# Patient Record
Sex: FEMALE | Race: WHITE | Employment: OTHER | URBAN - METROPOLITAN AREA
[De-identification: names, ages, dates, MRNs, and addresses within clinical notes are randomized per-mention and may not be internally consistent; named-entity substitution may affect disease eponyms.]

---

## 2017-10-31 ENCOUNTER — PAT TELEPHONE (OUTPATIENT)
Dept: PREADMISSION TESTING | Age: 72
End: 2017-10-31

## 2017-10-31 VITALS — BODY MASS INDEX: 30.84 KG/M2 | HEIGHT: 59 IN | WEIGHT: 153 LBS

## 2017-10-31 RX ORDER — PANTOPRAZOLE SODIUM 40 MG/1
20 GRANULE, DELAYED RELEASE ORAL
COMMUNITY
End: 2018-02-19 | Stop reason: CLARIF

## 2017-10-31 RX ORDER — LISINOPRIL AND HYDROCHLOROTHIAZIDE 20; 12.5 MG/1; MG/1
1 TABLET ORAL DAILY
COMMUNITY
End: 2018-02-19 | Stop reason: SDUPTHER

## 2017-10-31 RX ORDER — ATORVASTATIN CALCIUM 20 MG/1
20 TABLET, FILM COATED ORAL DAILY
COMMUNITY
End: 2019-10-29 | Stop reason: SDUPTHER

## 2017-10-31 NOTE — PROGRESS NOTES
them.     If you have a living will and a durable power of  for healthcare, please bring in a copy. As part of our patient safety program to minimize surgical site infections, we ask you to do the following:    · Please notify your surgeon if you develop any illness between         now and the  day of your surgery. · This includes a cough, cold, fever, sore throat, nausea,         or vomiting, and diarrhea, etc.  ·  Please notify your surgeon if you experience dizziness, shortness         of breath or blurred vision between now and the time of your surgery. You may shower the night before surgery or the morning of   your surgery with an antibacterial soap. You will need to bring a photo ID and insurance card    [facility] has an onsite pharmacy, would you like to utilize our pharmacy     If you will be staying overnight and use a C-pap machine, please bring   your C-pap to hospital     Our goal is to provide you with excellent care, therefore, visitors will be limited to two(2) in the room at a time so that we may focus on providing this care for you. Please contact pre-admission testing if you have any further questions. [facility] phone number:  [phone]  Please note these are generalized instructions for all surgical cases, you may be provided with more specific instructions according to your surgery.

## 2017-11-07 RX ORDER — SODIUM CHLORIDE 9 MG/ML
INJECTION, SOLUTION INTRAVENOUS CONTINUOUS
Status: CANCELLED | OUTPATIENT
Start: 2017-11-07

## 2017-11-07 RX ORDER — SODIUM CHLORIDE 0.9 % (FLUSH) 0.9 %
10 SYRINGE (ML) INJECTION PRN
Status: CANCELLED | OUTPATIENT
Start: 2017-11-07

## 2017-11-07 RX ORDER — SODIUM CHLORIDE 0.9 % (FLUSH) 0.9 %
10 SYRINGE (ML) INJECTION EVERY 12 HOURS SCHEDULED
Status: CANCELLED | OUTPATIENT
Start: 2017-11-07

## 2017-11-07 ASSESSMENT — LIFESTYLE VARIABLES: SMOKING_STATUS: 0

## 2017-11-07 NOTE — ANESTHESIA PRE-OP
20-12.5 MG per tablet Take 1 tablet by mouth daily      levothyroxine (SYNTHROID) 25 MCG tablet Take 25 mcg by mouth daily. No current facility-administered medications for this encounter. Vital Signs (Current) There were no vitals filed for this visit. Vital Signs Statistics (for past 48 hrs)     No Data Recorded    BP Readings from Last 3 Encounters:   09/02/10 115/66     BMI  There is no height or weight on file to calculate BMI. Estimated body mass index is 30.9 kg/m² as calculated from the following:    Height as of 10/31/17: 4' 11\" (1.499 m). Weight as of 10/31/17: 153 lb (69.4 kg). CBC   Lab Results   Component Value Date    WBC 5.9 09/02/2010    RBC 4.69 09/02/2010    HGB 14.5 09/02/2010    HCT 43.6 09/02/2010    MCV 93.0 09/02/2010    RDW 13.2 09/02/2010     09/02/2010     CMP    Lab Results   Component Value Date     09/02/2010    K 3.6 09/02/2010     09/02/2010    CO2 31 09/02/2010    BUN 19 09/02/2010    CREATININE 0.8 09/02/2010    GFRAA >60 09/02/2010    GLUCOSE 99 09/02/2010    CALCIUM 9.6 09/02/2010     BMP    Lab Results   Component Value Date     09/02/2010    K 3.6 09/02/2010     09/02/2010    CO2 31 09/02/2010    BUN 19 09/02/2010    CREATININE 0.8 09/02/2010    CALCIUM 9.6 09/02/2010    GFRAA >60 09/02/2010    GLUCOSE 99 09/02/2010     POCGlucose  No results for input(s): GLUCOSE in the last 72 hours. Coags  No results found for: PROTIME, INR, APTT  HCG (If Applicable) No results found for: PREGTESTUR, PREGSERUM, HCG, HCGQUANT   ABGs No results found for: PHART, PO2ART, STB3VIV, TXZ8OQO, BEART, A2DGTUAD   Type & Screen (If Applicable)  No results found for: [facility]  Surgeon:    Procedure:    Medications prior to admission:   Prior to Admission medications    Medication Sig Start Date End Date Taking?  Authorizing Provider   atorvastatin (LIPITOR) 20 MG tablet Take 20 mg by mouth daily    Historical Provider, MD   metFORMIN (GLUCOPHAGE) 500 MG tablet Take 500 mg by mouth 2 times daily (with meals)    Historical Provider, MD   pantoprazole sodium (PROTONIX) 40 MG PACK packet Take 20 mg by mouth every morning (before breakfast)    Historical Provider, MD   lisinopril-hydrochlorothiazide (PRINZIDE;ZESTORETIC) 20-12.5 MG per tablet Take 1 tablet by mouth daily    Historical Provider, MD   levothyroxine (SYNTHROID) 25 MCG tablet Take 25 mcg by mouth daily. Historical Provider, MD       Current medications:    Current Outpatient Prescriptions   Medication Sig Dispense Refill    atorvastatin (LIPITOR) 20 MG tablet Take 20 mg by mouth daily      metFORMIN (GLUCOPHAGE) 500 MG tablet Take 500 mg by mouth 2 times daily (with meals)      pantoprazole sodium (PROTONIX) 40 MG PACK packet Take 20 mg by mouth every morning (before breakfast)      lisinopril-hydrochlorothiazide (PRINZIDE;ZESTORETIC) 20-12.5 MG per tablet Take 1 tablet by mouth daily      levothyroxine (SYNTHROID) 25 MCG tablet Take 25 mcg by mouth daily. No current facility-administered medications for this encounter. Allergies:  No Known Allergies    Problem List:  There is no problem list on file for this patient. Past Medical History:        Diagnosis Date    Diabetes mellitus ([address]     GERD (gastroesophageal reflux disease)     Hyperlipidemia     Hypertension     Thyroid disease        Past Surgical History:        Procedure Laterality Date    EYE SURGERY      left eye       Social History:    Social History   Substance Use Topics    Smoking status: Never Smoker    Smokeless tobacco: Never Used    Alcohol use No                                Counseling given: Not Answered      Vital Signs (Current): There were no vitals filed for this visit.                                            BP Readings from Last 3 Encounters:   09/02/10 115/66       NPO Status:                                                                                 BMI:   Wt Readings from

## 2017-11-08 ENCOUNTER — HOSPITAL ENCOUNTER (OUTPATIENT)
Dept: ENDOSCOPY | Age: 72
Discharge: HOME | End: 2017-11-08
Attending: INTERNAL MEDICINE | Admitting: INTERNAL MEDICINE

## 2018-02-19 ENCOUNTER — OFFICE VISIT (OUTPATIENT)
Dept: FAMILY MEDICINE CLINIC | Age: 73
End: 2018-02-19

## 2018-02-19 VITALS
RESPIRATION RATE: 16 BRPM | HEIGHT: 59 IN | DIASTOLIC BLOOD PRESSURE: 80 MMHG | BODY MASS INDEX: 32.34 KG/M2 | TEMPERATURE: 98.8 F | WEIGHT: 160.4 LBS | OXYGEN SATURATION: 96 % | HEART RATE: 78 BPM | SYSTOLIC BLOOD PRESSURE: 158 MMHG

## 2018-02-19 DIAGNOSIS — Z23 NEED FOR SHINGLES VACCINE: ICD-10-CM

## 2018-02-19 DIAGNOSIS — E03.9 HYPOTHYROIDISM, UNSPECIFIED TYPE: ICD-10-CM

## 2018-02-19 DIAGNOSIS — E11.9 TYPE 2 DIABETES MELLITUS WITHOUT COMPLICATION, WITHOUT LONG-TERM CURRENT USE OF INSULIN (HCC): Primary | ICD-10-CM

## 2018-02-19 DIAGNOSIS — I10 HYPERTENSION, UNSPECIFIED TYPE: ICD-10-CM

## 2018-02-19 DIAGNOSIS — Z12.11 SCREENING FOR COLON CANCER: ICD-10-CM

## 2018-02-19 DIAGNOSIS — Z23 NEED FOR PNEUMOCOCCAL VACCINATION: ICD-10-CM

## 2018-02-19 DIAGNOSIS — E78.5 HYPERLIPIDEMIA, UNSPECIFIED HYPERLIPIDEMIA TYPE: ICD-10-CM

## 2018-02-19 DIAGNOSIS — K21.9 GASTRO-ESOPHAGEAL REFLUX DISEASE WITHOUT ESOPHAGITIS: ICD-10-CM

## 2018-02-19 DIAGNOSIS — R39.89 POSSIBLE URINARY TRACT INFECTION: ICD-10-CM

## 2018-02-19 LAB
BILIRUBIN, POC: ABNORMAL
BLOOD URINE, POC: ABNORMAL
CLARITY, POC: ABNORMAL
COLOR, POC: ABNORMAL
CREATININE URINE: 115.8 MG/DL (ref 28–259)
GLUCOSE URINE, POC: ABNORMAL
HBA1C MFR BLD: 7.3 %
KETONES, POC: ABNORMAL
LEUKOCYTE EST, POC: ABNORMAL
MICROALBUMIN UR-MCNC: 2.1 MG/DL
MICROALBUMIN/CREAT UR-RTO: 18.1 MG/G (ref 0–30)
NITRITE, POC: ABNORMAL
PH, POC: 6
PROTEIN, POC: ABNORMAL
SPECIFIC GRAVITY, POC: 1.02
UROBILINOGEN, POC: ABNORMAL

## 2018-02-19 PROCEDURE — 99203 OFFICE O/P NEW LOW 30 MIN: CPT | Performed by: FAMILY MEDICINE

## 2018-02-19 PROCEDURE — G0009 ADMIN PNEUMOCOCCAL VACCINE: HCPCS | Performed by: FAMILY MEDICINE

## 2018-02-19 PROCEDURE — 83036 HEMOGLOBIN GLYCOSYLATED A1C: CPT | Performed by: FAMILY MEDICINE

## 2018-02-19 PROCEDURE — 90732 PPSV23 VACC 2 YRS+ SUBQ/IM: CPT | Performed by: FAMILY MEDICINE

## 2018-02-19 PROCEDURE — 81002 URINALYSIS NONAUTO W/O SCOPE: CPT | Performed by: FAMILY MEDICINE

## 2018-02-19 RX ORDER — LISINOPRIL AND HYDROCHLOROTHIAZIDE 20; 12.5 MG/1; MG/1
1 TABLET ORAL DAILY
Qty: 90 TABLET | Refills: 1 | Status: SHIPPED | OUTPATIENT
Start: 2018-02-19 | End: 2018-07-19 | Stop reason: SDUPTHER

## 2018-02-19 RX ORDER — PANTOPRAZOLE SODIUM 40 MG/1
40 TABLET, DELAYED RELEASE ORAL DAILY
Qty: 90 TABLET | Refills: 1 | Status: SHIPPED | OUTPATIENT
Start: 2018-02-19 | End: 2018-11-06 | Stop reason: SDUPTHER

## 2018-02-19 RX ORDER — LEVOTHYROXINE SODIUM 0.03 MG/1
25 TABLET ORAL DAILY
Qty: 90 TABLET | Refills: 1 | Status: SHIPPED | OUTPATIENT
Start: 2018-02-19 | End: 2018-07-19 | Stop reason: SDUPTHER

## 2018-02-19 RX ORDER — PANTOPRAZOLE SODIUM 40 MG/1
20 GRANULE, DELAYED RELEASE ORAL
Qty: 30 EACH | Status: CANCELLED | OUTPATIENT
Start: 2018-02-19

## 2018-02-19 ASSESSMENT — ENCOUNTER SYMPTOMS
WHEEZING: 0
NAUSEA: 0
VOMITING: 0
SHORTNESS OF BREATH: 0
CHEST TIGHTNESS: 0
COUGH: 0
EYE PAIN: 0
EYE REDNESS: 0
TROUBLE SWALLOWING: 0
DIARRHEA: 0
SORE THROAT: 0
ANAL BLEEDING: 0
BLOOD IN STOOL: 0
ABDOMINAL PAIN: 0
CONSTIPATION: 0

## 2018-02-19 ASSESSMENT — PATIENT HEALTH QUESTIONNAIRE - PHQ9
2. FEELING DOWN, DEPRESSED OR HOPELESS: 0
SUM OF ALL RESPONSES TO PHQ9 QUESTIONS 1 & 2: 0
SUM OF ALL RESPONSES TO PHQ QUESTIONS 1-9: 0
1. LITTLE INTEREST OR PLEASURE IN DOING THINGS: 0

## 2018-02-19 NOTE — PROGRESS NOTES
[facility] Medicine  Clinic Note    Date: 2/19/2018                                               Subjective:     Chief Complaint   Patient presents with    New Patient     NEW PT  MEDS FOR DM , HTN     HPI  Ran out of Zestoretic, Synthroid, Pantroparzole about 1 month ago. HTN x since age 48. Diabetes x 2-3 years, , old chart looks like 6133, no known complications. Numbness in feet on and off. Normal eye exam Aug 2017. Last labs done couple months ago, does not know results. Unable to tolerate colon prep, agreeable to getting FIT cards. Got flu shot few months ago, does not recall pneumonia vaccine. Last mammogram and DEXA 2 years ago, will be scanned into chart.    Had exercise stress echo  test in Aug 2017, normal.          Patient Active Problem List    Diagnosis Date Noted    Type 2 diabetes mellitus ([facility] [address] 02/19/2018    Hyperlipemia 02/19/2018    Hypertension 02/19/2018    Hypothyroidism 02/19/2018    Gastro-esophageal reflux disease without esophagitis 09/07/2016     Past Medical History:   Diagnosis Date    Diabetes mellitus ([facility] [address]     GERD (gastroesophageal reflux disease)     Hyperlipidemia     Hypertension     Hypothyroidism     Thyroid disease      Past Surgical History:   Procedure Laterality Date    EYE SURGERY Left     \"pin under eye\"     Admission on 09/02/2010, Discharged on 09/02/2010   Component Date Value Ref Range Status    WBC 09/02/2010 5.9  4.0 - 11.0 X 1000 Final    RBC 09/02/2010 4.69  4.0 - 5.2 X(10)6 Final    Hemoglobin 09/02/2010 14.5  12.0 - 16.0 gm/dl Final    Hematocrit 09/02/2010 43.6  36.0 - 48.0 % Final    MCV 09/02/2010 93.0  80 - 100 fl Final    MCH 09/02/2010 30.9  26 - 34 pg Final    MCHC 09/02/2010 33.2  31 - 36 gm/dl Final    RDW 09/02/2010 13.2  11.5 - 14.5 % Final    Platelets 73/84/6600 157  135 - 450 X(10)3 Final    MPV 09/02/2010 8.4  5.0 - 10.5 fl Final    Segs Relative 09/02/2010 62.8  42.0 - 63.0 % Final    Lymphocytes % 09/02/2010 28.0  25.0 - 40.0 % Final    Monocytes % 09/02/2010 6.6  SEE BELOW % Final    Comment: 0.0-12.0                           Effective 8/11/10                           New ref range.  Eosinophils % 09/02/2010 2.2  0.0 - 5.0 % Final    Basophils % 09/02/2010 0.4  0.0 - 2.0 % Final    GRANULOCYTE ABSOLUTE COUNT 09/02/2010 3.7  1.7 - 7.7 X(10)3 Final    Lymphocytes # 09/02/2010 1.6  1.0 - 5.1 X(10)3 Final    Monocytes # 09/02/2010 0.4  SEE BELOW X(10)3 Final    Comment: 0.0-1.3                           Effective 8/11/10                           New ref range.  Eosinophils # 09/02/2010 0.1  0.0 - 0.6 X(10)3 Final    Basophils # 09/02/2010 0.0  0.0 - 0.2 X(10)3 Final    Differential Type 09/02/2010 Auto   Final    Sodium 09/02/2010 141  136 - 145 mEq/L Final    Potassium 09/02/2010 3.6  3.5 - 5.1 mEq/L Final    Chloride 09/02/2010 107  99 - 110 mEq/L Final    CO2 09/02/2010 31  21 - 32 mEq/L Final    Glucose 09/02/2010 99  70 - 99 mg/dl Final    BUN 09/02/2010 19* 7 - 18 mg/dl Final    CREATININE 09/02/2010 0.8  0.6 - 1.2 mg/dl Final    Calcium 09/02/2010 9.6  8.3 - 10.6 mg/dl Final    GFR, Estimated 09/02/2010 >60  >60 mL/min/1.73m2 Final    GFR Est, /Amer 09/02/2010 >60  SEE BELOW Final    Comment: >60 mL/min/1.73m2                           EGFR, calc. for ages 25                           & older using the MDRD                           formula (not corrected                           for weight),is valid for                           stable renal function.  Color, UA 09/02/2010 YELLOW  Yellow Final    Clarity, UA 09/02/2010 CLEAR  Clear Final    Glucose, UA 09/02/2010 NEGATIVE  Neg Final    Bilirubin, Urine 09/02/2010 NEGATIVE  Neg Final    Ketones, Urine 09/02/2010 NEGATIVE  Neg Final    Specific Gravity, UA 09/02/2010 1.010  SEE BELOW Final    Comment: 1.005-1.030                           Effective 8/11/10                           New ref range.     Blood, Urine 09/02/2010 NEGATIVE  Neg Final    pH, UA 09/02/2010 7.5  4.5 - 8.0 Final    Protein, UA 09/02/2010 NEGATIVE  Neg Final    Urobilinogen, Urine 09/02/2010 0.2  <2.0 EU/dl Final    Nitrite, Urine 09/02/2010 NEGATIVE  Neg Final    Leukocyte Esterase, Urine 09/02/2010 NEGATIVE  Neg Final     Family History   Problem Relation Age of Onset    High Cholesterol Mother     Stroke Mother     Heart Disease Father     Heart Attack Father      Current Outpatient Prescriptions   Medication Sig Dispense Refill    lisinopril-hydrochlorothiazide (PRINZIDE;ZESTORETIC) 20-12.5 MG per tablet Take 1 tablet by mouth daily 90 tablet 1    pantoprazole (PROTONIX) 40 MG tablet Take 1 tablet by mouth daily 90 tablet 1    levothyroxine (SYNTHROID) 25 MCG tablet Take 1 tablet by mouth daily 90 tablet 1    zoster recombinant adjuvanted vaccine (SHINGRIX) 50 MCG SUSR injection Inject 0.5 mLs into the muscle once for 1 dose 0.5 mL 0    atorvastatin (LIPITOR) 20 MG tablet Take 20 mg by mouth daily      metFORMIN (GLUCOPHAGE) 500 MG tablet Take 500 mg by mouth 2 times daily (with meals)       No current facility-administered medications for this visit. No Known Allergies    Review of Systems   Constitutional: Negative for chills, diaphoresis and fever. HENT: Negative for ear pain, mouth sores, sore throat and trouble swallowing. Eyes: Negative for pain, redness and visual disturbance. Respiratory: Negative for cough, chest tightness, shortness of breath and wheezing. Cardiovascular: Negative for chest pain, palpitations and leg swelling. Gastrointestinal: Negative for abdominal pain, anal bleeding, blood in stool, constipation, diarrhea, nausea and vomiting. Endocrine: Negative for polydipsia and polyuria. Genitourinary: Negative for decreased urine volume, difficulty urinating, dysuria and hematuria. Musculoskeletal: Negative for arthralgias, joint swelling and myalgias. Skin: Negative for rash and wound. Allergic/Immunologic: Negative for environmental allergies. Neurological: Negative for seizures, syncope and facial asymmetry. Hematological: Does not bruise/bleed easily. Psychiatric/Behavioral: Negative for agitation and dysphoric mood. The patient is not nervous/anxious. Objective:  BP (!) 158/80 (Site: Right Arm, Position: Sitting, Cuff Size: Medium Adult)   Pulse 78   Temp 98.8 °F (37.1 °C) (Oral)   Resp 16   Ht 4' 11\" (1.499 m) Comment: with shoes  Wt 160 lb 6.4 oz (72.8 kg) Comment: WITH SHOES  SpO2 96%   Breastfeeding? No   BMI 32.40 kg/m²     Physical Exam   Constitutional: She is oriented to person, place, and time. She appears well-developed and well-nourished. She is cooperative. HENT:   Head: Normocephalic and atraumatic. Right Ear: Hearing, tympanic membrane, external ear and ear canal normal.   Left Ear: Hearing, tympanic membrane, external ear and ear canal normal.   Mouth/Throat: Uvula is midline and mucous membranes are normal.   Eyes: Conjunctivae, EOM and lids are normal.   Neck: Neck supple. Cardiovascular: Normal rate and regular rhythm. Pulmonary/Chest: Effort normal and breath sounds normal. No respiratory distress. She has no wheezes. She has no rales. Abdominal: Soft. Normal appearance. She exhibits no distension. Musculoskeletal:        Right ankle: She exhibits no swelling. Left ankle: She exhibits no swelling. Neurological: She is alert and oriented to person, place, and time. Skin: Skin is warm and dry. Psychiatric: She has a normal mood and affect. Her speech is normal and behavior is normal. Cognition and memory are impaired.      Visual inspection:  Deformity/amputation: absent  Skin lesions/pre-ulcerative calluses: absent  Edema: right- negative, left- negative    Sensory exam:  Monofilament sensation: normal  (minimum of 5 random plantar locations tested, avoiding callused areas - > 1 area with absence of sensation is + for

## 2018-02-21 LAB — URINE CULTURE, ROUTINE: NORMAL

## 2018-07-19 ENCOUNTER — OFFICE VISIT (OUTPATIENT)
Dept: FAMILY MEDICINE CLINIC | Age: 73
End: 2018-07-19

## 2018-07-19 VITALS
RESPIRATION RATE: 16 BRPM | HEART RATE: 84 BPM | TEMPERATURE: 98.4 F | OXYGEN SATURATION: 95 % | BODY MASS INDEX: 30.88 KG/M2 | SYSTOLIC BLOOD PRESSURE: 134 MMHG | WEIGHT: 153.2 LBS | HEIGHT: 59 IN | DIASTOLIC BLOOD PRESSURE: 70 MMHG

## 2018-07-19 DIAGNOSIS — E78.5 HYPERLIPIDEMIA, UNSPECIFIED HYPERLIPIDEMIA TYPE: ICD-10-CM

## 2018-07-19 DIAGNOSIS — I10 HYPERTENSION, UNSPECIFIED TYPE: ICD-10-CM

## 2018-07-19 DIAGNOSIS — E03.9 HYPOTHYROIDISM, UNSPECIFIED TYPE: ICD-10-CM

## 2018-07-19 DIAGNOSIS — E11.9 TYPE 2 DIABETES MELLITUS WITHOUT COMPLICATION, WITHOUT LONG-TERM CURRENT USE OF INSULIN (HCC): ICD-10-CM

## 2018-07-19 DIAGNOSIS — I10 ESSENTIAL HYPERTENSION: Primary | ICD-10-CM

## 2018-07-19 DIAGNOSIS — R20.2 PARESTHESIAS: ICD-10-CM

## 2018-07-19 LAB
A/G RATIO: 1.5 (ref 1.1–2.2)
ALBUMIN SERPL-MCNC: 4 G/DL (ref 3.4–5)
ALP BLD-CCNC: 109 U/L (ref 40–129)
ALT SERPL-CCNC: 16 U/L (ref 10–40)
ANION GAP SERPL CALCULATED.3IONS-SCNC: 10 MMOL/L (ref 3–16)
AST SERPL-CCNC: 17 U/L (ref 15–37)
BASOPHILS ABSOLUTE: 0.1 K/UL (ref 0–0.2)
BASOPHILS RELATIVE PERCENT: 0.8 %
BILIRUB SERPL-MCNC: 0.4 MG/DL (ref 0–1)
BUN BLDV-MCNC: 16 MG/DL (ref 7–20)
CALCIUM SERPL-MCNC: 9.4 MG/DL (ref 8.3–10.6)
CHLORIDE BLD-SCNC: 102 MMOL/L (ref 99–110)
CHOLESTEROL, TOTAL: 152 MG/DL (ref 0–199)
CO2: 26 MMOL/L (ref 21–32)
CREAT SERPL-MCNC: 0.8 MG/DL (ref 0.6–1.2)
EOSINOPHILS ABSOLUTE: 0.2 K/UL (ref 0–0.6)
EOSINOPHILS RELATIVE PERCENT: 2.8 %
GFR AFRICAN AMERICAN: >60
GFR NON-AFRICAN AMERICAN: >60
GLOBULIN: 2.7 G/DL
GLUCOSE BLD-MCNC: 219 MG/DL (ref 70–99)
HCT VFR BLD CALC: 38 % (ref 36–48)
HDLC SERPL-MCNC: 32 MG/DL (ref 40–60)
HEMOGLOBIN: 12.5 G/DL (ref 12–16)
LDL CHOLESTEROL CALCULATED: ABNORMAL MG/DL
LDL CHOLESTEROL DIRECT: 93 MG/DL
LYMPHOCYTES ABSOLUTE: 2.6 K/UL (ref 1–5.1)
LYMPHOCYTES RELATIVE PERCENT: 36.8 %
MCH RBC QN AUTO: 28 PG (ref 26–34)
MCHC RBC AUTO-ENTMCNC: 33.1 G/DL (ref 31–36)
MCV RBC AUTO: 84.7 FL (ref 80–100)
MONOCYTES ABSOLUTE: 0.5 K/UL (ref 0–1.3)
MONOCYTES RELATIVE PERCENT: 6.9 %
NEUTROPHILS ABSOLUTE: 3.7 K/UL (ref 1.7–7.7)
NEUTROPHILS RELATIVE PERCENT: 52.7 %
PDW BLD-RTO: 15.4 % (ref 12.4–15.4)
PLATELET # BLD: 190 K/UL (ref 135–450)
PMV BLD AUTO: 9.6 FL (ref 5–10.5)
POTASSIUM SERPL-SCNC: 3.7 MMOL/L (ref 3.5–5.1)
RBC # BLD: 4.48 M/UL (ref 4–5.2)
SODIUM BLD-SCNC: 138 MMOL/L (ref 136–145)
TOTAL PROTEIN: 6.7 G/DL (ref 6.4–8.2)
TRIGL SERPL-MCNC: 379 MG/DL (ref 0–150)
TSH REFLEX: 1.72 UIU/ML (ref 0.27–4.2)
VLDLC SERPL CALC-MCNC: ABNORMAL MG/DL
WBC # BLD: 7 K/UL (ref 4–11)

## 2018-07-19 PROCEDURE — 36415 COLL VENOUS BLD VENIPUNCTURE: CPT | Performed by: FAMILY MEDICINE

## 2018-07-19 PROCEDURE — 99214 OFFICE O/P EST MOD 30 MIN: CPT | Performed by: FAMILY MEDICINE

## 2018-07-19 RX ORDER — LISINOPRIL AND HYDROCHLOROTHIAZIDE 20; 12.5 MG/1; MG/1
1 TABLET ORAL DAILY
Qty: 90 TABLET | Refills: 1 | Status: SHIPPED | OUTPATIENT
Start: 2018-07-19 | End: 2019-01-12 | Stop reason: SDUPTHER

## 2018-07-19 RX ORDER — LEVOTHYROXINE SODIUM 0.03 MG/1
25 TABLET ORAL DAILY
Qty: 90 TABLET | Refills: 1 | Status: SHIPPED | OUTPATIENT
Start: 2018-07-19 | End: 2019-01-12 | Stop reason: SDUPTHER

## 2018-07-19 ASSESSMENT — ENCOUNTER SYMPTOMS
SORE THROAT: 0
EYE REDNESS: 0
NAUSEA: 0
WHEEZING: 0
VOMITING: 0
COUGH: 0
SHORTNESS OF BREATH: 0
EYE PAIN: 0
ABDOMINAL PAIN: 0
TROUBLE SWALLOWING: 0

## 2018-07-19 NOTE — PROGRESS NOTES
times daily (with meals) 180 tablet 1    levothyroxine (SYNTHROID) 25 MCG tablet Take 1 tablet by mouth daily 90 tablet 1    lisinopril-hydrochlorothiazide (PRINZIDE;ZESTORETIC) 20-12.5 MG per tablet Take 1 tablet by mouth daily 90 tablet 1    atorvastatin (LIPITOR) 20 MG tablet Take 20 mg by mouth daily      pantoprazole (PROTONIX) 40 MG tablet Take 1 tablet by mouth daily 90 tablet 1     No current facility-administered medications for this visit. No Known Allergies    Review of Systems   Constitutional: Negative for chills, fever and unexpected weight change. HENT: Negative for ear pain, sore throat and trouble swallowing. Eyes: Negative for pain, redness and visual disturbance. Respiratory: Negative for cough, shortness of breath and wheezing. Cardiovascular: Negative for chest pain, palpitations and leg swelling. Gastrointestinal: Negative for abdominal pain, nausea and vomiting. Endocrine: Negative for polydipsia, polyphagia and polyuria. Musculoskeletal: Negative for gait problem, joint swelling and myalgias. Neurological: Positive for numbness. Negative for syncope and facial asymmetry. Psychiatric/Behavioral: Positive for decreased concentration. Negative for dysphoric mood. The patient is not nervous/anxious. Objective:  /70 (Site: Right Arm, Position: Sitting, Cuff Size: Medium Adult)   Pulse 84   Temp 98.4 °F (36.9 °C) (Oral)   Resp 16   Ht 4' 11\" (1.499 m) Comment: WITH SHOES  Wt 153 lb 3.2 oz (69.5 kg) Comment: WITH SHOES  SpO2 95%   Breastfeeding? No   BMI 30.94 kg/m²     Physical Exam   Constitutional: She is oriented to person, place, and time. She appears well-developed and well-nourished. She is cooperative. HENT:   Head: Normocephalic and atraumatic.    Right Ear: Hearing, tympanic membrane, external ear and ear canal normal.   Left Ear: Hearing, tympanic membrane, external ear and ear canal normal.   Mouth/Throat: Uvula is midline, oropharynx is clear and moist and mucous membranes are normal.   Eyes: Conjunctivae, EOM and lids are normal.   Neck: Neck supple. Cardiovascular: Normal rate and regular rhythm. Pulmonary/Chest: Effort normal and breath sounds normal.   Abdominal: Soft. Normal appearance. She exhibits no distension. Neurological: She is alert and oriented to person, place, and time. No cranial nerve deficit or sensory deficit. Skin: Skin is warm and dry. Psychiatric: She has a normal mood and affect. Her speech is normal and behavior is normal.   Nursing note and vitals reviewed. Assessment/Plan:   Cody Casiano was seen today for blood pressure check. Diagnoses and all orders for this visit:    Essential hypertension  -     CBC Auto Differential; Future  -     Comprehensive Metabolic Panel; Future    Type 2 diabetes mellitus without complication, without long-term current use of insulin (HCC)  -     Hemoglobin A1C; Future  -     metFORMIN (GLUCOPHAGE) 500 MG tablet; Take 1 tablet by mouth 2 times daily (with meals)    Hyperlipidemia, unspecified hyperlipidemia type  -     Lipid Panel; Future  -     levothyroxine (SYNTHROID) 25 MCG tablet; Take 1 tablet by mouth daily    Hypothyroidism, unspecified type  -     TSH with Reflex; Future    Paresthesias  -     Vitamin B12 & Folate; Future    Hypertension, unspecified type  -     lisinopril-hydrochlorothiazide (PRINZIDE;ZESTORETIC) 20-12.5 MG per tablet; Take 1 tablet by mouth daily      Return in about 3 months (around 10/19/2018), or follow up BP, diabetes, chol, thyroid.     [address] [address]  7/19/2018  2:20 PM

## 2018-07-20 LAB
ESTIMATED AVERAGE GLUCOSE: 165.7 MG/DL
FOLATE: 10.24 NG/ML (ref 4.78–24.2)
HBA1C MFR BLD: 7.4 %
VITAMIN B-12: 339 PG/ML (ref 211–911)

## 2018-07-26 ENCOUNTER — OFFICE VISIT (OUTPATIENT)
Dept: FAMILY MEDICINE CLINIC | Age: 73
End: 2018-07-26

## 2018-07-26 VITALS
HEART RATE: 88 BPM | DIASTOLIC BLOOD PRESSURE: 64 MMHG | RESPIRATION RATE: 16 BRPM | OXYGEN SATURATION: 96 % | SYSTOLIC BLOOD PRESSURE: 102 MMHG | WEIGHT: 150.8 LBS | TEMPERATURE: 98.4 F | BODY MASS INDEX: 30.4 KG/M2 | HEIGHT: 59 IN

## 2018-07-26 DIAGNOSIS — R10.30 LOWER ABDOMINAL PAIN: ICD-10-CM

## 2018-07-26 DIAGNOSIS — R82.90 ABNORMAL URINALYSIS: ICD-10-CM

## 2018-07-26 DIAGNOSIS — R31.9 HEMATURIA, UNSPECIFIED TYPE: Primary | ICD-10-CM

## 2018-07-26 LAB
BILIRUBIN, POC: ABNORMAL
BLOOD URINE, POC: ABNORMAL
CLARITY, POC: CLEAR
COLOR, POC: YELLOW
GLUCOSE URINE, POC: ABNORMAL
KETONES, POC: ABNORMAL
LEUKOCYTE EST, POC: ABNORMAL
NITRITE, POC: ABNORMAL
PH, POC: 6
PROTEIN, POC: ABNORMAL
SPECIFIC GRAVITY, POC: 1.01
UROBILINOGEN, POC: ABNORMAL

## 2018-07-26 PROCEDURE — 81002 URINALYSIS NONAUTO W/O SCOPE: CPT | Performed by: FAMILY MEDICINE

## 2018-07-26 PROCEDURE — 99213 OFFICE O/P EST LOW 20 MIN: CPT | Performed by: FAMILY MEDICINE

## 2018-07-26 RX ORDER — SULFAMETHOXAZOLE AND TRIMETHOPRIM 800; 160 MG/1; MG/1
1 TABLET ORAL 2 TIMES DAILY
Qty: 14 TABLET | Refills: 0 | Status: SHIPPED | OUTPATIENT
Start: 2018-07-26 | End: 2018-08-02

## 2018-07-26 ASSESSMENT — PATIENT HEALTH QUESTIONNAIRE - PHQ9
1. LITTLE INTEREST OR PLEASURE IN DOING THINGS: 0
2. FEELING DOWN, DEPRESSED OR HOPELESS: 0
SUM OF ALL RESPONSES TO PHQ9 QUESTIONS 1 & 2: 0
SUM OF ALL RESPONSES TO PHQ QUESTIONS 1-9: 0

## 2018-07-26 NOTE — PATIENT INSTRUCTIONS
Patient Education        Blood in the Urine: Care Instructions  Your Care Instructions    Blood in the urine, or hematuria, may make the urine look red, brown, or pink. There may be blood every time you urinate or just from time to time. You cannot always see blood in the urine, but it will show up in a urine test.  Blood in the urine may be serious. It should always be checked by a doctor. Your doctor may recommend more tests, including an X-ray, a CT scan, or a cystoscopy (which lets a doctor look inside the urethra and bladder). Blood in the urine can be a sign of another problem. Common causes are bladder infections and kidney stones. An injury to your groin or your genital area can also cause bleeding in the urinary tract. Very hard exercise-such as running a marathon-can cause blood in the urine. Blood in the urine can also be a sign of kidney disease or cancer in the bladder or kidney. Many cases of blood in the urine are caused by a harmless condition that runs in families. This is called benign familial hematuria. It does not need any treatment. Sometimes your urine may look red or brown even though it does not contain blood. For example, not getting enough fluids (dehydration), taking certain medicines, or having a liver problem can change the color of your urine. Eating foods such as beets, rhubarb, or blackberries or foods with red food coloring can make your urine look red or pink. Follow-up care is a key part of your treatment and safety. Be sure to make and go to all appointments, and call your doctor if you are having problems. It's also a good idea to know your test results and keep a list of the medicines you take. When should you call for help? Call your doctor now or seek immediate medical care if:    · You have symptoms of a urinary infection. For example:  ¨ You have pus in your urine. ¨ You have pain in your back just below your rib cage. This is called flank pain.   ¨ You have a fever,

## 2018-07-26 NOTE — PROGRESS NOTES
[facility] Medicine  Clinic Note    Date: 7/26/2018                                               Subjective:     Chief Complaint   Patient presents with    Emesis     1 EPISODE A COUPLE DAYS AGO    Diarrhea     1 EPISODE OF DIARRHEA , BLOOD IN URINE     HPI  Hematuria: Patient complains of gross hematuria. Onset of hematuria was 2 days ago and was unknown in onset. There is not a history of nephrolithiasis. There is not a history of urologic trauma. Other urologic symptoms include none. Patient admits to history of no urologic problems. Patient denies history of urolithiasis but had bladder suspension years ago. Prior workup has been none. Urine feels \"warm\" but no burning per se. Had one episode of vomiting and loose BM.          Patient Active Problem List    Diagnosis Date Noted    Type 2 diabetes mellitus ([facility] [address] 02/19/2018    Hyperlipemia 02/19/2018    Hypertension 02/19/2018    Hypothyroidism 02/19/2018    Gastro-esophageal reflux disease without esophagitis 09/07/2016     Past Medical History:   Diagnosis Date    Diabetes mellitus ([facility] [address]     GERD (gastroesophageal reflux disease)     Hyperlipidemia     Hypertension     Hypothyroidism     Thyroid disease      Past Surgical History:   Procedure Laterality Date    BLADDER SUSPENSION N/A 1999    EYE SURGERY Left     \"pin under eye\"       Current Outpatient Prescriptions   Medication Sig Dispense Refill    sulfamethoxazole-trimethoprim (BACTRIM DS) 800-160 MG per tablet Take 1 tablet by mouth 2 times daily for 7 days 14 tablet 0    metFORMIN (GLUCOPHAGE) 500 MG tablet Take 1 tablet by mouth 2 times daily (with meals) 180 tablet 1    levothyroxine (SYNTHROID) 25 MCG tablet Take 1 tablet by mouth daily 90 tablet 1    lisinopril-hydrochlorothiazide (PRINZIDE;ZESTORETIC) 20-12.5 MG per tablet Take 1 tablet by mouth daily 90 tablet 1    pantoprazole (PROTONIX) 40 MG tablet Take 1 tablet by mouth daily 90 tablet 1    atorvastatin (LIPITOR) 20 visit:    Hematuria, unspecified type  -     POCT Urinalysis no Micro  -     Urine Culture; Future  -     sulfamethoxazole-trimethoprim (BACTRIM DS) 800-160 MG per tablet; Take 1 tablet by mouth 2 times daily for 7 days    Abnormal urinalysis  -     Urine Culture; Future  -     sulfamethoxazole-trimethoprim (BACTRIM DS) 800-160 MG per tablet; Take 1 tablet by mouth 2 times daily for 7 days    Lower abdominal pain  -     XR ABDOMEN (2 VIEWS); Future    Cover for possible UTI, pt left before able to leave more urine for culture to be sent  KUB today, follow up depending on symptoms and results    Return if symptoms worsen or fail to improve.     [address] [address],   7/26/2018  4:25 PM

## 2018-07-27 ASSESSMENT — ENCOUNTER SYMPTOMS
COUGH: 0
DIARRHEA: 1
VOMITING: 1
ANAL BLEEDING: 0
ABDOMINAL PAIN: 1
BLOOD IN STOOL: 0
WHEEZING: 0
SHORTNESS OF BREATH: 0

## 2018-08-01 ENCOUNTER — HOSPITAL ENCOUNTER (OUTPATIENT)
Dept: OTHER | Age: 73
Discharge: HOME | End: 2018-08-01
Attending: FAMILY MEDICINE | Admitting: FAMILY MEDICINE

## 2018-08-01 DIAGNOSIS — R10.30 LOWER ABDOMINAL PAIN: ICD-10-CM

## 2018-10-17 ENCOUNTER — NURSE ONLY (OUTPATIENT)
Dept: FAMILY MEDICINE CLINIC | Age: 73
End: 2018-10-17
Payer: COMMERCIAL

## 2018-10-17 DIAGNOSIS — Z23 NEED FOR INFLUENZA VACCINATION: Primary | ICD-10-CM

## 2018-10-17 PROCEDURE — G0008 ADMIN INFLUENZA VIRUS VAC: HCPCS | Performed by: FAMILY MEDICINE

## 2018-10-17 PROCEDURE — 90662 IIV NO PRSV INCREASED AG IM: CPT | Performed by: FAMILY MEDICINE

## 2018-11-06 ENCOUNTER — OFFICE VISIT (OUTPATIENT)
Dept: FAMILY MEDICINE CLINIC | Age: 73
End: 2018-11-06
Payer: COMMERCIAL

## 2018-11-06 VITALS
HEART RATE: 72 BPM | HEIGHT: 59 IN | BODY MASS INDEX: 29.43 KG/M2 | SYSTOLIC BLOOD PRESSURE: 114 MMHG | DIASTOLIC BLOOD PRESSURE: 78 MMHG | WEIGHT: 146 LBS | TEMPERATURE: 97.2 F | RESPIRATION RATE: 16 BRPM | OXYGEN SATURATION: 98 %

## 2018-11-06 DIAGNOSIS — F03.A0 MILD DEMENTIA: Primary | ICD-10-CM

## 2018-11-06 DIAGNOSIS — K21.9 GASTRO-ESOPHAGEAL REFLUX DISEASE WITHOUT ESOPHAGITIS: ICD-10-CM

## 2018-11-06 DIAGNOSIS — R39.89 ABNORMAL URINE COLOR: ICD-10-CM

## 2018-11-06 DIAGNOSIS — E11.9 TYPE 2 DIABETES MELLITUS WITHOUT COMPLICATION, WITHOUT LONG-TERM CURRENT USE OF INSULIN (HCC): ICD-10-CM

## 2018-11-06 LAB
BILIRUBIN, POC: ABNORMAL
BLOOD URINE, POC: ABNORMAL
CLARITY, POC: CLEAR
COLOR, POC: YELLOW
GLUCOSE URINE, POC: ABNORMAL
HBA1C MFR BLD: 6.5 %
KETONES, POC: ABNORMAL
LEUKOCYTE EST, POC: ABNORMAL
NITRITE, POC: ABNORMAL
PH, POC: 6.5
PROTEIN, POC: ABNORMAL
SPECIFIC GRAVITY, POC: 1.01
UROBILINOGEN, POC: ABNORMAL

## 2018-11-06 PROCEDURE — 81002 URINALYSIS NONAUTO W/O SCOPE: CPT | Performed by: FAMILY MEDICINE

## 2018-11-06 PROCEDURE — 83036 HEMOGLOBIN GLYCOSYLATED A1C: CPT | Performed by: FAMILY MEDICINE

## 2018-11-06 PROCEDURE — 99214 OFFICE O/P EST MOD 30 MIN: CPT | Performed by: FAMILY MEDICINE

## 2018-11-06 RX ORDER — DONEPEZIL HYDROCHLORIDE 10 MG/1
10 TABLET, FILM COATED ORAL NIGHTLY
Qty: 30 TABLET | Refills: 2 | Status: SHIPPED | OUTPATIENT
Start: 2018-11-06 | End: 2019-01-14 | Stop reason: SDUPTHER

## 2018-11-06 RX ORDER — PANTOPRAZOLE SODIUM 40 MG/1
40 TABLET, DELAYED RELEASE ORAL DAILY
Qty: 90 TABLET | Refills: 1 | Status: SHIPPED | OUTPATIENT
Start: 2018-11-06 | End: 2019-06-08 | Stop reason: SDUPTHER

## 2018-11-08 LAB — URINE CULTURE, ROUTINE: NORMAL

## 2019-01-12 DIAGNOSIS — E78.5 HYPERLIPIDEMIA, UNSPECIFIED HYPERLIPIDEMIA TYPE: ICD-10-CM

## 2019-01-12 DIAGNOSIS — I10 HYPERTENSION, UNSPECIFIED TYPE: ICD-10-CM

## 2019-01-12 DIAGNOSIS — E11.9 TYPE 2 DIABETES MELLITUS WITHOUT COMPLICATION, WITHOUT LONG-TERM CURRENT USE OF INSULIN (HCC): ICD-10-CM

## 2019-01-14 ENCOUNTER — OFFICE VISIT (OUTPATIENT)
Dept: FAMILY MEDICINE CLINIC | Age: 74
End: 2019-01-14
Payer: COMMERCIAL

## 2019-01-14 VITALS
TEMPERATURE: 98.7 F | WEIGHT: 144 LBS | SYSTOLIC BLOOD PRESSURE: 118 MMHG | DIASTOLIC BLOOD PRESSURE: 60 MMHG | HEIGHT: 59 IN | HEART RATE: 72 BPM | RESPIRATION RATE: 18 BRPM | BODY MASS INDEX: 29.03 KG/M2 | OXYGEN SATURATION: 97 %

## 2019-01-14 DIAGNOSIS — Z12.39 BREAST CANCER SCREENING: ICD-10-CM

## 2019-01-14 DIAGNOSIS — F03.A0 MILD DEMENTIA: Primary | ICD-10-CM

## 2019-01-14 PROCEDURE — 99213 OFFICE O/P EST LOW 20 MIN: CPT | Performed by: FAMILY MEDICINE

## 2019-01-14 RX ORDER — LEVOTHYROXINE SODIUM 0.03 MG/1
TABLET ORAL
Qty: 90 TABLET | Refills: 0 | Status: SHIPPED | OUTPATIENT
Start: 2019-01-14 | End: 2019-02-08 | Stop reason: SDUPTHER

## 2019-01-14 RX ORDER — DONEPEZIL HYDROCHLORIDE 10 MG/1
10 TABLET, FILM COATED ORAL NIGHTLY
Qty: 30 TABLET | Refills: 5 | Status: SHIPPED | OUTPATIENT
Start: 2019-01-14 | End: 2019-07-07 | Stop reason: SDUPTHER

## 2019-01-14 RX ORDER — LISINOPRIL AND HYDROCHLOROTHIAZIDE 20; 12.5 MG/1; MG/1
TABLET ORAL
Qty: 90 TABLET | Refills: 0 | Status: SHIPPED | OUTPATIENT
Start: 2019-01-14 | End: 2019-04-11 | Stop reason: SDUPTHER

## 2019-01-14 ASSESSMENT — ENCOUNTER SYMPTOMS
COUGH: 0
SHORTNESS OF BREATH: 0
WHEEZING: 0

## 2019-02-06 ENCOUNTER — OFFICE VISIT (OUTPATIENT)
Dept: FAMILY MEDICINE CLINIC | Age: 74
End: 2019-02-06
Payer: COMMERCIAL

## 2019-02-06 VITALS
RESPIRATION RATE: 18 BRPM | OXYGEN SATURATION: 98 % | WEIGHT: 177.6 LBS | BODY MASS INDEX: 35.8 KG/M2 | HEIGHT: 59 IN | TEMPERATURE: 98.3 F | HEART RATE: 70 BPM | SYSTOLIC BLOOD PRESSURE: 126 MMHG | DIASTOLIC BLOOD PRESSURE: 78 MMHG

## 2019-02-06 DIAGNOSIS — E03.9 HYPOTHYROIDISM, UNSPECIFIED TYPE: ICD-10-CM

## 2019-02-06 DIAGNOSIS — E11.9 TYPE 2 DIABETES MELLITUS WITHOUT COMPLICATION, WITHOUT LONG-TERM CURRENT USE OF INSULIN (HCC): ICD-10-CM

## 2019-02-06 DIAGNOSIS — R26.81 GAIT INSTABILITY: Primary | ICD-10-CM

## 2019-02-06 DIAGNOSIS — R29.2 ABNORMAL REFLEX: ICD-10-CM

## 2019-02-06 DIAGNOSIS — F03.90 DEMENTIA WITHOUT BEHAVIORAL DISTURBANCE, UNSPECIFIED DEMENTIA TYPE: ICD-10-CM

## 2019-02-06 PROCEDURE — 99214 OFFICE O/P EST MOD 30 MIN: CPT | Performed by: FAMILY MEDICINE

## 2019-02-06 ASSESSMENT — ENCOUNTER SYMPTOMS
VOMITING: 0
WHEEZING: 0
SHORTNESS OF BREATH: 0
DIARRHEA: 1
COUGH: 0
ABDOMINAL PAIN: 0
BACK PAIN: 1
CONSTIPATION: 0

## 2019-02-06 ASSESSMENT — PATIENT HEALTH QUESTIONNAIRE - PHQ9
SUM OF ALL RESPONSES TO PHQ QUESTIONS 1-9: 0
2. FEELING DOWN, DEPRESSED OR HOPELESS: 0
SUM OF ALL RESPONSES TO PHQ9 QUESTIONS 1 & 2: 0
1. LITTLE INTEREST OR PLEASURE IN DOING THINGS: 0
SUM OF ALL RESPONSES TO PHQ QUESTIONS 1-9: 0

## 2019-02-07 DIAGNOSIS — E78.5 HYPERLIPIDEMIA, UNSPECIFIED HYPERLIPIDEMIA TYPE: ICD-10-CM

## 2019-02-08 ENCOUNTER — TELEPHONE (OUTPATIENT)
Dept: FAMILY MEDICINE CLINIC | Age: 74
End: 2019-02-08

## 2019-02-08 DIAGNOSIS — R29.2 ABNORMAL REFLEX: ICD-10-CM

## 2019-02-08 DIAGNOSIS — F03.90 DEMENTIA WITHOUT BEHAVIORAL DISTURBANCE, UNSPECIFIED DEMENTIA TYPE: ICD-10-CM

## 2019-02-08 DIAGNOSIS — R26.81 GAIT INSTABILITY: Primary | ICD-10-CM

## 2019-02-08 RX ORDER — LEVOTHYROXINE SODIUM 0.03 MG/1
TABLET ORAL
Qty: 90 TABLET | Refills: 0 | Status: SHIPPED | OUTPATIENT
Start: 2019-02-08 | End: 2019-07-10 | Stop reason: SDUPTHER

## 2019-02-11 ENCOUNTER — NURSE ONLY (OUTPATIENT)
Dept: FAMILY MEDICINE CLINIC | Age: 74
End: 2019-02-11
Payer: COMMERCIAL

## 2019-02-11 DIAGNOSIS — E11.9 TYPE 2 DIABETES MELLITUS WITHOUT COMPLICATION, WITHOUT LONG-TERM CURRENT USE OF INSULIN (HCC): ICD-10-CM

## 2019-02-11 DIAGNOSIS — F03.90 DEMENTIA WITHOUT BEHAVIORAL DISTURBANCE, UNSPECIFIED DEMENTIA TYPE: ICD-10-CM

## 2019-02-11 LAB
A/G RATIO: 1.6 (ref 1.1–2.2)
ALBUMIN SERPL-MCNC: 4.2 G/DL (ref 3.4–5)
ALP BLD-CCNC: 86 U/L (ref 40–129)
ALT SERPL-CCNC: 18 U/L (ref 10–40)
ANION GAP SERPL CALCULATED.3IONS-SCNC: 14 MMOL/L (ref 3–16)
AST SERPL-CCNC: 17 U/L (ref 15–37)
BASOPHILS ABSOLUTE: 0 K/UL (ref 0–0.2)
BASOPHILS RELATIVE PERCENT: 0.6 %
BILIRUB SERPL-MCNC: <0.2 MG/DL (ref 0–1)
BUN BLDV-MCNC: 14 MG/DL (ref 7–20)
CALCIUM SERPL-MCNC: 9.9 MG/DL (ref 8.3–10.6)
CHLORIDE BLD-SCNC: 102 MMOL/L (ref 99–110)
CHOLESTEROL, TOTAL: 238 MG/DL (ref 0–199)
CO2: 28 MMOL/L (ref 21–32)
CREAT SERPL-MCNC: 0.8 MG/DL (ref 0.6–1.2)
EOSINOPHILS ABSOLUTE: 0.1 K/UL (ref 0–0.6)
EOSINOPHILS RELATIVE PERCENT: 1.8 %
GFR AFRICAN AMERICAN: >60
GFR NON-AFRICAN AMERICAN: >60
GLOBULIN: 2.7 G/DL
GLUCOSE BLD-MCNC: 92 MG/DL (ref 70–99)
HCT VFR BLD CALC: 38.3 % (ref 36–48)
HDLC SERPL-MCNC: 45 MG/DL (ref 40–60)
HEMOGLOBIN: 12.7 G/DL (ref 12–16)
LDL CHOLESTEROL CALCULATED: 138 MG/DL
LYMPHOCYTES ABSOLUTE: 3.1 K/UL (ref 1–5.1)
LYMPHOCYTES RELATIVE PERCENT: 39.5 %
MCH RBC QN AUTO: 29.8 PG (ref 26–34)
MCHC RBC AUTO-ENTMCNC: 33.2 G/DL (ref 31–36)
MCV RBC AUTO: 89.7 FL (ref 80–100)
MONOCYTES ABSOLUTE: 0.5 K/UL (ref 0–1.3)
MONOCYTES RELATIVE PERCENT: 5.9 %
NEUTROPHILS ABSOLUTE: 4.2 K/UL (ref 1.7–7.7)
NEUTROPHILS RELATIVE PERCENT: 52.2 %
PDW BLD-RTO: 13.9 % (ref 12.4–15.4)
PLATELET # BLD: 206 K/UL (ref 135–450)
PMV BLD AUTO: 9.6 FL (ref 5–10.5)
POTASSIUM SERPL-SCNC: 4 MMOL/L (ref 3.5–5.1)
RBC # BLD: 4.27 M/UL (ref 4–5.2)
SODIUM BLD-SCNC: 144 MMOL/L (ref 136–145)
TOTAL PROTEIN: 6.9 G/DL (ref 6.4–8.2)
TRIGL SERPL-MCNC: 274 MG/DL (ref 0–150)
TSH REFLEX: 1.84 UIU/ML (ref 0.27–4.2)
VLDLC SERPL CALC-MCNC: 55 MG/DL
WBC # BLD: 8 K/UL (ref 4–11)

## 2019-02-11 PROCEDURE — 36415 COLL VENOUS BLD VENIPUNCTURE: CPT | Performed by: FAMILY MEDICINE

## 2019-02-12 LAB
ESTIMATED AVERAGE GLUCOSE: 145.6 MG/DL
HBA1C MFR BLD: 6.7 %

## 2019-02-14 ENCOUNTER — HOSPITAL ENCOUNTER (OUTPATIENT)
Dept: CT IMAGING | Age: 74
Discharge: HOME | End: 2019-02-14
Payer: COMMERCIAL

## 2019-02-14 DIAGNOSIS — F03.90 DEMENTIA WITHOUT BEHAVIORAL DISTURBANCE, UNSPECIFIED DEMENTIA TYPE: ICD-10-CM

## 2019-02-14 DIAGNOSIS — R26.81 GAIT INSTABILITY: ICD-10-CM

## 2019-02-14 DIAGNOSIS — R29.2 ABNORMAL REFLEX: ICD-10-CM

## 2019-02-14 PROCEDURE — 72131 CT LUMBAR SPINE W/O DYE: CPT

## 2019-02-14 PROCEDURE — 70470 CT HEAD/BRAIN W/O & W/DYE: CPT

## 2019-02-14 PROCEDURE — 6360000004 HC RX CONTRAST MEDICATION: Performed by: PSYCHIATRY & NEUROLOGY

## 2019-02-14 RX ADMIN — IOPAMIDOL 75 ML: 755 INJECTION, SOLUTION INTRAVENOUS at 08:19

## 2019-02-19 ENCOUNTER — TELEPHONE (OUTPATIENT)
Dept: FAMILY MEDICINE CLINIC | Age: 74
End: 2019-02-19

## 2019-02-19 DIAGNOSIS — R93.7 ABNORMAL BONE RADIOGRAPH: ICD-10-CM

## 2019-02-19 DIAGNOSIS — R90.89 ABNORMAL BRAIN CT: Primary | ICD-10-CM

## 2019-02-25 ENCOUNTER — TELEPHONE (OUTPATIENT)
Dept: FAMILY MEDICINE CLINIC | Age: 74
End: 2019-02-25

## 2019-02-27 ENCOUNTER — TELEPHONE (OUTPATIENT)
Dept: FAMILY MEDICINE CLINIC | Age: 74
End: 2019-02-27

## 2019-02-27 DIAGNOSIS — F03.90 DEMENTIA WITHOUT BEHAVIORAL DISTURBANCE, UNSPECIFIED DEMENTIA TYPE: Primary | ICD-10-CM

## 2019-03-07 ENCOUNTER — HOSPITAL ENCOUNTER (OUTPATIENT)
Dept: NUCLEAR MEDICINE | Age: 74
Discharge: HOME | End: 2019-03-07
Payer: COMMERCIAL

## 2019-03-07 DIAGNOSIS — R93.7 ABNORMAL BONE RADIOGRAPH: ICD-10-CM

## 2019-03-07 DIAGNOSIS — R90.89 ABNORMAL BRAIN CT: ICD-10-CM

## 2019-03-07 PROCEDURE — 78306 BONE IMAGING WHOLE BODY: CPT

## 2019-03-07 PROCEDURE — A9503 TC99M MEDRONATE: HCPCS | Performed by: FAMILY MEDICINE

## 2019-03-07 PROCEDURE — 3430000000 HC RX DIAGNOSTIC RADIOPHARMACEUTICAL: Performed by: FAMILY MEDICINE

## 2019-03-07 RX ORDER — TC 99M MEDRONATE 20 MG/10ML
25.37 INJECTION, POWDER, LYOPHILIZED, FOR SOLUTION INTRAVENOUS
Status: COMPLETED | OUTPATIENT
Start: 2019-03-07 | End: 2019-03-07

## 2019-03-07 RX ADMIN — Medication 25.37 MILLICURIE: at 08:55

## 2019-03-14 ENCOUNTER — OFFICE VISIT (OUTPATIENT)
Dept: FAMILY MEDICINE CLINIC | Age: 74
End: 2019-03-14
Payer: COMMERCIAL

## 2019-03-14 VITALS
SYSTOLIC BLOOD PRESSURE: 112 MMHG | BODY MASS INDEX: 28.87 KG/M2 | HEIGHT: 59 IN | DIASTOLIC BLOOD PRESSURE: 68 MMHG | WEIGHT: 143.2 LBS | OXYGEN SATURATION: 97 % | HEART RATE: 77 BPM

## 2019-03-14 DIAGNOSIS — Z12.39 BREAST CANCER SCREENING: ICD-10-CM

## 2019-03-14 DIAGNOSIS — F03.90 DEMENTIA WITHOUT BEHAVIORAL DISTURBANCE, UNSPECIFIED DEMENTIA TYPE: ICD-10-CM

## 2019-03-14 DIAGNOSIS — I10 ESSENTIAL HYPERTENSION: ICD-10-CM

## 2019-03-14 DIAGNOSIS — E11.8 TYPE 2 DIABETES MELLITUS WITH COMPLICATION, WITHOUT LONG-TERM CURRENT USE OF INSULIN (HCC): Primary | ICD-10-CM

## 2019-03-14 DIAGNOSIS — E03.9 HYPOTHYROIDISM, UNSPECIFIED TYPE: ICD-10-CM

## 2019-03-14 DIAGNOSIS — Z23 NEED FOR PNEUMOCOCCAL VACCINATION: ICD-10-CM

## 2019-03-14 LAB
CREATININE URINE POCT: NORMAL
MICROALBUMIN/CREAT 24H UR: NORMAL MG/G{CREAT}
MICROALBUMIN/CREAT UR-RTO: NORMAL

## 2019-03-14 PROCEDURE — G0009 ADMIN PNEUMOCOCCAL VACCINE: HCPCS | Performed by: FAMILY MEDICINE

## 2019-03-14 PROCEDURE — 82044 UR ALBUMIN SEMIQUANTITATIVE: CPT | Performed by: FAMILY MEDICINE

## 2019-03-14 PROCEDURE — 99214 OFFICE O/P EST MOD 30 MIN: CPT | Performed by: FAMILY MEDICINE

## 2019-03-14 PROCEDURE — 90670 PCV13 VACCINE IM: CPT | Performed by: FAMILY MEDICINE

## 2019-03-14 RX ORDER — ASPIRIN 81 MG/1
81 TABLET ORAL DAILY
Qty: 30 TABLET | Refills: 0 | COMMUNITY
Start: 2019-03-14 | End: 2019-10-29 | Stop reason: ALTCHOICE

## 2019-03-14 ASSESSMENT — ENCOUNTER SYMPTOMS
WHEEZING: 0
COUGH: 0
COLOR CHANGE: 0
EYE PAIN: 0
ABDOMINAL PAIN: 0
SHORTNESS OF BREATH: 0
DIARRHEA: 0
VOMITING: 0
BACK PAIN: 0
EYE REDNESS: 0

## 2019-04-11 DIAGNOSIS — I10 HYPERTENSION, UNSPECIFIED TYPE: ICD-10-CM

## 2019-04-11 DIAGNOSIS — E11.9 TYPE 2 DIABETES MELLITUS WITHOUT COMPLICATION, WITHOUT LONG-TERM CURRENT USE OF INSULIN (HCC): ICD-10-CM

## 2019-04-11 RX ORDER — LISINOPRIL AND HYDROCHLOROTHIAZIDE 20; 12.5 MG/1; MG/1
TABLET ORAL
Qty: 90 TABLET | Refills: 0 | Status: SHIPPED | OUTPATIENT
Start: 2019-04-11 | End: 2019-07-10 | Stop reason: SDUPTHER

## 2019-04-11 NOTE — TELEPHONE ENCOUNTER
for patients 18 years and older. Calcium 9.9  8.3 - 10.6 mg/dL Final 02/11/2019  8:28 AM [facility]   Total Protein 6.9  6.4 - 8.2 g/dL Final 02/11/2019  8:28 AM [facility]   Alb 4.2  3.4 - 5.0 g/dL Final 02/11/2019  8:28 AM [facility]   Albumin/Globulin Ratio 1.6  1.1 - 2.2 Final 02/11/2019  8:28 AM [facility]   Total Bilirubin <0.2  0.0 - 1.0 mg/dL Final 02/11/2019  8:28 AM [facility]   Alkaline Phosphatase 86  40 - 129 U/L Final 02/11/2019  8:28 AM [facility]   ALT 18  10 - 40 U/L Final 02/11/2019  8:28 AM [facility]   AST 17  15 - 37 U/L Final 02/11/2019  8:28 AM [facility]   Globulin 2.7  g/dL Final 02/11/2019  8:28 AM [address] [facility]   Testing Performed By     [id number] Name Director Address Valid Date Range   19- - Margaret Mg M.D., Ph.D. [facility] Mart[facility]nez.   [facility] [id number] 08/30/17 0817-Present   Narrative   Performed by: [address] [facility]   Performed at:

## 2019-05-02 ENCOUNTER — OFFICE VISIT (OUTPATIENT)
Dept: NEUROLOGY | Age: 74
End: 2019-05-02
Payer: COMMERCIAL

## 2019-05-02 ENCOUNTER — HOSPITAL ENCOUNTER (OUTPATIENT)
Age: 74
Discharge: HOME | End: 2019-05-02
Payer: COMMERCIAL

## 2019-05-02 VITALS
WEIGHT: 141 LBS | HEART RATE: 71 BPM | SYSTOLIC BLOOD PRESSURE: 102 MMHG | DIASTOLIC BLOOD PRESSURE: 60 MMHG | BODY MASS INDEX: 28.48 KG/M2

## 2019-05-02 DIAGNOSIS — M89.9 SKULL LESION: ICD-10-CM

## 2019-05-02 DIAGNOSIS — R93.0 ABNORMAL CT OF THE HEAD: ICD-10-CM

## 2019-05-02 DIAGNOSIS — F03.A0 MILD DEMENTIA: Primary | ICD-10-CM

## 2019-05-02 DIAGNOSIS — F03.A0 MILD DEMENTIA: ICD-10-CM

## 2019-05-02 LAB — VITAMIN B-12: 271 PG/ML (ref 211–911)

## 2019-05-02 PROCEDURE — 36415 COLL VENOUS BLD VENIPUNCTURE: CPT

## 2019-05-02 PROCEDURE — 99204 OFFICE O/P NEW MOD 45 MIN: CPT | Performed by: PSYCHIATRY & NEUROLOGY

## 2019-05-02 PROCEDURE — 84165 PROTEIN E-PHORESIS SERUM: CPT

## 2019-05-02 PROCEDURE — 82607 VITAMIN B-12: CPT

## 2019-05-02 PROCEDURE — 86334 IMMUNOFIX E-PHORESIS SERUM: CPT

## 2019-05-02 PROCEDURE — 84155 ASSAY OF PROTEIN SERUM: CPT

## 2019-05-02 NOTE — PATIENT INSTRUCTIONS
Please call with any questions or concerns:   [facility]  @ [phone]. LAB RESULTS:  Please obtain any labs or diagnostic tests as discussed today. You should call the office to check the results. Please allow  3 to 7 days for us to get these results. MEDICATION LIST:  Please bring an accurate list of your medications to every visit. APPOINTMENT CONFIRMATION:  We will call you the day before your scheduled appointment to confirm. If we are unable to reach you, you MUST call back by the end of the day to confirm the appointment or we may be forced to cancel.
Statement Selected

## 2019-05-02 NOTE — PROGRESS NOTES
NEUROLOGY CONSULTATION     Chief Complaint   Patient presents with    New Patient     Dementia        HISTORY OF PRESENT ILLNESS :    Germain Rodriguez is a 76 y.o. female who is referred by Dr. Cohen Pour   History was obtained from patient  Additional history was obtained from the patient's daughter. Patient started having memory impairment about one year ago. Onset was gradual.  Symptoms were initially progressive but now remained stable. No clear aggravating or relieving factors for symptoms. Patient was having difficulty with her financial affairs and her son who is the power of  has taken over the function of writing the checks and paying bills. Patient still has no difficulty with driving directions especially if she is going to familiar environment. Usually complains of forgetting the names of people and where she kept different objects. No focal weakness vertigo or diplopia.     REVIEW OF SYSTEMS    Constitutional:  []   Chills   []  Fatigue   []  Fevers   []  Malaise   [x]  Weight loss     [] Denies all of the above    Eyes:  []  Double vision   []  Blurry vision     [x] Denies all of the above    Ears, nose, mouth, throat, and face:   [] Hearing loss    []   Hoarseness      []  Snoring    []  Tinnitus       [x] Denies all of the above     Respiratory:   [x]  Cough    []  Shortness of breath         [] Denies all of the above     Cardiovascular:   []  Chest pain    [x]  Exertional chest pressure/discomfort           [] Palpitations    []  Syncope     [] Denies all of the above    Gastrointestinal:   []  Abdominal pain   [x]  Constipation    []  Diarrhea    []   Dysphagia                      [] Denies all of the above    Genitourinary:      []  Frequency   []  Hematuria     []  Urinary incontinence           [x] Denies all of the above     Hematologic/lymphatic:  []  Bleeding    []  Easy bruising   []  Anemia  [x] Denies History Narrative    None       PHYSICAL EXAMINATION:  /60   Pulse 71   Wt 141 lb (64 kg)   BMI 28.48 kg/m²   Appearance: Well appearing, well nourished and in no distress  Mental Status Exam: Patient is alert, oriented to person, place and time. Patient could easily recall 2 out of 3 objects in 3 minutes. She had difficulty with serial sevens. She was able to spell the word \"world\" forwards and backwards with minimal difficulty  Fund of Knowledge is normal  Attention/concentration is normal.   Speech : No dysarthria  Language : No aphasia  Funduscopic Exam: sharp disc margins  Cranial Nerves:   II: Visual fields:  Full to confrontation  III: Pupils:  equal, round, reactive to light  III,IV,VI: Extra Ocular Movements are intact. No nystagmus  V: Facial sensation is intact to pin prick and light touch  VII: Facial strength and movements: intact and symmetric smile,cheek puffing and eyebrow elevation  VIII: Hearing:  Intact to finger rub bilaterally  IX: Palate  elevation is symmetric  XI: Shoulder shrug is intact  XII: Tongue movements are normal  Motor:  Muscle tone and bulk are normal.   Strength is symmetrical 5/5 in all four extremities. Sensory: Intact to light touch and  pin prick in all four extremities  Coordination:  Normal  Finger to Nose and Heel to Shin bilaterally    . Reflexes:  DTR +2 and symmetric bilaterally  Plantar response: Flexor bilaterally  Gait: Gait and station is normal.   Romberg: negative  Vascular: No carotid bruit bilaterally        DATA:  LABS:  General Labs:    CBC:   Lab Results   Component Value Date    WBC 8.0 02/11/2019    RBC 4.27 02/11/2019    HGB 12.7 02/11/2019    HCT 38.3 02/11/2019    MCV 89.7 02/11/2019    MCH 29.8 02/11/2019    MCHC 33.2 02/11/2019    RDW 13.9 02/11/2019     02/11/2019    MPV 9.6 02/11/2019     BMP:    Lab Results   Component Value Date     02/11/2019    K 4.0 02/11/2019     02/11/2019    CO2 28 02/11/2019    BUN 14 02/11/2019    LABALBU 4.2 02/11/2019    CREATININE 0.8 02/11/2019    CALCIUM 9.9 02/11/2019    GFRAA >60 02/11/2019    GFRAA >60 09/02/2010    LABGLOM >60 02/11/2019    GLUCOSE 92 02/11/2019     RADIOLOGY REVIEW:  I have reviewed radiology image(s) and reports(s) of:  CT scan of the head    IMPRESSION :  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 The skull lucencies may be suggestive of metastatic lesion but there is no evidence of any primary neoplastic process. She may need further oncology workup but I will leave this up to the primary care physician. I will check B12 level  Continue Aricept 10 mg at bedtime  Return in 6 months  Thank you for this consultation        Please note a portion of this chart was generated using dragon dictation software. Although every effort was made to ensure the accuracy of this automated transcription, some errors in transcription may have occurred.

## 2019-05-03 LAB
ALBUMIN SERPL-MCNC: 3.5 G/DL (ref 3.1–4.9)
ALPHA-1-GLOBULIN: 0.3 G/DL (ref 0.2–0.4)
ALPHA-2-GLOBULIN: 0.8 G/DL (ref 0.4–1.1)
BETA GLOBULIN: 1.3 G/DL (ref 0.9–1.6)
GAMMA GLOBULIN: 1.4 G/DL (ref 0.6–1.8)
M SPIKE 1 SERUM PROTEIN ELEC: 0.2 G/DL
SPE/IFE INTERPRETATION: NORMAL
TOTAL PROTEIN: 7.2 G/DL (ref 6.4–8.2)

## 2019-06-19 ENCOUNTER — HOSPITAL ENCOUNTER (OUTPATIENT)
Dept: WOMENS IMAGING | Age: 74
Discharge: HOME | End: 2019-06-19
Payer: COMMERCIAL

## 2019-06-19 DIAGNOSIS — Z12.39 BREAST CANCER SCREENING: ICD-10-CM

## 2019-06-19 PROCEDURE — 77063 BREAST TOMOSYNTHESIS BI: CPT

## 2019-07-10 DIAGNOSIS — I10 HYPERTENSION, UNSPECIFIED TYPE: ICD-10-CM

## 2019-07-10 DIAGNOSIS — E78.5 HYPERLIPIDEMIA, UNSPECIFIED HYPERLIPIDEMIA TYPE: ICD-10-CM

## 2019-07-10 DIAGNOSIS — E11.9 TYPE 2 DIABETES MELLITUS WITHOUT COMPLICATION, WITHOUT LONG-TERM CURRENT USE OF INSULIN (HCC): ICD-10-CM

## 2019-07-11 RX ORDER — LISINOPRIL AND HYDROCHLOROTHIAZIDE 20; 12.5 MG/1; MG/1
TABLET ORAL
Qty: 90 TABLET | Refills: 0 | Status: SHIPPED | OUTPATIENT
Start: 2019-07-11 | End: 2019-10-08 | Stop reason: SDUPTHER

## 2019-07-11 RX ORDER — LEVOTHYROXINE SODIUM 0.03 MG/1
TABLET ORAL
Qty: 90 TABLET | Refills: 0 | Status: SHIPPED | OUTPATIENT
Start: 2019-07-11 | End: 2019-10-08 | Stop reason: SDUPTHER

## 2019-07-24 ENCOUNTER — TELEPHONE (OUTPATIENT)
Dept: FAMILY MEDICINE CLINIC | Age: 74
End: 2019-07-24

## 2019-07-24 ENCOUNTER — PATIENT MESSAGE (OUTPATIENT)
Dept: FAMILY MEDICINE CLINIC | Age: 74
End: 2019-07-24

## 2019-09-09 DIAGNOSIS — K21.9 GASTRO-ESOPHAGEAL REFLUX DISEASE WITHOUT ESOPHAGITIS: ICD-10-CM

## 2019-09-10 RX ORDER — PANTOPRAZOLE SODIUM 40 MG/1
TABLET, DELAYED RELEASE ORAL
Qty: 90 TABLET | Refills: 0 | Status: SHIPPED | OUTPATIENT
Start: 2019-09-10 | End: 2019-10-29 | Stop reason: SDUPTHER

## 2019-09-18 ENCOUNTER — NURSE ONLY (OUTPATIENT)
Dept: FAMILY MEDICINE CLINIC | Age: 74
End: 2019-09-18
Payer: COMMERCIAL

## 2019-09-18 DIAGNOSIS — Z23 NEED FOR INFLUENZA VACCINATION: Primary | ICD-10-CM

## 2019-09-18 PROCEDURE — G0008 ADMIN INFLUENZA VIRUS VAC: HCPCS | Performed by: FAMILY MEDICINE

## 2019-09-18 PROCEDURE — 90653 IIV ADJUVANT VACCINE IM: CPT | Performed by: FAMILY MEDICINE

## 2019-10-08 DIAGNOSIS — E78.5 HYPERLIPIDEMIA, UNSPECIFIED HYPERLIPIDEMIA TYPE: ICD-10-CM

## 2019-10-08 DIAGNOSIS — E11.9 TYPE 2 DIABETES MELLITUS WITHOUT COMPLICATION, WITHOUT LONG-TERM CURRENT USE OF INSULIN (HCC): ICD-10-CM

## 2019-10-08 DIAGNOSIS — I10 HYPERTENSION, UNSPECIFIED TYPE: ICD-10-CM

## 2019-10-08 RX ORDER — LISINOPRIL AND HYDROCHLOROTHIAZIDE 20; 12.5 MG/1; MG/1
TABLET ORAL
Qty: 90 TABLET | Refills: 0 | Status: SHIPPED | OUTPATIENT
Start: 2019-10-08 | End: 2020-01-07

## 2019-10-08 RX ORDER — LEVOTHYROXINE SODIUM 0.03 MG/1
TABLET ORAL
Qty: 90 TABLET | Refills: 0 | Status: SHIPPED | OUTPATIENT
Start: 2019-10-08 | End: 2020-01-07

## 2019-10-10 DIAGNOSIS — E11.9 TYPE 2 DIABETES MELLITUS WITHOUT COMPLICATION, WITHOUT LONG-TERM CURRENT USE OF INSULIN (HCC): ICD-10-CM

## 2019-10-29 ENCOUNTER — OFFICE VISIT (OUTPATIENT)
Dept: FAMILY MEDICINE CLINIC | Age: 74
End: 2019-10-29
Payer: COMMERCIAL

## 2019-10-29 VITALS
BODY MASS INDEX: 27.78 KG/M2 | SYSTOLIC BLOOD PRESSURE: 98 MMHG | TEMPERATURE: 98.6 F | HEART RATE: 88 BPM | RESPIRATION RATE: 16 BRPM | DIASTOLIC BLOOD PRESSURE: 76 MMHG | HEIGHT: 59 IN | WEIGHT: 137.8 LBS

## 2019-10-29 DIAGNOSIS — Z00.00 ROUTINE GENERAL MEDICAL EXAMINATION AT A HEALTH CARE FACILITY: Primary | ICD-10-CM

## 2019-10-29 DIAGNOSIS — E11.9 TYPE 2 DIABETES MELLITUS WITHOUT COMPLICATION, WITHOUT LONG-TERM CURRENT USE OF INSULIN (HCC): ICD-10-CM

## 2019-10-29 DIAGNOSIS — R07.9 CHEST PAIN, UNSPECIFIED TYPE: ICD-10-CM

## 2019-10-29 DIAGNOSIS — D47.2 MONOCLONAL GAMMOPATHY: ICD-10-CM

## 2019-10-29 LAB — HBA1C MFR BLD: 6.3 %

## 2019-10-29 PROCEDURE — 83036 HEMOGLOBIN GLYCOSYLATED A1C: CPT | Performed by: FAMILY MEDICINE

## 2019-10-29 PROCEDURE — 93000 ELECTROCARDIOGRAM COMPLETE: CPT | Performed by: FAMILY MEDICINE

## 2019-10-29 PROCEDURE — G0402 INITIAL PREVENTIVE EXAM: HCPCS | Performed by: FAMILY MEDICINE

## 2019-10-29 RX ORDER — ATORVASTATIN CALCIUM 20 MG/1
20 TABLET, FILM COATED ORAL DAILY
Qty: 90 TABLET | Refills: 1 | Status: SHIPPED | OUTPATIENT
Start: 2019-10-29 | End: 2020-04-06 | Stop reason: SDUPTHER

## 2019-10-29 ASSESSMENT — PATIENT HEALTH QUESTIONNAIRE - PHQ9
SUM OF ALL RESPONSES TO PHQ QUESTIONS 1-9: 0
1. LITTLE INTEREST OR PLEASURE IN DOING THINGS: 0
2. FEELING DOWN, DEPRESSED OR HOPELESS: 0
SUM OF ALL RESPONSES TO PHQ9 QUESTIONS 1 & 2: 0
SUM OF ALL RESPONSES TO PHQ QUESTIONS 1-9: 0

## 2019-10-29 ASSESSMENT — LIFESTYLE VARIABLES: HOW OFTEN DO YOU HAVE A DRINK CONTAINING ALCOHOL: 0

## 2019-10-30 ENCOUNTER — OFFICE VISIT (OUTPATIENT)
Dept: NEUROLOGY | Age: 74
End: 2019-10-30
Payer: COMMERCIAL

## 2019-10-30 VITALS
HEART RATE: 62 BPM | WEIGHT: 135 LBS | SYSTOLIC BLOOD PRESSURE: 103 MMHG | DIASTOLIC BLOOD PRESSURE: 62 MMHG | BODY MASS INDEX: 27.27 KG/M2

## 2019-10-30 DIAGNOSIS — R93.0 ABNORMAL CT OF THE HEAD: ICD-10-CM

## 2019-10-30 DIAGNOSIS — F03.A0 MILD DEMENTIA: Primary | ICD-10-CM

## 2019-10-30 PROCEDURE — 99213 OFFICE O/P EST LOW 20 MIN: CPT | Performed by: PSYCHIATRY & NEUROLOGY

## 2019-12-10 DIAGNOSIS — K21.9 GASTRO-ESOPHAGEAL REFLUX DISEASE WITHOUT ESOPHAGITIS: ICD-10-CM

## 2019-12-10 RX ORDER — PANTOPRAZOLE SODIUM 40 MG/1
TABLET, DELAYED RELEASE ORAL
Qty: 90 TABLET | Refills: 0 | Status: SHIPPED | OUTPATIENT
Start: 2019-12-10 | End: 2020-03-05

## 2020-01-07 RX ORDER — LEVOTHYROXINE SODIUM 0.03 MG/1
TABLET ORAL
Qty: 90 TABLET | Refills: 0 | Status: SHIPPED | OUTPATIENT
Start: 2020-01-07 | End: 2020-04-06

## 2020-01-07 RX ORDER — LISINOPRIL AND HYDROCHLOROTHIAZIDE 20; 12.5 MG/1; MG/1
TABLET ORAL
Qty: 90 TABLET | Refills: 0 | Status: SHIPPED | OUTPATIENT
Start: 2020-01-07 | End: 2020-04-06

## 2020-01-09 RX ORDER — DONEPEZIL HYDROCHLORIDE 10 MG/1
10 TABLET, FILM COATED ORAL NIGHTLY
Qty: 90 TABLET | Refills: 1 | Status: SHIPPED | OUTPATIENT
Start: 2020-01-09 | End: 2020-07-08 | Stop reason: SDUPTHER

## 2020-04-06 RX ORDER — LEVOTHYROXINE SODIUM 0.03 MG/1
TABLET ORAL
Qty: 90 TABLET | Refills: 0 | Status: SHIPPED | OUTPATIENT
Start: 2020-04-06 | End: 2020-07-06

## 2020-04-06 RX ORDER — LISINOPRIL AND HYDROCHLOROTHIAZIDE 20; 12.5 MG/1; MG/1
TABLET ORAL
Qty: 90 TABLET | Refills: 0 | Status: SHIPPED | OUTPATIENT
Start: 2020-04-06 | End: 2020-07-06

## 2020-04-06 RX ORDER — ATORVASTATIN CALCIUM 20 MG/1
20 TABLET, FILM COATED ORAL DAILY
Qty: 90 TABLET | Refills: 1 | Status: SHIPPED | OUTPATIENT
Start: 2020-04-06 | End: 2020-09-11

## 2020-04-06 RX ORDER — PANTOPRAZOLE SODIUM 40 MG/1
TABLET, DELAYED RELEASE ORAL
Qty: 30 TABLET | Refills: 1 | Status: SHIPPED | OUTPATIENT
Start: 2020-04-06 | End: 2020-06-01

## 2020-06-01 RX ORDER — PANTOPRAZOLE SODIUM 40 MG/1
TABLET, DELAYED RELEASE ORAL
Qty: 30 TABLET | Refills: 1 | Status: SHIPPED | OUTPATIENT
Start: 2020-06-01 | End: 2020-07-30 | Stop reason: SDUPTHER

## 2020-07-06 RX ORDER — LISINOPRIL AND HYDROCHLOROTHIAZIDE 20; 12.5 MG/1; MG/1
TABLET ORAL
Qty: 90 TABLET | Refills: 0 | Status: SHIPPED | OUTPATIENT
Start: 2020-07-06 | End: 2020-09-11

## 2020-07-06 RX ORDER — LEVOTHYROXINE SODIUM 0.03 MG/1
TABLET ORAL
Qty: 90 TABLET | Refills: 0 | Status: SHIPPED | OUTPATIENT
Start: 2020-07-06 | End: 2020-09-11

## 2020-07-08 RX ORDER — DONEPEZIL HYDROCHLORIDE 10 MG/1
10 TABLET, FILM COATED ORAL NIGHTLY
Qty: 30 TABLET | Refills: 1 | Status: SHIPPED | OUTPATIENT
Start: 2020-07-08 | End: 2020-09-11

## 2020-07-09 ENCOUNTER — OFFICE VISIT (OUTPATIENT)
Dept: NEUROLOGY | Age: 75
End: 2020-07-09
Payer: COMMERCIAL

## 2020-07-09 VITALS
SYSTOLIC BLOOD PRESSURE: 117 MMHG | BODY MASS INDEX: 29.03 KG/M2 | HEART RATE: 86 BPM | DIASTOLIC BLOOD PRESSURE: 75 MMHG | HEIGHT: 59 IN | WEIGHT: 144 LBS

## 2020-07-09 PROCEDURE — 99213 OFFICE O/P EST LOW 20 MIN: CPT | Performed by: PSYCHIATRY & NEUROLOGY

## 2020-07-09 NOTE — PROGRESS NOTES
Hector Akhtar   Neurology followup    Subjective:   CC/HP  History was obtained from the patient. Patient lost her  a few months ago. She is dealing with that as well. Additional history was obtained from her daughter. Patient and her family state that her symptoms are about the same as before. She tends to repeat herself more often now. Detailed history:  Short-term memory impairment started in the summer 2018. Onset was gradual and initially slowly progressive but now stable. No clear aggravating or relieving factors for symptoms. Patient does not drive an automobile at this time.       REVIEW OF SYSTEMS    Constitutional:  []   Chills   []  Fatigue   []  Fevers   []  Malaise   []  Weight loss     [x] Denies all of the above    Respiratory:   []  Cough    []  Shortness of breath         [x] Denies all of the above     Cardiovascular:   []  Chest pain    []  Exertional chest pressure/discomfort           [] Palpitations    []  Syncope     [x] Denies all of the above        Past Medical History:   Diagnosis Date    Diabetes mellitus ([facility] [address]     GERD (gastroesophageal reflux disease)     Hyperlipidemia     Hypertension     Hypothyroidism     Mild dementia ([facility] [address]      Family History   Problem Relation Age of Onset    High Cholesterol Mother     Stroke Mother     Heart Disease Father     Heart Attack Father     Colon Cancer Father      Social History     Socioeconomic History    Marital status:      Spouse name: Deuce Johnson Number of children: 3    Years of education: Not on file    Highest education level: Not on file   Occupational History    Occupation: retired   Social Needs    Financial resource strain: Not on file    Food insecurity     Worry: Not on file     Inability: Not on file   [employer] needs     Medical: Not on file     Non-medical: Not on file   Tobacco Use    Smoking status: Never Smoker    Smokeless tobacco: Never Used   Substance and Sexual Activity  Alcohol use: No    Drug use: No    Sexual activity: Not on file   Lifestyle    Physical activity     Days per week: Not on file     Minutes per session: Not on file    Stress: Not on file   Relationships    Social connections     Talks on phone: Not on file     Gets together: Not on file     Attends [employer] service: Not on file     Active member of club or organization: Not on file     Attends meetings of clubs or organizations: Not on file     Relationship status: Not on file    Intimate partner violence     Fear of current or ex partner: Not on file     Emotionally abused: Not on file     Physically abused: Not on file     Forced sexual activity: Not on file   Other Topics Concern    Not on file   Social History Narrative    Not on file        Objective:  Exam:  Ht 4' 11\" (1.499 m)   Wt 144 lb (65.3 kg)   BMI 29.08 kg/m²   This is a well-nourished patient in no acute distress  Patient is awake, alert and oriented x3. Speech is normal.  Poor short-term memory  Pupils are equal round reacting to light. Extraocular movements intact. Face symmetrical. Tongue midline. Motor exam shows normal symmetrical strength. Deep tendon reflexes normal. Plantar reflexes downgoing. Sensory exam normal. Coordination normal. Gait normal. No carotid bruit. No neck stiffness.       Data :  LABS:  General Labs:    CBC:   Lab Results   Component Value Date    WBC 8.0 02/11/2019    RBC 4.27 02/11/2019    HGB 12.7 02/11/2019    HCT 38.3 02/11/2019    MCV 89.7 02/11/2019    MCH 29.8 02/11/2019    MCHC 33.2 02/11/2019    RDW 13.9 02/11/2019     02/11/2019    MPV 9.6 02/11/2019     BMP:    Lab Results   Component Value Date     02/11/2019    K 4.0 02/11/2019     02/11/2019    CO2 28 02/11/2019    BUN 14 02/11/2019    LABALBU 3.5 05/02/2019    CREATININE 0.8 02/11/2019    CALCIUM 9.9 02/11/2019    GFRAA >60 02/11/2019    GFRAA >60 09/02/2010    LABGLOM >60 02/11/2019    GLUCOSE 92 02/11/2019     RADIOLOGY REVIEW:  I have reviewed radiology image(s) and reports(s) of: CT scan of the head    Impression :  Mild dementia, late onset Alzheimer's reasonably stable  CT head showed some lucencies in the skull bone but the brain itself was normal  Serum protein immunofixation showed monoclonal gammopathy. Patient has been evaluated by hematology/oncology and they are monitoring it. Plan :  Discussed with patient and her son  Continue Aricept 10 mg at night  Patient's family will call when she needs refills. Return in 6 months. Please note a portion of  this chart was generated using dragon dictation software. Although every effort was made to ensure the accuracy of this automated transcription, some errors in transcription may have occurred.

## 2020-07-09 NOTE — PATIENT INSTRUCTIONS
Please call with any questions or concerns:   RYAN [facility]  @ [phone]. LAB RESULTS:  Please obtain any labs or diagnostic tests as discussed today. You should call the office to check the results. Please allow  3 to 7 days for us to get these results. MEDICATION LIST:  Please bring an accurate list of your medications to every visit. APPOINTMENT CONFIRMATION:  We will call you the day before your scheduled appointment to confirm. If we are unable to reach you, you MUST call back by the end of the day to confirm the appointment or we may be forced to cancel.

## 2020-07-30 RX ORDER — PANTOPRAZOLE SODIUM 40 MG/1
TABLET, DELAYED RELEASE ORAL
Qty: 30 TABLET | Refills: 1 | Status: SHIPPED | OUTPATIENT
Start: 2020-07-30 | End: 2020-09-28 | Stop reason: SDUPTHER

## 2020-08-03 ENCOUNTER — VIRTUAL VISIT (OUTPATIENT)
Dept: FAMILY MEDICINE CLINIC | Age: 75
End: 2020-08-03
Payer: MEDICARE

## 2020-08-03 PROCEDURE — 99214 OFFICE O/P EST MOD 30 MIN: CPT | Performed by: FAMILY MEDICINE

## 2020-08-03 ASSESSMENT — ENCOUNTER SYMPTOMS
SHORTNESS OF BREATH: 0
WHEEZING: 0
CHEST TIGHTNESS: 0
CHOKING: 1
COUGH: 1

## 2020-08-03 NOTE — PROGRESS NOTES
8/3/2020    TELEHEALTH EVALUATION -- Audio/Visual (During CNK-98 public health emergency)  Chief Complaint   Patient presents with    Cough     PT C/O DRY COUGH BUT A LOT OF MUCUS IN HER THROAT FOR MONTHS NOW   322  HPI:    Deandra Martin (:  1945) has requested an audio/video evaluation for the following concern(s):    Cough: Patient complains of facial pressure, nasal congestion, productive cough, rhinorrhea  and sneezing. chills, dyspnea, bilateral ear congestion, bilateral ear pain, facial pain, fever, headache,  myalgias, nonproductive cough, sore throat, sweats, tooth pain and wheezing. Symptoms began 9 months ago per patient. The cough is without wheezing, dyspnea or hemoptysis and is aggravated by  reflux. Associated symptoms include:sputum production. Patient does not have a history of asthma. Patient does not have a history of environmental allergens. Patient has no recent travel. Patient does not have a history of smoking. Patient  had previous Chest X-ray in . No hx of pneumonia  She thinks it might be her sinus bothering her. Took some cough drops ([place]). No heartburn or reflux while on the medicine. Daughter thought from GERD. Stops eating about 5 pm. No after dinner snacking. No NSAIDS/ASA for pain. Bed at 9:30. She eats tid. She does not drink a glass of water before going to bed. Review of Systems   Constitutional: Positive for fatigue. Negative for chills, diaphoresis and fever. HENT: Positive for sneezing. Negative for congestion. Respiratory: Positive for cough and choking (drank something the other day and down the wrong pipe). Negative for chest tightness, shortness of breath and wheezing. Cardiovascular: Negative for chest pain, palpitations and leg swelling. Prior to Visit Medications    Medication Sig Taking?  Authorizing Provider   pantoprazole (PROTONIX) 40 MG tablet TAKE 1 TABLET BY MOUTH EVERY DAY Yes PRATIMA Starr - CNP Multiple Vitamin (MULTI-DAY VITAMINS PO) Take by mouth Yes Historical Provider, MD   donepezil (ARICEPT) 10 MG tablet Take 1 tablet by mouth nightly *appointment needed for further refills* Yes Nissa Rain MD   metFORMIN (GLUCOPHAGE) 500 MG tablet TAKE 1 TABLET BY MOUTH TWO TIMES A DAY WITH MEALS *APPOINTMENT REQUIRED FOR FURTHER REFILLS* Yes Nissa Rain MD   lisinopril-hydroCHLOROthiazide (PRINZIDE;ZESTORETIC) 20-12.5 MG per tablet TAKE 1 TABLET BY MOUTH ONE TIME A DAY  Yes Clement Mendez DO   levothyroxine (SYNTHROID) 25 MCG tablet TAKE 1 TABLET BY MOUTH ONE TIME A DAY  Yes Catalina Lino DO   atorvastatin (LIPITOR) 20 MG tablet Take 1 tablet by mouth daily Yes Clement Mendez DO       Social History     Tobacco Use    Smoking status: Never Smoker    Smokeless tobacco: Never Used   Substance Use Topics    Alcohol use: No    Drug use: No        No Known Allergies,   Past Medical History:   Diagnosis Date    Diabetes mellitus ([facility] [address]     GERD (gastroesophageal reflux disease)     Hyperlipidemia     Hypertension     Hypothyroidism     Mild dementia ([facility] [address]    ,   Past Surgical History:   Procedure Laterality Date    BLADDER SUSPENSION N/A 1999    COLONOSCOPY  11/07/2017    normal    EYE SURGERY Left 2000    \"pin under eye\"   ,   Family History   Problem Relation Age of Onset    High Cholesterol Mother     Stroke Mother     Heart Disease Father     Heart Attack Father     Colon Cancer Father        PHYSICAL EXAMINATION:  [ INSTRUCTIONS:  \"[x]\" Indicates a positive item  \"[]\" Indicates a negative item  -- DELETE ALL ITEMS NOT EXAMINED]  Vital Signs: (As obtained by patient/caregiver or practitioner observation)    Blood pressure-  Heart rate-    Respiratory rate-    Temperature-  Pulse oximetry-     Constitutional: [] Appears well-developed and well-nourished [x] No apparent distress      [x] Abnormal-overweight  Mental status  [x] Alert and awake  [] Oriented to person/place/time [x]Able to follow commands      Eyes:  EOM    [x]  Normal  [] Abnormal-  Sclera  [x]  Normal  [] Abnormal -         Discharge [x]  None visible  [] Abnormal -    HENT:   [x] Normocephalic, atraumatic. [] Abnormal   [x] Mouth/Throat: Mucous membranes are moist.  Sinus areas are tender. External Ears [] Normal  [] Abnormal-     Neck: [x] No visualized mass. NON-TENDER TO PT PALPATION. Pulmonary/Chest: [x] Respiratory effort normal.  [x] No visualized signs of difficulty breathing or respiratory distress. nO PAIN WITH DEEP BREATH        [] Abnormal-      Musculoskeletal:   [] Normal gait with no signs of ataxia         [] Normal range of motion of neck        [] Abnormal-       Neurological:        [x] No Facial Asymmetry (Cranial nerve 7 motor function) (limited exam to video visit)          [] No gaze palsy        [] Abnormal-         Skin:        [x] No significant exanthematous lesions or discoloration noted on facial skin         [] Abnormal-            Psychiatric:       [] Normal Affect [] No Hallucinations        [x] Abnormal- POOR MEMORY. Other pertinent observable physical exam findings-   Patient-Reported Vitals 8/3/2020   Patient-Reported Height 4'11\"      BP Readings from Last 3 Encounters:   07/09/20 117/75   10/30/19 103/62   10/29/19 98/76     Pulse Readings from Last 3 Encounters:   07/09/20 86   10/30/19 62   10/29/19 88     Wt Readings from Last 3 Encounters:   07/09/20 144 lb (65.3 kg)   10/30/19 135 lb (61.2 kg)   10/29/19 137 lb 12.8 oz (62.5 kg)     VITAMIN D (25 OH)Last Updated: 6/18/2010  [facility]   Component Name Value Ref Range   VITAMIN D, 25-OH, TOTAL 17 (L)   Reference Range:   NR ng/mL     ASSESSMENT/PLAN:  352     Patient Instructions     Paramjit Payne was seen today for cough. Diagnoses and all orders for this visit:    Acute maxillary sinusitis vs Gastro-esophageal reflux disease without esophagitis inducing a cough.     If esophageal reflux is felt to be the cause of the cough would be wise to elevate the head of her bed 4 to 6 inches. Alternatively she can use a wedge shaped pillow that goes from the waist to the top of the bed. Or sleeping in a recliner is sometimes used if you will have reflux until it is resolved. Sometimes it is helpful to minimize the amount of liquids taken with a meal to 8 ounces and then 1 to 2 hours after meal slowly sip water the rest of your daily fluids. This helps aid digestion. If he patient has constipation they also have more problems with reflux. If digested food does not leave the body then the undigested food will be delayed in emptying from the stomach causing more problems with reflux. A note from your previous doctor said that you normally had a bowel movement twice a week. Is this still the case? Below are the instructions for any respiratory infection. IF you develop ANY respiratory infection symptoms (not just allergy symptoms) suggestive of COVID-19 start the recommendations below: Instructions for Respiratory Infections (SAVE THIS SHEET)    For the first 7-14 days of symptoms follow instructions below, even before being seen in the office or even during treatment with antibiotics, until symptom free. 1. Water: Drink 1 ounce of water for every 2 pounds of body weight for adults, you need 64 ounces of water/fluids per day. This will loosen mucus in the head and chest & improve the weak feeling of dehydration, allow the body to get germ fighting resources to the infection. Half of fluids can be juice or sugar free Crystal Light. Don't count drinks with caffeine, alcohol or carbonation. Infants can have Pedialyte liquid or freezer pops. Avoid salt and sports drinks if you have high Blood Pressure, swelling in the feet or ankles or have heart problems. 2. Humidity: Humidify the air to 35-50% ( or until the windows fog over slightly).  Can use a humidifier, vaporizer, boil water on the stove or put a hours ( after meals if stomach is sensitive). Children can use 10-15 mg or less 3-4 times a day or Zinc lollypops. In pregnancy limit to 50-60 mg a day for 7 days as prenatals have Zinc also. With diarrhea use zinc pills 50 mg 1/2 to 1 pill 2x/day. 9. Vitamins: Vitamin C 500 mg with breakfast and dinner (with suspected or diagnosed COVID-19 infection take vitamin C 1000 mg 2-3 times a day). Children and pregnant women should drink citrus juices. This speeds healing and strengthens immune system. 10. Chest Symptoms: Vicks Vapor rub to the chest at bedtime. 11. Decongestants: Avoid all decongestants and antihistamine cold preparations in children. Decongestants should always be avoided in people with high blood pressure, palpitations, heart disease and those on stimulant medications. Antihistamines may impede the body's ability to fight COVID-19.  12. Frequent hand washing and/or hand gel especially after coughing or sneezing into the hands or blowing the nose to help prevent spreading to others. Use kleenex and NOT handkerchiefs. Try all of the above starting with day 1 of symptoms. If Strep throat symptoms appear call to be seen in the office as soon as possible and don't gargle on that day. Newborns, infants, or anyone with earaches or influenza may need to be seen quickly. Adults with fevers over 103 degrees or shortness of breath should call the office immediately. Education about COVID-19 virus infection  The risk is likely to increase for COVID-19 infection as the reopening occurs. This is because while some people are being careful with social distancing masks and social isolation other people are totally ignoring it.      Preventing the Spread of Coronavirus Disease 2019 in Homes and Residential Communities   For the most recent information go to [employer]    Prevention steps for People with confirmed or suspected COVID-19 (including call the healthcare provider and tell them that you have or may have COVID-19. This will help the healthcare providers office take steps to keep other people from getting infected or exposed. Wear a facemask  You should wear a facemask when you are around other people (e.g., sharing a room or vehicle) or pets and before you enter a healthcare providers office. If you are not able to wear a facemask (for example, because it causes trouble breathing), then people who live with you should not stay in the same room with you, or they should wear a facemask if they enter your room. Cover your coughs and sneezes  Cover your mouth and nose with a tissue when you cough or sneeze. Throw used tissues in a lined trash can. Immediately wash your hands with soap and water for at least 20 seconds or, if soap and water are not available, clean your hands with an alcohol-based hand  that contains at least 60% alcohol. Clean your hands often  Wash your hands often with soap and water for at least 20 seconds, especially after blowing your nose, coughing, or sneezing; going to the bathroom; and before eating or preparing food. If soap and water are not readily available, use an alcohol-based hand  with at least 60% alcohol, covering all surfaces of your hands and rubbing them together until they feel dry. Soap and water are the best option if hands are visibly dirty. Avoid touching your eyes, nose, and mouth with unwashed hands. Avoid sharing personal household items  You should not share dishes, drinking glasses, cups, eating utensils, towels, or bedding with other people or pets in your home. After using these items, they should be washed thoroughly with soap and water. Clean all high-touch surfaces everyday  High touch surfaces include counters, tabletops, doorknobs, bathroom fixtures, toilets, phones, keyboards, tablets, and bedside tables.  Also, clean any surfaces that may have blood, stool, or body fluids on them. Use a household cleaning spray or wipe, according to the label instructions. Labels contain instructions for safe and effective use of the cleaning product including precautions you should take when applying the product, such as wearing gloves and making sure you have good ventilation during use of the product. Monitor your symptoms  Seek prompt medical attention if your illness is worsening (e.g., difficulty breathing). Before seeking care, call your healthcare provider and tell them that you have, or are being evaluated for, COVID-19. Put on a facemask before you enter the facility. These steps will help the healthcare providers office to keep other people in the office or waiting room from getting infected or exposed. Ask your healthcare provider to call the local or state health department. Persons who are placed under active monitoring or facilitated self-monitoring should follow instructions provided by their local health department or occupational health professionals, as appropriate. When working with your local health department check their available hours. If you have a medical emergency and need to call 911, notify the dispatch personnel that you have, or are being evaluated for COVID-19. If possible, put on a facemask before emergency medical services arrive. Discontinuing home isolation  Patients with confirmed COVID-19 should remain under home isolation precautions until the risk of secondary transmission to others is thought to be low. The decision to discontinue home isolation precautions should be made on a case-by-case basis, in consultation with healthcare providers and state and local health departments. Use Tylenol NOT Ibuprofen/Aleve/aspirin for pain or fevers. There is a theoretical decrease in the body's ability to fight the virus when you are infected if you are on NSAIDs. The FDA has said that this information is not yet proven.      The newest evidence suggest that wearing a mask in public may be beneficial if you remember that the outside of it is contaminated and treat it accordingly. It also helps prevent spread if someone has an asymptomatic or presymptomatic case and does not know it yet. Even cloth masks can be beneficial.    Advance Care Planning  People with COVID-19 may have no symptoms, mild symptoms, such as fever, cough, and shortness of breath or they may have more severe illness, developing severe and fatal pneumonia. As a result, Advance Care Planning with attention to naming a health care decision maker (someone you trust to make healthcare decisions for you if you could not speak for yourself) and sharing other health care preferences is important BEFORE a possible health crisis. Please contact your Primary Care Provider to discuss Advance Care Planning. Vitamin D by mouth and vitamin C intravenously are being used as part of the treatment regimen for COVID-19 in some hospitalized patients. You have a longstanding vitamin D deficiency and there is no vitamin D listed on your med list:  VITAMIN D (25 OH)Last Updated: 6/18/2010  [facility]   Component Name Value Ref Range   VITAMIN D, 25-OH, TOTAL 17 (L)   Reference Range:   NR ng/mL   Start vitamin D 4000 IU daily. Also start vitamin C 500 mg daily long-term when done at the treatment protocol for this cough. Both of these should be continued for the duration of the moderate risk portion of the COVID-19 pandemic expected to be 1 year. Continue wearing a mask when around people except those living in the same house who are not infected with or heavily exposed to COVID-19, handwashing/hand gel use, social distancing, and social isolation for nonessential activities. Return in about 3 weeks (around 8/24/2020) for Diabetes, Hypertension, Thyroid, GERD dementia, Cholesterol with any available provider.     Espinoza Kay is a 76 y.o. female being evaluated by a Virtual Visit (video visit) encounter to address concerns as mentioned above. A caregiver was present when appropriate. Due to this being a TeleHealth encounter (During CVNPB-30 public health emergency), evaluation of the following organ systems was limited: Vitals/Constitutional/EENT/Resp/CV/GI//MS/Neuro/Skin/Heme-Lymph-Imm. Pursuant to the emergency declaration under the [address] [place] [address] and the [employer] Act, this Virtual Visit was conducted with patient's (and/or legal guardian's) consent, to reduce the patient's risk of exposure to COVID-19 and provide necessary medical care. The patient (and/or legal guardian) has also been advised to contact this office for worsening conditions or problems, and seek emergency medical treatment and/or call 911 if deemed necessary. Patient identification was verified at the start of the visit: Yes    Total time spent on this encounter: 30 minutes of which more than half spent in counseling. Services were provided through a video synchronous discussion virtually to substitute for in-person clinic visit. Patient and provider were located at their individual homes. --Valentina Amezquita DO on 8/3/2020 at 3:22 PM    An electronic signature was used to authenticate this note.

## 2020-08-04 NOTE — PATIENT INSTRUCTIONS
Emmanuel Travis was seen today for cough. Diagnoses and all orders for this visit:    Acute maxillary sinusitis vs Gastro-esophageal reflux disease without esophagitis inducing a cough. If esophageal reflux is felt to be the cause of the cough would be wise to elevate the head of her bed 4 to 6 inches. Alternatively she can use a wedge shaped pillow that goes from the waist to the top of the bed. Or sleeping in a recliner is sometimes used if you will have reflux until it is resolved. Sometimes it is helpful to minimize the amount of liquids taken with a meal to 8 ounces and then 1 to 2 hours after meal slowly sip water the rest of your daily fluids. This helps aid digestion. If he patient has constipation they also have more problems with reflux. If digested food does not leave the body then the undigested food will be delayed in emptying from the stomach causing more problems with reflux. A note from your previous doctor said that you normally had a bowel movement twice a week. Is this still the case? Below are the instructions for any respiratory infection. IF you develop ANY respiratory infection symptoms (not just allergy symptoms) suggestive of COVID-19 start the recommendations below: Instructions for Respiratory Infections (SAVE THIS SHEET)    For the first 7-14 days of symptoms follow instructions below, even before being seen in the office or even during treatment with antibiotics, until symptom free. 1. Water: Drink 1 ounce of water for every 2 pounds of body weight for adults, you need 64 ounces of water/fluids per day. This will loosen mucus in the head and chest & improve the weak feeling of dehydration, allow the body to get germ fighting resources to the infection. Half of fluids can be juice or sugar free Crystal Light. Don't count drinks with caffeine, alcohol or carbonation. Infants can have Pedialyte liquid or freezer pops.  Avoid salt and (DAY ONE OF SYMPTOMS)  Zinc lozenges such as Cold Elie (available most stores), or Basic ([employer] brand) will help shorten the duration and lessen symptoms such as sore throat, cough, nasal congestion, runny nose, and post nasal drip. Use 1 lozenge every 2-4 hours ( after meals if stomach is sensitive). Children can use 10-15 mg or less 3-4 times a day or Zinc lollypops. In pregnancy limit to 50-60 mg a day for 7 days as prenatals have Zinc also. With diarrhea use zinc pills 50 mg 1/2 to 1 pill 2x/day. 9. Vitamins: Vitamin C 500 mg with breakfast and dinner (with suspected or diagnosed COVID-19 infection take vitamin C 1000 mg 2-3 times a day). Children and pregnant women should drink citrus juices. This speeds healing and strengthens immune system. 10. Chest Symptoms: Vicks Vapor rub to the chest at bedtime. 11. Decongestants: Avoid all decongestants and antihistamine cold preparations in children. Decongestants should always be avoided in people with high blood pressure, palpitations, heart disease and those on stimulant medications. Antihistamines may impede the body's ability to fight COVID-19.  12. Frequent hand washing and/or hand gel especially after coughing or sneezing into the hands or blowing the nose to help prevent spreading to others. Use kleenex and NOT handkerchiefs. Try all of the above starting with day 1 of symptoms. If Strep throat symptoms appear call to be seen in the office as soon as possible and don't gargle on that day. Newborns, infants, or anyone with earaches or influenza may need to be seen quickly. Adults with fevers over 103 degrees or shortness of breath should call the office immediately. Education about COVID-19 virus infection  The risk is likely to increase for COVID-19 infection as the reopening occurs. This is because while some people are being careful with social distancing masks and social isolation other people are totally ignoring it.      Preventing the Spread of Coronavirus Disease 2019 in Homes and Residential Communities   For the most recent information go to [employer]    Prevention steps for People with confirmed or suspected COVID-19 (including persons under investigation) who do not need to be hospitalized  and   People with confirmed COVID-19 who were hospitalized and determined to be medically stable to go home    Your healthcare provider and public health staff will evaluate whether you can be cared for at home. If it is determined that you do not need to be hospitalized and can be isolated at home, you will be monitored by staff from your local or state health department. You should follow the prevention steps below until a healthcare provider or local or state health department says you can return to your normal activities. Stay home except to get medical care  People who are mildly ill with COVID-19 are able to isolate at home during their illness. You should restrict activities outside your home, except for getting medical care. Do not go to work, school, or public areas. Avoid using public transportation, ride-sharing, or taxis. Separate yourself from other people and animals in your home  People: As much as possible, you should stay in a specific room and away from other people in your home. Also, you should use a separate bathroom, if available. Animals: You should restrict contact with pets and other animals while you are sick with COVID-19, just like you would around other people. Although there have not been reports of pets or other animals becoming sick with COVID-19, it is still recommended that people sick with COVID-19 limit contact with animals until more information is known about the virus. When possible, have another member of your household care for your animals while you are sick.  If you are sick with COVID-19, avoid contact with your pet, including petting, snuggling, being kissed or licked, and sharing food. If you must care for your pet or be around animals while you are sick, wash your hands before and after you interact with pets and wear a facemask. Call ahead before visiting your doctor  If you have a medical appointment, call the healthcare provider and tell them that you have or may have COVID-19. This will help the healthcare providers office take steps to keep other people from getting infected or exposed. Wear a facemask  You should wear a facemask when you are around other people (e.g., sharing a room or vehicle) or pets and before you enter a healthcare providers office. If you are not able to wear a facemask (for example, because it causes trouble breathing), then people who live with you should not stay in the same room with you, or they should wear a facemask if they enter your room. Cover your coughs and sneezes  Cover your mouth and nose with a tissue when you cough or sneeze. Throw used tissues in a lined trash can. Immediately wash your hands with soap and water for at least 20 seconds or, if soap and water are not available, clean your hands with an alcohol-based hand  that contains at least 60% alcohol. Clean your hands often  Wash your hands often with soap and water for at least 20 seconds, especially after blowing your nose, coughing, or sneezing; going to the bathroom; and before eating or preparing food. If soap and water are not readily available, use an alcohol-based hand  with at least 60% alcohol, covering all surfaces of your hands and rubbing them together until they feel dry. Soap and water are the best option if hands are visibly dirty. Avoid touching your eyes, nose, and mouth with unwashed hands. Avoid sharing personal household items  You should not share dishes, drinking glasses, cups, eating utensils, towels, or bedding with other people or pets in your home.  After using these items, they should be washed thoroughly with soap and water. Clean all high-touch surfaces everyday  High touch surfaces include counters, tabletops, doorknobs, bathroom fixtures, toilets, phones, keyboards, tablets, and bedside tables. Also, clean any surfaces that may have blood, stool, or body fluids on them. Use a household cleaning spray or wipe, according to the label instructions. Labels contain instructions for safe and effective use of the cleaning product including precautions you should take when applying the product, such as wearing gloves and making sure you have good ventilation during use of the product. Monitor your symptoms  Seek prompt medical attention if your illness is worsening (e.g., difficulty breathing). Before seeking care, call your healthcare provider and tell them that you have, or are being evaluated for, COVID-19. Put on a facemask before you enter the facility. These steps will help the healthcare providers office to keep other people in the office or waiting room from getting infected or exposed. Ask your healthcare provider to call the local or state health department. Persons who are placed under active monitoring or facilitated self-monitoring should follow instructions provided by their local health department or occupational health professionals, as appropriate. When working with your local health department check their available hours. If you have a medical emergency and need to call 911, notify the dispatch personnel that you have, or are being evaluated for COVID-19. If possible, put on a facemask before emergency medical services arrive. Discontinuing home isolation  Patients with confirmed COVID-19 should remain under home isolation precautions until the risk of secondary transmission to others is thought to be low. The decision to discontinue home isolation precautions should be made on a case-by-case basis, in consultation with healthcare providers and [facility] and local health departments.     Use Tylenol NOT Ibuprofen/Aleve/aspirin for pain or fevers. There is a theoretical decrease in the body's ability to fight the virus when you are infected if you are on NSAIDs. The FDA has said that this information is not yet proven. The newest evidence suggest that wearing a mask in public may be beneficial if you remember that the outside of it is contaminated and treat it accordingly. It also helps prevent spread if someone has an asymptomatic or presymptomatic case and does not know it yet. Even cloth masks can be beneficial.    Advance Care Planning  People with COVID-19 may have no symptoms, mild symptoms, such as fever, cough, and shortness of breath or they may have more severe illness, developing severe and fatal pneumonia. As a result, Advance Care Planning with attention to naming a health care decision maker (someone you trust to make healthcare decisions for you if you could not speak for yourself) and sharing other health care preferences is important BEFORE a possible health crisis. Please contact your Primary Care Provider to discuss Advance Care Planning. Vitamin D by mouth and vitamin C intravenously are being used as part of the treatment regimen for COVID-19 in some hospitalized patients. You have a longstanding vitamin D deficiency and there is no vitamin D listed on your med list:  VITAMIN D (25 OH)Last Updated: 6/18/2010  [facility]   Component Name Value Ref Range   VITAMIN D, 25-OH, TOTAL 17 (L)   Reference Range:   NR ng/mL   Start vitamin D 4000 IU daily. Also start vitamin C 500 mg daily long-term when done at the treatment protocol for this cough. Both of these should be continued for the duration of the moderate risk portion of the COVID-19 pandemic expected to be 1 year.  Continue wearing a mask when around people except those living in the same house who are not infected with or heavily exposed to COVID-19, handwashing/hand gel use, social distancing, and social isolation for nonessential activities.

## 2020-08-18 ENCOUNTER — TELEPHONE (OUTPATIENT)
Dept: FAMILY MEDICINE CLINIC | Age: 75
End: 2020-08-18

## 2020-08-18 RX ORDER — SULFAMETHOXAZOLE AND TRIMETHOPRIM 800; 160 MG/1; MG/1
1 TABLET ORAL 2 TIMES DAILY
Qty: 20 TABLET | Refills: 0 | Status: SHIPPED | OUTPATIENT
Start: 2020-08-18 | End: 2020-08-28

## 2020-08-18 NOTE — TELEPHONE ENCOUNTER
Patient still has a productive cough  Was told if not better to call and would prescribe different antibiotic and order a chest xray

## 2020-08-18 NOTE — TELEPHONE ENCOUNTER
Tried propping her head up on a pillow. The daughter does not recall our list of things to do for respiratory infection. Encouraged her to look at my chart to find the list and to take the antibiotic called in. Still need to think about the possibility of reflux causing aspiration bronchitis. Acute maxillary sinusitis, recurrence not specified plus/minus aspiration bronchitis  -     sulfamethoxazole-trimethoprim (BACTRIM DS;SEPTRA DS) 800-160 MG per tablet;  Take 1 tablet by mouth 2 times daily for 10 days

## 2020-09-11 RX ORDER — LEVOTHYROXINE SODIUM 0.03 MG/1
TABLET ORAL
Qty: 30 TABLET | Refills: 1 | Status: SHIPPED | OUTPATIENT
Start: 2020-09-11 | End: 2020-12-02

## 2020-09-11 RX ORDER — ATORVASTATIN CALCIUM 20 MG/1
TABLET, FILM COATED ORAL
Qty: 30 TABLET | Refills: 1 | Status: SHIPPED | OUTPATIENT
Start: 2020-09-11 | End: 2020-12-02

## 2020-09-11 RX ORDER — LISINOPRIL AND HYDROCHLOROTHIAZIDE 20; 12.5 MG/1; MG/1
TABLET ORAL
Qty: 30 TABLET | Refills: 1 | Status: SHIPPED | OUTPATIENT
Start: 2020-09-11 | End: 2020-12-02

## 2020-09-29 RX ORDER — PANTOPRAZOLE SODIUM 40 MG/1
TABLET, DELAYED RELEASE ORAL
Qty: 30 TABLET | Refills: 1 | Status: SHIPPED | OUTPATIENT
Start: 2020-09-29 | End: 2020-11-30

## 2020-10-09 ENCOUNTER — OFFICE VISIT (OUTPATIENT)
Dept: FAMILY MEDICINE CLINIC | Age: 75
End: 2020-10-09
Payer: MEDICARE

## 2020-10-09 VITALS
HEIGHT: 59 IN | WEIGHT: 134 LBS | TEMPERATURE: 98.1 F | DIASTOLIC BLOOD PRESSURE: 72 MMHG | BODY MASS INDEX: 27.01 KG/M2 | SYSTOLIC BLOOD PRESSURE: 124 MMHG

## 2020-10-09 LAB
CREATININE URINE: 45 MG/DL (ref 28–259)
HBA1C MFR BLD: 6.7 %
MICROALBUMIN UR-MCNC: <1.2 MG/DL
MICROALBUMIN/CREAT UR-RTO: NORMAL MG/G (ref 0–30)

## 2020-10-09 PROCEDURE — 99214 OFFICE O/P EST MOD 30 MIN: CPT | Performed by: NURSE PRACTITIONER

## 2020-10-09 PROCEDURE — 83036 HEMOGLOBIN GLYCOSYLATED A1C: CPT | Performed by: NURSE PRACTITIONER

## 2020-10-09 PROCEDURE — 90653 IIV ADJUVANT VACCINE IM: CPT | Performed by: NURSE PRACTITIONER

## 2020-10-09 PROCEDURE — G0008 ADMIN INFLUENZA VIRUS VAC: HCPCS | Performed by: NURSE PRACTITIONER

## 2020-10-09 RX ORDER — FLUTICASONE PROPIONATE 50 MCG
1 SPRAY, SUSPENSION (ML) NASAL DAILY
Qty: 1 BOTTLE | Refills: 2 | Status: ON HOLD | OUTPATIENT
Start: 2020-10-09 | End: 2022-08-23 | Stop reason: HOSPADM

## 2020-10-09 ASSESSMENT — ENCOUNTER SYMPTOMS
ABDOMINAL PAIN: 0
SINUS PAIN: 0
NAUSEA: 0
SHORTNESS OF BREATH: 0
SINUS PRESSURE: 0
COUGH: 0
EYE PAIN: 0
EYE DISCHARGE: 0
VOMITING: 0
ABDOMINAL DISTENTION: 0
SORE THROAT: 0
DIARRHEA: 0
EYE REDNESS: 0
WHEEZING: 0

## 2020-10-09 NOTE — PROGRESS NOTES
10/9/2020     Elaine Banegas (:  1945) is a 76 y.o. female, here for evaluation of the following medical concerns:    HPI  Treatment Adherence:   Medication compliance:  compliant all of the time  Diet compliance:  compliant most of the time  Weight trend: stable  Current exercise: walks 7 time(s) per day  Barriers: none    Diabetes Mellitus Type 2: Current symptoms/problems include none. Home blood sugar records: patient does not test  Any episodes of hypoglycemia? no  Eye exam current (within one year): yes  Tobacco history: She  reports that she has never smoked. She has never used smokeless tobacco.   Daily Aspirin? No    Hypertension:  Home blood pressure monitoring: No.  She is adherent to a low sodium diet. Patient denies chest pain, shortness of breath, headache, lightheadedness, blurred vision, peripheral edema, palpitations, dry cough and fatigue. Antihypertensive medication side effects: no medication side effects noted. Use of agents associated with hypertension: none. Hyperlipidemia:  No new myalgias or GI upset on atorvastatin (Lipitor). Lab Results   Component Value Date    LABA1C 6.7 10/09/2020    LABA1C 6.3 10/29/2019    LABA1C 6.7 2019     Lab Results   Component Value Date    LABMICR 2.10 (H) 2018    CREATININE 0.8 2019     Lab Results   Component Value Date    ALT 18 2019    AST 17 2019     Lab Results   Component Value Date    CHOL 238 (H) 2019    TRIG 274 (H) 2019    HDL 45 2019    LDLCALC 138 (H) 2019    LDLDIRECT 93 2018          Review of Systems   Constitutional: Negative for chills and fever. HENT: Negative for ear discharge, ear pain, hearing loss, sinus pressure, sinus pain and sore throat. Eyes: Negative for pain, discharge and redness. Respiratory: Negative for cough, shortness of breath and wheezing. Cardiovascular: Negative for chest pain and palpitations.    Gastrointestinal: Negative for abdominal distention, abdominal pain, diarrhea, nausea and vomiting. Genitourinary: Negative for dysuria and hematuria. Musculoskeletal: Negative for myalgias. Skin: Negative for rash. Neurological: Negative for weakness, numbness and headaches. Prior to Visit Medications    Medication Sig Taking? Authorizing Provider   fluticasone (FLONASE) 50 MCG/ACT nasal spray 1 spray by Each Nostril route daily Yes PRATIMA Alvarado - CNP   pantoprazole (PROTONIX) 40 MG tablet TAKE 1 TABLET BY MOUTH EVERY DAY Yes Reema Lino DO   donepezil (ARICEPT) 10 MG tablet Take 1 tablet by mouth nightly Yes Mis Blankenship MD   metFORMIN (GLUCOPHAGE) 500 MG tablet TAKE 1 TABLET BY MOUTH TWO TIMES A DAY WITH MEALS. Yes Mis Blankenship MD   levothyroxine (SYNTHROID) 25 MCG tablet TAKE 1 TABLET BY MOUTH ONE TIME A DAY  Yes Mickey Lino DO   lisinopril-hydroCHLOROthiazide (PRINZIDE;ZESTORETIC) 20-12.5 MG per tablet TAKE 1 TABLET BY MOUTH ONE TIME A DAY  Yes Reema Lino DO   atorvastatin (LIPITOR) 20 MG tablet TAKE 1 TABLET BY MOUTH ONE TIME A DAY  Yes Brad Crowell,    Multiple Vitamin (MULTI-DAY VITAMINS PO) Take by mouth Yes Historical Provider, MD        Social History     Tobacco Use    Smoking status: Never Smoker    Smokeless tobacco: Never Used   Substance Use Topics    Alcohol use: No        Vitals:    10/09/20 1137   BP: 124/72   Site: Left Upper Arm   Position: Sitting   Cuff Size: Medium Adult   Temp: 98.1 °F (36.7 °C)   TempSrc: Infrared   Weight: 134 lb (60.8 kg)   Height: 4' 11\" (1.499 m)     Estimated body mass index is 27.06 kg/m² as calculated from the following:    Height as of this encounter: 4' 11\" (1.499 m). Weight as of this encounter: 134 lb (60.8 kg). Physical Exam  Constitutional:       Appearance: Normal appearance. She is obese. HENT:      Head: Normocephalic and atraumatic.       Right Ear: Tympanic membrane, ear canal and external ear normal.      Left Ear: Tympanic membrane, ear INFLUENZA, TRIV, INACTIVATED, SUBUNIT, ADJUVANTED, 65 YRS AND OLDER, IM, PREFILL SYR, 0.5ML (FLUAD TRIV)    3. Hypothyroidism, unspecified type  - Due for lab work. No change to synthroid dose today.  - TSH without Reflex; Future  - T4, Free; Future    4. Hyperlipidemia, unspecified hyperlipidemia type  - On lipitor. Due for labs. - Lipid Panel; Future    5. Essential hypertension  - Controlled on lisinopril-hctz  - Comprehensive Metabolic Panel; Future    6. Encounter for hepatitis C screening test for low risk patient  - Hepatitis C Antibody; Future      Return in about 6 months (around 4/9/2021) for Follow-up diabetes. An electronic signature was used to authenticate this note.     --PRATIMA Rodrigues - CNP on 10/9/2020 at 12:09 PM

## 2020-10-09 NOTE — PROGRESS NOTES
Vaccine Information Sheet, \"Influenza - Inactivated\"  given to Yaz Barcenas, or parent/legal guardian of  Yaz Barcenas and verbalized understanding. Patient responses:    Have you ever had a reaction to a flu vaccine? No  Do you have any current illness? No  Have you ever had Guillian Waverly Syndrome? No  Do you have a serious allergy to any of the follow: Neomycin, Polymyxin, Thimerosal, eggs or egg products? No    Flu vaccine given per order. Please see immunization tab. Risks and benefits explained. Current VIS given.       Immunizations Administered     Name Date Dose Route    Influenza, Triv, inactivated, subunit, adjuvanted, IM (Fluad 65 yrs and older) 10/9/2020 0.5 mL Intramuscular    Site: Deltoid- Left    Lot: [id number]    [address]: 32507-045-64

## 2020-10-10 ENCOUNTER — HOSPITAL ENCOUNTER (OUTPATIENT)
Age: 75
Discharge: HOME | End: 2020-10-10
Payer: MEDICARE

## 2020-10-10 LAB
A/G RATIO: 1.3 (ref 1.1–2.2)
ALBUMIN SERPL-MCNC: 3.9 G/DL (ref 3.4–5)
ALP BLD-CCNC: 98 U/L (ref 40–129)
ALT SERPL-CCNC: 33 U/L (ref 10–40)
ANION GAP SERPL CALCULATED.3IONS-SCNC: 12 MMOL/L (ref 3–16)
AST SERPL-CCNC: 24 U/L (ref 15–37)
BILIRUB SERPL-MCNC: 0.3 MG/DL (ref 0–1)
BUN BLDV-MCNC: 13 MG/DL (ref 7–20)
CALCIUM SERPL-MCNC: 9.6 MG/DL (ref 8.3–10.6)
CHLORIDE BLD-SCNC: 103 MMOL/L (ref 99–110)
CHOLESTEROL, TOTAL: 144 MG/DL (ref 0–199)
CO2: 26 MMOL/L (ref 21–32)
CREAT SERPL-MCNC: 0.8 MG/DL (ref 0.6–1.2)
GFR AFRICAN AMERICAN: >60
GFR NON-AFRICAN AMERICAN: >60
GLOBULIN: 3.1 G/DL
GLUCOSE BLD-MCNC: 128 MG/DL (ref 70–99)
HDLC SERPL-MCNC: 45 MG/DL (ref 40–60)
HEPATITIS C ANTIBODY INTERPRETATION: NORMAL
LDL CHOLESTEROL CALCULATED: 61 MG/DL
POTASSIUM SERPL-SCNC: 3.9 MMOL/L (ref 3.5–5.1)
SODIUM BLD-SCNC: 141 MMOL/L (ref 136–145)
T4 FREE: 1.3 NG/DL (ref 0.9–1.8)
TOTAL PROTEIN: 7 G/DL (ref 6.4–8.2)
TRIGL SERPL-MCNC: 190 MG/DL (ref 0–150)
TSH SERPL DL<=0.05 MIU/L-ACNC: 1.54 UIU/ML (ref 0.27–4.2)
VLDLC SERPL CALC-MCNC: 38 MG/DL

## 2020-10-10 PROCEDURE — 86803 HEPATITIS C AB TEST: CPT

## 2020-10-10 PROCEDURE — 84443 ASSAY THYROID STIM HORMONE: CPT

## 2020-10-10 PROCEDURE — 84439 ASSAY OF FREE THYROXINE: CPT

## 2020-10-10 PROCEDURE — 80053 COMPREHEN METABOLIC PANEL: CPT

## 2020-10-10 PROCEDURE — 36415 COLL VENOUS BLD VENIPUNCTURE: CPT

## 2020-10-10 PROCEDURE — 80061 LIPID PANEL: CPT

## 2020-10-28 ENCOUNTER — TELEPHONE (OUTPATIENT)
Dept: NEUROLOGY | Age: 75
End: 2020-10-28

## 2020-10-28 NOTE — TELEPHONE ENCOUNTER
Call from [employer] about a medical records request being sent to [facility].  Told them its been faxed 2 x and they would like it re-faxed to [facility] 1 more time

## 2020-11-09 RX ORDER — DONEPEZIL HYDROCHLORIDE 10 MG/1
TABLET, FILM COATED ORAL
Qty: 30 TABLET | Refills: 5 | Status: SHIPPED | OUTPATIENT
Start: 2020-11-09 | End: 2021-03-02

## 2020-11-30 RX ORDER — PANTOPRAZOLE SODIUM 40 MG/1
TABLET, DELAYED RELEASE ORAL
Qty: 30 TABLET | Refills: 4 | Status: SHIPPED | OUTPATIENT
Start: 2020-11-30 | End: 2021-04-22

## 2020-12-02 RX ORDER — LISINOPRIL AND HYDROCHLOROTHIAZIDE 20; 12.5 MG/1; MG/1
TABLET ORAL
Qty: 30 TABLET | Refills: 4 | Status: SHIPPED | OUTPATIENT
Start: 2020-12-02 | End: 2021-03-02

## 2020-12-02 RX ORDER — LEVOTHYROXINE SODIUM 0.03 MG/1
TABLET ORAL
Qty: 30 TABLET | Refills: 4 | Status: SHIPPED | OUTPATIENT
Start: 2020-12-02 | End: 2021-03-02

## 2020-12-02 RX ORDER — ATORVASTATIN CALCIUM 20 MG/1
TABLET, FILM COATED ORAL
Qty: 30 TABLET | Refills: 4 | Status: SHIPPED | OUTPATIENT
Start: 2020-12-02 | End: 2021-03-02

## 2021-01-13 ENCOUNTER — OFFICE VISIT (OUTPATIENT)
Dept: NEUROLOGY | Age: 76
End: 2021-01-13
Payer: MEDICARE

## 2021-01-13 VITALS
WEIGHT: 134 LBS | DIASTOLIC BLOOD PRESSURE: 79 MMHG | TEMPERATURE: 96.6 F | BODY MASS INDEX: 27.06 KG/M2 | SYSTOLIC BLOOD PRESSURE: 129 MMHG | HEART RATE: 90 BPM

## 2021-01-13 DIAGNOSIS — F03.A0 MILD DEMENTIA: ICD-10-CM

## 2021-01-13 PROCEDURE — 99213 OFFICE O/P EST LOW 20 MIN: CPT | Performed by: PSYCHIATRY & NEUROLOGY

## 2021-01-13 RX ORDER — DONEPEZIL HYDROCHLORIDE 10 MG/1
TABLET, FILM COATED ORAL
Qty: 30 TABLET | Refills: 5 | Status: CANCELLED | OUTPATIENT
Start: 2021-01-13

## 2021-01-13 NOTE — PROGRESS NOTES
Cheng Hart   Neurology followup    Subjective:   CC/HP  History was obtained from the patient. Additional history was obtained from her daughter. Patient's memory seems to have been slightly worse since her last office visit. It is mainly for short-term events at times. No side effects of medication. No other neurological symptoms. Detailed history:  Short-term memory impairment started in the summer 2018. Onset was gradual and initially slowly progressive   No clear aggravating or relieving factors for symptoms. Patient does not drive an automobile at this time.       REVIEW OF SYSTEMS    Constitutional:  []   Chills   []  Fatigue   []  Fevers   []  Malaise   []  Weight loss     [x] Denies all of the above    Respiratory:   []  Cough    []  Shortness of breath         [x] Denies all of the above     Cardiovascular:   []  Chest pain    []  Exertional chest pressure/discomfort           [] Palpitations    []  Syncope     [x] Denies all of the above        Past Medical History:   Diagnosis Date    Diabetes mellitus ([facility] [address]     GERD (gastroesophageal reflux disease)     Hyperlipidemia     Hypertension     Hypothyroidism     Mild dementia ([facility] [address]      Family History   Problem Relation Age of Onset    High Cholesterol Mother     Stroke Mother     Heart Disease Father     Heart Attack Father     Colon Cancer Father      Social History     Socioeconomic History    Marital status:      Spouse name: Xavier Razo Number of children: 3    Years of education: Not on file    Highest education level: Not on file   Occupational History    Occupation: retired manufacturing     Comment: [employer]   Social Needs    Financial resource strain: Not on file    Food insecurity     Worry: Not on file     Inability: Not on file   [employer] needs     Medical: Not on file     Non-medical: Not on file   Tobacco Use    Smoking status: Never Smoker    Smokeless tobacco: Never Used   Substance and Sexual Activity    Alcohol use: No    Drug use: No    Sexual activity: Not on file   Lifestyle    Physical activity     Days per week: Not on file     Minutes per session: Not on file    Stress: Not on file   Relationships    Social connections     Talks on phone: Not on file     Gets together: Not on file     Attends [employer] service: Not on file     Active member of club or organization: Not on file     Attends meetings of clubs or organizations: Not on file     Relationship status: Not on file    Intimate partner violence     Fear of current or ex partner: Not on file     Emotionally abused: Not on file     Physically abused: Not on file     Forced sexual activity: Not on file   Other Topics Concern    Not on file   Social History Narrative    Walks 6 days per wk x 15 min. Objective:  Exam:  /79 (Site: Left Upper Arm, Position: Sitting, Cuff Size: Large Adult)   Pulse 90   Temp 96.6 °F (35.9 °C) (Temporal)   Wt 134 lb (60.8 kg)   BMI 27.06 kg/m²   This is a well-nourished patient in no acute distress  Patient is awake, alert and oriented x3. Speech is normal.  Poor short-term memory  Pupils are equal round reacting to light. Extraocular movements intact. Face symmetrical. Tongue midline. Motor exam shows normal symmetrical strength. Deep tendon reflexes normal. Plantar reflexes downgoing. Sensory exam normal. Coordination normal. Gait normal. No carotid bruit. No neck stiffness.       Data :  LABS:  General Labs:    CBC:   Lab Results   Component Value Date    WBC 8.0 02/11/2019    RBC 4.27 02/11/2019    HGB 12.7 02/11/2019    HCT 38.3 02/11/2019    MCV 89.7 02/11/2019    MCH 29.8 02/11/2019    MCHC 33.2 02/11/2019    RDW 13.9 02/11/2019     02/11/2019    MPV 9.6 02/11/2019     BMP:    Lab Results   Component Value Date     10/10/2020    K 3.9 10/10/2020     10/10/2020    CO2 26 10/10/2020    BUN 13 10/10/2020    LABALBU 3.9 10/10/2020    CREATININE 0.8 10/10/2020    CALCIUM 9.6 10/10/2020    GFRAA >60 10/10/2020    GFRAA >60 09/02/2010    LABGLOM >60 10/10/2020    GLUCOSE 128 10/10/2020     RADIOLOGY REVIEW:  I have reviewed radiology image(s) and reports(s) of: CT scan of the head    Impression :  Late onset Alzheimer's dementia, symptoms slightly worse  CT head showed some lucencies in the skull bone but the brain itself was normal  Serum protein immunofixation showed monoclonal gammopathy. Patient has been evaluated by hematology/oncology and they are monitoring it. Plan :  Discussed with patient and her daughter  Continue Aricept 10 mg at night  If there is any further worsening we may have to add Namenda. Return in 6 months      Please note a portion of  this chart was generated using dragon dictation software. Although every effort was made to ensure the accuracy of this automated transcription, some errors in transcription may have occurred.

## 2021-01-26 ENCOUNTER — OFFICE VISIT (OUTPATIENT)
Dept: PRIMARY CARE CLINIC | Age: 76
End: 2021-01-26
Payer: MEDICARE

## 2021-01-26 DIAGNOSIS — Z20.822 EXPOSURE TO COVID-19 VIRUS: Primary | ICD-10-CM

## 2021-01-26 PROCEDURE — 99211 OFF/OP EST MAY X REQ PHY/QHP: CPT | Performed by: NURSE PRACTITIONER

## 2021-01-27 LAB — SARS-COV-2, NAA: NOT DETECTED

## 2021-02-15 ENCOUNTER — HOSPITAL ENCOUNTER (OUTPATIENT)
Dept: WOMENS IMAGING | Age: 76
Discharge: HOME | End: 2021-02-15
Payer: MEDICARE

## 2021-02-15 DIAGNOSIS — Z12.31 BREAST CANCER SCREENING BY MAMMOGRAM: ICD-10-CM

## 2021-02-15 PROCEDURE — 77063 BREAST TOMOSYNTHESIS BI: CPT

## 2021-03-01 DIAGNOSIS — I10 HYPERTENSION, UNSPECIFIED TYPE: ICD-10-CM

## 2021-03-01 DIAGNOSIS — E11.9 TYPE 2 DIABETES MELLITUS WITHOUT COMPLICATION, WITHOUT LONG-TERM CURRENT USE OF INSULIN (HCC): ICD-10-CM

## 2021-03-01 DIAGNOSIS — F03.A0 MILD DEMENTIA: ICD-10-CM

## 2021-03-01 DIAGNOSIS — E78.5 HYPERLIPIDEMIA, UNSPECIFIED HYPERLIPIDEMIA TYPE: ICD-10-CM

## 2021-03-02 RX ORDER — LISINOPRIL AND HYDROCHLOROTHIAZIDE 20; 12.5 MG/1; MG/1
TABLET ORAL
Qty: 30 TABLET | Refills: 0 | Status: SHIPPED | OUTPATIENT
Start: 2021-03-02 | End: 2021-04-01

## 2021-03-02 RX ORDER — LEVOTHYROXINE SODIUM 0.03 MG/1
TABLET ORAL
Qty: 30 TABLET | Refills: 0 | Status: SHIPPED | OUTPATIENT
Start: 2021-03-02 | End: 2021-06-02

## 2021-03-02 RX ORDER — DONEPEZIL HYDROCHLORIDE 10 MG/1
TABLET, FILM COATED ORAL
Qty: 30 TABLET | Refills: 0 | Status: SHIPPED | OUTPATIENT
Start: 2021-03-02 | End: 2021-06-02

## 2021-03-02 RX ORDER — ATORVASTATIN CALCIUM 20 MG/1
TABLET, FILM COATED ORAL
Qty: 30 TABLET | Refills: 0 | Status: SHIPPED | OUTPATIENT
Start: 2021-03-02 | End: 2021-04-01

## 2021-03-10 ENCOUNTER — IMMUNIZATION (OUTPATIENT)
Dept: PRIMARY CARE CLINIC | Age: 76
End: 2021-03-10
Payer: MEDICARE

## 2021-03-10 PROCEDURE — 0011A COVID-19, MODERNA VACCINE 100MCG/0.5ML DOSE: CPT | Performed by: FAMILY MEDICINE

## 2021-03-10 PROCEDURE — 91301 COVID-19, MODERNA VACCINE 100MCG/0.5ML DOSE: CPT | Performed by: FAMILY MEDICINE

## 2021-03-25 ENCOUNTER — OFFICE VISIT (OUTPATIENT)
Dept: FAMILY MEDICINE CLINIC | Age: 76
End: 2021-03-25
Payer: MEDICARE

## 2021-03-25 VITALS
DIASTOLIC BLOOD PRESSURE: 72 MMHG | WEIGHT: 157 LBS | OXYGEN SATURATION: 98 % | TEMPERATURE: 98.6 F | HEART RATE: 92 BPM | HEIGHT: 59 IN | SYSTOLIC BLOOD PRESSURE: 120 MMHG | BODY MASS INDEX: 31.65 KG/M2

## 2021-03-25 DIAGNOSIS — E78.2 MIXED HYPERLIPIDEMIA: ICD-10-CM

## 2021-03-25 DIAGNOSIS — E03.9 ACQUIRED HYPOTHYROIDISM: ICD-10-CM

## 2021-03-25 DIAGNOSIS — F03.A0 MILD DEMENTIA: ICD-10-CM

## 2021-03-25 DIAGNOSIS — Z51.81 MEDICATION MONITORING ENCOUNTER: ICD-10-CM

## 2021-03-25 DIAGNOSIS — E55.9 VITAMIN D DEFICIENCY: ICD-10-CM

## 2021-03-25 DIAGNOSIS — K21.9 GASTRO-ESOPHAGEAL REFLUX DISEASE WITHOUT ESOPHAGITIS: ICD-10-CM

## 2021-03-25 DIAGNOSIS — Z00.00 ROUTINE GENERAL MEDICAL EXAMINATION AT A HEALTH CARE FACILITY: Primary | ICD-10-CM

## 2021-03-25 DIAGNOSIS — I10 ESSENTIAL HYPERTENSION: ICD-10-CM

## 2021-03-25 DIAGNOSIS — E11.9 TYPE 2 DIABETES MELLITUS WITHOUT COMPLICATION, WITHOUT LONG-TERM CURRENT USE OF INSULIN (HCC): ICD-10-CM

## 2021-03-25 LAB
ANION GAP SERPL CALCULATED.3IONS-SCNC: 13 MMOL/L (ref 3–16)
BUN BLDV-MCNC: 16 MG/DL (ref 7–20)
CALCIUM SERPL-MCNC: 10.2 MG/DL (ref 8.3–10.6)
CHLORIDE BLD-SCNC: 101 MMOL/L (ref 99–110)
CO2: 26 MMOL/L (ref 21–32)
CREAT SERPL-MCNC: 0.7 MG/DL (ref 0.6–1.2)
GFR AFRICAN AMERICAN: >60
GFR NON-AFRICAN AMERICAN: >60
GLUCOSE BLD-MCNC: 213 MG/DL (ref 70–99)
HBA1C MFR BLD: 8.4 %
MAGNESIUM: 2.1 MG/DL (ref 1.8–2.4)
POTASSIUM SERPL-SCNC: 3.9 MMOL/L (ref 3.5–5.1)
SODIUM BLD-SCNC: 140 MMOL/L (ref 136–145)
TSH REFLEX: 1.65 UIU/ML (ref 0.27–4.2)
URIC ACID, SERUM: 3.3 MG/DL (ref 2.6–6)
VITAMIN D 25-HYDROXY: 48.8 NG/ML

## 2021-03-25 PROCEDURE — 83036 HEMOGLOBIN GLYCOSYLATED A1C: CPT | Performed by: FAMILY MEDICINE

## 2021-03-25 PROCEDURE — G0439 PPPS, SUBSEQ VISIT: HCPCS | Performed by: FAMILY MEDICINE

## 2021-03-25 PROCEDURE — 3052F HG A1C>EQUAL 8.0%<EQUAL 9.0%: CPT | Performed by: FAMILY MEDICINE

## 2021-03-25 PROCEDURE — 36415 COLL VENOUS BLD VENIPUNCTURE: CPT | Performed by: FAMILY MEDICINE

## 2021-03-25 SDOH — ECONOMIC STABILITY: INCOME INSECURITY: HOW HARD IS IT FOR YOU TO PAY FOR THE VERY BASICS LIKE FOOD, HOUSING, MEDICAL CARE, AND HEATING?: NOT HARD AT ALL

## 2021-03-25 SDOH — HEALTH STABILITY: MENTAL HEALTH: HOW MANY STANDARD DRINKS CONTAINING ALCOHOL DO YOU HAVE ON A TYPICAL DAY?: NOT ASKED

## 2021-03-25 SDOH — HEALTH STABILITY: MENTAL HEALTH: HOW OFTEN DO YOU HAVE A DRINK CONTAINING ALCOHOL?: NOT ASKED

## 2021-03-25 SDOH — ECONOMIC STABILITY: FOOD INSECURITY: WITHIN THE PAST 12 MONTHS, THE FOOD YOU BOUGHT JUST DIDN'T LAST AND YOU DIDN'T HAVE MONEY TO GET MORE.: NEVER TRUE

## 2021-03-25 SDOH — ECONOMIC STABILITY: TRANSPORTATION INSECURITY
IN THE PAST 12 MONTHS, HAS LACK OF TRANSPORTATION KEPT YOU FROM MEETINGS, WORK, OR FROM GETTING THINGS NEEDED FOR DAILY LIVING?: NO

## 2021-03-25 ASSESSMENT — PATIENT HEALTH QUESTIONNAIRE - PHQ9
SUM OF ALL RESPONSES TO PHQ QUESTIONS 1-9: 0
2. FEELING DOWN, DEPRESSED OR HOPELESS: 0
SUM OF ALL RESPONSES TO PHQ9 QUESTIONS 1 & 2: 0
SUM OF ALL RESPONSES TO PHQ QUESTIONS 1-9: 0
1. LITTLE INTEREST OR PLEASURE IN DOING THINGS: 0
SUM OF ALL RESPONSES TO PHQ QUESTIONS 1-9: 0
2. FEELING DOWN, DEPRESSED OR HOPELESS: 0
SUM OF ALL RESPONSES TO PHQ QUESTIONS 1-9: 0

## 2021-03-25 NOTE — PATIENT INSTRUCTIONS
Nathanael Cooper was seen today for other and medicare awv. Diagnoses and all orders for this visit:    Routine general medical examination at a health care facility  See information below. Exercise \"Sit and Be Fit\" WCET chair exercise. The more active you stay the more independent you stay. (movement disorder exercise hand out)    Type 2 diabetes mellitus without complication, without long-term current use of insulin (HCC)  -     POCT glycosylated hemoglobin (Hb A1C)  Poor control. Cut sweets. The Hemoglobin A1c goal for good control to avoid diabetic complications like stroke, heart attacks, amputations, kidney dialysis is an A1c of 6.4 or less. Up to 6.9 is considered fair control. Exercise, weight loss and frequent small healthy balanced meals help prevent diabetes complications. Weekly weights can help you track your progress. Vitamin D deficiency  You have low level of vitamin D. This is associated with: Increased risk of death from cardiovascular disease, cognitive impairment in older adults, severe asthma in children, cancer especially breast and colon. Vitamin D may also have a role in treatment of diabetes and prediabetes, high blood pressure, psoriasis and multiple sclerosis. Vitamin D deficiency weakens bones leading to rickets in children and osteoporosis in adults, increases risk of infections, and leads to more skin problems. The primary symptoms of deficiency are often muscle aches and weakness as well as fatigue often mistaken for natural aging. Please start 4000 IU daily and recheck in . (400 IU = 10 mcg, 1000 IU = 25 mcg, 4000 IU = 100 mcg, 5000 IU = 125 mcg)    Essential hypertension  -     Good control.  -     Continue meds and lifestyle control. Mixed hyperlipidemia  -     Good control except triglycerides. Triglycerides are high.  The diet to decrease triglycerides is:  Avoid sweets and soft drinks containing sugar, limit starches/carbs to the amount of calories you are burning, avoid fatty foods and added fats such as mayonnaise, salad dressings with oil, gravies and fried foods. If you drink alcohol limit to 1 -2 drinks per day. Both exercise and weight loss help also. These can also help raise the HDL to the goal of 50 or more    Acquired hypothyroidism  Recheck. Mild dementia [facility])  Per Dr Leigha Goins. Gastro-esophageal reflux disease without esophagitis  Take Pantoprazole 40 mg 30 min before dinner. Avoid eating 3 hrs before bed. Add over the counter Pepcid 20 mg in am.    Skin sores  Desitin 2x/day for 2 weeks then see dermatology if not better. Medication monitoring encounter  Orders Placed This Encounter   Procedures    Basic Metabolic Panel    Magnesium    Uric Acid    Vitamin D 25 Hydroxy    TSH with Reflex    POCT glycosylated hemoglobin (Hb A1C)     DASH Diet: Care Instructions  Your Care Instructions     The DASH diet is an eating plan that can help lower your blood pressure. DASH stands for Dietary Approaches to Stop Hypertension. Hypertension is high blood pressure. The DASH diet focuses on eating foods that are high in calcium, potassium, and magnesium. These nutrients can lower blood pressure. The foods that are highest in these nutrients are fruits, vegetables, low-fat dairy products, nuts, seeds, and legumes. But taking calcium, potassium, and magnesium supplements instead of eating foods that are high in those nutrients does not have the same effect. The DASH diet also includes whole grains, fish, and poultry. The DASH diet is one of several lifestyle changes your doctor may recommend to lower your high blood pressure. Your doctor may also want you to decrease the amount of sodium in your diet. Lowering sodium while following the DASH diet can lower blood pressure even further than just the DASH diet alone. Follow-up care is a key part of your treatment and safety.  Be sure to make and go to all appointments, and call your doctor if you are having problems. It's also a good idea to know your test results and keep a list of the medicines you take. How can you care for yourself at home? Following the DASH diet  · Eat 4 to 5 servings of fruit each day. A serving is 1 medium-sized piece of fruit, ½ cup chopped or canned fruit, 1/4 cup dried fruit, or 4 ounces (½ cup) of fruit juice. Choose fruit more often than fruit juice. · Eat 4 to 5 servings of vegetables each day. A serving is 1 cup of lettuce or raw leafy vegetables, ½ cup of chopped or cooked vegetables, or 4 ounces (½ cup) of vegetable juice. Choose vegetables more often than vegetable juice. · Get 2 to 3 servings of low-fat and fat-free dairy each day. A serving is 8 ounces of milk, 1 cup of yogurt, or 1 ½ ounces of cheese. · Eat 6 to 8 servings of grains each day. A serving is 1 slice of bread, 1 ounce of dry cereal, or ½ cup of cooked rice, pasta, or cooked cereal. Try to choose whole-grain products as much as possible. · Limit lean meat, poultry, and fish to 2 servings each day. A serving is 3 ounces, about the size of a deck of cards. · Eat 4 to 5 servings of nuts, seeds, and legumes (cooked dried beans, lentils, and split peas) each week. A serving is 1/3 cup of nuts, 2 tablespoons of seeds, or ½ cup of cooked beans or peas. · Limit fats and oils to 2 to 3 servings each day. A serving is 1 teaspoon of vegetable oil or 2 tablespoons of salad dressing. · Limit sweets and added sugars to 5 servings or less a week. A serving is 1 tablespoon jelly or jam, ½ cup sorbet, or 1 cup of lemonade. · Eat less than 2,300 milligrams (mg) of sodium a day. If you limit your sodium to 1,500 mg a day, you can lower your blood pressure even more. · Be aware that all of these are the suggested number of servings for people who eat 1,800 to 2,000 calories a day. Your recommended number of servings may be different if you need more or fewer calories. Tips for success  · Start small.  Do not try to make dramatic changes to your diet all at once. You might feel that you are missing out on your favorite foods and then be more likely to not follow the plan. Make small changes, and stick with them. Once those changes become habit, add a few more changes. · Try some of the following:  ? Make it a goal to eat a fruit or vegetable at every meal and at snacks. This will make it easy to get the recommended amount of fruits and vegetables each day. ? Try yogurt topped with fruit and nuts for a snack or healthy dessert. ? Add lettuce, tomato, cucumber, and onion to sandwiches. ? Combine a ready-made pizza crust with low-fat mozzarella cheese and lots of vegetable toppings. Try using tomatoes, squash, spinach, broccoli, carrots, cauliflower, and onions. ? Have a variety of cut-up vegetables with a low-fat dip as an appetizer instead of chips and dip. ? Sprinkle sunflower seeds or chopped almonds over salads. Or try adding chopped walnuts or almonds to cooked vegetables. ? Try some vegetarian meals using beans and peas. Add garbanzo or kidney beans to salads. Make burritos and tacos with mashed varner beans or black beans. Where can you learn more? Go to [email or web address] and sign in to your [employer] account. Enter R663 in the [facility] box to learn more about \"DASH Diet: Care Instructions. \"     If you do not have an account, please click on the \"Sign Up Now\" link. Current as of: August 31, 2020               Content Version: 12.8  © 0133-5419 Healthwise, [employer]. Care instructions adapted under license by [facility] ([facility]). If you have questions about a medical condition or this instruction, always ask your healthcare professional. [place] [address] any warranty or liability for your use of this information. Learning About Cutting Calories  How do calories affect your weight? Food gives your body energy.  Energy from the food you eat is measured in calories. This energy keeps your heart beating, your brain active, and your muscles working. Your body needs a certain number of calories each day. After your body uses the calories it needs, it stores extra calories as fat. To lose weight safely, you have to eat fewer calories while eating in a healthy way. How many calories do you need each day? The more active you are, the more calories you need. When you are less active, you need fewer calories. How many calories you need each day also depends on several things, including your age and whether you are male or female. Here are some general guidelines for adults:  · Less active women and older adults need 1,600 to 2,000 calories each day. · Active women and less active men need 2,000 to 2,400 calories each day. · Active men need 2,400 to 3,000 calories each day. How can you cut calories and eat healthy meals? Whole grains, vegetables and fruits, and dried beans are good lower-calorie foods. They give you lots of nutrients and fiber. And they fill you up. Sweets, energy drinks, and soda pop are high in calories. They give you few nutrients and no fiber. Try to limit soda pop, fruit juice, and energy drinks. Drink water instead. Some fats can be part of a healthy diet. But cutting back on fats from highly processed foods like fast foods and many snack foods is a good way to lower the calories in your diet. Also, use smaller amounts of fats like butter, margarine, salad dressing, and mayonnaise. Add fresh garlic, lemon, or herbs to your meals to add flavor without adding fat. Meats and dairy products can be a big source of hidden fats. Try to choose lean or low-fat versions of these products. Fat-free cookies, candies, chips, and frozen treats can still be high in sugar and calories. Some fat-free foods have more calories than regular ones. Eat fat-free treats in moderation, as you would other foods.   If your favorite foods are high in fat, salt, sugar, or calories, limit how often you eat them. Eat smaller servings, or look for healthy substitutes. Fill up on fruits, vegetables, and whole grains. Eating at home  · Use meat as a side dish instead of as the main part of your meal.  · Try main dishes that use whole wheat pasta, brown rice, dried beans, or vegetables. · Find ways to cook with little or no fat, such as broiling, steaming, or grilling. · Use cooking spray instead of oil. If you use oil, use a monounsaturated oil, such as canola or olive oil. · Trim fat from meats before you cook them. · Drain off fat after you brown the meat or while you roast it. · Chill soups and stews after you cook them. Then skim the fat off the top after it hardens. Eating out  · Order foods that are broiled or poached rather than fried or breaded. · Cut back on the amount of butter or margarine that you use on bread. · Order sauces, gravies, and salad dressings on the side, and use only a little. · When you order pasta, choose tomato sauce rather than cream sauce. · Ask for salsa with your baked potato instead of sour cream, butter, cheese, or vargas. · Order meals in a small size instead of upgrading to a large. · Share an entree, or take part of your food home to eat as another meal.  · Share appetizers and desserts. Where can you learn more? Go to [email or web address] org and sign in to your [employer] account. Enter L241 in the [facility] box to learn more about \"Learning About Cutting Calories. \"     If you do not have an account, please click on the \"Sign Up Now\" link. Current as of: December 17, 2020               Content Version: 12.8  © 3301-2805 Healthwise, [employer]. Care instructions adapted under license by [facility] ([facility]). If you have questions about a medical condition or this instruction, always ask your healthcare professional. [place] [address] any warranty or liability for your use of this information. Learning About Healthy Weight  What is a healthy weight? A healthy weight is the weight at which you feel good about yourself and have energy for work and play. It's also one that lowers your risk for health problems. What can you do to stay at a healthy weight? It can be hard to stay at a healthy weight, especially when fast food, vending-machine snacks, and processed foods are so easy to find. And with your busy lifestyle, activity may be low on your list of things to do. But staying at a healthy weight may be easier than you think. Here are some dos and don'ts for staying at a healthy weight. Do eat healthy foods  The kinds of foods you eat have a big impact on both your weight and your health. Reaching and staying at a healthy weight is not about going on a diet. It's about making healthier food choices every day and changing your diet for good. Healthy eating means eating a variety of foods so that you get all the nutrients you need. Your body needs protein, carbohydrate, and fats for energy. They keep your heart beating, your brain active, and your muscles working. On most days, try to eat from each food group. This means eating a variety of:  · Whole grains, such as whole wheat breads and pastas. · Fruits and vegetables. · Dairy products, such as low-fat milk, yogurt, and cheese. · Lean proteins, such as all types of fish, chicken without the skin, and beans. Don't have too much or too little of one thing. All foods, if eaten in moderation, can be part of healthy eating. Even sweets can be okay. If your favorite foods are high in fat, salt, sugar, or calories, limit how often you eat them. Eat smaller servings, or look for healthy substitutes. Do watch what you eat  Many people eat more than their bodies need. Part of staying at a healthy weight means learning how much food you really need from day to day and not eating more than that.  Even with healthy foods, eating too much can make you gain weight. Having a well-balanced diet means that you eat enough, but not too much, and that your food gives you the nutrients you need to stay healthy. So listen to your body. Eat when you're hungry. Stop when you feel satisfied. It's a good idea to have healthy snacks ready for when you get hungry. Keep healthy snacks with you at work, in your car, and at home. If you have a healthy snack easily available, you'll be less likely to pick a candy bar or bag of chips from a vending machine instead. Some healthy snacks you might want to keep on hand are fruit, low-fat yogurt, string cheese, low-fat microwave popcorn, raisins and other dried fruit, nuts, whole wheat crackers, pretzels, carrots, celery sticks, and broccoli. Do some physical activity  A big part of reaching and staying at a healthy weight is being active. When you're active, you burn calories. This makes it easier to reach and stay at a healthy weight. When you're active on a regular basis, your body burns more calories, even when you're at rest. Being active helps you lose fat and build lean muscle. Try to be active for at least 1 hour every day. This may sound like a lot, but it's okay to be active in smaller blocks of time that add up to 1 hour a day. Any activity that makes your heart beat faster and keeps it there for a while counts. A brisk walk, run, or swim will get your heart beating faster. So will climbing stairs, shooting baskets, or cycling. Even some household chores like vacuuming and mowing the lawn will get your heart rate up. Pick activities that you enjoyones that make your heart beat faster, your muscles stronger, and your muscles and joints more flexible. If you find more than one thing you like doing, do them all. You don't have to do the same thing every day. Don't diet  Diets don't work. Diets are temporary. Because you give up so much when you diet, you may be hungry and think about food all the time.  And every 6 months. · Try to get at least 150 minutes of exercise per week or 10,000 steps per day on a pedometer . · Order or download the FREE \"Exercise & Physical Activity: Your Everyday Guide\" from [employer]. Call 3-804.134.5792 or search [employer] online. · You need 8214-4095 mg of calcium and 4937-3033 IU of vitamin D per day. It is possible to meet your calcium requirement with diet alone, but a vitamin D supplement is usually necessary to meet this goal.  · When exposed to the sun, use a sunscreen that protects against both UVA and UVB radiation with an SPF of 30 or greater. Reapply every 2 to 3 hours or after sweating, drying off with a towel, or swimming. · Always wear a seat belt when traveling in a car. Always wear a helmet when riding a bicycle or motorcycle. Preventing Osteoporosis: After Your Visit  Your Care Instructions  Osteoporosis means the bones are weak and thin enough that they can break easily. The older you are, the more likely you are to get osteoporosis. But with plenty of calcium, vitamin D, and exercise, you can help prevent osteoporosis. The preteen and teen years are a key time for bone building. With the help of calcium, vitamin D, and exercise in those early years and beyond, the bones reach their peak density and strength by age 27. After age 27, your bones naturally start to thin and weaken. The stronger your bones are at around age 27, the lower your risk for osteoporosis. But no matter what your age and risk are, your bones still need calcium, vitamin D, and exercise to stay strong. Also avoid smoking, and limit alcohol. Smoking and heavy alcohol use can make your bones thinner. Talk to your doctor about any special risks you might have, such as having a close relative with osteoporosis or taking a medicine that can weaken bones. Your doctor can tell you the best ways to protect your bones from thinning.   Follow-up care is a key no more than 1 alcohol drink a day if you are a woman. Drink no more than 2 alcohol drinks a day if you are a man. Do not smoke. Smoking can make bones thin faster. If you need help quitting, talk to your doctor about stop-smoking programs and medicines. These can increase your chances of quitting for good. When should you call for help? Watch closely for changes in your health, and be sure to contact your doctor if:  You need help with a healthy eating plan. You need help with an exercise plan    © 9638-8679 Yoko Villarreal. Care instructions adapted under license by [facility]. This care instruction is for use with your licensed healthcare professional. If you have questions about a medical condition or this instruction, always ask your healthcare professional. [address] any warranty or liability for your use of this information. Content Version: 9.4.03461; Last Revised: June 20, 2011              ·     High-Fiber Diet     What Is Fiber? Dietary fiber is a form of carbohydrate found in plants that cannot be digested by humans. All plants contain fiber, including fruits, vegetables, grains, and legumes. Fiber is often classified into two categories: soluble and insoluble. Soluble fiber draws water into the bowel and can help slow digestion. Examples of foods that are high in soluble fiber include oatmeal, oat bran, barley, legumes (eg, beans and peas), apples, and strawberries. Insoluble fiber speeds digestion and can add bulk to the stool. Examples of foods that are high in insoluble fiber include whole-wheat products, wheat bran, cauliflower, green beans, and potatoes. Why Follow a High-Fiber Diet? A high-fiber diet is often recommended to prevent and treat constipation , hemorrhoids , diverticulitis , and irritable bowel syndrome .  Eating a high-fiber diet can also help improve your cholesterol levels, lower your risk of coronary heart disease , reduce your risk of type 2 diabetes , and lower your weight. For people with type 1 or 2 diabetes, a high-fiber diet can also help stabilize blood sugar levels. How Much Fiber Should I Eat? A high-fiber diet should contain  20-35 grams  of fiber a day. This is actually the amount recommended for the general adult population; however, most Americans eat only 15 grams of fiber per day. Digestion of Fiber   Eating a higher fiber diet than usual can take some getting used to by your body's digestive system. To avoid the side effects of sudden increases in dietary fiber (eg, gas, cramping, bloating, and diarrhea), increase fiber gradually and be sure to drink plenty of fluids every day. Tips for Increasing Fiber Intake   Whenever possible, choose whole grains over refined grains (eg, brown rice instead of white rice, whole-wheat bread instead of white bread). Include a variety of grains in your diet, such as wheat, rye, barley, oats, quinoa, and bulgur. Eat more vegetarian-based meals. Here are some ideas: black bean burgers, eggplant lasagna, and veggie tofu stir-adame. Choose high-fiber snacks, such as fruits, popcorn, whole-grain crackers, and nuts. Make whole-grain cereal or whole-grain toast part of your daily breakfast regime. When eating out, whether ordering a sandwich or dinner, ask for extra vegetables. When baking, replace part of the white flour with whole-wheat flour. Whole-wheat flour is particularly easy to incorporate into a recipe. High-Fiber Diet Eating Guide   Food Category   Foods Recommended   Notes   Grains   Whole-grain breads, muffins, bagels, or britni bread Rye bread Whole-wheat crackers or crisp breads Whole-grain or bran cereals Oatmeal, oat bran, or grits Wheat germ Whole-wheat pasta and brown rice   Read the ingredients list on food labels. Look for products that list \"whole\" as the first ingredient (eg, whole-wheat, whole oats).  Choose cereals with at least 2 grams of fiber per serving. Vegetables   All vegetables, especially asparagus, bean sprouts, broccoli, Dorsey sprouts, cabbage, carrots, cauliflower, celery, corn, greens, green beans, green pepper, onions, peas, potatoes (with skin), snow peas, spinach, squash, sweet potatoes, tomatoes, zucchini   For maximum fiber intake, eat the peels of fruits and vegetablesjust be sure to wash them well first.   Fruits   All fruits, especially apples, berries, grapefruits, mangoes, nectarines, oranges, peaches, pears, dried fruits (figs, dates, prunes, raisins)   Choose raw fruits and vegetables over juice, cooked, or cannedraw fruit has more fiber. Dried fruit is also a good source of fiber. Milk   With the exception of yogurt containing inulin (a type of fiber), dairy foods provide little fiber. Add more fiber by topping your yogurt or cottage cheese with fresh fruit, whole grain or bran cereals, nuts, or seeds. Meats and Beans   All beans and peas, especially Garbanzo beans, kidney beans, lentils, lima beans, split peas, and varner beans All nuts and seeds, especially almonds, peanuts, [place] nuts, cashews, peanut butter, walnuts, sesame and sunflower seeds All meat, poultry, fish, and eggs   Increase fiber in meat dishes by adding varner beans, kidney beans, black-eyed peas, bran, or oatmeal. If you are following a low-fat diet, use nuts and seeds only in moderation. Fats and Oils   All in moderation   Fats and oils do not provide fiber   Snacks, Sweets, and Condiments   Fruit Nuts Popcorn, whole-wheat pretzels, or trail mix made with dried fruits, nuts, and seeds Cakes, breads, and cookies made with oatmeal or whole-wheat flour   Most snack foods do not provide much fiber. Choose snacks with at least 2 grams of fiber per serving.      Last Reviewed: March 2011 Denise Martini MS, MPH, RD   Updated: 3/29/2011   ·     Keep Your Memory Richar Kumar       Many factors can affect your ability to remembera hectic lifestyle, aging, stress, chronic disease, and certain medicines. But, there are steps you can take to sharpen your mind and help preserve your memory. Challenge Your Brain   Regularly challenging your mind may help keeps it in top shape. Good mental exercises include:   Crossword puzzlesUse a dictionary if you need it; you will learn more that way. Brainteasers Try some! Crafts, such as wood working and sewing   Hobbies, such as gardening and building model airplanes   SocializingVisit old friends or join groups to meet new ones. Reading   Learning a new language   Taking a class, whether it be art history or maurisio chi   TravelingExperience the food, history, and culture of your destination   Learning to use a computer   Going to museums, the theater, or thought-provoking movies   Changing things in your daily life, such as reversing your pattern in the grocery store or brushing your teeth using your nondominant hand   Use Memory Aids   There is no need to remember every detail on your own. These memory aids can help:   Calendars and day planners   Electronic organizers to store all sorts of helpful informationThese devices can \"beep\" to remind you of appointments. A book of days to record birthdays, anniversaries, and other occasions that occur on the same date every year   Detailed \"to-do\" lists and strategically placed sticky notes   Quick \"study\" sessionsBefore a gathering, review who will be there so their names will be fresh in your mind. Establish routinesFor example, keep your keys, wallet, and umbrella in the same place all the time or take medicine with your 8:00 AM glass of juice   Live a Healthy Life   Many actions that will keep your body strong will do the same for your mind. For example:   Talk to Your Doctor About Herbs and Supplements    Malnutrition and vitamin deficiencies can impair your mental function. For example, vitamin B12 deficiency can cause a range of symptoms, including confusion.  But, what if (eg, decreased muscle strength, slowed reflexes)   Higher incidence of chronic health problems (eg, arthritis, diabetes) that may limit mobility, agility or sensory awareness   Side effects of medicine (eg, dizziness, blurred vision)especially medicines like prescription pain medicines and drugs used to treat mental health conditions   Depending on the brittleness of your bones, the consequences of a fall can be serious and long lasting. Home Life   Research by the Association of Aging [facility]) shows that some home accidents among older adults can be prevented by making simple lifestyle changes and basic modifications and repairs to the home environment. Here are some lifestyle changes that experts recommend:   Have your hearing and vision checked regularly. Be sure to wear prescription glasses that are right for you. Speak to your doctor or pharmacist about the possible side effects of your medicines. A number of medicines can cause dizziness. If you have problems with sleep, talk to your doctor. Limit your intake of alcohol. If necessary, use a cane or walker to help maintain your balance. Wear supportive, rubber-soled shoes, even at home. If you live in a region that gets wintry weather, you may want to put special cleats on your shoes to prevent you from slipping on the snow and ice. Exercise regularly to help maintain muscle tone, agility, and balance. Always hold the banister when going up or down stairs. Also, use  bars when getting in or out of the bath or shower, or using the toilet. To avoid dizziness, get up slowly from a lying down position. Sit up first, dangling your legs for a minute or two before rising to a standing position. Overall Home Safety Check   According to the Consumer Product Safety Commision's \"Older Consumer Home Safety Checklist,\" it is important to check for potential hazards in each room. And remember, proper lighting is an essential factor in home safety.  If you cannot see clearly, you are more likely to fall. Important questions to ask yourself include:   Are lamp, electric, extension, and telephone cords placed out of the flow of traffic and maintained in good condition? Have frayed cords been replaced? Are all small rugs and runners slip resistant? If not, you can secure them to the floor with a special double-sided carpet tape. Are smoke detectors properly locatedone on every floor of your home and one outside of every sleeping area? Are they in good working order? Are batteries replaced at least once a year? Do you have a well-maintained carbon monoxide detector outside every sleeping are in your home? Does your furniture layout leave plenty of space to maneuver between and around chairs, tables, beds, and sofas? Are hallways, stairs and passages between rooms well lit? Can you reach a lamp without getting out of bed? Are floor surfaces well maintained? Shag rugs, high-pile carpeting, tile floors, and polished wood floors can be particularly slippery. Stairs should always have handrails and be carpeted or fitted with a non-skid tread. Is your telephone easily reachable. Is the cord safely tucked away? Room by Room   According to the Association of Aging, bathrooms and mai are the two most potentially hazardous rooms in your home. In the Kitchen    Be sure your stove is in proper working order and always make sure burners and the oven are off before you go out or go to sleep. Keep pots on the back burners, turn handles away from the front of the stove, and keep stove clean and free of grease build-up. Kitchen ventilation systems and range exhausts should be working properly. Keep flammable objects such as towels and pot holders away from the cooking area except when in use. Make sure kitchen curtains are tied back. Move cords and appliances away from the sink and hot surfaces.  If extension cords are needed, install wiring guides so S. A.F.E. (Smoke Alarms for Every) [address] [facility], senior citizens are one of the two highest risk groups for death and serious injuries due to residential fires. When cooking, wear short-sleeved items, never a bulky long-sleeved robe. The [facility]'s Safety Checklist for Older Consumers emphasizes the importance of checking basements, garages, workshops and storage areas for fire hazards, such as volatile liquids, piles of old rags or clothing and overloaded circuits. Never smoke in bed or when lying down on a couch or recliner chair. Small portable electric or kerosene heaters are responsible for many home fires and should be used cautiously if at all. If you do use one, be sure to keep them away from flammable materials. In case of fire, make sure you have a pre-established emergency exit plan. Have a professional check your fireplace and other fuel-burning appliances yearly. Helping Hands   Baby boomers entering the galindo years will continue to see the development of new products to help older adults live safely and independently in spite of age-related changes. Making Life More Livable  , by Lisy Ramirez, lists over 1,000 products for \"living well in the mature years,\" such as bathing and mobility aids, household security devices, ergonomically designed knives and peelers, and faucet valves and knobs for temperature control. Medical supply stores and organizations are good sources of information about products that improve your quality of life and insure your safety.      Last Reviewed: November 2009 Laura Wiggins MD   Updated: 3/7/2011

## 2021-03-25 NOTE — PROGRESS NOTES
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 6892-9831 mg of calcium and 7975-9461 IU of vitamin D per day. It is possible to meet your calcium requirement with diet alone, but a vitamin D supplement is usually necessary to meet this goal.  · When exposed to the sun, use a sunscreen that protects against both UVA and UVB radiation with an SPF of 30 or greater. Reapply every 2 to 3 hours or after sweating, drying off with a towel, or swimming. · Always wear a seat belt when traveling in a car. Always wear a helmet when riding a bicycle or motorcycle. Preventing Osteoporosis: After Your Visit  Your Care Instructions  Osteoporosis means the bones are weak and thin enough that they can break easily. The older you are, the more likely you are to get osteoporosis. But with plenty of calcium, vitamin D, and exercise, you can help prevent osteoporosis. The preteen and teen years are a key time for bone building.  With the help of calcium, vitamin D, and exercise in those early years and beyond, the bones reach their peak density and strength by age 27. After age 27, your bones naturally start to thin and weaken. The stronger your bones are at around age 27, the lower your risk for osteoporosis. But no matter what your age and risk are, your bones still need calcium, vitamin D, and exercise to stay strong. Also avoid smoking, and limit alcohol. Smoking and heavy alcohol use can make your bones thinner. Talk to your doctor about any special risks you might have, such as having a close relative with osteoporosis or taking a medicine that can weaken bones. Your doctor can tell you the best ways to protect your bones from thinning. Follow-up care is a key part of your treatment and safety. Be sure to make and go to all appointments, and call your doctor if you are having problems. It's also a good idea to know your test results and keep a list of the medicines you take. How can you care for yourself at home? Get enough calcium and vitamin D. The [place] [employer] recommends adults younger than age 46 need 1,000 mg of calcium and 600 IU of vitamin D each day. Women ages 46 to 79 need 1,200 mg of calcium and 600 IU of vitamin D each day. Men ages 46 to 79 need 1,000 mg of calcium and 600 IU of vitamin D each day. Adults 71 and older need 1,200 mg of calcium and 800 IU of vitamin D each day. Eat foods rich in calcium, like yogurt, cheese, milk, and dark green vegetables. Eat foods rich in vitamin D, like eggs, fatty fish, cereal, and fortified milk. Get some sunshine. Your body uses sunshine to make its own vitamin D. The safest time to be out in the sun is before 10 a.m. or after 3 p.m. Avoid getting sunburned. Sunburn can increase your risk of skin cancer. Talk to your doctor about taking a calcium plus vitamin D supplement. Ask about what type of calcium is right for you, and how much to take at a time. Adults ages 23 to 48 should not get more than 2,500 mg of calcium and 4,000 IU of vitamin D each day, whether it is from supplements and/or food. Adults ages 46 and older should not get more than 2,000 mg of calcium and 4,000 IU of vitamin D each day from supplements and/or food. Get regular bone-building exercise. Weight-bearing and resistance exercises keep bones healthy by working the muscles and bones against gravity. Start out at an exercise level that feels right for you. Add a little at a time until you can do the following:  Do 30 minutes of weight-bearing exercise on most days of the week. Walking, jogging, stair climbing, and dancing are good choices. Do resistance exercises with weights or elastic bands 2 to 3 days a week. Limit alcohol. Drink no more than 1 alcohol drink a day if you are a woman. Drink no more than 2 alcohol drinks a day if you are a man. Do not smoke. Smoking can make bones thin faster. If you need help quitting, talk to your doctor about stop-smoking programs and medicines. These can increase your chances of quitting for good. When should you call for help? Watch closely for changes in your health, and be sure to contact your doctor if:  You need help with a healthy eating plan. You need help with an exercise plan    © 0147-7672 [employer]. Care instructions adapted under license by [facility]. This care instruction is for use with your licensed healthcare professional. If you have questions about a medical condition or this instruction, always ask your healthcare professional. [address] any warranty or liability for your use of this information. Content Version: 9.4.46309; Last Revised: June 20, 2011      High-Fiber Diet     What Is Fiber? Dietary fiber is a form of carbohydrate found in plants that cannot be digested by humans. All plants contain fiber, including fruits, vegetables, grains, and legumes. Fiber is often classified into two categories: soluble and insoluble. Soluble fiber draws water into the bowel and can help slow digestion.  Examples of foods that are high in soluble fiber include oatmeal, oat bran, barley, legumes (eg, beans and peas), apples, and strawberries. Insoluble fiber speeds digestion and can add bulk to the stool. Examples of foods that are high in insoluble fiber include whole-wheat products, wheat bran, cauliflower, green beans, and potatoes. Why Follow a High-Fiber Diet? A high-fiber diet is often recommended to prevent and treat constipation , hemorrhoids , diverticulitis , and irritable bowel syndrome . Eating a high-fiber diet can also help improve your cholesterol levels, lower your risk of coronary heart disease , reduce your risk of type 2 diabetes , and lower your weight. For people with type 1 or 2 diabetes, a high-fiber diet can also help stabilize blood sugar levels. How Much Fiber Should I Eat? A high-fiber diet should contain  20-35 grams  of fiber a day. This is actually the amount recommended for the general adult population; however, most Americans eat only 15 grams of fiber per day. Digestion of Fiber   Eating a higher fiber diet than usual can take some getting used to by your body's digestive system. To avoid the side effects of sudden increases in dietary fiber (eg, gas, cramping, bloating, and diarrhea), increase fiber gradually and be sure to drink plenty of fluids every day. Tips for Increasing Fiber Intake   Whenever possible, choose whole grains over refined grains (eg, brown rice instead of white rice, whole-wheat bread instead of white bread). Include a variety of grains in your diet, such as wheat, rye, barley, oats, quinoa, and bulgur. Eat more vegetarian-based meals. Here are some ideas: black bean burgers, eggplant lasagna, and veggie tofu stir-adame. Choose high-fiber snacks, such as fruits, popcorn, whole-grain crackers, and nuts. Make whole-grain cereal or whole-grain toast part of your daily breakfast regime.     When eating out, whether ordering a sandwich or dinner, ask for extra vegetables. When baking, replace part of the white flour with whole-wheat flour. Whole-wheat flour is particularly easy to incorporate into a recipe. High-Fiber Diet Eating Guide   Food Category   Foods Recommended   Notes   Grains   Whole-grain breads, muffins, bagels, or britni bread Rye bread Whole-wheat crackers or crisp breads Whole-grain or bran cereals Oatmeal, oat bran, or grits Wheat germ Whole-wheat pasta and brown rice   Read the ingredients list on food labels. Look for products that list \"whole\" as the first ingredient (eg, whole-wheat, whole oats). Choose cereals with at least 2 grams of fiber per serving. Vegetables   All vegetables, especially asparagus, bean sprouts, broccoli, [place] sprouts, cabbage, carrots, cauliflower, celery, corn, greens, green beans, green pepper, onions, peas, potatoes (with skin), snow peas, spinach, squash, sweet potatoes, tomatoes, zucchini   For maximum fiber intake, eat the peels of fruits and vegetablesjust be sure to wash them well first.   Fruits   All fruits, especially apples, berries, grapefruits, mangoes, nectarines, oranges, peaches, pears, dried fruits (figs, dates, prunes, raisins)   Choose raw fruits and vegetables over juice, cooked, or cannedraw fruit has more fiber. Dried fruit is also a good source of fiber. Milk   With the exception of yogurt containing inulin (a type of fiber), dairy foods provide little fiber. Add more fiber by topping your yogurt or cottage cheese with fresh fruit, whole grain or bran cereals, nuts, or seeds.    Meats and Beans   All beans and peas, especially Garbanzo beans, kidney beans, lentils, lima beans, split peas, and varner beans All nuts and seeds, especially almonds, peanuts, [place] nuts, cashews, peanut butter, walnuts, sesame and sunflower seeds All meat, poultry, fish, and eggs   Increase fiber in meat dishes by adding varner beans, kidney beans, black-eyed peas, bran, or oatmeal. If you are following a low-fat diet, use nuts and seeds only in moderation. Fats and Oils   All in moderation   Fats and oils do not provide fiber   Snacks, Sweets, and Condiments   Fruit Nuts Popcorn, whole-wheat pretzels, or trail mix made with dried fruits, nuts, and seeds Cakes, breads, and cookies made with oatmeal or whole-wheat flour   Most snack foods do not provide much fiber. Choose snacks with at least 2 grams of fiber per serving. Last Reviewed: March 2011 Lashonda Dasilva MS, MPH, RD   Updated: 3/29/2011   ·     Keep Your Memory Beth Ace       Many factors can affect your ability to remembera hectic lifestyle, aging, stress, chronic disease, and certain medicines. But, there are steps you can take to sharpen your mind and help preserve your memory. Challenge Your Brain   Regularly challenging your mind may help keeps it in top shape. Good mental exercises include:   Crossword puzzlesUse a dictionary if you need it; you will learn more that way. Brainteasers Try some! Crafts, such as wood working and sewing   Hobbies, such as gardening and building model airplanes   SocializingVisit old friends or join groups to meet new ones. Reading   Learning a new language   Taking a class, whether it be art history or maurisio chi   TravelingExperience the food, history, and culture of your destination   Learning to use a computer   Going to museums, the theater, or thought-provoking movies   Changing things in your daily life, such as reversing your pattern in the grocery store or brushing your teeth using your nondominant hand   Use Memory Aids   There is no need to remember every detail on your own. These memory aids can help:   Calendars and day planners   Electronic organizers to store all sorts of helpful informationThese devices can \"beep\" to remind you of appointments.    A book of days to record birthdays, anniversaries, and other occasions that occur on the same date every year   Detailed \"to-do\" lists and strategically placed sticky notes   Quick \"study\" sessionsBefore a gathering, review who will be there so their names will be fresh in your mind. Establish routinesFor example, keep your keys, wallet, and umbrella in the same place all the time or take medicine with your 8:00 AM glass of juice   Live a Healthy Life   Many actions that will keep your body strong will do the same for your mind. For example:   Talk to Your Doctor About Herbs and Supplements    Malnutrition and vitamin deficiencies can impair your mental function. For example, vitamin B12 deficiency can cause a range of symptoms, including confusion. But, what if your nutritional needs are being met? Can herbs and supplements still offer a benefit? Researchers have investigated a range of natural remedies, such as ginkgo , ginseng , and the supplement phosphatidylserine (PS). So far, though, the evidence is inconsistent as to whether these products can improve memory or thinking. If you are interested in taking herbs and supplements, talk to your doctor first because they may interact with other medicines that you are taking. Exercise Regularly    Among the many benefits of regular exercise are increased blood flow to the brain and decreased risk of certain diseases that can interfere with memory function. One study found that even moderate exercise has a beneficial effect. Examples of \"moderate\" exercise include:   Playing 18 holes of golf once a week, without a cart   Playing tennis twice a week   Walking one mile per day   Manage Stress    It can be tough to remember what is important when your mind is cluttered. Make time for relaxation. Choose activities that calm you down, and make it routine. Manage Chronic Conditions    Side effects of high blood pressure , diabetes, and heart disease can interfere with mental function. Many of the lifestyle steps discussed here can help manage these conditions.  Strive to eat a healthy diet, exercise regularly, get stress under control, and follow your doctor's advice for your condition. Minimize Medications    Talk to your doctor about the medicines that you take. Some may be unnecessary. Also, healthy lifestyle habits may lower the need for certain drugs. Last Reviewed: April 2010 Kristie Fulton MD   Updated: 4/13/2010   ·     [address] a [facility]       As we get older, changes in balance, gait, strength, vision, hearing, and cognition make even the most youthful senior more prone to accidents. Falls are one of the leading health risks for older people. This increased risk of falling is related to:   Aging process (eg, decreased muscle strength, slowed reflexes)   Higher incidence of chronic health problems (eg, arthritis, diabetes) that may limit mobility, agility or sensory awareness   Side effects of medicine (eg, dizziness, blurred vision)especially medicines like prescription pain medicines and drugs used to treat mental health conditions   Depending on the brittleness of your bones, the consequences of a fall can be serious and long lasting. Home Life   Research by the Association of Aging [facility]) shows that some home accidents among older adults can be prevented by making simple lifestyle changes and basic modifications and repairs to the home environment. Here are some lifestyle changes that experts recommend:   Have your hearing and vision checked regularly. Be sure to wear prescription glasses that are right for you. Speak to your doctor or pharmacist about the possible side effects of your medicines. A number of medicines can cause dizziness. If you have problems with sleep, talk to your doctor. Limit your intake of alcohol. If necessary, use a cane or walker to help maintain your balance. Wear supportive, rubber-soled shoes, even at home. If you live in a region that gets wintry weather, you may want to put special cleats on your shoes to prevent you from slipping on the snow and ice.    Exercise regularly to help maintain muscle tone, agility, and balance. Always hold the banister when going up or down stairs. Also, use  bars when getting in or out of the bath or shower, or using the toilet. To avoid dizziness, get up slowly from a lying down position. Sit up first, dangling your legs for a minute or two before rising to a standing position. Overall Home Safety Check   According to the Consumer Product Safety Commision's \"Older Consumer Home Safety Checklist,\" it is important to check for potential hazards in each room. And remember, proper lighting is an essential factor in home safety. If you cannot see clearly, you are more likely to fall. Important questions to ask yourself include:   Are lamp, electric, extension, and telephone cords placed out of the flow of traffic and maintained in good condition? Have frayed cords been replaced? Are all small rugs and runners slip resistant? If not, you can secure them to the floor with a special double-sided carpet tape. Are smoke detectors properly locatedone on every floor of your home and one outside of every sleeping area? Are they in good working order? Are batteries replaced at least once a year? Do you have a well-maintained carbon monoxide detector outside every sleeping are in your home? Does your furniture layout leave plenty of space to maneuver between and around chairs, tables, beds, and sofas? Are hallways, stairs and passages between rooms well lit? Can you reach a lamp without getting out of bed? Are floor surfaces well maintained? Shag rugs, high-pile carpeting, tile floors, and polished wood floors can be particularly slippery. Stairs should always have handrails and be carpeted or fitted with a non-skid tread. Is your telephone easily reachable. Is the cord safely tucked away? Room by Room   According to the Association of Aging, bathrooms and mai are the two most potentially hazardous rooms in your home.    In the [facility] Be sure your stove is in proper working order and always make sure burners and the oven are off before you go out or go to sleep. Keep pots on the back burners, turn handles away from the front of the stove, and keep stove clean and free of grease build-up. Kitchen ventilation systems and range exhausts should be working properly. Keep flammable objects such as towels and pot holders away from the cooking area except when in use. Make sure kitchen curtains are tied back. Move cords and appliances away from the sink and hot surfaces. If extension cords are needed, install wiring guides so they do not hang over the sink, range, or working areas. Look for coffee pots, kettles and toaster ovens with automatic shut-offs. Keep a mop handy in the kitchen so you can wipe up spills instantly. You should also have a small fire extinguisher. Arrange your kitchen with frequently used items on lower shelves to avoid the need to stand on a stepstool to reach them. Make sure countertops are well-lit to avoid injuries while cutting and preparing food. In the Bathroom    Use a non-slip mat or decals in the tub and shower, since wet, soapy tile or porcelain surfaces are extremely slippery. Make sure bathroom rugs are non-skid or tape them firmly to the floor. Bathtubs should have at least one, preferably two, grab bars, firmly attached to structural supports in the wall. (Do not use built-in soap holders or glass shower doors as grab bars.)    Tub seats fitted with non-slip material on the legs allow you to wash sitting down. For people with limited mobility, bathtub transfer benches allow you to slide safely into the tub. Raised toilet seats and toilet safety rails are helpful for those with knee or hip problems. In the [facility]    Make sure you use a nightlight and that the area around your bed is clear of potential obstacles.     Be careful with electric blankets and never go to sleep with a

## 2021-04-01 DIAGNOSIS — I10 HYPERTENSION, UNSPECIFIED TYPE: ICD-10-CM

## 2021-04-01 RX ORDER — LISINOPRIL AND HYDROCHLOROTHIAZIDE 20; 12.5 MG/1; MG/1
TABLET ORAL
Qty: 30 TABLET | Refills: 2 | Status: SHIPPED | OUTPATIENT
Start: 2021-04-01 | End: 2021-04-27

## 2021-04-01 RX ORDER — ATORVASTATIN CALCIUM 20 MG/1
TABLET, FILM COATED ORAL
Qty: 30 TABLET | Refills: 5 | Status: SHIPPED | OUTPATIENT
Start: 2021-04-01 | End: 2021-04-27

## 2021-04-07 ENCOUNTER — IMMUNIZATION (OUTPATIENT)
Dept: PRIMARY CARE CLINIC | Age: 76
End: 2021-04-07
Payer: MEDICARE

## 2021-04-07 PROCEDURE — 0012A COVID-19, MODERNA VACCINE 100MCG/0.5ML DOSE: CPT

## 2021-04-07 PROCEDURE — 91301 COVID-19, MODERNA VACCINE 100MCG/0.5ML DOSE: CPT

## 2021-04-22 DIAGNOSIS — K21.9 GASTRO-ESOPHAGEAL REFLUX DISEASE WITHOUT ESOPHAGITIS: ICD-10-CM

## 2021-04-22 RX ORDER — PANTOPRAZOLE SODIUM 40 MG/1
TABLET, DELAYED RELEASE ORAL
Qty: 30 TABLET | Refills: 3 | Status: SHIPPED | OUTPATIENT
Start: 2021-04-22 | End: 2021-08-10

## 2021-04-27 DIAGNOSIS — I10 HYPERTENSION, UNSPECIFIED TYPE: ICD-10-CM

## 2021-05-04 RX ORDER — LISINOPRIL AND HYDROCHLOROTHIAZIDE 20; 12.5 MG/1; MG/1
TABLET ORAL
Qty: 90 TABLET | Refills: 1 | Status: SHIPPED | OUTPATIENT
Start: 2021-05-04 | End: 2021-06-28

## 2021-05-04 RX ORDER — ATORVASTATIN CALCIUM 20 MG/1
TABLET, FILM COATED ORAL
Qty: 90 TABLET | Refills: 1 | Status: SHIPPED | OUTPATIENT
Start: 2021-05-04 | End: 2021-08-02

## 2021-05-20 DIAGNOSIS — E11.9 TYPE 2 DIABETES MELLITUS WITHOUT COMPLICATION, WITHOUT LONG-TERM CURRENT USE OF INSULIN (HCC): ICD-10-CM

## 2021-06-02 DIAGNOSIS — F03.A0 MILD DEMENTIA: ICD-10-CM

## 2021-06-02 DIAGNOSIS — E78.5 HYPERLIPIDEMIA, UNSPECIFIED HYPERLIPIDEMIA TYPE: ICD-10-CM

## 2021-06-02 RX ORDER — DONEPEZIL HYDROCHLORIDE 10 MG/1
TABLET, FILM COATED ORAL
Qty: 30 TABLET | Refills: 0 | Status: SHIPPED | OUTPATIENT
Start: 2021-06-02 | End: 2021-06-28

## 2021-06-02 RX ORDER — LEVOTHYROXINE SODIUM 0.03 MG/1
TABLET ORAL
Qty: 30 TABLET | Refills: 0 | Status: SHIPPED | OUTPATIENT
Start: 2021-06-02 | End: 2021-06-28

## 2021-07-01 ENCOUNTER — OFFICE VISIT (OUTPATIENT)
Dept: FAMILY MEDICINE CLINIC | Age: 76
End: 2021-07-01
Payer: MEDICARE

## 2021-07-01 VITALS
SYSTOLIC BLOOD PRESSURE: 116 MMHG | DIASTOLIC BLOOD PRESSURE: 70 MMHG | HEART RATE: 90 BPM | HEIGHT: 59 IN | WEIGHT: 148.2 LBS | OXYGEN SATURATION: 97 % | BODY MASS INDEX: 29.88 KG/M2

## 2021-07-01 DIAGNOSIS — L30.8 OTHER ECZEMA: ICD-10-CM

## 2021-07-01 DIAGNOSIS — I10 ESSENTIAL HYPERTENSION: ICD-10-CM

## 2021-07-01 DIAGNOSIS — E11.9 TYPE 2 DIABETES MELLITUS WITHOUT COMPLICATION, WITHOUT LONG-TERM CURRENT USE OF INSULIN (HCC): Primary | ICD-10-CM

## 2021-07-01 DIAGNOSIS — E03.9 ACQUIRED HYPOTHYROIDISM: ICD-10-CM

## 2021-07-01 DIAGNOSIS — E78.2 MIXED HYPERLIPIDEMIA: ICD-10-CM

## 2021-07-01 LAB — HBA1C MFR BLD: 7.1 %

## 2021-07-01 PROCEDURE — 3051F HG A1C>EQUAL 7.0%<8.0%: CPT | Performed by: NURSE PRACTITIONER

## 2021-07-01 PROCEDURE — 83036 HEMOGLOBIN GLYCOSYLATED A1C: CPT | Performed by: NURSE PRACTITIONER

## 2021-07-01 PROCEDURE — 99214 OFFICE O/P EST MOD 30 MIN: CPT | Performed by: NURSE PRACTITIONER

## 2021-07-01 RX ORDER — METHYLPREDNISOLONE ACETATE 80 MG/ML
80 INJECTION, SUSPENSION INTRA-ARTICULAR; INTRALESIONAL; INTRAMUSCULAR; SOFT TISSUE ONCE
Status: CANCELLED | OUTPATIENT
Start: 2021-07-01 | End: 2021-07-01

## 2021-07-01 RX ORDER — TRIAMCINOLONE ACETONIDE 0.25 MG/G
CREAM TOPICAL
Qty: 80 G | Refills: 0 | Status: SHIPPED | OUTPATIENT
Start: 2021-07-01 | End: 2021-10-21

## 2021-07-01 ASSESSMENT — ENCOUNTER SYMPTOMS
COUGH: 0
WHEEZING: 0
SINUS PRESSURE: 0
SHORTNESS OF BREATH: 0
EYE DISCHARGE: 0
ABDOMINAL PAIN: 0
EYE PAIN: 0
ABDOMINAL DISTENTION: 0
SINUS PAIN: 0
NAUSEA: 0
EYE REDNESS: 0
SORE THROAT: 0
DIARRHEA: 0
VOMITING: 0

## 2021-07-01 NOTE — PATIENT INSTRUCTIONS
Patient Education        triamcinolone topical  Pronunciation:  Leah Sor am SIN oh lone  Brand:  DermasilkRx SDS Prakash, Dermasorb TA, DermaWerx SDS Prakash, [place], Pedralva, [place], Triderm  What is the most important information I should know about triamcinolone topical?  Follow all directions on your medicine label and package. Tell each of your healthcare providers about all your medical conditions, allergies, and all medicines you use. What is triamcinolone topical?  Triamcinolone is a potent steroid that helps reduce inflammation in the body. Triamcinolone topical (for the skin) is used to treat the inflammation and itching caused by skin conditions that respond to steroid medication. The dental paste form of triamcinolone topical is used to treat mouth ulcers. Triamcinolone topical may also be used for purposes not listed in this medication guide. What should I discuss with my healthcare provider before using triamcinolone topical?  You should not use triamcinolone if you are allergic to it. Tell your doctor if you have ever had:  · any type of skin infection;  · a skin reaction to any steroid medicine;  · liver disease; or  · an adrenal gland disorder. Steroid medicines can increase the glucose (sugar) levels in your blood or urine. Tell your doctor if you have diabetes. Tell your doctor if you are pregnant or breastfeeding. If you apply triamcinolone to your chest, avoid areas that may come into contact with the nursing baby's mouth. How should I use triamcinolone topical?  Follow all directions on your prescription label and read all medication guides or instruction sheets. Use the medicine exactly as directed. Triamcinolone topical cream, lotion, ointment, or spray is for use only on the skin. Triamcinolone dental paste is applied directly onto an ulcer inside the mouth and left in place. Do not swallow this medicine.     Wash your hands before and after using triamcinolone topical, unless you are using this medicine to treat the skin on your hands. Apply a thin layer of medicine to the affected skin. Do not apply this medicine over a large area of skin unless your doctor has told you to. Do not cover the treated skin area with a bandage or other covering unless your doctor tells you to. Covering treated areas can increase the amount of medicine absorbed through your skin and may cause harmful effects. If you are treating the diaper area, do not use plastic pants or tight-fitting diapers. To use the dental paste, press a small dab onto the mouth ulcer but do not rub in the medicine. The dab will form a thin film that should be left in place for several hours. Triamcinolone dental paste is usually applied at bedtime and/or after meals. Follow your doctor's instructions. Call your doctor if your symptoms do not improve, or if they get worse. A mouth ulcer should improve within 1 week of using triamcinolone dental paste. You should stop using this medicine once your symptoms are controlled. Store at room temperature away from moisture and heat. What happens if I miss a dose? Apply the medicine as soon as you can, but skip the missed dose if it is almost time for your next dose. Do not apply two doses at one time. What happens if I overdose? Seek emergency medical attention or call the Poison Help line at 1-363.988.7409 if anyone has accidentally swallowed the medication. High doses or long-term use of steroid medicine can lead to thinning skin, easy bruising, changes in body fat (especially in your face, neck, back, and waist), increased acne or facial hair, menstrual problems, impotence, or loss of interest in sex. What should I avoid while using triamcinolone topical?  Avoid getting this medicine in your eyes. Avoid using other topical steroid medications on the areas you treat with triamcinolone unless your doctor tells you to.   Do not use triamcinolone topical to treat any condition that has not been checked by your doctor. Do not share this medicine with another person, even if they have the same symptoms you have. What are the possible side effects of triamcinolone topical?  Get emergency medical help if you have signs of an allergic reaction: hives; difficult breathing; swelling of your face, lips, tongue, or throat. Call your doctor at once if you have:  · worsening of your skin condition;  · redness, warmth, swelling, oozing, or severe irritation of any treated skin;  · blurred vision, tunnel vision, eye pain, or seeing halos around lights;  · high blood sugar --increased thirst, increased urination, dry mouth, fruity breath odor; or  · possible signs of absorbing this medicine through your skin or gums --weight gain (especially in your face or your upper back and torso), slow wound healing, thinning or discolored skin, increased body hair, muscle weakness, nausea, diarrhea, tiredness, mood changes, menstrual changes, sexual changes. Children can absorb larger amounts of this medicine through the skin and may be more likely to have side effects such as growth delay, headaches, or pain behind the eyes. A baby using this medicine may have a bulging soft spot (the top of the head where the skull hasn't yet grown together). Common side effects may include:  · burning, itching, dryness, or other irritation of treated skin;  · redness or crusting around your hair follicles;  · redness or itching around your mouth;  · allergic skin reaction;  · stretch marks;  · acne, increased body hair growth;  · thinning skin or discoloration; or  · white or \"pruned\" appearance of the skin (caused by covering treated skin with a tight bandage or other covering). This is not a complete list of side effects and others may occur. Call your doctor for medical advice about side effects. You may report side effects to FDA at 4-965-FDA-8416.   What other drugs will affect triamcinolone topical?  Medicine used on the skin is not likely to be affected by other drugs you use. But many drugs can interact with each other. Tell each of your healthcare providers about all medicines you use, including prescription and over-the-counter medicines, vitamins, and herbal products. Where can I get more information? Your pharmacist can provide more information about triamcinolone topical.  Remember, keep this and all other medicines out of the reach of children, never share your medicines with others, and use this medication only for the indication prescribed. Every effort has been made to ensure that the information provided by [address] is accurate, up-to-date, and complete, but no guarantee is made to that effect. Drug information contained herein may be time sensitive. [facility] information has been compiled for use by healthcare practitioners and consumers in the [place] and therefore [facility] does not warrant that uses outside of the [place] are appropriate, unless specifically indicated otherwise. [facility]'s drug information does not endorse drugs, diagnose patients or recommend therapy. [facility] drug information is an informational resource designed to assist licensed healthcare practitioners in caring for their patients and/or to serve consumers viewing this service as a supplement to, and not a substitute for, the expertise, skill, knowledge and judgment of healthcare practitioners. The absence of a warning for a given drug or drug combination in no way should be construed to indicate that the drug or drug combination is safe, effective or appropriate for any given patient. [facility] does not assume any responsibility for any aspect of healthcare administered with the aid of information [facility] provides. The information contained herein is not intended to cover all possible uses, directions, precautions, warnings, drug interactions, allergic reactions, or adverse effects.  If you have questions about the drugs you are taking, check with your doctor, nurse or pharmacist.  Copyright 0975-0016 [facility] [place]. Version: 8.02. Revision date: 12/27/2019. Care instructions adapted under license by [facility] ([facility]). If you have questions about a medical condition or this instruction, always ask your healthcare professional. [address] any warranty or liability for your use of this information.

## 2021-07-01 NOTE — PROGRESS NOTES
Celeste Mcdonald (:  1945) is a 68 y.o. female,Established patient, here for evaluation of the following chief complaint(s):  Diabetes (FOLLOW UP ON DIABETES, A1C DUE TODAY- NO CONCERNS )      ASSESSMENT/PLAN:  1. Type 2 diabetes mellitus without complication, without long-term current use of insulin (HCC)  - 7.1% today. No changes to medication regimen today. Recommended lowering carbs in diet. Decrease amount of sweet tea and substitute for water. Follow-up in 3 months for diabetic follow-up. Will be due for fasting labs at that time. - POCT glycosylated hemoglobin (Hb A1C)    2. Other eczema  -Presentation is consistent with eczema. Kenalog cream is being sent to the pharmacy. The patient was educated on medication use as well as side effects. Follow-up as needed. - triamcinolone (KENALOG) 0.025 % cream; Apply topically 2 times daily for 14 days  Dispense: 80 g; Refill: 0    3. Acquired hypothyroidism  -Controlled on current dose of levothyroxine. Will be due for labs at next appointment. 4. Essential hypertension  -Controlled on current regimen. No changes at this time. Follow-up in 3 months. Will be due for fasting labs at that time. 5. Mixed hyperlipidemia  -Controlled on atorvastatin. No changes today. Follow-up in 3 months. Will be due for labs at that time. Return in about 3 months (around 10/1/2021) for Follow-up diabetes/fasting labs. SUBJECTIVE/OBJECTIVE:  RACHEL Smith presents today with her daughter for diabetes follow-up. She denies any complaints today aside from a rash to her right upper arm. She states that the rash has been present for multiple months. It is very itchy. She has not tried anything on the rash. Denies any drainage. Lloyd Smith is tolerating the increase in metformin without issue. Does not check her sugar at home. She typically watches her carbs for the most part, but she does admit that she drinks a lot of sweet tea.   Denies any episodes of hyper or hypoglycemia. Denies any increased urination, increased thirst, abdominal pain, or nausea. Segun Pantoja is also following up for blood pressure. She states she does not check it at home. Tolerates medications well without issue. Tries to watch her salt in her diet. She tries to stay active. Walks occasionally. Denies any headaches, dizziness, chest pain, shortness of breath. Segun Pantoja also continues to take atorvastatin and levothyroxine without issue. Denies any cold intolerance, heat intolerance, fatigue, or other issues. Tolerating the atorvastatin well without muscle aches. Review of Systems   Constitutional: Negative for chills and fever. HENT: Negative for ear discharge, ear pain, hearing loss, sinus pressure, sinus pain and sore throat. Eyes: Negative for pain, discharge and redness. Respiratory: Negative for cough, shortness of breath and wheezing. Cardiovascular: Negative for chest pain and palpitations. Gastrointestinal: Negative for abdominal distention, abdominal pain, diarrhea, nausea and vomiting. Genitourinary: Negative for dysuria and hematuria. Musculoskeletal: Negative for myalgias. Skin: Positive for rash. Neurological: Negative for weakness, numbness and headaches. Psychiatric/Behavioral: Negative for decreased concentration, dysphoric mood and sleep disturbance. The patient is not nervous/anxious. Physical Exam  Constitutional:       Appearance: Normal appearance. She is normal weight. HENT:      Head: Normocephalic and atraumatic. Right Ear: Tympanic membrane, ear canal and external ear normal.      Left Ear: Tympanic membrane, ear canal and external ear normal.      Mouth/Throat:      Mouth: Mucous membranes are moist.   Eyes:      Extraocular Movements: Extraocular movements intact. Conjunctiva/sclera: Conjunctivae normal.      Pupils: Pupils are equal, round, and reactive to light. Neck:      Thyroid: No thyromegaly. Vascular: No carotid bruit. Cardiovascular:      Rate and Rhythm: Normal rate and regular rhythm. Pulmonary:      Effort: Pulmonary effort is normal.      Breath sounds: Normal breath sounds. Abdominal:      General: Bowel sounds are normal.      Palpations: Abdomen is soft. Tenderness: There is no abdominal tenderness. There is no guarding or rebound. Musculoskeletal:         General: Normal range of motion. Cervical back: Normal range of motion and neck supple. Lymphadenopathy:      Cervical: No cervical adenopathy. Skin:     General: Skin is warm and dry. Capillary Refill: Capillary refill takes less than 2 seconds. Findings: Rash present. Comments: Scaly erythematous rash to right upper extremity as well as the posterior neck. No drainage. Neurological:      Mental Status: She is alert and oriented to person, place, and time. Psychiatric:         Mood and Affect: Mood normal.         Behavior: Behavior normal.         Thought Content: Thought content normal.         Judgment: Judgment normal.               This dictation was generated by voice recognition computer software. Although all attempts are made to edit the dictation for accuracy, there may be errors in the transcription that are not intended. An electronic signature was used to authenticate this note.     --PRATIMA Dutta - CNP

## 2021-07-12 DIAGNOSIS — E78.5 HYPERLIPIDEMIA, UNSPECIFIED HYPERLIPIDEMIA TYPE: ICD-10-CM

## 2021-07-13 DIAGNOSIS — E11.9 TYPE 2 DIABETES MELLITUS WITHOUT COMPLICATION, WITHOUT LONG-TERM CURRENT USE OF INSULIN (HCC): ICD-10-CM

## 2021-07-13 RX ORDER — LEVOTHYROXINE SODIUM 0.03 MG/1
TABLET ORAL
Qty: 30 TABLET | Refills: 2 | Status: SHIPPED | OUTPATIENT
Start: 2021-07-13 | End: 2021-10-29

## 2021-07-14 ENCOUNTER — OFFICE VISIT (OUTPATIENT)
Dept: NEUROLOGY | Age: 76
End: 2021-07-14
Payer: MEDICARE

## 2021-07-14 VITALS
WEIGHT: 148 LBS | DIASTOLIC BLOOD PRESSURE: 78 MMHG | HEIGHT: 59 IN | HEART RATE: 84 BPM | SYSTOLIC BLOOD PRESSURE: 126 MMHG | BODY MASS INDEX: 29.84 KG/M2

## 2021-07-14 DIAGNOSIS — G30.1 LATE ONSET ALZHEIMER'S DEMENTIA WITHOUT BEHAVIORAL DISTURBANCE (HCC): Primary | ICD-10-CM

## 2021-07-14 DIAGNOSIS — F02.80 LATE ONSET ALZHEIMER'S DEMENTIA WITHOUT BEHAVIORAL DISTURBANCE (HCC): Primary | ICD-10-CM

## 2021-07-14 PROCEDURE — 99213 OFFICE O/P EST LOW 20 MIN: CPT | Performed by: PSYCHIATRY & NEUROLOGY

## 2021-07-14 RX ORDER — DONEPEZIL HYDROCHLORIDE 10 MG/1
TABLET, FILM COATED ORAL
Qty: 90 TABLET | Refills: 1 | Status: SHIPPED | OUTPATIENT
Start: 2021-07-14 | End: 2021-10-29

## 2021-07-14 NOTE — PROGRESS NOTES
Natanael Correa   Neurology followup    Subjective:   CC/HP  History was obtained from the patient. Additional history was obtained from her daughter. Patient memory seems to be reasonably stable. She still has some short-term memory impairment. .  No side effects of medication. No other neurological symptoms. Detailed history:  Short-term memory impairment started in the summer 2018. Onset was gradual and initially slowly progressive   No clear aggravating or relieving factors for symptoms. Patient does not drive an automobile at this time. REVIEW OF SYSTEMS    Constitutional:  []   Chills   []  Fatigue   []  Fevers   []  Malaise   []  Weight loss     [x] Denies all of the above    Respiratory:   []  Cough    []  Shortness of breath         [x] Denies all of the above     Cardiovascular:   []  Chest pain    []  Exertional chest pressure/discomfort           [] Palpitations    []  Syncope     [x] Denies all of the above        Past Medical History:   Diagnosis Date    Diabetes mellitus ([facility] [address]     GERD (gastroesophageal reflux disease)     Hyperlipidemia     Hypertension     Hypothyroidism     Mild dementia ([facility] [address]      Family History   Problem Relation Age of Onset    High Cholesterol Mother     Stroke Mother     Heart Disease Father     Heart Attack Father     Colon Cancer Father     Other Brother         gait issue    Cancer Sister     Heart Attack Brother     Coronary Art Dis Brother     Coronary Art Dis Brother     Heart Attack Brother     Diabetes type 2  Brother         ? ?  Type     Other Brother         MVA with amputation    Heart Disease Brother      Social History     Socioeconomic History    Marital status:      Spouse name: Yanni Read Number of children: 3    Years of education: None    Highest education level: None   Occupational History    Occupation: retired manufacturing     Comment: [place]   Tobacco Use    Smoking status: Never Smoker    Smokeless tobacco: Never Used   Vaping Use    Vaping Use: Never used   Substance and Sexual Activity    Alcohol use: No     Comment: never a heavy drinker    Drug use: No    Sexual activity: None   Other Topics Concern    None   Social History Narrative    Walks 6 days per wk x 15 min. Walks every day if not raining for 15 min on [address]. 3/24/21. Social Determinants of Health     Financial Resource Strain: Low Risk     Difficulty of Paying Living Expenses: Not hard at all   Food Insecurity: No Food Insecurity    Worried About Running Out of Food in the Last Year: Never true    Juan Alberto of Food in the Last Year: Never true   Transportation Needs: No Transportation Needs    Lack of Transportation (Medical): No    Lack of Transportation (Non-Medical): No   Physical Activity:     Days of Exercise per Week:     Minutes of Exercise per Session:    Stress:     Feeling of Stress :    Social Connections:     Frequency of Communication with Friends and Family:     Frequency of Social Gatherings with Friends and Family:     Attends Temple Services:     Active Member of Clubs or Organizations:     Attends Club or Organization Meetings:     Marital Status:    Intimate Partner Violence:     Fear of Current or Ex-Partner:     Emotionally Abused:     Physically Abused:     Sexually Abused:         Objective:  Exam:  Ht 4' 11\" (1.499 m)   Wt 148 lb (67.1 kg)   BMI 29.89 kg/m²   This is a well-nourished patient in no acute distress  Patient is awake, alert and oriented x3. Speech is normal.  Poor short-term memory  Pupils are equal round reacting to light. Extraocular movements intact. Face symmetrical. Tongue midline. Motor exam shows normal symmetrical strength. Deep tendon reflexes normal. Plantar reflexes downgoing. Sensory exam normal. Coordination normal. Gait normal. No carotid bruit. No neck stiffness.       Data :  LABS:  General Labs:    CBC:   Lab Results   Component Value Date    WBC 8.0 02/11/2019    RBC 4.27 02/11/2019    HGB 12.7 02/11/2019    HCT 38.3 02/11/2019    MCV 89.7 02/11/2019    MCH 29.8 02/11/2019    MCHC 33.2 02/11/2019    RDW 13.9 02/11/2019     02/11/2019    MPV 9.6 02/11/2019     BMP:    Lab Results   Component Value Date     03/25/2021    K 3.9 03/25/2021     03/25/2021    CO2 26 03/25/2021    BUN 16 03/25/2021    LABALBU 3.9 10/10/2020    CREATININE 0.7 03/25/2021    CALCIUM 10.2 03/25/2021    GFRAA >60 03/25/2021    GFRAA >60 09/02/2010    LABGLOM >60 03/25/2021    GLUCOSE 213 03/25/2021     RADIOLOGY REVIEW:  I have reviewed radiology image(s) and reports(s) of: CT scan of the head    Impression :  Late onset Alzheimer's dementia, symptoms stable  CT head showed some lucencies in the skull bone but the brain itself was normal  Serum protein immunofixation showed monoclonal gammopathy. Patient has been evaluated by hematology/oncology and they are monitoring it. Plan :  Discussed with patient and her daughter  Continue Aricept 10 mg at night  If there is any further worsening we may have to add Namenda. Return in 6 months      Please note a portion of  this chart was generated using dragon dictation software. Although every effort was made to ensure the accuracy of this automated transcription, some errors in transcription may have occurred.

## 2021-07-31 DIAGNOSIS — I10 HYPERTENSION, UNSPECIFIED TYPE: ICD-10-CM

## 2021-08-02 RX ORDER — ATORVASTATIN CALCIUM 20 MG/1
TABLET, FILM COATED ORAL
Qty: 30 TABLET | Refills: 2 | Status: SHIPPED | OUTPATIENT
Start: 2021-08-02 | End: 2021-10-29

## 2021-08-02 RX ORDER — LISINOPRIL AND HYDROCHLOROTHIAZIDE 20; 12.5 MG/1; MG/1
TABLET ORAL
Qty: 30 TABLET | Refills: 2 | Status: SHIPPED | OUTPATIENT
Start: 2021-08-02 | End: 2021-10-25

## 2021-08-07 DIAGNOSIS — E78.5 HYPERLIPIDEMIA, UNSPECIFIED HYPERLIPIDEMIA TYPE: ICD-10-CM

## 2021-08-09 RX ORDER — LEVOTHYROXINE SODIUM 0.03 MG/1
TABLET ORAL
Qty: 30 TABLET | Refills: 0 | OUTPATIENT
Start: 2021-08-09

## 2021-08-10 DIAGNOSIS — K21.9 GASTRO-ESOPHAGEAL REFLUX DISEASE WITHOUT ESOPHAGITIS: ICD-10-CM

## 2021-08-10 RX ORDER — PANTOPRAZOLE SODIUM 40 MG/1
TABLET, DELAYED RELEASE ORAL
Qty: 30 TABLET | Refills: 2 | Status: SHIPPED | OUTPATIENT
Start: 2021-08-10 | End: 2021-10-20

## 2021-10-11 ENCOUNTER — OFFICE VISIT (OUTPATIENT)
Dept: FAMILY MEDICINE CLINIC | Age: 76
End: 2021-10-11
Payer: MEDICARE

## 2021-10-11 VITALS
HEIGHT: 59 IN | HEART RATE: 85 BPM | DIASTOLIC BLOOD PRESSURE: 78 MMHG | BODY MASS INDEX: 28.83 KG/M2 | SYSTOLIC BLOOD PRESSURE: 120 MMHG | WEIGHT: 143 LBS | OXYGEN SATURATION: 97 % | RESPIRATION RATE: 18 BRPM

## 2021-10-11 DIAGNOSIS — E03.9 ACQUIRED HYPOTHYROIDISM: ICD-10-CM

## 2021-10-11 DIAGNOSIS — R41.82 ALTERED MENTAL STATUS, UNSPECIFIED ALTERED MENTAL STATUS TYPE: ICD-10-CM

## 2021-10-11 DIAGNOSIS — E11.9 TYPE 2 DIABETES MELLITUS WITHOUT COMPLICATION, WITHOUT LONG-TERM CURRENT USE OF INSULIN (HCC): ICD-10-CM

## 2021-10-11 DIAGNOSIS — D47.2 MGUS (MONOCLONAL GAMMOPATHY OF UNKNOWN SIGNIFICANCE): ICD-10-CM

## 2021-10-11 DIAGNOSIS — R44.3 HALLUCINATIONS: Primary | ICD-10-CM

## 2021-10-11 LAB
A/G RATIO: 1.5 (ref 1.1–2.2)
ALBUMIN SERPL-MCNC: 4.1 G/DL (ref 3.4–5)
ALP BLD-CCNC: 85 U/L (ref 40–129)
ALT SERPL-CCNC: 19 U/L (ref 10–40)
ANION GAP SERPL CALCULATED.3IONS-SCNC: 14 MMOL/L (ref 3–16)
AST SERPL-CCNC: 16 U/L (ref 15–37)
BASOPHILS ABSOLUTE: 0 K/UL (ref 0–0.2)
BASOPHILS RELATIVE PERCENT: 0.6 %
BILIRUB SERPL-MCNC: <0.2 MG/DL (ref 0–1)
BILIRUBIN, POC: NEGATIVE
BLOOD URINE, POC: NORMAL
BUN BLDV-MCNC: 10 MG/DL (ref 7–20)
C-REACTIVE PROTEIN: <3 MG/L (ref 0–5.1)
CALCIUM SERPL-MCNC: 9.8 MG/DL (ref 8.3–10.6)
CHLORIDE BLD-SCNC: 102 MMOL/L (ref 99–110)
CHP ED QC CHECK: ABNORMAL
CLARITY, POC: CLEAR
CO2: 24 MMOL/L (ref 21–32)
COLOR, POC: NORMAL
CREAT SERPL-MCNC: 0.7 MG/DL (ref 0.6–1.2)
EOSINOPHILS ABSOLUTE: 0.1 K/UL (ref 0–0.6)
EOSINOPHILS RELATIVE PERCENT: 1.4 %
GFR AFRICAN AMERICAN: >60
GFR NON-AFRICAN AMERICAN: >60
GLOBULIN: 2.8 G/DL
GLUCOSE BLD-MCNC: 167 MG/DL (ref 70–99)
GLUCOSE BLD-MCNC: 230 MG/DL
GLUCOSE URINE, POC: NEGATIVE
HCT VFR BLD CALC: 36.1 % (ref 36–48)
HEMOGLOBIN: 12.1 G/DL (ref 12–16)
KETONES, POC: NEGATIVE
LEUKOCYTE EST, POC: NEGATIVE
LYMPHOCYTES ABSOLUTE: 2.7 K/UL (ref 1–5.1)
LYMPHOCYTES RELATIVE PERCENT: 33.6 %
MCH RBC QN AUTO: 28.4 PG (ref 26–34)
MCHC RBC AUTO-ENTMCNC: 33.4 G/DL (ref 31–36)
MCV RBC AUTO: 85.1 FL (ref 80–100)
MONOCYTES ABSOLUTE: 0.5 K/UL (ref 0–1.3)
MONOCYTES RELATIVE PERCENT: 6.6 %
NEUTROPHILS ABSOLUTE: 4.6 K/UL (ref 1.7–7.7)
NEUTROPHILS RELATIVE PERCENT: 57.8 %
NITRITE, POC: NEGATIVE
PDW BLD-RTO: 15.2 % (ref 12.4–15.4)
PH, POC: 6
PLATELET # BLD: 187 K/UL (ref 135–450)
PMV BLD AUTO: 9.6 FL (ref 5–10.5)
POTASSIUM SERPL-SCNC: 3.7 MMOL/L (ref 3.5–5.1)
PROTEIN, POC: NEGATIVE
RBC # BLD: 4.24 M/UL (ref 4–5.2)
SEDIMENTATION RATE, ERYTHROCYTE: 13 MM/HR (ref 0–30)
SODIUM BLD-SCNC: 140 MMOL/L (ref 136–145)
SPECIFIC GRAVITY, POC: 1.01
TOTAL PROTEIN: 6.9 G/DL (ref 6.4–8.2)
TSH REFLEX: 1.74 UIU/ML (ref 0.27–4.2)
UROBILINOGEN, POC: 0.2
WBC # BLD: 7.9 K/UL (ref 4–11)

## 2021-10-11 PROCEDURE — 82962 GLUCOSE BLOOD TEST: CPT | Performed by: FAMILY MEDICINE

## 2021-10-11 PROCEDURE — 99215 OFFICE O/P EST HI 40 MIN: CPT | Performed by: FAMILY MEDICINE

## 2021-10-11 PROCEDURE — 81002 URINALYSIS NONAUTO W/O SCOPE: CPT | Performed by: FAMILY MEDICINE

## 2021-10-11 PROCEDURE — 36415 COLL VENOUS BLD VENIPUNCTURE: CPT | Performed by: FAMILY MEDICINE

## 2021-10-11 ASSESSMENT — ENCOUNTER SYMPTOMS
VOMITING: 1
ANAL BLEEDING: 0
DIARRHEA: 0
CONSTIPATION: 0
ABDOMINAL DISTENTION: 0
NAUSEA: 1
BLOOD IN STOOL: 0
ABDOMINAL PAIN: 0
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
## 2021-10-11 NOTE — PATIENT INSTRUCTIONS
Oxana Bacon was seen today for mental health problem and emesis. Diagnoses and all orders for this visit:    Hallucinations  -     POCT Urinalysis no Micro  -     POCT Glucose  -     Comprehensive Metabolic Panel; Future  -     CBC Auto Differential; Future  -     C-Reactive Protein; Future  -     Sedimentation Rate; Future  -     TSH with Reflex; Future  Will treat if a UTI. Home with urine test supplies. Urinary tract infection type symptoms plan    Water: 1 ounce per 2 pounds body weight. Twice this much if you are out in the heat and sweating or if you think you have a kidney stone. Cranberry pills: 1 pill 2 to 3 times a day ( stop 12 hours prior to coming to the office for a urine test, as they may kill enough bacteria to keep the culture from growing germs). Do not take at the same time as Acidophilus (cranberry may kill the probiotic). Vitamin C 500 mg.twice a day with food. Zinc 50 mg. Pills: 1/2 pill twice a day with food, ( use Zinc lozenges for a total of 50 mg daily if constipation occurs or you have chronic constipation as Zinc tends to firm the stool but lozenges have sorbitol which has a slight laxative effect preventing constipation from the zinc.)    Acidophilus pills: For women twice a day until symptoms are gone ( twice daily for 1 week after finishing any antibiotics also to prevent yeast infections- also helps prevent diarrhea for men on antibiotics). Always take 3-4 hours after last dose of antibiotics or cranberry pills. ANTIBIOTICS KILL PROBIOTICS WHEN TAKEN TOGETHER AT THE SAME TIME. Pyridium ( Phenazopyridine) over the counter for urinary burning as needed (IF THERE IS NO BURNING THERE IS NO NEED FOR PYRIDIUM) if not pregnant. Must stop at least 12- 16 hours prior to urine test done in the office. These steps will help until you can come into the office to be seen and have your urine tested.  If you have fever or back pain or blood in the urine or severe symptoms, you must be seen as soon as possible, if not able to be seen in the office then go to urgent care or to the emergency room, make sure they send a culture of your urine out to be tested BEFORE any antibiotics are taken. Follow up in the office if symptoms persist or are severe. Omega 3 fatty acids such as are found in fish oil can decrease urinary urgency and frequency. Each capsule of fish oil should have 800 mg or more of a combination of EPA & DHA - the active omega 3's- per capsule and you take 1 capsule 3 times per day. The oil lines the bladder decreasing irritation of the bladder wall and helping prevent bacteria from sticking to the bladder wall so fewer UTI's occur. Fish oil also lowers triglycerides, helps with dry eyes and helps lubricate joints to lower arthritis pain. MGUS (monoclonal gammopathy of unknown significance)  -     Comprehensive Metabolic Panel; Future  -     CBC Auto Differential; Future  -     C-Reactive Protein; Future  -     Sedimentation Rate; Future  -     TSH with Reflex; Future  Could be related. Acquired hypothyroidism  -     TSH with Reflex; Future  -     Good control at last check. Type 2 diabetes mellitus without complication, without long-term current use of insulin ([facility])  -     POCT Glucose  -     Comprehensive Metabolic Panel; Future  Poor control last check and high glucose. Patient Education   Learning About Delirium  What is delirium? Delirium is a sudden change in mental condition. It leads to confusion and unusual behavior. Delirium is also called acute confusional state. Delirium affects all age groups. It can result from problems that affect the brain, such as stroke. It can also happen after an infection or when using certain medicines. Pain may also cause the problem. Seeing delirium in a loved one can be scary and sad. But it will go away most of the time. It usually lasts hours to days.   The doctor will look for a cause and take steps to treat it and keep your loved one comfortable. What are the symptoms? Symptoms of delirium usually develop over several hours to a few days. Symptoms may change and be more or less severe. Symptoms include:  · A short attention span. · Confusion. This is not knowing where you are, what time it is, or who others are. · Hallucinations. This usually is seeing or hearing things that are not really there. · Delusions. This is believing things that aren't true. · Illusions. This is making a mistake in what you think is real. For example, you think a child is crying, but it's a pillow. · Disorganized thinking. How is delirium treated? The doctor may:  · Find and treat the cause. This could be:  ? Not getting enough fluids. ? An infection. ? A medicine or combination of medicines. ? Another medical problem. · Prescribe a medicine. · Make the hospital room as quiet as possible. You may be able to help your loved one by being present and talking to and touching him or her. Follow-up care is a key part of your treatment and safety. Be sure to make and go to all appointments, and call your doctor if you are having problems. It's also a good idea to know your test results and keep a list of the medicines you take. Where can you learn more? Go to [email or web address]://[email or web address] org and sign in to your Work[employer] account. Enter F677 in the [facility] box to learn more about \"Learning About Delirium. \"     If you do not have an account, please click on the \"Sign Up Now\" link. Current as of: June 16, 2021               Content Version: 13.0  © 7968-0065 Healthwise, [employer]. Care instructions adapted under license by [facility] ([facility]). If you have questions about a medical condition or this instruction, always ask your healthcare professional. [place] [address] any warranty or liability for your use of this information.

## 2021-10-12 LAB — URINE CULTURE, ROUTINE: NORMAL

## 2021-10-19 ENCOUNTER — OFFICE VISIT (OUTPATIENT)
Dept: FAMILY MEDICINE CLINIC | Age: 76
End: 2021-10-19
Payer: MEDICARE

## 2021-10-19 ENCOUNTER — TELEPHONE (OUTPATIENT)
Dept: FAMILY MEDICINE CLINIC | Age: 76
End: 2021-10-19

## 2021-10-19 VITALS
RESPIRATION RATE: 16 BRPM | OXYGEN SATURATION: 97 % | WEIGHT: 141.6 LBS | BODY MASS INDEX: 28.55 KG/M2 | SYSTOLIC BLOOD PRESSURE: 126 MMHG | DIASTOLIC BLOOD PRESSURE: 84 MMHG | HEART RATE: 70 BPM | HEIGHT: 59 IN

## 2021-10-19 DIAGNOSIS — F03.A0 MILD DEMENTIA: ICD-10-CM

## 2021-10-19 DIAGNOSIS — R41.82 ALTERED MENTAL STATUS, UNSPECIFIED ALTERED MENTAL STATUS TYPE: ICD-10-CM

## 2021-10-19 DIAGNOSIS — R07.9 CHEST PAIN, UNSPECIFIED TYPE: ICD-10-CM

## 2021-10-19 DIAGNOSIS — L98.9 SKIN SORE: ICD-10-CM

## 2021-10-19 DIAGNOSIS — R44.3 HALLUCINATIONS: ICD-10-CM

## 2021-10-19 DIAGNOSIS — E11.9 TYPE 2 DIABETES MELLITUS WITHOUT COMPLICATION, WITHOUT LONG-TERM CURRENT USE OF INSULIN (HCC): Primary | ICD-10-CM

## 2021-10-19 DIAGNOSIS — E78.2 MIXED HYPERLIPIDEMIA: ICD-10-CM

## 2021-10-19 DIAGNOSIS — D47.2 MGUS (MONOCLONAL GAMMOPATHY OF UNKNOWN SIGNIFICANCE): ICD-10-CM

## 2021-10-19 DIAGNOSIS — D51.0 PERNICIOUS ANEMIA: ICD-10-CM

## 2021-10-19 DIAGNOSIS — E03.9 ACQUIRED HYPOTHYROIDISM: ICD-10-CM

## 2021-10-19 DIAGNOSIS — Z23 NEED FOR INFLUENZA VACCINATION: ICD-10-CM

## 2021-10-19 LAB
CHOLESTEROL, TOTAL: 145 MG/DL (ref 0–199)
CREATININE URINE POCT: NORMAL
HBA1C MFR BLD: 6.8 %
HDLC SERPL-MCNC: 45 MG/DL (ref 40–60)
HOMOCYSTEINE: 9 UMOL/L (ref 0–10)
LDL CHOLESTEROL CALCULATED: 58 MG/DL
MICROALBUMIN/CREAT 24H UR: NORMAL MG/G{CREAT}
MICROALBUMIN/CREAT UR-RTO: NORMAL
TRIGL SERPL-MCNC: 210 MG/DL (ref 0–150)
VLDLC SERPL CALC-MCNC: 42 MG/DL

## 2021-10-19 PROCEDURE — 83036 HEMOGLOBIN GLYCOSYLATED A1C: CPT | Performed by: FAMILY MEDICINE

## 2021-10-19 PROCEDURE — 99215 OFFICE O/P EST HI 40 MIN: CPT | Performed by: FAMILY MEDICINE

## 2021-10-19 PROCEDURE — 82044 UR ALBUMIN SEMIQUANTITATIVE: CPT | Performed by: FAMILY MEDICINE

## 2021-10-19 PROCEDURE — 36415 COLL VENOUS BLD VENIPUNCTURE: CPT | Performed by: FAMILY MEDICINE

## 2021-10-19 PROCEDURE — 90694 VACC AIIV4 NO PRSRV 0.5ML IM: CPT | Performed by: FAMILY MEDICINE

## 2021-10-19 PROCEDURE — G0008 ADMIN INFLUENZA VIRUS VAC: HCPCS | Performed by: FAMILY MEDICINE

## 2021-10-19 RX ORDER — OLANZAPINE 2.5 MG/1
2.5 TABLET ORAL NIGHTLY
Qty: 30 TABLET | Refills: 2 | Status: SHIPPED | OUTPATIENT
Start: 2021-10-19 | End: 2021-10-19 | Stop reason: SDUPTHER

## 2021-10-19 RX ORDER — OLANZAPINE 2.5 MG/1
2.5 TABLET ORAL NIGHTLY
Qty: 30 TABLET | Refills: 2 | Status: SHIPPED | OUTPATIENT
Start: 2021-10-19 | End: 2021-11-30 | Stop reason: SDUPTHER

## 2021-10-19 NOTE — TELEPHONE ENCOUNTER
----- Message from Yoko Orona sent at 10/19/2021 12:22 PM EDT -----  Subject: Medication Problem    QUESTIONS  Name of Medication? OLANZapine (ZYPREXA) 2.5 MG tablet  Patient-reported dosage and instructions? 2.5mg tablet  What question or problem do you have with the medication? Patient needs   the prescription sent to a different pharmacy. States her usual pharmacy   is out of this medication until tomorrow. Preferred Pharmacy? [facility] [address] [facility],[address], [address] - [address] [phone]  Pharmacy phone number (if available)? [phone]  Additional Information for Provider?   ---------------------------------------------------------------------------  --------------  CALL BACK INFO  What is the best way for the office to contact you? OK to leave message on   voicemail  Preferred Call Back Phone Number? [phone]  ---------------------------------------------------------------------------  --------------  SCRIPT ANSWERS  Relationship to Patient? Other  Representative Name? Sean Greenfield  Is the Representative on the appropriate HIPAA document in Epic?  Yes

## 2021-10-19 NOTE — PATIENT INSTRUCTIONS
Ken Crenshaw was seen today for diabetes and mental health problem. Diagnoses and all orders for this visit:    Type 2 diabetes mellitus without complication, without long-term current use of insulin (HCC)  -     POCT glycosylated hemoglobin (Hb A1C)  -     POCT microalbumin  Fair diabetes control.   -     Continue meds and lifestyle control. Acquired hypothyroidism  -     Good control.  -     Continue meds and lifestyle control. Altered mental status, unspecified altered mental status type  -     OLANZapine (ZYPREXA) 2.5 MG tablet; Take 1 tablet by mouth nightly  Call with changes if having problems. If not improving will increase dose by taking 2 pills in 2 weeks. Hallucinations  -     OLANZapine (ZYPREXA) 2.5 MG tablet; Take 1 tablet by mouth nightly  Follow up with Dr Tammy Neil. Mild dementia ([facility] [address]  SLUMS at next visit. MGUS (monoclonal gammopathy of unknown significance)  Stable. Pernicious anemia  -     Methylmalonic Acid Urine; Future    Mixed hyperlipidemia  -     Lipid Panel; Future  -     Homocysteine, Serum; Future    Chest pain, unspecified type  Take a full sized aspirin 325 mg or 4 81 mg aspirin IF chest pain. Skin sore  Desitin once or twice daily and after showers.      Need for influenza vaccination  -     INFLUENZA, QUADV, ADJUVANTED, 65 YRS =, IM, PF, PREFILL SYR, 0.5ML (FLUAD)
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
## 2021-10-19 NOTE — PROGRESS NOTES
Vaccine Information Sheet, \"Influenza - Inactivated\"  given to Vanessa Noe, or parent/legal guardian of  Vanessa Noe and verbalized understanding. Patient responses:    Have you ever had a reaction to a flu vaccine? No  Do you have any current illness? No  Have you ever had Guillian Neodesha Syndrome? No  Do you have a serious allergy to any of the follow: Neomycin, Polymyxin, Thimerosal, eggs or egg products? No    Flu vaccine given per order. Please see immunization tab. Risks and benefits explained. Current VIS given. Consent signed.     Immunizations Administered     Name Date Dose Route    Influenza, Quadv, adjuvanted, 65 yrs +, IM, PF (Fluad) 10/19/2021 0.5 mL Intramuscular    Site: Deltoid- Left    Lot: [id number]    NDC: 74550-127-74

## 2021-10-20 ENCOUNTER — NURSE ONLY (OUTPATIENT)
Dept: FAMILY MEDICINE CLINIC | Age: 76
End: 2021-10-20

## 2021-10-20 ENCOUNTER — TELEPHONE (OUTPATIENT)
Dept: FAMILY MEDICINE CLINIC | Age: 76
End: 2021-10-20

## 2021-10-20 DIAGNOSIS — D51.0 PERNICIOUS ANEMIA: ICD-10-CM

## 2021-10-20 DIAGNOSIS — K21.9 GASTRO-ESOPHAGEAL REFLUX DISEASE WITHOUT ESOPHAGITIS: ICD-10-CM

## 2021-10-20 RX ORDER — PANTOPRAZOLE SODIUM 40 MG/1
TABLET, DELAYED RELEASE ORAL
Qty: 30 TABLET | Refills: 2 | Status: SHIPPED | OUTPATIENT
Start: 2021-10-20 | End: 2022-01-28 | Stop reason: SDUPTHER

## 2021-10-20 NOTE — TELEPHONE ENCOUNTER
Pt daughter calling her script for     Olanzapine - didn't go through to [facility] last night - could someone please send this again - and let them know when done.  thanks      Call [place] @  [phone]

## 2021-10-21 ENCOUNTER — OFFICE VISIT (OUTPATIENT)
Dept: NEUROLOGY | Age: 76
End: 2021-10-21
Payer: MEDICARE

## 2021-10-21 VITALS
SYSTOLIC BLOOD PRESSURE: 154 MMHG | BODY MASS INDEX: 28.63 KG/M2 | WEIGHT: 142 LBS | HEART RATE: 85 BPM | DIASTOLIC BLOOD PRESSURE: 84 MMHG | HEIGHT: 59 IN

## 2021-10-21 DIAGNOSIS — G30.1 LATE ONSET ALZHEIMER'S DEMENTIA WITH BEHAVIORAL DISTURBANCE (HCC): Primary | ICD-10-CM

## 2021-10-21 DIAGNOSIS — F22 DELUSIONS (HCC): ICD-10-CM

## 2021-10-21 DIAGNOSIS — F02.818 LATE ONSET ALZHEIMER'S DEMENTIA WITH BEHAVIORAL DISTURBANCE (HCC): Primary | ICD-10-CM

## 2021-10-21 DIAGNOSIS — F22 PARANOID IDEATION (HCC): ICD-10-CM

## 2021-10-21 PROCEDURE — 99213 OFFICE O/P EST LOW 20 MIN: CPT | Performed by: PSYCHIATRY & NEUROLOGY

## 2021-10-21 RX ORDER — DONEPEZIL HYDROCHLORIDE 10 MG/1
TABLET, FILM COATED ORAL
Qty: 90 TABLET | Refills: 1 | Status: CANCELLED | OUTPATIENT
Start: 2021-10-21

## 2021-10-21 NOTE — PROGRESS NOTES
Conchita Gipson   Neurology followup    Subjective:   CC/HP  History was obtained from the patient. Additional history was obtained from her son and daughter. Patient memory seems to be reasonably stable. She still has some short-term memory impairment. .  No side effects of medication. However she has had a lot of new symptoms. Patient now has developed delusions and paranoid ideations and these were discussed in detail. These have started in the last few weeks. Patient was seen by her primary care doctor. Urine analysis was normal.  Metabolic profile was also normal.  Hyperglycemia was noted. She was started on Zyprexa 2.5 mg. Patient took her first dose last night. Detailed history:  Short-term memory impairment started in the summer 2018. Onset was gradual and initially slowly progressive   No clear aggravating or relieving factors for symptoms. Patient does not drive an automobile at this time.       REVIEW OF SYSTEMS    Constitutional:  []   Chills   []  Fatigue   []  Fevers   []  Malaise   []  Weight loss     [x] Denies all of the above    Respiratory:   []  Cough    []  Shortness of breath         [x] Denies all of the above     Cardiovascular:   []  Chest pain    []  Exertional chest pressure/discomfort           [] Palpitations    []  Syncope     [x] Denies all of the above        Past Medical History:   Diagnosis Date    [address]     GERD (gastroesophageal reflux disease)     Hyperlipidemia     Hypertension     Hypothyroidism     MGUS (monoclonal gammopathy of unknown significance) 2019    Mild dementia ([facility] [facility]      Family History   Problem Relation Age of Onset    High Cholesterol Mother     Stroke Mother     Mental Illness Mother     Heart Disease Father     Heart Attack Father     Colon Cancer Father     No Known Problems Sister     Cancer Sister     Other Brother         gait issue    Heart Attack Brother     Coronary Art Dis Brother     Coronary Art Dis decrease the incidence of extrapyramidal side effects. I will see her back in 3 months. Please note a portion of  this chart was generated using dragon dictation software. Although every effort was made to ensure the accuracy of this automated transcription, some errors in transcription may have occurred.

## 2021-10-24 LAB
CREATININE 24 HOUR URINE: NORMAL MG/D (ref 500–1400)
CREATININE URINE: 63 MG/DL
HOURS COLLECTED: NORMAL
METHYLMALONIC ACID /MOL OF CREAT: 1.24 MMOL/MOL CRT (ref 0–3.6)
METHYLMALONIC ACID URINE INTERPRETATION: NORMAL
METHYLMALONIC ACID URINE: 6.93 UMOL/L
URINE TOTAL VOLUME: NORMAL

## 2021-10-29 DIAGNOSIS — E11.9 TYPE 2 DIABETES MELLITUS WITHOUT COMPLICATION, WITHOUT LONG-TERM CURRENT USE OF INSULIN (HCC): ICD-10-CM

## 2021-10-29 DIAGNOSIS — E78.5 HYPERLIPIDEMIA, UNSPECIFIED HYPERLIPIDEMIA TYPE: ICD-10-CM

## 2021-10-29 RX ORDER — DONEPEZIL HYDROCHLORIDE 10 MG/1
TABLET, FILM COATED ORAL
Qty: 90 TABLET | Refills: 0 | Status: SHIPPED | OUTPATIENT
Start: 2021-10-29 | End: 2022-01-14 | Stop reason: SDUPTHER

## 2021-10-29 RX ORDER — LEVOTHYROXINE SODIUM 0.03 MG/1
TABLET ORAL
Qty: 30 TABLET | Refills: 0 | Status: SHIPPED | OUTPATIENT
Start: 2021-10-29 | End: 2021-12-03

## 2021-10-29 RX ORDER — ATORVASTATIN CALCIUM 20 MG/1
TABLET, FILM COATED ORAL
Qty: 30 TABLET | Refills: 0 | Status: SHIPPED | OUTPATIENT
Start: 2021-10-29 | End: 2022-02-02

## 2021-11-30 ENCOUNTER — OFFICE VISIT (OUTPATIENT)
Dept: FAMILY MEDICINE CLINIC | Age: 76
End: 2021-11-30
Payer: MEDICARE

## 2021-11-30 VITALS
RESPIRATION RATE: 16 BRPM | OXYGEN SATURATION: 96 % | HEART RATE: 88 BPM | DIASTOLIC BLOOD PRESSURE: 78 MMHG | BODY MASS INDEX: 29.43 KG/M2 | SYSTOLIC BLOOD PRESSURE: 118 MMHG | HEIGHT: 59 IN | WEIGHT: 146 LBS

## 2021-11-30 DIAGNOSIS — E78.2 MIXED HYPERLIPIDEMIA: ICD-10-CM

## 2021-11-30 DIAGNOSIS — F03.A0 MILD DEMENTIA: ICD-10-CM

## 2021-11-30 DIAGNOSIS — E11.9 TYPE 2 DIABETES MELLITUS WITHOUT COMPLICATION, WITHOUT LONG-TERM CURRENT USE OF INSULIN (HCC): ICD-10-CM

## 2021-11-30 DIAGNOSIS — I10 HYPERTENSION, UNSPECIFIED TYPE: ICD-10-CM

## 2021-11-30 DIAGNOSIS — R41.82 ALTERED MENTAL STATUS, UNSPECIFIED ALTERED MENTAL STATUS TYPE: ICD-10-CM

## 2021-11-30 DIAGNOSIS — R07.9 CHEST PAIN, UNSPECIFIED TYPE: Primary | ICD-10-CM

## 2021-11-30 DIAGNOSIS — E03.9 ACQUIRED HYPOTHYROIDISM: ICD-10-CM

## 2021-11-30 DIAGNOSIS — R44.3 HALLUCINATIONS: ICD-10-CM

## 2021-11-30 PROCEDURE — 99214 OFFICE O/P EST MOD 30 MIN: CPT | Performed by: FAMILY MEDICINE

## 2021-11-30 RX ORDER — OLANZAPINE 5 MG/1
5 TABLET ORAL NIGHTLY
Qty: 90 TABLET | Refills: 0 | Status: SHIPPED | OUTPATIENT
Start: 2021-12-19 | End: 2022-02-21 | Stop reason: SDUPTHER
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
## 2021-11-30 NOTE — PATIENT INSTRUCTIONS
Marly Iyer was seen today for mental health problem and anxiety. Diagnoses and all orders for this visit:    Chest pain, unspecified type  Resolved. Try Maalox or Mylanta if has chest pain again. Type 2 diabetes mellitus without complication, without long-term current use of insulin (HCC)  Exercise, weight loss and frequent small healthy balanced meals help prevent diabetes complications. Weekly weights can help you track your progress. Limit carbs//starches and avoid sweets. Hyperlipidemia, unspecified hyperlipidemia type  Triglycerides are high. The diet to decrease triglycerides is:  Avoid sweets and soft drinks containing sugar, limit starches/carbs to the amount of calories you are burning, avoid fatty foods and added fats such as mayonnaise, salad dressings with oil, gravies and fried foods. If you drink alcohol limit to 1 -2 drinks per day. Both exercise and weight loss help also. These can also help raise the HDL to the goal of 50 or more. Hypertension, unspecified type  -     Good control here. High at neurology. -     Continue meds and lifestyle control. Altered mental status, unspecified altered mental status type  -     OLANZapine (ZYPREXA) 5 MG tablet; Take 1 tablet by mouth nightly  Improved so dose increased. Hallucinations  -     OLANZapine (ZYPREXA) 5 MG tablet; Take 1 tablet by mouth nightly  Improved so dose increased. Acquired hypothyroidism  -     Good control.  -     Continue meds and lifestyle control. Mild dementia [facility])  Per Dr Scott Horne.

## 2021-12-08 PROBLEM — E78.2 MIXED HYPERLIPIDEMIA: Status: ACTIVE | Noted: 2018-02-19

## 2021-12-08 PROBLEM — D47.2 MGUS (MONOCLONAL GAMMOPATHY OF UNKNOWN SIGNIFICANCE): Chronic | Status: ACTIVE | Noted: 2019-01-01

## 2021-12-08 PROBLEM — E11.9 TYPE 2 DIABETES MELLITUS (HCC): Chronic | Status: ACTIVE | Noted: 2018-02-19

## 2021-12-08 PROBLEM — I10 HYPERTENSION: Chronic | Status: ACTIVE | Noted: 2018-02-19

## 2021-12-08 PROBLEM — E03.9 HYPOTHYROIDISM: Chronic | Status: ACTIVE | Noted: 2018-02-19

## 2021-12-08 PROBLEM — E78.2 MIXED HYPERLIPIDEMIA: Chronic | Status: ACTIVE | Noted: 2018-02-19

## 2021-12-16 DIAGNOSIS — I10 HYPERTENSION, UNSPECIFIED TYPE: ICD-10-CM

## 2021-12-17 RX ORDER — LISINOPRIL AND HYDROCHLOROTHIAZIDE 20; 12.5 MG/1; MG/1
TABLET ORAL
Qty: 30 TABLET | Refills: 0 | Status: SHIPPED | OUTPATIENT
Start: 2021-12-17 | End: 2021-12-28

## 2022-01-14 ENCOUNTER — OFFICE VISIT (OUTPATIENT)
Dept: NEUROLOGY | Age: 77
End: 2022-01-14
Payer: MEDICARE

## 2022-01-14 VITALS
BODY MASS INDEX: 29.12 KG/M2 | DIASTOLIC BLOOD PRESSURE: 81 MMHG | SYSTOLIC BLOOD PRESSURE: 127 MMHG | HEART RATE: 88 BPM | WEIGHT: 144.2 LBS

## 2022-01-14 DIAGNOSIS — G30.1 LATE ONSET ALZHEIMER'S DEMENTIA WITH BEHAVIORAL DISTURBANCE (HCC): Primary | ICD-10-CM

## 2022-01-14 DIAGNOSIS — F02.818 LATE ONSET ALZHEIMER'S DEMENTIA WITH BEHAVIORAL DISTURBANCE (HCC): Primary | ICD-10-CM

## 2022-01-14 DIAGNOSIS — D47.2 MGUS (MONOCLONAL GAMMOPATHY OF UNKNOWN SIGNIFICANCE): Chronic | ICD-10-CM

## 2022-01-14 DIAGNOSIS — F22 PARANOID IDEATION (HCC): ICD-10-CM

## 2022-01-14 DIAGNOSIS — F22 DELUSIONS (HCC): ICD-10-CM

## 2022-01-14 PROCEDURE — 99213 OFFICE O/P EST LOW 20 MIN: CPT | Performed by: PSYCHIATRY & NEUROLOGY

## 2022-01-14 RX ORDER — DONEPEZIL HYDROCHLORIDE 10 MG/1
TABLET, FILM COATED ORAL
Qty: 90 TABLET | Refills: 1 | Status: SHIPPED | OUTPATIENT
Start: 2022-01-14 | End: 2022-07-15 | Stop reason: SDUPTHER

## 2022-01-14 NOTE — PROGRESS NOTES
[facility]   Neurology followup    Subjective:   CC/HP  History was obtained from the patient. Additional history was obtained from her  Daughter. Interval history:   Patient memory seems to be reasonably stable. She still has some short-term memory impairment. .  No side effects of medication. Patient started having paranoid ideations and delusions. She was started on low-dose Zyprexa and the dose was increased to 5 mg. She seems to be doing better in that regard. No major side effects of Zyprexa so far. Detailed history:  Short-term memory impairment started in the summer 2018. Onset was gradual and initially slowly progressive   No clear aggravating or relieving factors for symptoms. Patient does not drive an automobile at this time. REVIEW OF SYSTEMS    Constitutional:  []   Chills   []  Fatigue   []  Fevers   []  Malaise   []  Weight loss     [x] Denies all of the above    Respiratory:   []  Cough    []  Shortness of breath         [x] Denies all of the above     Cardiovascular:   []  Chest pain    []  Exertional chest pressure/discomfort           [] Palpitations    []  Syncope     [x] Denies all of the above        Past Medical History:   Diagnosis Date    Diabetes mellitus ([facility] [address]     GERD (gastroesophageal reflux disease)     Hyperlipidemia     Hypertension     Hypothyroidism     MGUS (monoclonal gammopathy of unknown significance) 2019    Mild dementia ([facility] [address]      Family History   Problem Relation Age of Onset    High Cholesterol Mother     Stroke Mother     Mental Illness Mother     Heart Disease Father     Heart Attack Father     Colon Cancer Father     No Known Problems Sister     Cancer Sister     Other Brother         gait issue    Heart Attack Brother     Coronary Art Dis Brother     Coronary Art Dis Brother     Heart Attack Brother     Diabetes type 2  Brother         ? ?  Type     Other Brother         MVA with amputation    Heart Disease Brother    Jamil Mental Illness Daughter         ?schizophrenia?  Hypertension Son     High Cholesterol Son     Other Son         GB, Fatty     Social History     Socioeconomic History    Marital status:      Spouse name: Estefania Quiñonez [id number]    Number of children: 3    Years of education: 15    Highest education level: High school graduate   Occupational History    Occupation: retired [employer]     Comment: [employer]   Tobacco Use    Smoking status: Never Smoker    Smokeless tobacco: Never Used   Vaping Use    Vaping Use: Never used   Substance and Sexual Activity    Alcohol use: No     Comment: never a heavy drinker    Drug use: No    Sexual activity: Not Currently     Birth control/protection: Post-menopausal   Other Topics Concern    None   Social History Narrative    Walks 6 days per wk x 15 min. Walks every day if not raining for 15 min on her [address]. 3/24/21. Steps 3x/wk. Walks 3x/wk. 10/11/21. Walks 15 min 3x/wk. 10/19/21. Does laundry on Monday. Then walks 15 min 5 days/wk. 11/30/21. Social Determinants of Health     Financial Resource Strain: Low Risk     Difficulty of Paying Living Expenses: Not hard at all   Food Insecurity: No Food Insecurity    Worried About Running Out of Food in the Last Year: Never true    Juan Alberto of Food in the Last Year: Never true   Transportation Needs: No Transportation Needs    Lack of Transportation (Medical): No    Lack of Transportation (Non-Medical):  No   Physical Activity:     Days of Exercise per Week: Not on file    Minutes of Exercise per Session: Not on file   Stress:     Feeling of Stress : Not on file   Social Connections:     Frequency of Communication with Friends and Family: Not on file    Frequency of Social Gatherings with Friends and Family: Not on file    Attends Faith Services: Not on file    Active Member of Clubs or Organizations: Not on file    Attends Club or Organization Meetings: Not on file    Marital Status: Not on file Intimate Partner Violence:     Fear of Current or Ex-Partner: Not on file    Emotionally Abused: Not on file    Physically Abused: Not on file    Sexually Abused: Not on file   Housing Stability:     Unable to Pay for Housing in the Last Year: Not on file    Number of [place] in the Last Year: Not on file    Unstable Housing in the Last Year: Not on file        Objective:  Exam:  /81   Pulse 88   Wt 144 lb 3.2 oz (65.4 kg)   BMI 29.12 kg/m²   This is a well-nourished patient in no acute distress  Patient is awake, alert and oriented x3. Speech is normal.  Poor short-term memory  Pupils are equal round reacting to light. Extraocular movements intact. Face symmetrical. Tongue midline. Motor exam shows normal symmetrical strength. Deep tendon reflexes normal. Plantar reflexes downgoing. Sensory exam normal. Coordination normal. Gait normal. No carotid bruit. No neck stiffness. Data :  LABS:  General Labs:    CBC:   Lab Results   Component Value Date    WBC 7.9 10/11/2021    RBC 4.24 10/11/2021    HGB 12.1 10/11/2021    HCT 36.1 10/11/2021    MCV 85.1 10/11/2021    MCH 28.4 10/11/2021    MCHC 33.4 10/11/2021    RDW 15.2 10/11/2021     10/11/2021    MPV 9.6 10/11/2021     BMP:    Lab Results   Component Value Date     10/11/2021    K 3.7 10/11/2021     10/11/2021    CO2 24 10/11/2021    BUN 10 10/11/2021    LABALBU 4.1 10/11/2021    CREATININE 0.7 10/11/2021    CALCIUM 9.8 10/11/2021    GFRAA >60 10/11/2021    GFRAA >60 09/02/2010    LABGLOM >60 10/11/2021    GLUCOSE 167 10/11/2021     RADIOLOGY REVIEW:  I have reviewed radiology image(s) and reports(s) of: CT scan of the head    Impression :  Late onset Alzheimer's dementia,  Delusions and paranoid ideations, improved with Zyprexa  CT head showed some lucencies in the skull bone but the brain itself was normal  Serum protein immunofixation showed monoclonal gammopathy.     Patient has been evaluated by hematology/oncology and they are monitoring it. Plan :  Discussed with patient and her daughter  Continue Aricept 10 mg at night  Continue Zyprexa 5 mg at night  If she develops any extrapyramidal side effects then we can consider switching to Seroquel  I will see her back in 6 months for follow-up. Please note a portion of  this chart was generated using dragon dictation software. Although every effort was made to ensure the accuracy of this automated transcription, some errors in transcription may have occurred.  1

## 2022-01-17 RX ORDER — OLANZAPINE 2.5 MG/1
TABLET ORAL
Qty: 90 TABLET | Refills: 0 | Status: SHIPPED | OUTPATIENT
Start: 2022-01-17 | End: 2022-02-21 | Stop reason: ALTCHOICE

## 2022-01-17 NOTE — TELEPHONE ENCOUNTER
LAST VISIT 11/30/2021 WITH JOELLE, NEXT VISIT 02/21/2022.   [address] [facility]

## 2022-01-21 ENCOUNTER — VIRTUAL VISIT (OUTPATIENT)
Dept: FAMILY MEDICINE CLINIC | Age: 77
End: 2022-01-21
Payer: MEDICARE

## 2022-01-21 DIAGNOSIS — R05.9 COUGH: Primary | ICD-10-CM

## 2022-01-21 PROCEDURE — 99213 OFFICE O/P EST LOW 20 MIN: CPT | Performed by: NURSE PRACTITIONER

## 2022-01-21 RX ORDER — BENZONATATE 200 MG/1
200 CAPSULE ORAL 3 TIMES DAILY PRN
Qty: 30 CAPSULE | Refills: 0 | Status: SHIPPED | OUTPATIENT
Start: 2022-01-21 | End: 2022-01-28

## 2022-01-21 ASSESSMENT — ENCOUNTER SYMPTOMS
SINUS PRESSURE: 0
SORE THROAT: 0
COUGH: 1
SINUS PAIN: 0
WHEEZING: 0
RHINORRHEA: 0
SHORTNESS OF BREATH: 0

## 2022-01-21 ASSESSMENT — PATIENT HEALTH QUESTIONNAIRE - PHQ9
1. LITTLE INTEREST OR PLEASURE IN DOING THINGS: 0
SUM OF ALL RESPONSES TO PHQ9 QUESTIONS 1 & 2: 0
SUM OF ALL RESPONSES TO PHQ QUESTIONS 1-9: 0
2. FEELING DOWN, DEPRESSED OR HOPELESS: 0

## 2022-01-21 NOTE — PROGRESS NOTES
Denilson Yan (:  1945) is a 68 y.o. female,Established patient, here for evaluation of the following chief complaint(s): Cough (PRODUCTIVE COUGH, MUCUS, ONGOING 2 WEEKS, OTC MED : NONE )      ASSESSMENT/PLAN:  1. Cough  -Tessalon Perles are being sent to the pharmacy for cough. The patient was educated that she can take this in conjunction with over-the-counter remedies including cough drops and Delsym. Follow-up as needed if symptoms worsen or fail to improve. My index of suspicion for infectious process is low at this time. The patient does not have any other symptoms aside from the cough. Likely related to seasonal allergies versus viral URI. -     benzonatate (TESSALON) 200 MG capsule; Take 1 capsule by mouth 3 times daily as needed for Cough, Disp-30 capsule, R-0Normal      Return if symptoms worsen or fail to improve. SUBJECTIVE/OBJECTIVE:  RACHEL  Skip Duval presents today via telephone regarding a cough. She states that she has had the cough for approximately 2 weeks. Denies any other symptoms including fever, congestion, or shortness of breath. She has not tried any over-the-counter remedies to help with the cough. She does take Flonase for allergies. Denies any exposure to anyone ill. Review of Systems   Constitutional: Negative for chills, fatigue and fever. HENT: Negative for congestion, postnasal drip, rhinorrhea, sinus pressure, sinus pain, sneezing and sore throat. Respiratory: Positive for cough. Negative for shortness of breath and wheezing. Neurological: Negative for dizziness, weakness, light-headedness, numbness and headaches. Psychiatric/Behavioral: Negative for decreased concentration, dysphoric mood and sleep disturbance. The patient is not nervous/anxious. Patient-Reported Vitals 8/3/2020   Patient-Reported Height 4'11\"        Physical Exam  Constitutional:       Comments: Limited due to phone call   HENT:      Nose: No congestion or rhinorrhea. Pulmonary:      Effort: Pulmonary effort is normal.   Neurological:      Mental Status: She is alert and oriented to person, place, and time. Psychiatric:         Mood and Affect: Mood normal.         Thought Content: Thought content normal.         Judgment: Judgment normal.             On this date 1/21/2022 I have spent 20 minutes reviewing previous notes, test results and face to face (virtual) with the patient discussing the diagnosis and importance of compliance with the treatment plan as well as documenting on the day of the visit. Jorge Luis Carlos, was evaluated through a synchronous (real-time) audio-video encounter. The patient (or guardian if applicable) is aware that this is a billable service. Verbal consent to proceed has been obtained within the past 12 months. The visit was conducted pursuant to the emergency declaration under the [address] [place] [address], [address] [place] [address] authority and the [employer] General Act. Patient identification was verified, and a caregiver was present when appropriate. The patient was located in a state where the provider was credentialed to provide care. This dictation was generated by voice recognition computer software. Although all attempts are made to edit the dictation for accuracy, there may be errors in the transcription that are not intended. An electronic signature was used to authenticate this note.     --PRATIMA Zhu - CNP

## 2022-01-26 ENCOUNTER — TELEPHONE (OUTPATIENT)
Dept: FAMILY MEDICINE CLINIC | Age: 77
End: 2022-01-26

## 2022-01-26 DIAGNOSIS — E11.9 TYPE 2 DIABETES MELLITUS WITHOUT COMPLICATION, WITHOUT LONG-TERM CURRENT USE OF INSULIN (HCC): ICD-10-CM

## 2022-01-26 NOTE — TELEPHONE ENCOUNTER
[facility] REQUESTING 90 DAY SCRIPT FOR METFORMIN  LV 11/30/21 WITH DR Rosette Travis FOR DM  [place] 2/21/22

## 2022-01-28 ENCOUNTER — TELEPHONE (OUTPATIENT)
Dept: FAMILY MEDICINE CLINIC | Age: 77
End: 2022-01-28

## 2022-01-28 DIAGNOSIS — K21.9 GASTRO-ESOPHAGEAL REFLUX DISEASE WITHOUT ESOPHAGITIS: ICD-10-CM

## 2022-01-28 RX ORDER — PANTOPRAZOLE SODIUM 40 MG/1
TABLET, DELAYED RELEASE ORAL
Qty: 90 TABLET | Refills: 0 | Status: SHIPPED | OUTPATIENT
Start: 2022-01-28 | End: 2022-04-27

## 2022-02-02 RX ORDER — ATORVASTATIN CALCIUM 20 MG/1
TABLET, FILM COATED ORAL
Qty: 30 TABLET | Refills: 0 | Status: SHIPPED | OUTPATIENT
Start: 2022-02-02 | End: 2022-02-18 | Stop reason: SDUPTHER

## 2022-02-02 NOTE — TELEPHONE ENCOUNTER
Component Ref Range & Units 10/19/21 0937 10/10/20 1025 2/11/19 0828 7/19/18 1441   Cholesterol, Total 0 - 199 mg/dL 145  144  238 High   152    Triglycerides 0 - 150 mg/dL 210 High   190 High   274 High   379 High     HDL 40 - 60 mg/dL 45  45  45  32 Low     LDL Calculated <100 mg/dL 58  61  138 High   see below CM    VLDL Cholesterol Calculated Not Established mg/dL 42  38  55  see below CM

## 2022-02-11 DIAGNOSIS — I10 HYPERTENSION, UNSPECIFIED TYPE: ICD-10-CM

## 2022-02-14 RX ORDER — LISINOPRIL AND HYDROCHLOROTHIAZIDE 20; 12.5 MG/1; MG/1
TABLET ORAL
Qty: 30 TABLET | Refills: 0 | Status: SHIPPED | OUTPATIENT
Start: 2022-02-14 | End: 2022-02-18 | Stop reason: SDUPTHER

## 2022-02-14 NOTE — TELEPHONE ENCOUNTER
LAST VISIT 11/30/2021 WITH DR Gama Gaffney, NEXT VISIT 02/21/2022 WITH DR Gama Gaffney. [address]       10/11/2021  6:32 PM - Indu Roe Incoming Lab Results From Soft (Epic Adt)    Component Value Flag Ref Range Units Status   Sodium 140   136 - 145 mmol/L Final   Potassium 3.7   3.5 - 5.1 mmol/L Final   Chloride 102   99 - 110 mmol/L Final   CO2 24   21 - 32 mmol/L Final   Anion Gap 14   3 - 16  Final   Glucose 167   High   70 - 99 mg/dL Final   BUN 10   7 - 20 mg/dL Final   CREATININE 0.7   0.6 - 1.2 mg/dL Final   GFR Non- >60   >60  Final   Comment:   >60 mL/min/1.73m2 EGFR, calc. for ages 25 and older using the   MDRD formula (not corrected for weight), is valid for stable   renal function. GFR  >60   >60  Final   Comment:   Chronic Kidney Disease: less than 60 ml/min/1.73 sq. m.         Kidney Failure: less than 15 ml/min/1.73 sq.m. Results valid for patients 18 years and older.     Calcium 9.8   8.3 - 10.6 mg/dL Final   Total Protein 6.9   6.4 - 8.2 g/dL Final   Albumin 4.1   3.4 - 5.0 g/dL Final   Albumin/Globulin Ratio 1.5   1.1 - 2.2  Final   Total Bilirubin <0.2   0.0 - 1.0 mg/dL Final   Alkaline Phosphatase 85   40 - 129 U/L Final   ALT 19   10 - 40 U/L Final   AST 16   15 - 37 U/L Final   Globulin 2.8   Not Established g/dL Final

## 2022-02-18 ENCOUNTER — TELEPHONE (OUTPATIENT)
Dept: FAMILY MEDICINE CLINIC | Age: 77
End: 2022-02-18

## 2022-02-18 DIAGNOSIS — I10 HYPERTENSION, UNSPECIFIED TYPE: ICD-10-CM

## 2022-02-18 RX ORDER — LISINOPRIL AND HYDROCHLOROTHIAZIDE 20; 12.5 MG/1; MG/1
TABLET ORAL
Qty: 90 TABLET | Refills: 0 | Status: SHIPPED | OUTPATIENT
Start: 2022-02-18 | End: 2022-03-25

## 2022-02-18 RX ORDER — ATORVASTATIN CALCIUM 20 MG/1
TABLET, FILM COATED ORAL
Qty: 90 TABLET | Refills: 0 | Status: SHIPPED | OUTPATIENT
Start: 2022-02-18 | End: 2022-03-18 | Stop reason: SDUPTHER

## 2022-02-21 ENCOUNTER — OFFICE VISIT (OUTPATIENT)
Dept: FAMILY MEDICINE CLINIC | Age: 77
End: 2022-02-21
Payer: MEDICARE

## 2022-02-21 VITALS
SYSTOLIC BLOOD PRESSURE: 120 MMHG | DIASTOLIC BLOOD PRESSURE: 80 MMHG | WEIGHT: 143 LBS | OXYGEN SATURATION: 98 % | HEIGHT: 59 IN | RESPIRATION RATE: 18 BRPM | BODY MASS INDEX: 28.83 KG/M2 | HEART RATE: 84 BPM

## 2022-02-21 DIAGNOSIS — E03.9 ACQUIRED HYPOTHYROIDISM: ICD-10-CM

## 2022-02-21 DIAGNOSIS — F03.A0 MILD DEMENTIA: ICD-10-CM

## 2022-02-21 DIAGNOSIS — R41.82 ALTERED MENTAL STATUS, UNSPECIFIED ALTERED MENTAL STATUS TYPE: ICD-10-CM

## 2022-02-21 DIAGNOSIS — R44.3 HALLUCINATIONS: ICD-10-CM

## 2022-02-21 DIAGNOSIS — M47.812 SPONDYLOSIS OF CERVICAL REGION WITHOUT MYELOPATHY OR RADICULOPATHY: ICD-10-CM

## 2022-02-21 DIAGNOSIS — I10 ESSENTIAL HYPERTENSION: ICD-10-CM

## 2022-02-21 DIAGNOSIS — G44.89 OTHER HEADACHE SYNDROME: ICD-10-CM

## 2022-02-21 DIAGNOSIS — E11.9 TYPE 2 DIABETES MELLITUS WITHOUT COMPLICATION, WITHOUT LONG-TERM CURRENT USE OF INSULIN (HCC): Primary | ICD-10-CM

## 2022-02-21 LAB
ANION GAP SERPL CALCULATED.3IONS-SCNC: 14 MMOL/L (ref 3–16)
BUN BLDV-MCNC: 14 MG/DL (ref 7–20)
CALCIUM SERPL-MCNC: 10 MG/DL (ref 8.3–10.6)
CHLORIDE BLD-SCNC: 102 MMOL/L (ref 99–110)
CO2: 25 MMOL/L (ref 21–32)
CREAT SERPL-MCNC: 0.7 MG/DL (ref 0.6–1.2)
GFR AFRICAN AMERICAN: >60
GFR NON-AFRICAN AMERICAN: >60
GLUCOSE BLD-MCNC: 184 MG/DL (ref 70–99)
HBA1C MFR BLD: 6.7 %
POTASSIUM SERPL-SCNC: 4 MMOL/L (ref 3.5–5.1)
SODIUM BLD-SCNC: 141 MMOL/L (ref 136–145)

## 2022-02-21 PROCEDURE — 83036 HEMOGLOBIN GLYCOSYLATED A1C: CPT | Performed by: FAMILY MEDICINE

## 2022-02-21 PROCEDURE — 36415 COLL VENOUS BLD VENIPUNCTURE: CPT | Performed by: FAMILY MEDICINE

## 2022-02-21 PROCEDURE — 99215 OFFICE O/P EST HI 40 MIN: CPT | Performed by: FAMILY MEDICINE

## 2022-02-21 RX ORDER — OLANZAPINE 5 MG/1
5 TABLET ORAL NIGHTLY
COMMUNITY
End: 2022-02-21 | Stop reason: SINTOL

## 2022-02-21 RX ORDER — OLANZAPINE 5 MG/1
5 TABLET ORAL NIGHTLY
Qty: 90 TABLET | Refills: 1 | Status: CANCELLED | OUTPATIENT
Start: 2022-02-21

## 2022-02-21 RX ORDER — QUETIAPINE FUMARATE 25 MG/1
25 TABLET, FILM COATED ORAL 2 TIMES DAILY
Qty: 60 TABLET | Refills: 0 | Status: SHIPPED | OUTPATIENT
Start: 2022-02-21 | End: 2022-03-23 | Stop reason: SDUPTHER

## 2022-02-21 ASSESSMENT — ENCOUNTER SYMPTOMS
CHEST TIGHTNESS: 0
WHEEZING: 0
COUGH: 0
SHORTNESS OF BREATH: 0
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
## 2022-02-21 NOTE — PATIENT INSTRUCTIONS
Matt Quintana was seen today for diabetes. Diagnoses and all orders for this visit:    Type 2 diabetes mellitus without complication, without long-term current use of insulin (HCC)  -     POCT glycosylated hemoglobin (Hb A1C)  Fair diabetes control.  -     Continue meds and lifestyle control. Essential hypertension  -     Basic Metabolic Panel; Future  -     Good control.  -     Continue meds and lifestyle control. Acquired hypothyroidism  Take 1 tab oral prior to breakfast with 8 oz water on empty stomach. Don't take food/drinks/meds/supplements for 30 min. Mild dementia [facility])  Per Dr Bry Vargas. Altered mental status, unspecified altered mental status type  -     QUEtiapine (SEROQUEL) 25 MG tablet; Take 1 tablet by mouth 2 times daily    Hallucinations  -     QUEtiapine (SEROQUEL) 25 MG tablet; Take 1 tablet by mouth 2 times daily  Stop the Zyprexa. Start Quetiapine 25 mg at bed time. If still having hallucinations add 25 at dinner and 25 mg at bedtime. Call if problems. Other headache syndrome/Spondylosis of cervical region without myelopathy or radiculopathy  -     diclofenac sodium (VOLTAREN) 1 % GEL; Apply 2-4 g topically 4 times daily To area of pain to intact skin as needed for pain. Patient Education        diclofenac topical  Pronunciation:  dye CADEN luna ak TOP ik al  Brand:  Pennsaid, Rexaphenac, Solaraze, Voltaren Topical  What is the most important information I should know about diclofenac topical?  Diclofenac topical can increase your risk of fatal heart attack or stroke. Do not use this medicine just before or after heart bypass surgery (coronary artery bypass graft, or CABG). Diclofenac topical may also cause stomach or intestinal bleeding, which can be fatal.  What is diclofenac topical?  Diclofenac is a nonsteroidal anti-inflammatory drug (NSAID). Diclofenac topical (for the skin) is used to treat joint pain caused by osteoarthritis.  This medicine is for use on the hands, wrists, elbows, knees, ankles, or feet. Diclofenac topical may not be effective in treating arthritis pain elsewhere in the body. Pennsaid is for use only on the knees. Solaraze is used to treat warty overgrowths of skin (actinic keratoses) on sun-exposed areas of the body. Diclofenac topical may also be used for purposes not listed in this medication guide. What should I discuss with my healthcare provider before using diclofenac topical?  Diclofenac topical can increase your risk of fatal heart attack or stroke, even if you don't have any risk factors. Do not use this medicine just before or after heart bypass surgery (coronary artery bypass graft, or CABG). Diclofenac topical may also cause stomach or intestinal bleeding, which can be fatal. These conditions can occur without warning while you are using diclofenac topical, especially in older adults. You should not use this medicine if you are allergic to diclofenac (Voltaren, Cataflam, Flector, and others), or if you have ever had an asthma attack or severe allergic reaction after taking aspirin or an NSAID. Diclofenac topical is not approved for use by anyone younger than 25years old. Tell your doctor if you have ever had:  · heart disease, high blood pressure, high cholesterol, diabetes, or if you smoke;  · a heart attack, stroke, or blood clot;  · stomach ulcers, bleeding in your stomach or intestines;  · asthma;  · liver or kidney disease; or  · fluid retention. Diclofenac can affect ovulation and it may be harder to get pregnant while you are using this medicine. If you are pregnant, you should not take diclofenac topical unless your doctor tells you to. Taking an NSAID during the last 20 weeks of pregnancy can cause serious heart or kidney problems in the unborn baby and possible complications with your pregnancy. It may not be safe to breastfeed while using this medicine. Ask your doctor about any risk.   How should I use diclofenac topical?  Follow all directions on your prescription label and read all medication guides. Use the lowest dose that is effective in treating your condition. Do not take by mouth. Topical medicine is for use only on the skin. Rinse with water if this medicine gets in your eyes or mouth. Read and carefully follow any Instructions for Use provided with your medicine. Ask your doctor or pharmacist if you do not understand these instructions. Do not apply diclofenac topical to an open skin wound, or on areas of infection, rash, burn, or peeling skin. Store at room temperature away from moisture and heat. Do not freeze. What happens if I miss a dose? Apply the medicine as soon as you can, but skip the missed dose if it is almost time for your next dose. Do not apply two doses at one time. What happens if I overdose? Seek emergency medical attention or call the Poison Help line at 1-244.848.2539. What should I avoid while using diclofenac topical?  Ask a doctor or pharmacist before using other medicines for pain, fever, swelling, or cold/flu symptoms. They may contain ingredients similar to diclofenac (such as aspirin, ibuprofen, ketoprofen, or naproxen). Avoid drinking alcohol. It may increase your risk of stomach bleeding. Avoid exposing treated skin to heat, sunlight, or tanning beds. Heat can increase the amount of diclofenac you absorb through your skin. Avoid getting this medicine in your eyes. If contact does occur, rinse with water. Call your doctor if you have eye irritation that lasts longer than 1 hour.   Do not use cosmetics, sunscreen, lotions, insect repellant, or other medicated skin products on the same area you treat with diclofenac topical.  What are the possible side effects of diclofenac topical?  Get emergency medical help if you have signs of an allergic reaction (hives, sneezing, runny or stuffy nose, wheezing or trouble breathing, swelling in your face or throat) or a severe skin reaction (fever, sore throat, burning eyes, skin pain, red or purple skin rash with blistering and peeling). Although the risk of serious side effects is low when diclofenac is applied to the skin, this medicine can be absorbed through the skin, which may cause steroid side effects throughout the body. Stop using diclofenac and seek emergency medical attention if you have signs of a heart attack or stroke: chest pain spreading to your jaw or shoulder, sudden numbness or weakness on one side of the body, slurred speech, feeling short of breath. Also call your doctor at once if you have:  · a skin rash, no matter how mild;  · swelling, rapid weight gain;  · severe headache, blurred vision, pounding in your neck or ears;  · little or no urination;  · liver problems --nausea, diarrhea, stomach pain (upper right side), tiredness, itching, dark urine, arjun-colored stools, jaundice (yellowing of the skin or eyes);  · low red blood cells (anemia) --pale skin, unusual tiredness, feeling light-headed or short of breath, cold hands and feet; or  · signs of stomach bleeding --bloody or tarry stools, coughing up blood or vomit that looks like coffee grounds. Common side effects may include:  · heartburn, gas, stomach pain, nausea, vomiting;  · diarrhea, constipation;  · headache, dizziness, drowsiness;  · stuffy nose;  · itching, increased sweating;  · increased blood pressure; or  · skin redness, itching, dryness, scaling, or peeling where the medicine was applied. This is not a complete list of side effects and others may occur. Call your doctor for medical advice about side effects. You may report side effects to FDA at 1-442-FDA-3561. What other drugs will affect diclofenac topical?  Ask your doctor before using diclofenac if you take an antidepressant. Taking certain antidepressants with an NSAID may cause you to bruise or bleed easily.   Tell your doctor about all your current medicines, especially:  · cyclosporine;  · lithium;  · methotrexate;  · a blood thinner (warfarin, Coumadin, Jantoven);  · heart or blood pressure medication, including a diuretic or \"water pill\"; or  · steroid medicine (prednisone and others). This list is not complete and many other drugs may affect diclofenac. This includes prescription and over-the-counter medicines, vitamins, and herbal products. Not all possible drug interactions are listed here. Where can I get more information? Your pharmacist can provide more information about diclofenac topical.  Remember, keep this and all other medicines out of the reach of children, never share your medicines with others, and use this medication only for the indication prescribed. Every effort has been made to ensure that the information provided by [address] [place] [address]  is accurate, up-to-date, and complete, but no guarantee is made to that effect. Drug information contained herein may be time sensitive. [facility] information has been compiled for use by healthcare practitioners and consumers in the [place] and therefore [facility] does not warrant that uses outside of the [place] are appropriate, unless specifically indicated otherwise. [facility]'s drug information does not endorse drugs, diagnose patients or recommend therapy. [facility] drug information is an informational resource designed to assist licensed healthcare practitioners in caring for their patients and/or to serve consumers viewing this service as a supplement to, and not a substitute for, the expertise, skill, knowledge and judgment of healthcare practitioners. The absence of a warning for a given drug or drug combination in no way should be construed to indicate that the drug or drug combination is safe, effective or appropriate for any given patient. [facility] does not assume any responsibility for any aspect of healthcare administered with the aid of information [facility] provides.  The information contained herein is not intended to cover all possible uses, directions, precautions, warnings, drug interactions, allergic reactions, or adverse effects. If you have questions about the drugs you are taking, check with your doctor, nurse or pharmacist.  Copyright 2750-7480 [address]: 10.03. Revision date: 10/28/2020. Care instructions adapted under license by [facility] ([facility]). If you have questions about a medical condition or this instruction, always ask your healthcare professional. [place] [address] any warranty or liability for your use of this information.

## 2022-02-23 DIAGNOSIS — E11.9 TYPE 2 DIABETES MELLITUS WITHOUT COMPLICATION, WITHOUT LONG-TERM CURRENT USE OF INSULIN (HCC): ICD-10-CM

## 2022-02-23 RX ORDER — ATORVASTATIN CALCIUM 20 MG/1
TABLET, FILM COATED ORAL
Qty: 30 TABLET | Refills: 0 | OUTPATIENT
Start: 2022-02-23

## 2022-02-23 NOTE — TELEPHONE ENCOUNTER
ATORVASTATIN SENT TO [facility] ON 02/18/2022. METFORMIN [address] 01/26/2022.  HUNTER

## 2022-03-03 DIAGNOSIS — E78.5 HYPERLIPIDEMIA, UNSPECIFIED HYPERLIPIDEMIA TYPE: ICD-10-CM

## 2022-03-03 RX ORDER — LEVOTHYROXINE SODIUM 0.03 MG/1
TABLET ORAL
Qty: 30 TABLET | Refills: 2 | Status: ON HOLD | OUTPATIENT
Start: 2022-03-03 | End: 2022-05-24

## 2022-03-06 DIAGNOSIS — I10 HYPERTENSION, UNSPECIFIED TYPE: ICD-10-CM

## 2022-03-07 RX ORDER — LISINOPRIL AND HYDROCHLOROTHIAZIDE 20; 12.5 MG/1; MG/1
TABLET ORAL
Qty: 30 TABLET | OUTPATIENT
Start: 2022-03-07

## 2022-03-07 RX ORDER — ATORVASTATIN CALCIUM 20 MG/1
TABLET, FILM COATED ORAL
Qty: 30 TABLET | OUTPATIENT
Start: 2022-03-07

## 2022-03-07 NOTE — TELEPHONE ENCOUNTER
Pharmacy    [place] [address] [place], 0 [facility] [phone] - F [phone]   [address] [address]   Phone:  [phone]  Fax:  [phone]     lisinopril-hydroCHLOROthiazide (PRINZIDE;ZESTORETIC) 20-12.5 MG per tablet 90 tablet 0 2/18/2022     Sig: TAKE 1 TABLET BY MOUTH EVERY DAY    Sent to pharmacy as: Lisinopril-hydroCHLOROthiazide 20-12.5 MG Oral Tablet (PRINZIDE;ZESTORETIC)    Cosign for Ordering: Accepted by Srini Barrett DO on 2/25/2022  5:54 PM    E-Prescribing Status: Receipt confirmed by pharmacy (2/18/2022  1:21 PM EST)      [place] [address] [place], 0 [facility] [phone] - f [phone]   [address] [address]   Phone:  [phone]  Fax:  [phone]  atorvastatin (LIPITOR) 20 MG tablet 90 tablet 0 2/18/2022     Sig: TAKE 1 TABLET BY MOUTH EVERY DAY    Sent to pharmacy as:  Atorvastatin Calcium 20 MG Oral Tablet (LIPITOR)    Cosign for Ordering: Accepted by Srini Barrett DO on 2/25/2022  5:54 PM    E-Prescribing Status: Receipt confirmed by pharmacy (2/18/2022  1:21 PM EST)

## 2022-03-07 NOTE — TELEPHONE ENCOUNTER
LAST VISIT 02/21/2022 WITH DR Sid Hawk, NEXT VISIT 05/25/2022. [facility]     2/21/2022  7:00 PM - Tona Shepard Incoming Lab Results From Soft (Epic Adt)    Component Value Flag Ref Range Units Status   Sodium 141   136 - 145 mmol/L Final   Potassium 4.0   3.5 - 5.1 mmol/L Final   Chloride 102   99 - 110 mmol/L Final   CO2 25   21 - 32 mmol/L Final   Anion Gap 14   3 - 16  Final   Glucose 184   High   70 - 99 mg/dL Final   BUN 14   7 - 20 mg/dL Final   CREATININE 0.7   0.6 - 1.2 mg/dL Final   GFR Non- >60   >60  Final   Comment:   >60 mL/min/1.73m2 EGFR, calc. for ages 25 and older using the   MDRD formula (not corrected for weight), is valid for stable   renal function. GFR  >60   >60  Final   Comment:   Chronic Kidney Disease: less than 60 ml/min/1.73 sq. m.         Kidney Failure: less than 15 ml/min/1.73 sq.m. Results valid for patients 18 years and older.     Calcium 10.0   8.3 - 10.6 mg/dL Final     10/19/2021  1:34 PM - Tona Shepard Incoming Lab Results From Soft (Epic Adt)    Component Value Flag Ref Range Units Status   Cholesterol, Total 145   0 - 199 mg/dL Final   Triglycerides 210   High   0 - 150 mg/dL Final   HDL 45   40 - 60 mg/dL Final   LDL Calculated 58   <100 mg/dL Final   VLDL Cholesterol Calculated 42   Not Established mg/dL Final

## 2022-03-18 ENCOUNTER — TELEPHONE (OUTPATIENT)
Dept: FAMILY MEDICINE CLINIC | Age: 77
End: 2022-03-18

## 2022-03-18 RX ORDER — ATORVASTATIN CALCIUM 20 MG/1
TABLET, FILM COATED ORAL
Qty: 90 TABLET | Refills: 0 | Status: SHIPPED | OUTPATIENT
Start: 2022-03-18 | End: 2022-04-25

## 2022-03-23 DIAGNOSIS — R44.3 HALLUCINATIONS: ICD-10-CM

## 2022-03-23 DIAGNOSIS — R41.82 ALTERED MENTAL STATUS, UNSPECIFIED ALTERED MENTAL STATUS TYPE: ICD-10-CM

## 2022-03-23 RX ORDER — QUETIAPINE FUMARATE 25 MG/1
25 TABLET, FILM COATED ORAL 2 TIMES DAILY
Qty: 60 TABLET | Refills: 1 | Status: SHIPPED | OUTPATIENT
Start: 2022-03-23 | End: 2022-05-10 | Stop reason: DRUGHIGH

## 2022-03-25 DIAGNOSIS — I10 HYPERTENSION, UNSPECIFIED TYPE: ICD-10-CM

## 2022-03-25 RX ORDER — ATORVASTATIN CALCIUM 20 MG/1
TABLET, FILM COATED ORAL
Qty: 30 TABLET | Refills: 0 | OUTPATIENT
Start: 2022-03-25

## 2022-03-25 RX ORDER — LISINOPRIL AND HYDROCHLOROTHIAZIDE 20; 12.5 MG/1; MG/1
TABLET ORAL
Qty: 30 TABLET | Refills: 0 | Status: SHIPPED | OUTPATIENT
Start: 2022-03-25 | End: 2022-04-11

## 2022-03-25 NOTE — TELEPHONE ENCOUNTER
Disp Refills Start End    atorvastatin (LIPITOR) 20 MG tablet 90 tablet 0 3/18/2022     Sig: TAKE 1 TABLET BY MOUTH EVERY DAY    Sent to pharmacy as:  Atorvastatin Calcium 20 MG Oral Tablet (LIPITOR)    Cosign for Ordering: Accepted by Luís Mosqueda DO on 3/22/2022  9:38 AM    E-Prescribing Status: Receipt confirmed by pharmacy (3/18/2022  3:26 PM EDT)

## 2022-04-03 DIAGNOSIS — I10 HYPERTENSION, UNSPECIFIED TYPE: ICD-10-CM

## 2022-04-04 RX ORDER — LISINOPRIL AND HYDROCHLOROTHIAZIDE 20; 12.5 MG/1; MG/1
TABLET ORAL
Qty: 30 TABLET | Refills: 0 | OUTPATIENT
Start: 2022-04-04

## 2022-04-04 RX ORDER — ATORVASTATIN CALCIUM 20 MG/1
TABLET, FILM COATED ORAL
Qty: 30 TABLET | Refills: 0 | OUTPATIENT
Start: 2022-04-04

## 2022-04-04 NOTE — TELEPHONE ENCOUNTER
atorvastatin (LIPITOR) 20 MG tablet 90 tablet 0 3/18/2022     Sig: TAKE 1 TABLET BY MOUTH EVERY DAY    Sent to pharmacy as:  Atorvastatin Calcium 20 MG Oral Tablet (LIPITOR)    Cosign for Ordering: Accepted by Lukas Garay DO on 3/22/2022  9:38 AM    E-Prescribing Status: Receipt confirmed by pharmacy (3/18/2022  3:26 PM EDT)      lisinopril-hydroCHLOROthiazide (PRINZIDE;ZESTORETIC) 20-12.5 MG per tablet 30 tablet 0 3/25/2022     Sig: TAKE 1 TABLET BY MOUTH EVERY DAY    Sent to pharmacy as: Lisinopril-hydroCHLOROthiazide 20-12.5 MG Oral Tablet (PRINZIDE;ZESTORETIC)    Cosign for Ordering: Accepted by Lukas Garay DO on 3/30/2022  8:22 PM    E-Prescribing Status: Receipt confirmed by pharmacy (3/25/2022  9:27 AM EDT)

## 2022-04-10 DIAGNOSIS — I10 HYPERTENSION, UNSPECIFIED TYPE: ICD-10-CM

## 2022-04-11 RX ORDER — LISINOPRIL AND HYDROCHLOROTHIAZIDE 20; 12.5 MG/1; MG/1
TABLET ORAL
Qty: 30 TABLET | Refills: 1 | Status: SHIPPED | OUTPATIENT
Start: 2022-04-11 | End: 2022-06-20

## 2022-04-18 RX ORDER — OLANZAPINE 2.5 MG/1
TABLET ORAL
Qty: 90 TABLET | Refills: 0 | OUTPATIENT
Start: 2022-04-18

## 2022-04-18 NOTE — TELEPHONE ENCOUNTER
LV 2/21/22 WITH DR Amparo Deluca FOR [facility] 5/25/22  OLANZapine (ZYPREXA) 2.5 MG tablet (Discontinued) 90 tablet 0 1/17/2022 2/21/2022    Sig: TAKE 1 TABLET BY MOUTH NIGHTLY    Sent to pharmacy as: OLANZapine 2.5 MG Oral Tablet [facility])    Reason for Discontinue: Therapy completed    Cosign for Ordering: Accepted by Eddy Lozano DO on 1/27/2022  1:00 PM    E-Prescribing Status: Receipt confirmed by pharmacy (1/17/2022 12:54 PM EST)

## 2022-04-22 ENCOUNTER — TELEPHONE (OUTPATIENT)
Dept: FAMILY MEDICINE CLINIC | Age: 77
End: 2022-04-22
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
## 2022-04-23 DIAGNOSIS — E11.9 TYPE 2 DIABETES MELLITUS WITHOUT COMPLICATION, WITHOUT LONG-TERM CURRENT USE OF INSULIN (HCC): ICD-10-CM

## 2022-04-25 ENCOUNTER — TELEPHONE (OUTPATIENT)
Dept: FAMILY MEDICINE CLINIC | Age: 77
End: 2022-04-25

## 2022-04-25 RX ORDER — ATORVASTATIN CALCIUM 20 MG/1
TABLET, FILM COATED ORAL
Qty: 90 TABLET | Refills: 0 | Status: SHIPPED | OUTPATIENT
Start: 2022-04-25 | End: 2022-07-13 | Stop reason: SDUPTHER

## 2022-04-25 NOTE — TELEPHONE ENCOUNTER
Paranoia has worsened. The patient thought authorities were coming to take her to the longterm - arrested for murder. She has not had more sleepiness and is normal on the new dose of medication at 25 mg twice daily. She has a slight tremor the tremor was much worse on the Zyprexa. A/P Schizophrenia with paranoia    Increase Seroquel dose to 25 mg the morning and 2 pills or 50 mg at night. Watch for symptoms of sleep apnea which can be: Increased sleepiness during the day, increased confusion, increased edema, increased GERD, increased blood pressures.

## 2022-04-25 NOTE — TELEPHONE ENCOUNTER
Call patient on Friday night left message. Called patient again on Sunday night left message again we will call Monday.

## 2022-04-25 NOTE — TELEPHONE ENCOUNTER
----- Message from Britni Salinas sent at 4/23/2022  3:22 PM EDT -----  Subject: Medication Problem    QUESTIONS  Name of Medication? QUEtiapine (SEROQUEL) 25 MG tablet  Patient-reported dosage and instructions? 2 times a day  What question or problem do you have with the medication? Patient has been   taking the medication 2 times a day for a week and daughter wanted to know   if she should be put back on the ziroprexa or continue on the Seroquel  Preferred Pharmacy? [id number] - [place], [place] - [address] [place] [address]. [facility] [phone] [facility] [phone]  Pharmacy phone number (if available)? [phone]  Additional Information for Provider?   ---------------------------------------------------------------------------  --------------  CALL BACK INFO  What is the best way for the office to contact you? OK to leave message on   [place], [place] to respond with electronic message via [employer] portal (only   for patients who have registered [employer] account)  Preferred Call Back Phone Number? [phone]  ---------------------------------------------------------------------------  --------------  SCRIPT ANSWERS  Relationship to Patient? Other  Representative Name? Myriam Silva  Is the Representative on the appropriate HIPAA document in Epic?  Yes

## 2022-04-27 DIAGNOSIS — K21.9 GASTRO-ESOPHAGEAL REFLUX DISEASE WITHOUT ESOPHAGITIS: ICD-10-CM

## 2022-04-27 RX ORDER — PANTOPRAZOLE SODIUM 40 MG/1
TABLET, DELAYED RELEASE ORAL
Qty: 90 TABLET | Refills: 0 | Status: ON HOLD | OUTPATIENT
Start: 2022-04-27 | End: 2022-06-17 | Stop reason: HOSPADM

## 2022-04-30 ENCOUNTER — HOSPITAL ENCOUNTER (OUTPATIENT)
Dept: WOMENS IMAGING | Age: 77
Discharge: HOME | End: 2022-04-30
Payer: MEDICARE

## 2022-04-30 VITALS — BODY MASS INDEX: 28.63 KG/M2 | WEIGHT: 142 LBS | HEIGHT: 59 IN

## 2022-04-30 DIAGNOSIS — Z12.31 BREAST CANCER SCREENING BY MAMMOGRAM: ICD-10-CM

## 2022-04-30 PROCEDURE — 77067 SCR MAMMO BI INCL CAD: CPT

## 2022-05-10 ENCOUNTER — OFFICE VISIT (OUTPATIENT)
Dept: FAMILY MEDICINE CLINIC | Age: 77
End: 2022-05-10
Payer: MEDICARE

## 2022-05-10 VITALS
BODY MASS INDEX: 28.51 KG/M2 | SYSTOLIC BLOOD PRESSURE: 124 MMHG | DIASTOLIC BLOOD PRESSURE: 70 MMHG | HEIGHT: 59 IN | RESPIRATION RATE: 20 BRPM | HEART RATE: 65 BPM | OXYGEN SATURATION: 97 % | WEIGHT: 141.4 LBS

## 2022-05-10 DIAGNOSIS — R44.3 HALLUCINATIONS: Primary | ICD-10-CM

## 2022-05-10 DIAGNOSIS — F03.A0 MILD DEMENTIA: ICD-10-CM

## 2022-05-10 DIAGNOSIS — F41.9 ANXIETY: ICD-10-CM

## 2022-05-10 PROCEDURE — 99215 OFFICE O/P EST HI 40 MIN: CPT | Performed by: FAMILY MEDICINE

## 2022-05-10 RX ORDER — QUETIAPINE FUMARATE 50 MG/1
TABLET, FILM COATED ORAL
Qty: 60 TABLET | Refills: 2 | Status: ON HOLD | OUTPATIENT
Start: 2022-05-10 | End: 2022-06-17 | Stop reason: HOSPADM
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
## 2022-05-23 ENCOUNTER — APPOINTMENT (OUTPATIENT)
Dept: GENERAL RADIOLOGY | Age: 77
End: 2022-05-23
Payer: MEDICARE

## 2022-05-23 ENCOUNTER — HOSPITAL ENCOUNTER (EMERGENCY)
Age: 77
Discharge: ANOTHER HOSPITAL | End: 2022-05-23
Attending: EMERGENCY MEDICINE
Payer: MEDICARE

## 2022-05-23 ENCOUNTER — HOSPITAL ENCOUNTER (INPATIENT)
Age: 77
LOS: 25 days | Discharge: HOME | DRG: 884 | End: 2022-06-17
Attending: PSYCHIATRY & NEUROLOGY | Admitting: PSYCHIATRY & NEUROLOGY
Payer: MEDICARE

## 2022-05-23 VITALS
HEART RATE: 84 BPM | HEIGHT: 60 IN | BODY MASS INDEX: 29.45 KG/M2 | DIASTOLIC BLOOD PRESSURE: 63 MMHG | TEMPERATURE: 98.5 F | WEIGHT: 150 LBS | RESPIRATION RATE: 25 BRPM | SYSTOLIC BLOOD PRESSURE: 135 MMHG | OXYGEN SATURATION: 97 %

## 2022-05-23 DIAGNOSIS — R44.3 HALLUCINATIONS: ICD-10-CM

## 2022-05-23 DIAGNOSIS — Z86.59 HISTORY OF DEMENTIA: ICD-10-CM

## 2022-05-23 DIAGNOSIS — R46.89 ABNORMAL BEHAVIOR: Primary | ICD-10-CM

## 2022-05-23 PROBLEM — F03.918 DEMENTIA WITH BEHAVIORAL DISTURBANCE: Status: ACTIVE | Noted: 2022-05-23

## 2022-05-23 LAB
A/G RATIO: 1.4 (ref 1.1–2.2)
ACETAMINOPHEN LEVEL: <5 UG/ML (ref 10–30)
ALBUMIN SERPL-MCNC: 4 G/DL (ref 3.4–5)
ALP BLD-CCNC: 81 U/L (ref 40–129)
ALT SERPL-CCNC: 24 U/L (ref 10–40)
AMPHETAMINE SCREEN, URINE: NORMAL
ANION GAP SERPL CALCULATED.3IONS-SCNC: 13 MMOL/L (ref 3–16)
AST SERPL-CCNC: 24 U/L (ref 15–37)
BACTERIA: NORMAL /HPF
BARBITURATE SCREEN URINE: NORMAL
BASOPHILS ABSOLUTE: 0.1 K/UL (ref 0–0.2)
BASOPHILS RELATIVE PERCENT: 0.8 %
BENZODIAZEPINE SCREEN, URINE: NORMAL
BILIRUB SERPL-MCNC: 0.3 MG/DL (ref 0–1)
BILIRUBIN URINE: NEGATIVE
BLOOD, URINE: NEGATIVE
BUN BLDV-MCNC: 11 MG/DL (ref 7–20)
CALCIUM SERPL-MCNC: 9.6 MG/DL (ref 8.3–10.6)
CANNABINOID SCREEN URINE: NORMAL
CHLORIDE BLD-SCNC: 102 MMOL/L (ref 99–110)
CLARITY: CLEAR
CO2: 25 MMOL/L (ref 21–32)
COCAINE METABOLITE SCREEN URINE: NORMAL
COLOR: YELLOW
CREAT SERPL-MCNC: 0.7 MG/DL (ref 0.6–1.2)
EOSINOPHILS ABSOLUTE: 0.1 K/UL (ref 0–0.6)
EOSINOPHILS RELATIVE PERCENT: 2.1 %
EPITHELIAL CELLS, UA: 1 /HPF (ref 0–5)
ETHANOL: NORMAL MG/DL (ref 0–0.08)
GFR AFRICAN AMERICAN: >60
GFR NON-AFRICAN AMERICAN: >60
GLUCOSE BLD-MCNC: 166 MG/DL (ref 70–99)
GLUCOSE URINE: NEGATIVE MG/DL
HCT VFR BLD CALC: 35 % (ref 36–48)
HEMOGLOBIN: 11.2 G/DL (ref 12–16)
HYALINE CASTS: 2 /LPF (ref 0–8)
KETONES, URINE: NEGATIVE MG/DL
LEUKOCYTE ESTERASE, URINE: ABNORMAL
LYMPHOCYTES ABSOLUTE: 2.3 K/UL (ref 1–5.1)
LYMPHOCYTES RELATIVE PERCENT: 34.4 %
Lab: NORMAL
MAGNESIUM: 1.8 MG/DL (ref 1.8–2.4)
MCH RBC QN AUTO: 27.2 PG (ref 26–34)
MCHC RBC AUTO-ENTMCNC: 31.9 G/DL (ref 31–36)
MCV RBC AUTO: 85.4 FL (ref 80–100)
METHADONE SCREEN, URINE: NORMAL
MICROSCOPIC EXAMINATION: YES
MONOCYTES ABSOLUTE: 0.4 K/UL (ref 0–1.3)
MONOCYTES RELATIVE PERCENT: 6.7 %
NEUTROPHILS ABSOLUTE: 3.8 K/UL (ref 1.7–7.7)
NEUTROPHILS RELATIVE PERCENT: 56 %
NITRITE, URINE: NEGATIVE
OPIATE SCREEN URINE: NORMAL
OXYCODONE URINE: NORMAL
PDW BLD-RTO: 15.1 % (ref 12.4–15.4)
PH UA: 5 (ref 5–8)
PH UA: 6
PHENCYCLIDINE SCREEN URINE: NORMAL
PLATELET # BLD: 207 K/UL (ref 135–450)
PMV BLD AUTO: 8.7 FL (ref 5–10.5)
POTASSIUM REFLEX MAGNESIUM: 3.5 MMOL/L (ref 3.5–5.1)
PROPOXYPHENE SCREEN: NORMAL
PROTEIN UA: NEGATIVE MG/DL
RBC # BLD: 4.1 M/UL (ref 4–5.2)
RBC UA: 0 /HPF (ref 0–4)
SALICYLATE, SERUM: <0.3 MG/DL (ref 15–30)
SARS-COV-2, NAAT: NOT DETECTED
SODIUM BLD-SCNC: 140 MMOL/L (ref 136–145)
SPECIFIC GRAVITY UA: 1.01 (ref 1–1.03)
TOTAL PROTEIN: 6.9 G/DL (ref 6.4–8.2)
TROPONIN: <0.01 NG/ML
URINE REFLEX TO CULTURE: ABNORMAL
URINE TYPE: ABNORMAL
UROBILINOGEN, URINE: 0.2 E.U./DL
WBC # BLD: 6.7 K/UL (ref 4–11)
WBC UA: 1 /HPF (ref 0–5)

## 2022-05-23 PROCEDURE — 84484 ASSAY OF TROPONIN QUANT: CPT

## 2022-05-23 PROCEDURE — 82077 ASSAY SPEC XCP UR&BREATH IA: CPT

## 2022-05-23 PROCEDURE — 80179 DRUG ASSAY SALICYLATE: CPT

## 2022-05-23 PROCEDURE — 80307 DRUG TEST PRSMV CHEM ANLYZR: CPT

## 2022-05-23 PROCEDURE — 71045 X-RAY EXAM CHEST 1 VIEW: CPT

## 2022-05-23 PROCEDURE — 87635 SARS-COV-2 COVID-19 AMP PRB: CPT

## 2022-05-23 PROCEDURE — 81001 URINALYSIS AUTO W/SCOPE: CPT

## 2022-05-23 PROCEDURE — 85025 COMPLETE CBC W/AUTO DIFF WBC: CPT

## 2022-05-23 PROCEDURE — 6370000000 HC RX 637 (ALT 250 FOR IP): Performed by: EMERGENCY MEDICINE

## 2022-05-23 PROCEDURE — 1240000000 HC EMOTIONAL WELLNESS R&B

## 2022-05-23 PROCEDURE — 36415 COLL VENOUS BLD VENIPUNCTURE: CPT

## 2022-05-23 PROCEDURE — 99285 EMERGENCY DEPT VISIT HI MDM: CPT

## 2022-05-23 PROCEDURE — 80143 DRUG ASSAY ACETAMINOPHEN: CPT

## 2022-05-23 PROCEDURE — 83735 ASSAY OF MAGNESIUM: CPT

## 2022-05-23 PROCEDURE — 80053 COMPREHEN METABOLIC PANEL: CPT

## 2022-05-23 RX ORDER — DONEPEZIL HYDROCHLORIDE 5 MG/1
10 TABLET, FILM COATED ORAL ONCE
Status: COMPLETED | OUTPATIENT
Start: 2022-05-23 | End: 2022-05-23

## 2022-05-23 RX ORDER — QUETIAPINE FUMARATE 100 MG/1
50 TABLET, FILM COATED ORAL ONCE
Status: COMPLETED | OUTPATIENT
Start: 2022-05-23 | End: 2022-05-23

## 2022-05-23 RX ADMIN — QUETIAPINE FUMARATE 50 MG: 100 TABLET ORAL at 22:51

## 2022-05-23 RX ADMIN — DONEPEZIL HYDROCHLORIDE 10 MG: 5 TABLET, FILM COATED ORAL at 22:51

## 2022-05-23 RX ADMIN — METFORMIN HYDROCHLORIDE 500 MG: 500 TABLET ORAL at 22:51

## 2022-05-23 ASSESSMENT — PAIN - FUNCTIONAL ASSESSMENT: PAIN_FUNCTIONAL_ASSESSMENT: NONE - DENIES PAIN
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
## 2022-05-23 NOTE — ED NOTES
Urine cup provided. Pt states she does not feel she needs to urinate yet. Daughter, [facility] at bedside, and states she will help with collecting urine.       Rbua Cortez RN  05/23/22 4748

## 2022-05-24 ENCOUNTER — APPOINTMENT (OUTPATIENT)
Dept: CT IMAGING | Age: 77
DRG: 884 | End: 2022-05-24
Attending: PSYCHIATRY & NEUROLOGY
Payer: MEDICARE

## 2022-05-24 DIAGNOSIS — E78.5 HYPERLIPIDEMIA, UNSPECIFIED HYPERLIPIDEMIA TYPE: ICD-10-CM

## 2022-05-24 LAB — TSH SERPL DL<=0.05 MIU/L-ACNC: 2.38 UIU/ML (ref 0.27–4.2)

## 2022-05-24 PROCEDURE — 6370000000 HC RX 637 (ALT 250 FOR IP)

## 2022-05-24 PROCEDURE — 99223 1ST HOSP IP/OBS HIGH 75: CPT | Performed by: PSYCHIATRY & NEUROLOGY

## 2022-05-24 PROCEDURE — 36415 COLL VENOUS BLD VENIPUNCTURE: CPT

## 2022-05-24 PROCEDURE — 70450 CT HEAD/BRAIN W/O DYE: CPT

## 2022-05-24 PROCEDURE — 99221 1ST HOSP IP/OBS SF/LOW 40: CPT

## 2022-05-24 PROCEDURE — 84443 ASSAY THYROID STIM HORMONE: CPT

## 2022-05-24 PROCEDURE — 1240000000 HC EMOTIONAL WELLNESS R&B

## 2022-05-24 RX ORDER — OLANZAPINE 5 MG/1
5 TABLET ORAL EVERY 8 HOURS PRN
Status: DISCONTINUED | OUTPATIENT
Start: 2022-05-24 | End: 2022-06-17 | Stop reason: HOSPADM

## 2022-05-24 RX ORDER — ATORVASTATIN CALCIUM 10 MG/1
20 TABLET, FILM COATED ORAL DAILY
Status: DISCONTINUED | OUTPATIENT
Start: 2022-05-24 | End: 2022-06-17 | Stop reason: HOSPADM

## 2022-05-24 RX ORDER — LEVOTHYROXINE SODIUM 0.03 MG/1
TABLET ORAL
Qty: 30 TABLET | Refills: 0 | Status: SHIPPED | OUTPATIENT
Start: 2022-05-24 | End: 2022-06-17 | Stop reason: HOSPADM

## 2022-05-24 RX ORDER — HYDROXYZINE 50 MG/1
50 TABLET, FILM COATED ORAL 3 TIMES DAILY PRN
Status: DISCONTINUED | OUTPATIENT
Start: 2022-05-24 | End: 2022-06-17 | Stop reason: HOSPADM

## 2022-05-24 RX ORDER — ACETAMINOPHEN 325 MG/1
650 TABLET ORAL EVERY 4 HOURS PRN
Status: DISCONTINUED | OUTPATIENT
Start: 2022-05-24 | End: 2022-06-17 | Stop reason: HOSPADM

## 2022-05-24 RX ORDER — LEVOTHYROXINE SODIUM 0.03 MG/1
25 TABLET ORAL DAILY
Status: DISCONTINUED | OUTPATIENT
Start: 2022-05-24 | End: 2022-06-17 | Stop reason: HOSPADM

## 2022-05-24 RX ORDER — LISINOPRIL AND HYDROCHLOROTHIAZIDE 20; 12.5 MG/1; MG/1
1 TABLET ORAL DAILY
Status: DISCONTINUED | OUTPATIENT
Start: 2022-05-24 | End: 2022-06-17 | Stop reason: HOSPADM

## 2022-05-24 RX ORDER — MAGNESIUM HYDROXIDE/ALUMINUM HYDROXICE/SIMETHICONE 120; 1200; 1200 MG/30ML; MG/30ML; MG/30ML
30 SUSPENSION ORAL EVERY 6 HOURS PRN
Status: DISCONTINUED | OUTPATIENT
Start: 2022-05-24 | End: 2022-06-17 | Stop reason: HOSPADM

## 2022-05-24 RX ORDER — PANTOPRAZOLE SODIUM 40 MG/1
40 TABLET, DELAYED RELEASE ORAL DAILY
Status: DISCONTINUED | OUTPATIENT
Start: 2022-05-24 | End: 2022-06-17 | Stop reason: HOSPADM

## 2022-05-24 RX ORDER — POLYETHYLENE GLYCOL 3350 17 G
2 POWDER IN PACKET (EA) ORAL
Status: DISCONTINUED | OUTPATIENT
Start: 2022-05-24 | End: 2022-06-17 | Stop reason: HOSPADM

## 2022-05-24 RX ORDER — TRAZODONE HYDROCHLORIDE 50 MG/1
50 TABLET ORAL NIGHTLY PRN
Status: DISCONTINUED | OUTPATIENT
Start: 2022-05-24 | End: 2022-06-17 | Stop reason: HOSPADM

## 2022-05-24 RX ORDER — DIPHENHYDRAMINE HYDROCHLORIDE 50 MG/ML
50 INJECTION INTRAMUSCULAR; INTRAVENOUS EVERY 4 HOURS PRN
Status: DISCONTINUED | OUTPATIENT
Start: 2022-05-24 | End: 2022-06-17 | Stop reason: HOSPADM

## 2022-05-24 RX ORDER — FLUTICASONE PROPIONATE 50 MCG
1 SPRAY, SUSPENSION (ML) NASAL DAILY
Status: DISCONTINUED | OUTPATIENT
Start: 2022-05-24 | End: 2022-06-08

## 2022-05-24 RX ORDER — IBUPROFEN 400 MG/1
400 TABLET ORAL EVERY 6 HOURS PRN
Status: DISCONTINUED | OUTPATIENT
Start: 2022-05-24 | End: 2022-06-17 | Stop reason: HOSPADM

## 2022-05-24 RX ADMIN — LEVOTHYROXINE SODIUM 25 MCG: 25 TABLET ORAL at 11:47

## 2022-05-24 RX ADMIN — LISINOPRIL AND HYDROCHLOROTHIAZIDE 1 TABLET: 12.5; 2 TABLET ORAL at 11:47

## 2022-05-24 RX ADMIN — METFORMIN HYDROCHLORIDE 1000 MG: 500 TABLET ORAL at 11:47

## 2022-05-24 RX ADMIN — PANTOPRAZOLE SODIUM 40 MG: 40 TABLET, DELAYED RELEASE ORAL at 11:47

## 2022-05-24 RX ADMIN — METFORMIN HYDROCHLORIDE 1000 MG: 500 TABLET ORAL at 17:16

## 2022-05-24 RX ADMIN — ATORVASTATIN CALCIUM 20 MG: 10 TABLET, FILM COATED ORAL at 11:47

## 2022-05-24 ASSESSMENT — SLEEP AND FATIGUE QUESTIONNAIRES
DO YOU USE A SLEEP AID: NO
DO YOU HAVE DIFFICULTY SLEEPING: NO
AVERAGE NUMBER OF SLEEP HOURS: 10
DO YOU USE A SLEEP AID: NO
AVERAGE NUMBER OF SLEEP HOURS: 10
DO YOU HAVE DIFFICULTY SLEEPING: NO

## 2022-05-24 ASSESSMENT — LIFESTYLE VARIABLES
HOW MANY STANDARD DRINKS CONTAINING ALCOHOL DO YOU HAVE ON A TYPICAL DAY: 1 OR 2
HOW OFTEN DO YOU HAVE A DRINK CONTAINING ALCOHOL: NEVER

## 2022-05-24 NOTE — H&P
25 MCG tablet TAKE 1 TABLET BY MOUTH EVERY DAY 3/3/22   Tanner Araujo DO   diclofenac sodium (VOLTAREN) 1 % GEL Apply 2-4 g topically 4 times daily To area of pain to intact skin as needed for pain. 2/21/22   Tanner Araujo DO   donepezil (ARICEPT) 10 MG tablet TAKE 1 TABLET BY MOUTH IN THE EVENING 1/14/22   Faith Mccoy MD   VITAMIN D PO Take 1 tablet by mouth daily Indications: Vitamin D Deficiency    Historical Provider, MD   fluticasone (FLONASE) 50 MCG/ACT nasal spray 1 spray by Each Nostril route daily 10/9/20   Beny Hdz APRN - CNP   Multiple Vitamin (MULTI-DAY VITAMINS PO) Take by mouth    Historical Provider, MD       Allergies:  Patient has no known allergies. Social History:  The patient currently lives with daugther    TOBACCO:   reports that she has never smoked. She has never used smokeless tobacco.  ETOH:   reports no history of alcohol use. Family History:   Positive as follows:        Problem Relation Age of Onset    High Cholesterol Mother     Stroke Mother     Mental Illness Mother     Heart Disease Father     Heart Attack Father     Colon Cancer Father     No Known Problems Sister     Cancer Sister     Other Brother         gait issue    Heart Attack Brother     Coronary Art Dis Brother     Coronary Art Dis Brother     Heart Attack Brother     Diabetes type 2  Brother         ? ? Type     Other Brother         MVA with amputation    Heart Disease Brother     Mental Illness Daughter         ?schizophrenia?     Hypertension Son     High Cholesterol Son     Other Son         GB, Fatty       REVIEW OF SYSTEMS:       Constitutional: Negative for fever   HENT: Negative for sore throat   Eyes: Negative for redness   Respiratory: Negative  for dyspnea, cough   Cardiovascular: Negative for chest pain   Gastrointestinal: Negative for vomiting, diarrhea   Genitourinary: Negative for hematuria   Musculoskeletal: Negative for arthralgias   Skin: Negative for rash [id number]) as of 5/24/2022 09:22   Ref. Range 5/23/2022 14:27   Amphetamine Screen, Urine Latest Ref Range: Negative <1000ng/mL  Neg   Benzodiazepine Screen, Urine Latest Ref Range: Negative <200 ng/mL  Neg   Cocaine Metabolite Screen, Urine Latest Ref Range: Negative <300 ng/mL  Neg   METHADONE SCREEN, URINE, 88192 Latest Ref Range: Negative <300 ng/mL  Neg   Opiate Scrn, Ur Latest Ref Range: Negative <300 ng/mL  Neg   Oxycodone Urine Latest Ref Range: Negative <100 ng/ml  Neg   PCP Screen, Urine Latest Ref Range: Negative <25 ng/mL  Neg   Cannabinoid Scrn, Ur Latest Ref Range: Negative <50 ng/mL  Neg       CULTURES  Results for Brent Charles (MRN [id number]) as of 5/24/2022 09:22   Ref. Range 5/23/2022 16:13   SARS-CoV-2, NAAT Latest Ref Range: Not Detected  Not Detected     Results for Brent Charles (MRN [id number]) as of 5/24/2022 09:22   Ref.  Range 5/23/2022 14:27   Urine Reflex to Culture Unknown Not Indicated       EKG:  I have reviewed the EKG with the following interpretation:   Normal sinus rhythm  Normal ECG    RADIOLOGY  No orders to display        ASSESSMENT/PLAN:  #Dementia with behavioral disturbance  - per psychiatry team    #DMII  -BG controlled  -Continue metformin  -Consider SSI  -Monitor BG for now    #HTN  -BP elevated  -Continue lisinopril-hctz  -Monitor    #HLD  -Continue statin    #GERD  -Continue PPI    #Hypothyroidism  -Continue synthroid    Vereloisa Natarajan PA-C  5/24/2022 9:20 AM

## 2022-05-24 NOTE — GROUP NOTE
Group Therapy Note    Date: 5/24/2022    Group Start Time: 1000  Group End Time: 0505  Group Topic: Cognitive Skills    [address] [facility]        Group Therapy Note    Attendees: 11    Group members were given slogans from different foods/restaurants/cars from the years and asked to name what each slogan belonged to. Notes: Patient remained in group for the full duration. Patient remained engaged and interacted appropriately with other members of the group. Status After Intervention:  Improved    Participation Level:  Active Listener and Interactive    Participation Quality: Appropriate, Attentive and Sharing      Speech:  normal      Thought Process/Content: Logical      Affective Functioning: Congruent      Mood: euthymic      Level of consciousness:  Alert      Response to Learning: Able to verbalize current knowledge/experience      Endings: None Reported    Modes of Intervention: Socialization, Exploration and Activity      Discipline Responsible: Behavorial Health Tech      Signature:  ANGE Roman

## 2022-05-24 NOTE — PROGRESS NOTES
Occupational Therapy    Attempted OT evaluation today. Pt was visiting with family and then is leaving the floor for a CT scan. Will re-attempt another day.     Valentine Desouza [address] 87, OTR/L  #[id number]

## 2022-05-24 NOTE — CARE COORDINATION
22 1338   Psychiatric History   Psychiatric history treatment   (Denies)   Are there any medication issues?  No   Recent Psychological Experiences   ( passed away in , denies anything recent)   Support System   Support system Adequate   Types of Support System   (Daughters Delores Shannon and Chelsy Ortiz, Abigail Miranda)   Problems in support system None   Current Living Situation   Home Living Adequate   Living information Lives with others  (Daughter Raghavendra Celis lives with her)   Problems with living situation  No   Lack of basic needs No   SSDI/SSI SSI   Other government assistance Denies   Problems with environment Denies   Current abuse issues Denies   Supervised setting None   Relationship problems No   Contact information Deepali Celis [phone]   Medical and Self-Care Issues   Relevant medical problems Denies   Relevant self-care issues Denies   Barriers to treatment No   Family Constellation   Spouse/partner-name/age Passed away in    Children-names/ages Abdirahman Hull   Parents    Siblings Sister in [place]   Support services   (N/A)   Childhood   Raised by Biological mother;Biological father   Biological mother    Biological father    Relevant family history Denies   History of abuse No   Legal History   Legal history No   Other relevant legal issues Denies   Juvenile legal history No   Abuse Assessment   Physical Abuse Denies   Verbal Abuse Denies   Emotional abuse Denies   Financial Abuse Denies   Sexual abuse Denies   Possible abuse reported to None needed   Substance Use   Use of substances  No   Motivation for SA Treatment   Stage of engagement   (N/A)   Motivation for treatment   (Denies)   Current barriers to treatment   (N/A)   Education   Education   (Graduated Highschool, attended some college and got a certificate.)   Special education   (Denies)   Work History   Currently employed No   Recent job loss or change   (N/A)   [facility]   (Denies)   /VA involvement Denies   Cultural and Spiritual   Spiritual concerns No   Cultural concerns No

## 2022-05-24 NOTE — PROGRESS NOTES
Behavioral Services  Medicare Certification Upon Admission    I certify that this patient's inpatient psychiatric hospital admission is medically necessary for:    [x] (1) Treatment which could reasonably be expected to improve this patient's condition,       [x] (2) Or for diagnostic study;     AND     [x](2) The inpatient psychiatric services are provided while the individual is under the care of a physician and are included in the individualized plan of care.     Estimated length of stay/service 5 d    Plan for post-hospital care outpt    Electronically signed by Huyen Macdonald MD on 5/24/2022 at 1:18 PM

## 2022-05-24 NOTE — GROUP NOTE
Group Therapy Note    Date: 5/24/2022    Group Start Time: 1100  Group End Time: 7172  Group Topic: Cognitive Skills    [address] [facility]         Group Therapy Note    Group facilitator led participants in group singing of Joan Jacobsen's A Very Good Year. Group processed memories and activities associated with multiple ages throughout adolescence and adulthood: ages [age]. Attendees: 9         Notes: This pt was present across group therapy session, active with direct prompting from facilitator, flat/depressed affect with minimal verbal interactions with peers. Status After Intervention:  Improved    Participation Level:  Active Listener and Interactive    Participation Quality: Sharing      Speech:  normal      Thought Process/Content: Logical      Affective Functioning: Congruent      Mood: depressed and euthymic      Level of consciousness:  Alert and Attentive      Response to Learning: Progressing to goal      Endings: None Reported    Modes of Intervention: Socialization, Exploration, Activity and Media      Discipline Responsible: Psychoeducational Specialist      Signature:  Winnie Fisher MM, MT-BC

## 2022-05-24 NOTE — PROGRESS NOTES
Pt arrived from [facility] ED for worsening dementia and wandering down the street naked looking for Gokulvah. She came with no belongings. Per transport, belongings were sent home with her daughter from [facility]. She is alert and oriented to person, place, month and year currently but is forgetful. She is pleasant and cooperative but anxious. She is preoccupied and endorses audio command hallucinations that told her to walk down the street naked. States \"the devil was pretending to be Jehovah. \" She denies SI/HI. Her daughter Av Mackenzie is POA.

## 2022-05-24 NOTE — PLAN OF CARE
Pt alert and oriented. No auditorium hallucinations. Active in group. Independent and continent. Denies SI/HI. No RTIS observed or reported. +meals. +meds. Cat scan of the brain ordered. Problem: Chronic Conditions and Co-morbidities  Goal: Patient's chronic conditions and co-morbidity symptoms are monitored and maintained or improved  Outcome: Progressing  Flowsheets (Taken 5/24/2022 [id number] by Nazario Stevenson RN)  Care Plan - Patient's Chronic Conditions and Co-Morbidity Symptoms are Monitored and Maintained or Improved:   Monitor and assess patient's chronic conditions and comorbid symptoms for stability, deterioration, or improvement   Collaborate with multidisciplinary team to address chronic and comorbid conditions and prevent exacerbation or deterioration   Update acute care plan with appropriate goals if chronic or comorbid symptoms are exacerbated and prevent overall improvement and discharge     Problem: Safety - Adult  Goal: Free from fall injury  Outcome: Progressing     Problem: ABCDS Injury Assessment  Goal: Absence of physical injury  Outcome: Progressing     Problem: Anxiety  Goal: Will report anxiety at manageable levels  Description: INTERVENTIONS:  1. Administer medication as ordered  2. Teach and rehearse alternative coping skills  3. Provide emotional support with 1:1 interaction with staff  Outcome: Progressing     Problem: Coping  Goal: Pt/Family able to verbalize concerns and demonstrate effective coping strategies  Description: INTERVENTIONS:  1. Assist patient/family to identify coping skills, available support systems and cultural and spiritual values  2. Provide emotional support, including active listening and acknowledgement of concerns of patient and caregivers  3. Reduce environmental stimuli, as able  4. Instruct patient/family in relaxation techniques, as appropriate  5.  Assess for spiritual pain/suffering and initiate Spiritual Care, Psychosocial Clinical Specialist consults as needed  Outcome: Progressing     Problem: Decision Making  Goal: Pt/Family able to effectively weigh alternatives and participate in decision making related to treatment and care  Description: INTERVENTIONS:  1. Determine when there are differences between patient's view, family's view, and healthcare provider's view of condition  2. Facilitate patient and family articulation of goals for care  3. Help patient and family identify pros/cons of alternative solutions  4. Provide information as requested by patient/family  5. Respect patient/family right to receive or not to receive information  6. Serve as a liaison between patient and family and health care team  7. Initiate Consults from Ethics, Palliative Care or initiate [facility] [place] as is appropriate  Outcome: Progressing     Problem: Confusion  Goal: Confusion, delirium, dementia, or psychosis is improved or at baseline  Description: INTERVENTIONS:  1. Assess for possible contributors to thought disturbance, including medications, impaired vision or hearing, underlying metabolic abnormalities, dehydration, psychiatric diagnoses, and notify attending LIP  2. [place] high risk fall precautions, as indicated  3. Provide frequent short contacts to provide reality reorientation, refocusing and direction  4. Decrease environmental stimuli, including noise as appropriate  5. Monitor and intervene to maintain adequate nutrition, hydration, elimination, sleep and activity  6. If unable to ensure safety without constant attention obtain sitter and review sitter guidelines with assigned personnel  7.  Initiate Psychosocial CNS and Spiritual Care consult, as indicated  Outcome: Progressing

## 2022-05-24 NOTE — PROGRESS NOTES
[facility]  Admission Note     Admission Type:   Admission Type: Involuntary    Reason for admission:  Reason for Admission: Wordsening dementia. Found walking down street naked looking for [address]:       Patient Strengths and Limitations:       Addictive Behavior:   Addictive Behavior  In the Past 3 Months, Have You Felt or Has Someone Told You That You Have a Problem With  : None    Medical Problems:   Past Medical History:   Diagnosis Date    COVID-19 virus infection 01/07/2022    tESTED = 1/24/21.  Diabetes mellitus ([facility] [address]     GERD (gastroesophageal reflux disease)     Hyperlipidemia     Hypertension     Hypothyroidism     MGUS (monoclonal gammopathy of unknown significance) 2019    Mild dementia (HCC)        Status EXAM:  Mental Status and Behavioral Exam  Normal: No  Level of Assistance: Independent/Self  Facial Expression: Brightened,Worried  Affect: Stable  Level of Consciousness: Alert (forgetful)  Frequency of Checks: 4 times per hour, close  Mood:Normal: No  Mood: Anxious  Motor Activity:Normal: Yes  Eye Contact: Good  Observed Behavior: Cooperative,Friendly  Sexual Misconduct History: Current - no  Preception: [place] to person,[place] to place,[place] to time  Attention:Normal: No  Attention: Distractible  Thought Processes: Blocking  Thought Content:Normal: No  Thought Content: Delusions,Preoccupations  Depression Symptoms: No problems reported or observed. Anxiety Symptoms: Generalized  Ardha Symptoms: No problems reported or observed. Hallucinations:  Auditory (comment)  Delusions: Yes  Delusions: Controlling,Spiritism  Memory:Normal: No  Memory: Poor recent,Poor remote  Insight and Judgment: No  Insight and Judgment: Poor judgment,Poor insight    Tobacco Screening:  Practical Counseling, on admission, mauricio X, if applicable and completed (first 3 are required if patient doesn't refuse):            ( )  Recognizing danger situations (included triggers and roadblocks)                    ( )  Coping skills (new ways to manage stress, exercise, relaxation techniques, changing routine, distraction)                                                           ( )  Basic information about quitting (benefits of quitting, techniques in how to quit, available resources  ( ) Referral for counseling faxed to [facility]                                           ( ) Patient refused counseling  ( ) Patient has not smoked in the last 30 days    Metabolic Screening:    Lab Results   Component Value Date    LABA1C 6.7 02/21/2022       Lab Results   Component Value Date    CHOL 145 10/19/2021    CHOL 144 10/10/2020    CHOL 238 (H) 02/11/2019    CHOL 152 07/19/2018     Lab Results   Component Value Date    TRIG 210 (H) 10/19/2021    TRIG 190 (H) 10/10/2020    TRIG 274 (H) 02/11/2019    TRIG 379 (H) 07/19/2018     Lab Results   Component Value Date    HDL 45 10/19/2021    HDL 45 10/10/2020    HDL 45 02/11/2019    HDL 32 (L) 07/19/2018     No components found for: LDLCAL  Lab Results   Component Value Date    LABVLDL 42 10/19/2021    LABVLDL 38 10/10/2020    LABVLDL 55 02/11/2019    LABVLDL see below 07/19/2018         Body mass index is 28.68 kg/m². BP Readings from Last 2 Encounters:   05/24/22 (!) 167/80   05/23/22 135/63           Pt admitted with followings belongings:  Dental Appliances: None  Vision - Corrective Lenses: Eyeglasses  Hearing Aid: None  Jewelry: None  Body Piercings Removed: N/A  Clothing: Sent home  Other Valuables: At home               Derrick Rivas RN       4 Eyes Skin Assessment     The patient is being assessed for  Admission    I agree that 2 RN's have performed a thorough Head to Toe Skin Assessment on the patient. ALL assessment sites listed below have been assessed.        Areas assessed for pressure by both nurses:   [x]   Head, Face, and Ears   [x]   Shoulders, Back, and Chest  [x]   Arms, Elbows, and Hands   [x]   Coccyx, Sacrum, and Ischum  [x]   Legs, Feet, and Heels                                Skin Assessed Under all Medical Devices by both nurses:  n/a              All Mepilex Borders were peeled back and area peeked at by both nurses:  No: n/a  Please list where Mepilex Borders are located:  n/a                 Does the Patient have Skin Breakdown related to pressure? No. Has multiple areas to bilat upper arms where she has small scabbed sores from picking. Nay Sharper Nay Sharper \"a nervous habit\"           Manuel Prevention initiated:  No   Wound Care Orders initiated:  NA      [facility] nurse consulted for Pressure Injury (Stage 3,4, Unstageable, DTI, NWPT, Complex wounds)and New or Established Ostomies:  NA        Nurse 1 eSignature: Electronically signed by Yosi Frazier RN on 5/24/22 at 2:47 AM EDT    **SHARE this note so that the co-signing nurse is able to place an eSignature**    Patient was provided with a mask to utilize on unit. All staff will continue to utilize masks when interacting with patient. Patient is able to demonstrate the ability to move from a reclining position to an upright position within the recliner.     Nurse 2 eSignature: Electronically signed by Emely Valle RN on 5/24/22 at 2:56 AM EDT
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
## 2022-05-24 NOTE — PROGRESS NOTES
Patient is alert x 3. Patient pleasant and friendly. Denies SI/HI. No RTIS to report or observed. Patient ordered a ct scan of the head. Ordered and transport came up to get patient but she had visitors so requested to do later. rn been trying to get a hold of ct and still have not been able to reach to have scan performed. Patient ordered all home meds. Patient took meds and meals without any hesitation. Patient participated in group. Patient had visitors today. Independent without any assist device. Patient continent on both urine and stool. No other concerns or complications.

## 2022-05-25 LAB
CHOLESTEROL, TOTAL: 150 MG/DL (ref 0–199)
HDLC SERPL-MCNC: 46 MG/DL (ref 40–60)
LDL CHOLESTEROL CALCULATED: 64 MG/DL
TRIGL SERPL-MCNC: 198 MG/DL (ref 0–150)
VLDLC SERPL CALC-MCNC: 40 MG/DL

## 2022-05-25 PROCEDURE — 1240000000 HC EMOTIONAL WELLNESS R&B

## 2022-05-25 PROCEDURE — 6370000000 HC RX 637 (ALT 250 FOR IP): Performed by: PSYCHIATRY & NEUROLOGY

## 2022-05-25 PROCEDURE — 80061 LIPID PANEL: CPT

## 2022-05-25 PROCEDURE — 97161 PT EVAL LOW COMPLEX 20 MIN: CPT

## 2022-05-25 PROCEDURE — 97116 GAIT TRAINING THERAPY: CPT

## 2022-05-25 PROCEDURE — 97165 OT EVAL LOW COMPLEX 30 MIN: CPT

## 2022-05-25 PROCEDURE — 6370000000 HC RX 637 (ALT 250 FOR IP)

## 2022-05-25 PROCEDURE — 36415 COLL VENOUS BLD VENIPUNCTURE: CPT

## 2022-05-25 PROCEDURE — 83036 HEMOGLOBIN GLYCOSYLATED A1C: CPT

## 2022-05-25 PROCEDURE — 99233 SBSQ HOSP IP/OBS HIGH 50: CPT | Performed by: PSYCHIATRY & NEUROLOGY

## 2022-05-25 PROCEDURE — 97535 SELF CARE MNGMENT TRAINING: CPT

## 2022-05-25 PROCEDURE — 97530 THERAPEUTIC ACTIVITIES: CPT

## 2022-05-25 RX ORDER — RISPERIDONE 0.25 MG/1
0.25 TABLET, FILM COATED ORAL NIGHTLY
Status: DISCONTINUED | OUTPATIENT
Start: 2022-05-25 | End: 2022-05-26

## 2022-05-25 RX ORDER — M-VIT,TX,IRON,MINS/CALC/FOLIC 27MG-0.4MG
1 TABLET ORAL DAILY
Status: DISCONTINUED | OUTPATIENT
Start: 2022-05-25 | End: 2022-06-17 | Stop reason: HOSPADM

## 2022-05-25 RX ORDER — VITAMIN E 268 MG
400 CAPSULE ORAL DAILY
Status: DISCONTINUED | OUTPATIENT
Start: 2022-05-25 | End: 2022-06-17 | Stop reason: HOSPADM

## 2022-05-25 RX ORDER — DONEPEZIL HYDROCHLORIDE 5 MG/1
5 TABLET, FILM COATED ORAL NIGHTLY
Status: DISCONTINUED | OUTPATIENT
Start: 2022-05-25 | End: 2022-05-27

## 2022-05-25 RX ORDER — RISPERIDONE 0.25 MG/1
0.25 TABLET, FILM COATED ORAL DAILY
Status: DISCONTINUED | OUTPATIENT
Start: 2022-05-25 | End: 2022-05-25

## 2022-05-25 RX ADMIN — RISPERIDONE 0.25 MG: 0.25 TABLET ORAL at 21:06

## 2022-05-25 RX ADMIN — LISINOPRIL AND HYDROCHLOROTHIAZIDE 1 TABLET: 12.5; 2 TABLET ORAL at 08:24

## 2022-05-25 RX ADMIN — ATORVASTATIN CALCIUM 20 MG: 10 TABLET, FILM COATED ORAL at 08:24

## 2022-05-25 RX ADMIN — DONEPEZIL HYDROCHLORIDE 5 MG: 5 TABLET, FILM COATED ORAL at 21:06

## 2022-05-25 RX ADMIN — PANTOPRAZOLE SODIUM 40 MG: 40 TABLET, DELAYED RELEASE ORAL at 08:24

## 2022-05-25 RX ADMIN — METFORMIN HYDROCHLORIDE 1000 MG: 500 TABLET ORAL at 08:24

## 2022-05-25 RX ADMIN — LEVOTHYROXINE SODIUM 25 MCG: 25 TABLET ORAL at 08:24

## 2022-05-25 RX ADMIN — HYDROXYZINE HYDROCHLORIDE 50 MG: 50 TABLET, FILM COATED ORAL at 22:18

## 2022-05-25 NOTE — PROGRESS NOTES
Inpatient Occupational Therapy  Evaluation and Treatment    Unit:   [facility]  Date:  5/25/2022  Patient Name:    Lis Gonzalez  Admitting diagnosis:  Dementia with behavioral disturbance [facility]) [F03.91]  Admit Date:  5/23/2022  Precautions/Restrictions/WB Status/ Lines/ Wounds/ Oxygen:  Standard BHI Precautions  History of Present Illness:  Per 5/23/22 ED note, pt arrived from [facility] ED for worsening dementia and wandering down the street naked looking for Jehovah. She came with no belongings. Per transport, belongings were sent home with her daughter from [facility]. She is alert and oriented to person, place, month and year currently but is forgetful. She is pleasant and cooperative but anxious. She is preoccupied and endorses audio command hallucinations that told her to walk down the street naked. States \"the devil was pretending to be Jehovah. \" She denies SI/HI. Her daughter Corinna Beaver is POA. Treatment Number:  1    Treatment Time: 952-453  Timed Code Treatment Minutes:   27  minutes   Total Treatment Time:    37  minutes    Staff Recommendations:    Supervision with ambulation on unit    Discharge Recommendations:  Home with daily assist    DME needs for discharge:      Needs Met     AM-PAC Score:   [address] Level: NA    MOCA:    [facility] Cognitive Assessment ([address] [place] [address], Ne)  (See pt folder behind nurses station for copy of assessment while pt is admitted.)   Total Score: Sum of all subscores listed on the right-hand side. Add one point for an individual who has 12 years or fewer of formal education, for a possible maximum of 30 points. A final total score of 26 and above is considered normal.     Education Level HS+     Visuospatial/Executive  2/5   Naming  3/3   Attention  5/6   Language 1/3   Abstraction  2/2   Delayed Recall    MIS = 13/15   3/5   Orientation  6/6   (additional point for <12 grade educations)     Total Score    22/30       Preadmission Environment    Pt.  Lives with family (daughter Kyrie Dejesus)  Home environment:  one story home  Steps to enter first floor: 2 steps to enter and hand rail unilateral  Steps to second floor: N/A  Bathroom: tub/shower unit, grab bar, shower seat, standard commode  Equipment owned:  and [facility]    Preadmission Status:  Pt. Able to drive: No  Pt Fully independent with ADLs: Yes  Pt. Required assistance from family for: Independent PTA  Pt. independent for transfers and gait and walked with No Device  History of falls No    Sleep Hygiene:  No issues reported  Income: SSI  Financial Management:  Daughter performs  Leisure Interests:  Reading, walking, sitting on porch, reading bible  Medication Management: has medication machine that releases pills when prescribed. Dtr sets this up  Health Management:  Dr. Rona Prakash is primary physician  Social Network:  Has 2 close friends  Stressors:  Nothing currently  Coping Skills: watching tv, reading, walking    Pain   No  Rating:NA  Location: NA  Pain Medicine Status: No request made      Cognition    A&O to x4  Able to follow:  2 step commands    Upper Extremity ROM:    WFL, pt able to perform all bed mobility, transfers, and gait without ROM limitation. Upper Extremity Strength:    WFL, pt able to perform all bed mobility, transfers, and gait without strength limitation. Upper Extremity Sensation:    No apparent deficits. Upper Extremity Proprioception:    No apparent deficits. Skin Integrity:  No issues noted     Coordination and Tone:  No issues noted    Balance  Static Sitting:  Normal  Dynamic Sitting: Good   Static Standing:  Good   Dynamic Standing: Good     Bed mobility:    Supine to sit:   Not Tested  Sit to supine:   Not Tested  Scooting to head of bed: Not Tested   Scooting in sitting:  Independent  Rolling:   Not Tested  Bridging:   Not Tested    Transfers:    Sit to stand:   Independent  Stand to sit:    Independent  Bed/Chair to/from toilet: Not Tested    Self Care:  Toileting: IND  Grooming: IND with brushing teeth at sink  Dressing: IND with donning/doffing socks and underwear    Exercise / Activities Initiated:   Pt. Educated on role of OT. Pt. Participated in: [address] [place] [address], ADL retraining    Assessment of Deficits:   Pt demonstrated decreased coping skills, decreased cognition, self isolation, limited education    At end of evaluation, pt. In room, needs met. Goal(s) : To be met in 3 Visits:  1). Pt. To complete interest check list.   2). Pt will participate in [address] [place] [address] assessment. -MET 5/24/22    To be met in 5 Visits:  1). Pt. To identify 2 memory strategies to take medications as prescribed. 2). Pt will verbalize 3 new coping skills  3). Pt. To verbalize understanding of sleep hygiene education/handouts. Rehabilitation Potential:  Good for goals listed above. Strengths for achieving goals include:  PLOF, Motivation and Med compliance  Barriers to achieving goals include: Decreased coping skills, Decreased cognition and Limited education    Plan: To be seen 2-5x/week while in acute care setting for therapeutic exercises/act, ADL retraining, NMR and patient/caregiver ed/instruction.        Signature and License Number  Mary Louis 87, OTR/L  #[id number]           If patient discharges from this facility prior to next visit, this note will serve as the Discharge Summary

## 2022-05-25 NOTE — FLOWSHEET NOTE
Senior Purposeful Rounding     Position:Repositions Self     Physical Environment:Room free from clutter and Clear path to bathroom      Pain Rating/ Nonverbal Pain Behaviors:0;      Pain interventions Attempted: n/a     Patient Toileted:Independent

## 2022-05-25 NOTE — GROUP NOTE
Group Therapy Note    Date: 5/25/2022    Group Start Time: 5287  Group End Time: 1400  Group Topic: Recreational    [id number]        Group Therapy Note    Attendees: 6    Group members engaged in play with multiple board games. The goal was to promote interaction and team building. Notes: Magdi Hdz attended group for the full duration. Magdi Hdz remained engaged and interacted approrpiately with others in the group. Status After Intervention:  Improved    Participation Level:  Active Listener    Participation Quality: Appropriate      Speech:  normal      Thought Process/Content: Linear      Affective Functioning: Congruent      Mood: euthymic      Level of consciousness:  Alert      Response to Learning: Able to verbalize current knowledge/experience      Endings: None Reported    Modes of Intervention: Socialization and Activity      Discipline Responsible: Behavorial Health Tech      Signature:  ANGE Roman

## 2022-05-25 NOTE — PROGRESS NOTES
Department of Psychiatry  AttendingProgress Note  Chief Complaint: dementia with behavioral disturbances   Lawerance Skill reports that she is still hearing voices. She said she heard the voice this morning and it is now saying \"state. \" She says it is the same male voice that she has been hearing the past couple days. OT completed a MoCA with her and she scored a 22/30. We discussed these results with her and that it is most likely indicative of mild cognitive impairment. Lawerance Skill also explained that she was having some issues with sleeping so we will start her on Risperdal 0.25mg. She will also be started on Aricept 5mg qd to help with the dementia/ cognitive impairment. Patient's chart was reviewed and collaborated with  about the treatment plan. SUBJECTIVE:    Patient is feeling unchanged. Suicidal ideation:  denies suicidal ideation. Patient does not have medication side effects. ROS: Patient has new complaints: no  Sleeping adequately:  No: will be started on Risperdal to help with sleep. Appetite adequate: Yes  Attending groups: Yes  Visitors:No    OBJECTIVE    Physical  VITALS:  BP (!) 168/84   Pulse 89   Temp 98.9 °F (37.2 °C) (Oral)   Resp 18   Ht 4' 11\" (1.499 m)   Wt 142 lb (64.4 kg)   SpO2 93%   BMI 28.68 kg/m²     Mental Status Examination:  Patients appearance was well-appearing and hospital attire. Thoughts are Logical. Homicidal ideations none. No abnormal movements, tics or mannerisms. Memory impaired immediate recall and recent memory A[place] 0. Concentration Fair. Alert and oriented X 4. Insight and Judgement impaired insight. Patient was cooperative.  Patient gait normal. Mood within normal limits, affect normal affect Hallucinations Present - auditory hallucinations, suicidal ideations no specific plan to harm self Speech delayed  Data  Labs:   Admission on 05/23/2022   Component Date Value Ref Range Status    TSH 05/24/2022 2.38  0.27 - 4.20 uIU/mL Final Medications  Current Facility-Administered Medications: therapeutic multivitamin-minerals 1 tablet, 1 tablet, Oral, Daily  vitamin E capsule 400 Units, 400 Units, Oral, Daily  donepezil (ARICEPT) tablet 5 mg, 5 mg, Oral, Nightly  risperiDONE (RISPERDAL) tablet 0.25 mg, 0.25 mg, Oral, Nightly  acetaminophen (TYLENOL) tablet 650 mg, 650 mg, Oral, Q4H PRN  ibuprofen (ADVIL;MOTRIN) tablet 400 mg, 400 mg, Oral, Q6H PRN  magnesium hydroxide (MILK OF MAGNESIA) 400 MG/5ML suspension 30 mL, 30 mL, Oral, Daily PRN  nicotine polacrilex (COMMIT) lozenge 2 mg, 2 mg, Oral, Q1H PRN  aluminum & magnesium hydroxide-simethicone (MAALOX) 200-200-20 MG/5ML suspension 30 mL, 30 mL, Oral, Q6H PRN  hydrOXYzine (ATARAX) tablet 50 mg, 50 mg, Oral, TID PRN  OLANZapine (ZYPREXA) tablet 5 mg, 5 mg, Oral, Q8H PRN **OR** OLANZapine (ZYPREXA) 5 mg in sterile water 1 mL injection, 5 mg, IntraMUSCular, Q8H PRN  sterile water injection 2.1 mL, 2.1 mL, IntraMUSCular, Q4H PRN  diphenhydrAMINE (BENADRYL) injection 50 mg, 50 mg, IntraMUSCular, Q4H PRN  traZODone (DESYREL) tablet 50 mg, 50 mg, Oral, Nightly PRN  atorvastatin (LIPITOR) tablet 20 mg, 20 mg, Oral, Daily  fluticasone (FLONASE) 50 MCG/ACT nasal spray 1 spray, 1 spray, Each Nostril, Daily  lisinopril-hydroCHLOROthiazide (PRINZIDE;ZESTORETIC) 20-12.5 MG per tablet 1 tablet, 1 tablet, Oral, Daily  levothyroxine (SYNTHROID) tablet 25 mcg, 25 mcg, Oral, Daily  metFORMIN (GLUCOPHAGE) tablet 1,000 mg, 1,000 mg, Oral, BID WC  pantoprazole (PROTONIX) tablet 40 mg, 40 mg, Oral, Daily    ASSESSMENT AND PLAN    Principal Problem:    Dementia with behavioral disturbance (HCC)  Active Problems:    Encounter for routine adult medical examination    Hyperlipidemia  Resolved Problems:    * No resolved hospital problems. *       1. Patients symptoms show no change  2. Probable discharge is 2-3 days  3. Discharge planning is complete  4. Suicidal ideation is none  5.  Total time with patient was 40 minutes and more than 50 % of that time was spent counseling the patient on their symptoms, treatment and expected goals. Addendum to PA student note:  Pt seen, examined, and evaluated with PA student , who acted as my scribe for the above documentation. I have reviewed the current history, physical findings, labs, assessment and plan; and agree with note as documented.

## 2022-05-25 NOTE — PROGRESS NOTES
ROM/Strength  Deferred to OT evaluation: please see OT note    Lower Extremity ROM / Strength (use of 5/5 scale if strength formally assessed)    AROM: WNL    Right LE  quads    5                                       ant tibs  5                                                hams          5                                               iliopsoas   5                  LEFT LE   quads     5     ant tibs       5     hams         5    iliopsoas    5  Sensation       Light touch:      WFL R UE, L UE, L LE and R LE  Proprioception:    WFL R UE, L UE, L LE and R LE    Coordination Testing: Alternating pronation/supination:  WFL  Heel to shin (sitting or supine):      WFL  Alternating Toe Tapping:       WFL    Tone     WNL R UE, L UE, L LE and R LE    Trunk Control   Good    Vision:   WNL  Comments:     Hearing: WNL  Comments:     Balance  Static Sitting:  Normal  Dynamic Sitting: Normal  Static Standing:  Normal  Dynamic Standing: Good      Balance Functional Outcome Measure   NA  Measure Used:  Tinetti  Date Assessed: 5/25/2022  Score: 28/28  Time up go test; 12 sec without Assistive device (indicating low fall risk)    Indication for Romberg: postive test = proprioceptive deficit  Indications for BRAGA: Risk of Falls []41-55 = low, []21-40 = medium, []0-20 = high   Indications for Tinetti: Risk of Falls:   [x]Low (> 24)   []Mod (19-23)   []High (< 18)  Indications for DGI:  [] minimal to no risk (> 21), [] risk of falls (<21)    Bed mobility    Rolling to left: Independent  Rolling to right:   Independent  Scooting in supine:   Independent  Scooting in sitting:   Independent  Supine to sit with HOB flat: Independent  Sit to supine with HOB flat: Independent    Transfers    Sit to stand:  Independent  Stand to sit:   Independent  Bed to chair:  Independent    Toileting  Sit>stand from toilet (standard height):  Not Tested  Stand>sit to toilet (standard height):   Not Tested  Farrah-care:      Not Tested  Hand hygiene:     Not Tested  LE pants management:   Not Tested  Cues:    Gait gait completed as indicated below  Deviations (firm surface/linoleum):  none  Level of assist:    Independent  Assistive Device Used:    No AD  Distance:  500 ft  Cued on:     Stair Training Patient demonstrated good balance to perform stepping by performing marching on the spot for 20 reps without holding on. Patient demontrated single leg stance ~8-10 sec      WC mobility   NA    Activity Tolerance   No adverse symptoms noted w/activity    Positioning Needs   Pt in community room, no alarm needed, positioned in proper neutral alignment and pressure relief provided. Needs within reach. Within line of sight of nursing staff. Therapeutic Exercises Initiated    Exercises in BOLD performed in unit today. Items not bolded are carried forward from prior visits for continuity of the record. Exercise/Equipment Resistance/Repetitions Other comments                                        Patient/Family Education   Educated on importance of continued activity   Educated on safety with transfers    Patients response to evaluation/treatment:   Pt tolerated treatment well    Impairments/ Deficits  N/A    Assessment of Deficits  Pt is 68year old female presenting [facility] the [facility] behavioral [facility] at [facility] with medical diagnosis of Dementia with behavioral disturbance ([facility] [address] [F03.91]. Patient tolerated session well. Patient was independent in functional activities and will benefit from supervision at home upon discharge. Recommending Home 24 hr supervision upon discharge as patient functioning independently    Goals :   N/A    Rehabilitation Potential    Good      Plan    To be seen for evaluation and treatment only while in [facility] setting.     Timed Code Treatment Minutes:  13 minutes  Total Treatment Time:   23 minutes    Electronically signed by Chai Goldman PT on 5/25/22 at 4:49 PM EDT      If patient discharges from this facility prior to next visit, this note will serve as the Discharge Summary.

## 2022-05-25 NOTE — GROUP NOTE
Group Therapy Note    Date: 5/25/2022    Group Start Time: 1100  Group End Time: 4087  Group Topic: Psychoeducation    [facility]        Group Therapy Note    Topic: Mind/Body Connection    Group members explored definitions of the word \"feeling\", relating this to emotional and physical sensations. Group facilitator discussed the connection between emotional and physical feeling, how each impact the other. Group members then engaged in creative expression intervention, using color to illustrate their perceived emotional and physical feeling on a body outline provided by facilitator. Attendees: 7         Notes: Tejal Soria was present and actively engaged across discussions throughout session. Tejal Soria reflected on her desire to maintain positivity, illustrating on body outline with shades of green and blue. Tejal Soria illustrated pain in her knees with black ink, reflecting with peers on ways to maintain quality of life while coping with chronic pain. Status After Intervention:  Improved    Participation Level:  Active Listener and Interactive    Participation Quality: Attentive and Sharing      Speech:  normal      Thought Process/Content: Logical      Affective Functioning: Congruent      Mood: euthymic      Level of consciousness:  Alert and Attentive      Response to Learning: Capable of insight and Progressing to goal      Endings: None Reported    Modes of Intervention: Education, Support, Socialization, Exploration, Activity and Media      Discipline Responsible: Psychoeducational Specialist      Signature:  Shashank Weathers MM, MT-BC

## 2022-05-26 LAB
ESTIMATED AVERAGE GLUCOSE: 165.7 MG/DL
HBA1C MFR BLD: 7.4 %

## 2022-05-26 PROCEDURE — 6370000000 HC RX 637 (ALT 250 FOR IP)

## 2022-05-26 PROCEDURE — 99233 SBSQ HOSP IP/OBS HIGH 50: CPT | Performed by: PSYCHIATRY & NEUROLOGY

## 2022-05-26 PROCEDURE — 6370000000 HC RX 637 (ALT 250 FOR IP): Performed by: PSYCHIATRY & NEUROLOGY

## 2022-05-26 PROCEDURE — 1240000000 HC EMOTIONAL WELLNESS R&B

## 2022-05-26 RX ORDER — RISPERIDONE 0.25 MG/1
0.5 TABLET, FILM COATED ORAL NIGHTLY
Status: DISCONTINUED | OUTPATIENT
Start: 2022-05-26 | End: 2022-05-27

## 2022-05-26 RX ADMIN — METFORMIN HYDROCHLORIDE 1000 MG: 500 TABLET ORAL at 09:49

## 2022-05-26 RX ADMIN — PANTOPRAZOLE SODIUM 40 MG: 40 TABLET, DELAYED RELEASE ORAL at 09:49

## 2022-05-26 RX ADMIN — Medication 400 UNITS: at 09:49

## 2022-05-26 RX ADMIN — FLUTICASONE PROPIONATE 1 SPRAY: 50 SPRAY, METERED NASAL at 10:46

## 2022-05-26 RX ADMIN — METFORMIN HYDROCHLORIDE 1000 MG: 500 TABLET ORAL at 17:44

## 2022-05-26 RX ADMIN — MULTIPLE VITAMINS W/ MINERALS TAB 1 TABLET: TAB at 09:49

## 2022-05-26 RX ADMIN — LEVOTHYROXINE SODIUM 25 MCG: 25 TABLET ORAL at 09:49

## 2022-05-26 RX ADMIN — DONEPEZIL HYDROCHLORIDE 5 MG: 5 TABLET, FILM COATED ORAL at 20:48

## 2022-05-26 RX ADMIN — RISPERIDONE 0.5 MG: 0.25 TABLET ORAL at 20:48

## 2022-05-26 RX ADMIN — LISINOPRIL AND HYDROCHLOROTHIAZIDE 1 TABLET: 12.5; 2 TABLET ORAL at 09:49

## 2022-05-26 RX ADMIN — ATORVASTATIN CALCIUM 20 MG: 10 TABLET, FILM COATED ORAL at 09:50

## 2022-05-26 NOTE — PROGRESS NOTES
Senior Purposeful Rounding    Position:Back and Repositions Self    Physical Environment:Room free from clutter, Clear path to bathroom , Adequate lighting and No safety hazards noted    Pain Rating/ Nonverbal Pain Behaviors:0    Pain interventions Attempted: None    Patient Toileted:Continent and Independent

## 2022-05-26 NOTE — PLAN OF CARE
Problem: Chronic Conditions and Co-morbidities  Goal: Patient's chronic conditions and co-morbidity symptoms are monitored and maintained or improved  Outcome: Progressing     Problem: Anxiety  Goal: Will report anxiety at manageable levels  Description: INTERVENTIONS:  1. Administer medication as ordered  2. Teach and rehearse alternative coping skills  3. Provide emotional support with 1:1 interaction with staff  Outcome: Progressing    Pt has been visible pacing the unit. She has been disrobing since the beginning of the shift requiring constant redirection. She believes that she needs to keep her clothes off to prevent her daughter from being cremated. She also said that she was trying to please God. Reorientation provided without evidence of learning. She was medication compliant. Prn atarax given at 2218 for restlessness. Ambulates with a steady gait.  Denies needs

## 2022-05-26 NOTE — PLAN OF CARE
[facility]  Day 3 Interdisciplinary Treatment Plan NOTE    Review Date & Time: 5/26/22 0950    Patient was not in treatment team    Estimated Length of Stay Update:  2-3 days  Estimated Discharge Date Update: 5/28/22-5/29/22    EDUCATION:   Learner Progress Toward Treatment Goals: Reviewed results and recommendations of this team    Method: Individual    Outcome: Verbalized understanding    PATIENT GOALS: none expressed    PLAN/TREATMENT RECOMMENDATIONS UPDATE: continue treatment    GOALS UPDATE:   Time frame for Short-Term Goals: 2 days      Felix Barros RN

## 2022-05-26 NOTE — FLOWSHEET NOTE
Group Therapy Note    Date: 5/26/2022  Start Time: 1000  End Time:  0502  Number of Participants: 7    Type of Group: Music Group    Notes:  Pt present for Music Group. Pt actively participated by making song selections and singing along to music. Participation Level:  Active Listener and Interactive    Participation Quality: Appropriate and Attentive      Speech:  normal      Affective Functioning: Congruent      Endings: None Reported    Modes of Intervention: Support, Socialization, Activity and Media      Discipline Responsible: Chaplain Kaz Partida       05/26/22 1421   Encounter Summary   Encounter Overview/Reason  Behavioral Health   Service Provided For: Patient   Last Encounter    (5/26 Music Group)   Complexity of Encounter Moderate   Begin Time 1000   End Time  1045   Total Time Calculated 45 min

## 2022-05-26 NOTE — FLOWSHEET NOTE
Senior Purposeful Rounding    Position:Repositions Self    Physical Environment:Room free from clutter, Clear path to bathroom , Adequate lighting and No safety hazards noted    Pain Rating/ Nonverbal Pain Behaviors:0;      Pain interventions Attempted: n/a    Patient Toileted:Independent

## 2022-05-26 NOTE — GROUP NOTE
Group Therapy Note    Date: 5/26/2022    Group Start Time: 1100  Group End Time: 4975  Group Topic: Relaxation    [id number] [facility]        Group Therapy Note    Attendees: 6    Group members engaged in stretches as well as in deep breathing. Notes:  Patient attended group for the full duration. Patient remained engaged and interacted appropriately with other members of the group. Status After Intervention:  Improved    Participation Level:  Active Listener and Interactive    Participation Quality: Appropriate and Attentive      Speech:  normal      Thought Process/Content: Linear    Affective Functioning: Congruent      Mood: euthymic      Level of consciousness:  Alert      Response to Learning: Able to verbalize current knowledge/experience      Endings: None Reported    Modes of Intervention: Socialization, Exploration and Movement      Discipline Responsible: Behavorial Health Tech      Signature:  ANGE Moctezuma

## 2022-05-26 NOTE — PROGRESS NOTES
Occupational Therapy    Attempted OT treatment this afternoon. Pt was found to be visiting with family. Will follow-up tomorrow as time allows.     Yanna Desouza Heriberto 87, OTR/L  #[id number]
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
## 2022-05-27 PROBLEM — F29 PSYCHOSIS (HCC): Status: ACTIVE | Noted: 2022-05-27

## 2022-05-27 LAB
BILIRUBIN URINE: NEGATIVE
BLOOD, URINE: ABNORMAL
CLARITY: CLEAR
COLOR: YELLOW
EKG ATRIAL RATE: 81 BPM
EKG DIAGNOSIS: NORMAL
EKG P AXIS: 48 DEGREES
EKG P-R INTERVAL: 130 MS
EKG Q-T INTERVAL: 388 MS
EKG QRS DURATION: 76 MS
EKG QTC CALCULATION (BAZETT): 450 MS
EKG R AXIS: 14 DEGREES
EKG T AXIS: 34 DEGREES
EKG VENTRICULAR RATE: 81 BPM
EPITHELIAL CELLS, UA: NORMAL /HPF (ref 0–5)
GLUCOSE URINE: NEGATIVE MG/DL
KETONES, URINE: NEGATIVE MG/DL
LEUKOCYTE ESTERASE, URINE: ABNORMAL
MICROSCOPIC EXAMINATION: YES
NITRITE, URINE: NEGATIVE
PH UA: 6 (ref 5–8)
PROTEIN UA: NEGATIVE MG/DL
RBC UA: NORMAL /HPF (ref 0–4)
SPECIFIC GRAVITY UA: 1.01 (ref 1–1.03)
URINE REFLEX TO CULTURE: ABNORMAL
URINE TYPE: ABNORMAL
UROBILINOGEN, URINE: 0.2 E.U./DL
WBC UA: NORMAL /HPF (ref 0–5)

## 2022-05-27 PROCEDURE — 6370000000 HC RX 637 (ALT 250 FOR IP): Performed by: PSYCHIATRY & NEUROLOGY

## 2022-05-27 PROCEDURE — 1240000000 HC EMOTIONAL WELLNESS R&B

## 2022-05-27 PROCEDURE — 99233 SBSQ HOSP IP/OBS HIGH 50: CPT | Performed by: PSYCHIATRY & NEUROLOGY

## 2022-05-27 PROCEDURE — 81001 URINALYSIS AUTO W/SCOPE: CPT

## 2022-05-27 PROCEDURE — 6370000000 HC RX 637 (ALT 250 FOR IP)

## 2022-05-27 RX ORDER — RISPERIDONE 0.25 MG/1
0.5 TABLET, FILM COATED ORAL 2 TIMES DAILY
Status: DISCONTINUED | OUTPATIENT
Start: 2022-05-28 | End: 2022-05-31

## 2022-05-27 RX ORDER — DIVALPROEX SODIUM 125 MG/1
125 CAPSULE, COATED PELLETS ORAL 2 TIMES DAILY
Status: DISCONTINUED | OUTPATIENT
Start: 2022-05-27 | End: 2022-05-31

## 2022-05-27 RX ADMIN — LEVOTHYROXINE SODIUM 25 MCG: 25 TABLET ORAL at 08:18

## 2022-05-27 RX ADMIN — PANTOPRAZOLE SODIUM 40 MG: 40 TABLET, DELAYED RELEASE ORAL at 08:18

## 2022-05-27 RX ADMIN — METFORMIN HYDROCHLORIDE 1000 MG: 500 TABLET ORAL at 16:17

## 2022-05-27 RX ADMIN — METFORMIN HYDROCHLORIDE 1000 MG: 500 TABLET ORAL at 08:18

## 2022-05-27 RX ADMIN — FLUTICASONE PROPIONATE 1 SPRAY: 50 SPRAY, METERED NASAL at 08:18

## 2022-05-27 RX ADMIN — LISINOPRIL AND HYDROCHLOROTHIAZIDE 1 TABLET: 12.5; 2 TABLET ORAL at 08:18

## 2022-05-27 RX ADMIN — OLANZAPINE 5 MG: 5 TABLET, FILM COATED ORAL at 14:22

## 2022-05-27 RX ADMIN — DIVALPROEX SODIUM 125 MG: 125 CAPSULE, COATED PELLETS ORAL at 16:17

## 2022-05-27 RX ADMIN — Medication 400 UNITS: at 08:28

## 2022-05-27 RX ADMIN — ATORVASTATIN CALCIUM 20 MG: 10 TABLET, FILM COATED ORAL at 08:18

## 2022-05-27 RX ADMIN — MULTIPLE VITAMINS W/ MINERALS TAB 1 TABLET: TAB at 08:18
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
## 2022-05-27 NOTE — PROGRESS NOTES
Patient guarded, when asked how she was feeling, patient stated good. Asking this nurse the same question. After answering, patient didn't wait for other questions and left to socialize. Took meds well. Resting well.   Urine collected for urinalysis at 0205, sent to lab

## 2022-05-27 NOTE — GROUP NOTE
Group Therapy Note    Date: 5/27/2022    Group Start Time: 0900  Group End Time: 5534  Group Topic: Cognitive Skills    [facility]        Group Therapy Note    Group facilitator led members through morning orientation discussion, movement exercises, cognitive exercises and activity, as well as live music with reminiscence discussions throughout. Attendees: 5             Notes: Sage Jarquin was present and actively engaged across all activities and interventions, collaborative with peers in discussions while displaying difficulty in [facility], unable to identify previous places of employment, names of family members, and various categories of \"favorites\". Pleasant and engaging, social and self-motivated. Status After Intervention:  Improved    Participation Level:  Active Listener    Participation Quality: Appropriate, Sharing and Supportive      Speech:  normal      Thought Process/Content: Logical      Affective Functioning: Congruent      Mood: euthymic      Level of consciousness:  Alert and Attentive      Response to Learning: Able to verbalize current knowledge/experience, Capable of insight and Progressing to goal      Endings: None Reported    Modes of Intervention: Support, Socialization, Exploration, Activity, Movement and Media      Discipline Responsible: Psychoeducational Specialist      Signature:  Kriss Johnson MM, MT-BC

## 2022-05-27 NOTE — PLAN OF CARE
Problem: Chronic Conditions and Co-morbidities  Goal: Patient's chronic conditions and co-morbidity symptoms are monitored and maintained or improved  Outcome: Progressing     Problem: Safety - Adult  Goal: Free from fall injury  Outcome: Progressing  Problem: Anxiety  Goal: Will report anxiety at manageable levels  Description: INTERVENTIONS:  1. Administer medication as ordered  2. Teach and rehearse alternative coping skills  3. Provide emotional support with 1:1 interaction with staff  5/26/2022 2314 by Katrin Barrera LPN  Outcome: Progressing     Problem: Confusion  Goal: Confusion, delirium, dementia, or psychosis is improved or at baseline  Description: INTERVENTIONS:  1. Assess for possible contributors to thought disturbance, including medications, impaired vision or hearing, underlying metabolic abnormalities, dehydration, psychiatric diagnoses, and notify attending LIP  2. [place] high risk fall precautions, as indicated  3. Provide frequent short contacts to provide reality reorientation, refocusing and direction  4. Decrease environmental stimuli, including noise as appropriate  5. Monitor and intervene to maintain adequate nutrition, hydration, elimination, sleep and activity  6. If unable to ensure safety without constant attention obtain sitter and review sitter guidelines with assigned personnel  7.  Initiate Psychosocial CNS and Spiritual Care consult, as indicated  5/26/2022 2314 by Katrin Barrera LPN  Outcome: Progressing
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
## 2022-05-27 NOTE — GROUP NOTE
Group Therapy Note    Date: 5/27/2022    Group Start Time: 1330  Group End Time: 4981  Group Topic: Psychoeducation    [address], [place]        Group Therapy Note    Attendees: 4    Participants learned about the Cognitive [place] and Cognitive Restructuring and completed an activity about this topic. Notes: Ros Tobin attended group and was engaged and attentive but did not talk much during group. Ros Tobin got up and left during the group activity.     Status After Intervention:  Unchanged    Participation Level: Minimal    Participation Quality: Appropriate and Attentive      Speech:  hesitant      Thought Process/Content: Logical      Affective Functioning: Congruent      Mood: euthymic      Level of consciousness:  Alert and Attentive      Response to Learning: Progressing to goal      Endings: None Reported    Modes of Intervention: Education and Activity      Discipline Responsible: /Counselor      Signature:  YVONNE Ceja

## 2022-05-28 PROCEDURE — 1240000000 HC EMOTIONAL WELLNESS R&B

## 2022-05-28 PROCEDURE — 6370000000 HC RX 637 (ALT 250 FOR IP)

## 2022-05-28 PROCEDURE — 6370000000 HC RX 637 (ALT 250 FOR IP): Performed by: PSYCHIATRY & NEUROLOGY

## 2022-05-28 RX ADMIN — RISPERIDONE 0.5 MG: 0.25 TABLET ORAL at 08:22

## 2022-05-28 RX ADMIN — RISPERIDONE 0.5 MG: 0.25 TABLET ORAL at 20:34

## 2022-05-28 RX ADMIN — HYDROXYZINE HYDROCHLORIDE 50 MG: 50 TABLET, FILM COATED ORAL at 23:33

## 2022-05-28 RX ADMIN — OLANZAPINE 5 MG: 5 TABLET, FILM COATED ORAL at 11:55

## 2022-05-28 RX ADMIN — LISINOPRIL AND HYDROCHLOROTHIAZIDE 1 TABLET: 12.5; 2 TABLET ORAL at 08:22

## 2022-05-28 RX ADMIN — LEVOTHYROXINE SODIUM 25 MCG: 25 TABLET ORAL at 08:23

## 2022-05-28 RX ADMIN — METFORMIN HYDROCHLORIDE 1000 MG: 500 TABLET ORAL at 08:22

## 2022-05-28 RX ADMIN — Medication 400 UNITS: at 08:23

## 2022-05-28 RX ADMIN — DIVALPROEX SODIUM 125 MG: 125 CAPSULE, COATED PELLETS ORAL at 08:23

## 2022-05-28 RX ADMIN — ATORVASTATIN CALCIUM 20 MG: 10 TABLET, FILM COATED ORAL at 08:22

## 2022-05-28 RX ADMIN — DIVALPROEX SODIUM 125 MG: 125 CAPSULE, COATED PELLETS ORAL at 13:13

## 2022-05-28 RX ADMIN — PANTOPRAZOLE SODIUM 40 MG: 40 TABLET, DELAYED RELEASE ORAL at 08:23

## 2022-05-28 RX ADMIN — MULTIPLE VITAMINS W/ MINERALS TAB 1 TABLET: TAB at 08:23

## 2022-05-28 RX ADMIN — METFORMIN HYDROCHLORIDE 1000 MG: 500 TABLET ORAL at 16:28

## 2022-05-28 NOTE — FLOWSHEET NOTE
Senior Purposeful Rounding    Position:Repositions Self    Physical Environment:Room free from clutter, Clear path to bathroom , Adequate lighting and No safety hazards noted    Pain Rating/ Nonverbal Pain Behaviors:0;  n/a    Pain interventions Attempted: n/a    Patient Toileted:Independent
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
## 2022-05-29 PROCEDURE — 6370000000 HC RX 637 (ALT 250 FOR IP): Performed by: PSYCHIATRY & NEUROLOGY

## 2022-05-29 PROCEDURE — 1240000000 HC EMOTIONAL WELLNESS R&B

## 2022-05-29 PROCEDURE — 99233 SBSQ HOSP IP/OBS HIGH 50: CPT | Performed by: PSYCHIATRY & NEUROLOGY

## 2022-05-29 PROCEDURE — 6370000000 HC RX 637 (ALT 250 FOR IP)

## 2022-05-29 RX ADMIN — HYDROXYZINE HYDROCHLORIDE 50 MG: 50 TABLET, FILM COATED ORAL at 13:27

## 2022-05-29 RX ADMIN — RISPERIDONE 0.5 MG: 0.25 TABLET ORAL at 08:33

## 2022-05-29 RX ADMIN — METFORMIN HYDROCHLORIDE 1000 MG: 500 TABLET ORAL at 08:32

## 2022-05-29 RX ADMIN — PANTOPRAZOLE SODIUM 40 MG: 40 TABLET, DELAYED RELEASE ORAL at 08:33

## 2022-05-29 RX ADMIN — METFORMIN HYDROCHLORIDE 1000 MG: 500 TABLET ORAL at 17:27

## 2022-05-29 RX ADMIN — LEVOTHYROXINE SODIUM 25 MCG: 25 TABLET ORAL at 08:33

## 2022-05-29 RX ADMIN — DIVALPROEX SODIUM 125 MG: 125 CAPSULE, COATED PELLETS ORAL at 13:27

## 2022-05-29 RX ADMIN — DIVALPROEX SODIUM 125 MG: 125 CAPSULE, COATED PELLETS ORAL at 08:32

## 2022-05-29 RX ADMIN — ATORVASTATIN CALCIUM 20 MG: 10 TABLET, FILM COATED ORAL at 08:33

## 2022-05-29 RX ADMIN — RISPERIDONE 0.5 MG: 0.25 TABLET ORAL at 20:14

## 2022-05-29 RX ADMIN — LISINOPRIL AND HYDROCHLOROTHIAZIDE 1 TABLET: 12.5; 2 TABLET ORAL at 08:33

## 2022-05-29 RX ADMIN — Medication 400 UNITS: at 08:32

## 2022-05-29 RX ADMIN — MULTIPLE VITAMINS W/ MINERALS TAB 1 TABLET: TAB at 08:32

## 2022-05-29 NOTE — PROGRESS NOTES
Purposeful Rounding    Patient concerns reported:None noted    Nurse made aware:No    Patient Turned and repositioned: Independent    Patient Toileted: Continent    Fall Precautions in Place: Yellow non-skid socks on, Bed alarm on and functioning properly, Bed locked in low position, 1/2 bed rails up, Lighting appropriate, Room free of clutter and Clear path to bathroom      Electronically signed by Munira Nowak on 5/29/22 at 5:22 PM EDT

## 2022-05-29 NOTE — PROGRESS NOTES
Purposeful Rounding    Patient concerns reported:None noted    Nurse made aware:No    Patient Turned and repositioned: Independent    Patient Toileted: Continent    Fall Precautions in Place: Yellow non-skid socks on, Bed alarm on and functioning properly, Bed locked in low position, 1/2 bed rails up, Lighting appropriate, Room free of clutter and Clear path to bathroom      Electronically signed by Pat Lopez on 5/29/22 at 11:07 AM EDT

## 2022-05-29 NOTE — PLAN OF CARE
Problem: Chronic Conditions and Co-morbidities  Goal: Patient's chronic conditions and co-morbidity symptoms are monitored and maintained or improved  5/28/2022 2109 by Noemi Bernstein RN  Outcome: Progressing     Problem: Safety - Adult  Goal: Free from fall injury  5/28/2022 2109 by Noemi Bernstein RN  Outcome: Progressing  Flowsheets (Taken 5/28/2022 2108)  Free From Fall Injury: Instruct family/caregiver on patient safety    Pt has been visible on the unit. She continues to be delusional and believes that her daughter is burning in a fire. She has disrobed a couple of times but has been redirectable. She did admit to [address] but was unable to say what her hallucinations were. She was medication compliant. She accepted snacks and drinks.  Denies needs currently

## 2022-05-29 NOTE — PROGRESS NOTES
Pt alert to person and place. AVH present and voices tell her to undress and walk around naked in the hallways. Denies SI/HI. RTIS observed at times. +meds and +meals. She continues to pick at her skin and there are sores present on upper arms. Patient received shower today. Patient had visitation from both daughters today. Patient still fixated on daughter Jacek Pillai being shot or cremated a live. Patient wants to make frequent calls to her children. Will come up to the nurses station frequently.

## 2022-05-29 NOTE — FLOWSHEET NOTE
Prn atarax given at 2333.  Pt continues to be restless and frequently stands in her bathroom without clothes

## 2022-05-29 NOTE — PLAN OF CARE
Pt alert to self and place. Had 2 of her children visit her today. Patient states she is hearing a male voice telling her to remove her clothes and walk naked in the donis. Patient has to be redirected to put her clothes back on. Patient wants to use the phone a lot and continues to reference that her daughter Savita Sifuentes is getting cremated a live or shot and killed. Patient continues to pick at her skin and has sores on left upper arm and right upper arm. Pt denies SI/HI. RTIS not observed or reported. +meds, +meals. Problem: Chronic Conditions and Co-morbidities  Goal: Patient's chronic conditions and co-morbidity symptoms are monitored and maintained or improved  5/29/2022 1644 by CLIVE HARTMANN  Outcome: Not Progressing  5/29/2022 1644 by CLIVE HARTMANN  Outcome: Not Progressing     Problem: Safety - Adult  Goal: Free from fall injury  Outcome: Not Progressing     Problem: ABCDS Injury Assessment  Goal: Absence of physical injury  Outcome: Not Progressing     Problem: Anxiety  Goal: Will report anxiety at manageable levels  Description: INTERVENTIONS:  1. Administer medication as ordered  2. Teach and rehearse alternative coping skills  3. Provide emotional support with 1:1 interaction with staff  Outcome: Not Progressing     Problem: Coping  Goal: Pt/Family able to verbalize concerns and demonstrate effective coping strategies  Description: INTERVENTIONS:  1. Assist patient/family to identify coping skills, available support systems and cultural and spiritual values  2. Provide emotional support, including active listening and acknowledgement of concerns of patient and caregivers  3. Reduce environmental stimuli, as able  4. Instruct patient/family in relaxation techniques, as appropriate  5.  Assess for spiritual pain/suffering and initiate Spiritual Care, Psychosocial Clinical Specialist consults as needed  Outcome: Not Progressing     Problem: Decision Making  Goal: Pt/Family able to effectively weigh alternatives and participate in decision making related to treatment and care  Description: INTERVENTIONS:  1. Determine when there are differences between patient's view, family's view, and healthcare provider's view of condition  2. Facilitate patient and family articulation of goals for care  3. Help patient and family identify pros/cons of alternative solutions  4. Provide information as requested by patient/family  5. Respect patient/family right to receive or not to receive information  6. Serve as a liaison between patient and family and health care team  7. Initiate Consults from Ethics, Palliative Care or initiate [address] [place] [address] as is appropriate  Outcome: Not Progressing     Problem: Confusion  Goal: Confusion, delirium, dementia, or psychosis is improved or at baseline  Description: INTERVENTIONS:  1. Assess for possible contributors to thought disturbance, including medications, impaired vision or hearing, underlying metabolic abnormalities, dehydration, psychiatric diagnoses, and notify attending LIP  2. [place] high risk fall precautions, as indicated  3. Provide frequent short contacts to provide reality reorientation, refocusing and direction  4. Decrease environmental stimuli, including noise as appropriate  5. Monitor and intervene to maintain adequate nutrition, hydration, elimination, sleep and activity  6. If unable to ensure safety without constant attention obtain sitter and review sitter guidelines with assigned personnel  7.  Initiate Psychosocial CNS and Spiritual Care consult, as indicated  Outcome: Not Progressing

## 2022-05-29 NOTE — PROGRESS NOTES
Purposeful Rounding    Patient concerns reported:None noted    Nurse made aware:No    Patient Turned and repositioned: Independent    Patient Toileted: Continent    Fall Precautions in Place: Yellow non-skid socks on, Bed alarm on and functioning properly, Bed locked in low position, 1/2 bed rails up, Lighting appropriate, Room free of clutter and Clear path to bathroom      Electronically signed by Munira Nowak on 5/29/22 at 8:58 AM EDT

## 2022-05-30 PROCEDURE — 99233 SBSQ HOSP IP/OBS HIGH 50: CPT | Performed by: PSYCHIATRY & NEUROLOGY

## 2022-05-30 PROCEDURE — 6370000000 HC RX 637 (ALT 250 FOR IP)

## 2022-05-30 PROCEDURE — 6370000000 HC RX 637 (ALT 250 FOR IP): Performed by: PSYCHIATRY & NEUROLOGY

## 2022-05-30 PROCEDURE — 1240000000 HC EMOTIONAL WELLNESS R&B

## 2022-05-30 RX ADMIN — PANTOPRAZOLE SODIUM 40 MG: 40 TABLET, DELAYED RELEASE ORAL at 08:51

## 2022-05-30 RX ADMIN — RISPERIDONE 0.5 MG: 0.25 TABLET ORAL at 20:07

## 2022-05-30 RX ADMIN — METFORMIN HYDROCHLORIDE 1000 MG: 500 TABLET ORAL at 08:50

## 2022-05-30 RX ADMIN — MULTIPLE VITAMINS W/ MINERALS TAB 1 TABLET: TAB at 08:51

## 2022-05-30 RX ADMIN — LISINOPRIL AND HYDROCHLOROTHIAZIDE 1 TABLET: 12.5; 2 TABLET ORAL at 08:51

## 2022-05-30 RX ADMIN — Medication 400 UNITS: at 08:50

## 2022-05-30 RX ADMIN — METFORMIN HYDROCHLORIDE 1000 MG: 500 TABLET ORAL at 17:47

## 2022-05-30 RX ADMIN — LEVOTHYROXINE SODIUM 25 MCG: 25 TABLET ORAL at 08:51

## 2022-05-30 RX ADMIN — ATORVASTATIN CALCIUM 20 MG: 10 TABLET, FILM COATED ORAL at 08:51

## 2022-05-30 RX ADMIN — DIVALPROEX SODIUM 125 MG: 125 CAPSULE, COATED PELLETS ORAL at 14:03

## 2022-05-30 RX ADMIN — DIVALPROEX SODIUM 125 MG: 125 CAPSULE, COATED PELLETS ORAL at 08:51

## 2022-05-30 RX ADMIN — RISPERIDONE 0.5 MG: 0.25 TABLET ORAL at 08:51

## 2022-05-30 NOTE — FLOWSHEET NOTE
Senior Purposeful Rounding    Position:Repositions Self    Physical Environment:Room free from clutter, Clear path to bathroom , Adequate lighting and No safety hazards noted    Pain Rating/ Nonverbal Pain Behaviors:0    Pain interventions Attempted: n/a    Patient Toileted:Independent

## 2022-05-30 NOTE — PLAN OF CARE
Problem: Chronic Conditions and Co-morbidities  Goal: Patient's chronic conditions and co-morbidity symptoms are monitored and maintained or improved  5/29/2022 2113 by Noemi Bernstein RN  Outcome: Progressing     Problem: Anxiety  Goal: Will report anxiety at manageable levels  Description: INTERVENTIONS:  1. Administer medication as ordered  2. Teach and rehearse alternative coping skills  3. Provide emotional support with 1:1 interaction with staff  5/29/2022 2113 by Noemi Bernstein RN  Outcome: Progressing    Pt has been visible on the unit. She continues to worry over her daughter but she did deny AVH. She was medication compliant. No instances of disrobing so far this shift. She has been pleasant and cooperative with staff.  Denies needs currently

## 2022-05-30 NOTE — PLAN OF CARE
Problem: Chronic Conditions and Co-morbidities  Goal: Patient's chronic conditions and co-morbidity symptoms are monitored and maintained or improved  Outcome: Progressing     Problem: ABCDS Injury Assessment  Goal: Absence of physical injury  Outcome: Progressing     Problem: Anxiety  Goal: Will report anxiety at manageable levels  Description: INTERVENTIONS:  1. Administer medication as ordered  2. Teach and rehearse alternative coping skills  3. Provide emotional support with 1:1 interaction with staff  Outcome: Progressing  Pt. Stating she is worried about her daughter being on fire, redirected to wear clothes while out on the unit. Denies all. Still very paranoid.

## 2022-05-30 NOTE — PROGRESS NOTES
Department of Psychiatry  Progress Note    Patient's chart was reviewed. Discussed with treatment team. Met with patient. SUBJECTIVE:      Elevated. Bright affect. \"God tells me to walk naked in the hallway! \"     Encouraged her to practice walking clothed. Continues to come out of her room naked. ROS:   Patient has new complaints: no  Sleeping adequately:  Yes   Appetite adequate: Yes  Engaged in programming: No:    OBJECTIVE:  VITALS:  /69   Pulse 93   Temp 98.1 °F (36.7 °C) (Oral)   Resp 16   Ht 4' 11\" (1.499 m)   Wt 142 lb (64.4 kg)   SpO2 96%   BMI 28.68 kg/m²     Mental Status Examination:    Appearance: fair grooming and hygiene  Behavior/Attitude toward examiner:  cooperative, attentive and fair eye contact  Speech: Normal rate, volume, amount  Mood:  \"good\"  Affect:  elevated    Thought processes:  impaired  Thought Content: no SI, no HI, delusional   Perceptions: +AH  Attention: impaired  Abstraction: impaired  Cognition:  Impaired  Insight: impaired  Judgment: impaired    Medication:  Scheduled:   risperiDONE  0.5 mg Oral BID    divalproex  125 mg Oral BID    multivitamin  1 tablet Oral Daily    vitamin E  400 Units Oral Daily    atorvastatin  20 mg Oral Daily    fluticasone  1 spray Each Nostril Daily    lisinopril-hydroCHLOROthiazide  1 tablet Oral Daily    levothyroxine  25 mcg Oral Daily    metFORMIN  1,000 mg Oral BID WC    pantoprazole  40 mg Oral Daily        PRN:  acetaminophen, ibuprofen, magnesium hydroxide, nicotine polacrilex, aluminum & magnesium hydroxide-simethicone, hydrOXYzine, OLANZapine **OR** OLANZapine (ZyPREXA) in sterile water IntraMUSCular, sterile water, diphenhydrAMINE, traZODone     ASSESSMENT AND PLAN:    Principal Problem:    Dementia with behavioral disturbance (HCC)  Active Problems:    Encounter for routine adult medical examination    Psychosis ([facility] [address]    Hyperlipidemia  Resolved Problems:    * No resolved hospital problems.  * 1.Patient s symptoms   show no change  2. Probable discharge is undetermined  3. Discharge planning is incomplete  4. Suicidal ideation is absent    Total time with patient was 35 minutes and more than 50 % of that time was spent counseling the patient on their symptoms, treatment, and expected goals.        Sarwat Uriostegui MD

## 2022-05-30 NOTE — FLOWSHEET NOTE
Senior Purposeful Rounding     Position:Repositions Self     Physical Environment:Room free from clutter, Clear path to bathroom , Adequate lighting and No safety hazards noted     Pain Rating/ Nonverbal Pain Behaviors:0     Pain interventions Attempted:n/a     Patient Toileted:Independent

## 2022-05-30 NOTE — PROGRESS NOTES
Behavioral Services                                              Medicare Re-Certification    I certify that the inpatient psychiatric hospital services furnished since the previous certification/re-certification were, and continue to be, medically necessary for;    [x] (1) Treatment which could reasonably be expected to improve the patient's condition,    [x] (2) Or for diagnostic study. Estimated length of stay/service 2-3 d    Plan for post-hospital care outpt    This patient continues to need, on a daily basis, active treatment furnished directly by or requiring the supervision of inpatient psychiatric personnel.     Electronically signed by Shelton Qureshi MD on 5/30/2022 at 11:47 AM

## 2022-05-30 NOTE — PROGRESS NOTES
Department of Psychiatry  AttendingProgress Note  Chief Complaint: behavioral disturbances   Maninder Sloan reports that she is still hearing voices. It is a carmencita voice. She heard the voice this morning and it told her to [facility] naked. \" While I was talking with her she said she was \"hearing gun shots being fired. \" Maninder Sloan still reports that she needs to [place] her daughter because she is in danger. \" She said her daughters, Norman Ruiz and Sima Gilmore, came to visit this past weekend and she was happy to see them and to know they are safe. She reports no issues with sleeping and no issues with appetite. Check Valproate level in the AM  Patient's chart was reviewed and collaborated with  about the treatment plan. SUBJECTIVE:    Patient is feeling unchanged. Suicidal ideation:  denies suicidal ideation. Patient does not have medication side effects. ROS: Patient has new complaints: no  Sleeping adequately:  Yes   Appetite adequate: Yes  Attending groups: Yes  Visitors:Yes    OBJECTIVE    Physical  VITALS:  BP (!) 149/79   Pulse 90   Temp 98.1 °F (36.7 °C) (Oral)   Resp 16   Ht 4' 11\" (1.499 m)   Wt 142 lb (64.4 kg)   SpO2 97%   BMI 28.68 kg/m²     Mental Status Examination:  Patients appearance was street clothes. Thoughts are Unusual fears and Illogical. Homicidal ideations none. No abnormal movements, tics or mannerisms. Memory intact A[place] 0. Concentration Fair. Alert and oriented X 4. Insight and Judgement impaired insight. Patient was cooperative. Patient gait not assessed.  Mood anxious, affect normal affect Hallucinations Present - auditory hallucinations, suicidal ideations no specific plan to harm self Speech normal pitch and normal volume  Data  Labs:   Admission on 05/23/2022   Component Date Value Ref Range Status    TSH 05/24/2022 2.38  0.27 - 4.20 uIU/mL Final    Cholesterol, Total 05/25/2022 150  0 - 199 mg/dL Final    Triglycerides 05/25/2022 198* 0 - 150 mg/dL Final    HDL 05/25/2022 46 40 - 60 mg/dL Final    LDL Calculated 05/25/2022 64  <100 mg/dL Final    VLDL Cholesterol Calculated 05/25/2022 40  Not Established mg/dL Final    Hemoglobin A1C 05/25/2022 7.4  See comment % Final    Comment: Comment:  Diagnosis of Diabetes: > or = 6.5%  Increased risk of diabetes (Prediabetes): 5.7-6.4%  Glycemic Control: Nonpregnant Adults: <7.0%                    Pregnant: <6.0%        eAG 05/25/2022 165.7  mg/dL Final    Color, UA 05/27/2022 Yellow  Straw/Yellow Final    Clarity, UA 05/27/2022 Clear  Clear Final    Glucose, Ur 05/27/2022 Negative  Negative mg/dL Final    Bilirubin Urine 05/27/2022 Negative  Negative Final    Ketones, Urine 05/27/2022 Negative  Negative mg/dL Final    Specific Gravity, UA 05/27/2022 1.010  1.005 - 1.030 Final    Blood, Urine 05/27/2022 TRACE-INTACT* Negative Final    pH, UA 05/27/2022 6.0  5.0 - 8.0 Final    Protein, UA 05/27/2022 Negative  Negative mg/dL Final    Urobilinogen, Urine 05/27/2022 0.2  <2.0 E.U./dL Final    Nitrite, Urine 05/27/2022 Negative  Negative Final    Leukocyte Esterase, Urine 05/27/2022 SMALL* Negative Final    Microscopic Examination 05/27/2022 YES   Final    Urine Type 05/27/2022 NotGiven   Final    Urine received in a container without preservatives.     Urine Reflex to Culture 05/27/2022 Not Indicated   Final    WBC, UA 05/27/2022 3-5  0 - 5 /HPF Final    RBC, UA 05/27/2022 3-4  0 - 4 /HPF Final    Epithelial Cells, UA 05/27/2022 0-1  0 - 5 /HPF Final            Medications  Current Facility-Administered Medications: risperiDONE (RISPERDAL) tablet 0.5 mg, 0.5 mg, Oral, BID  divalproex (DEPAKOTE SPRINKLE) capsule 125 mg, 125 mg, Oral, BID  therapeutic multivitamin-minerals 1 tablet, 1 tablet, Oral, Daily  vitamin E capsule 400 Units, 400 Units, Oral, Daily  acetaminophen (TYLENOL) tablet 650 mg, 650 mg, Oral, Q4H PRN  ibuprofen (ADVIL;MOTRIN) tablet 400 mg, 400 mg, Oral, Q6H PRN  magnesium hydroxide (MILK OF MAGNESIA) 400 MG/5ML suspension 30 mL, 30 mL, Oral, Daily PRN  nicotine polacrilex (COMMIT) lozenge 2 mg, 2 mg, Oral, Q1H PRN  aluminum & magnesium hydroxide-simethicone (MAALOX) 200-200-20 MG/5ML suspension 30 mL, 30 mL, Oral, Q6H PRN  hydrOXYzine (ATARAX) tablet 50 mg, 50 mg, Oral, TID PRN  OLANZapine (ZYPREXA) tablet 5 mg, 5 mg, Oral, Q8H PRN **OR** OLANZapine (ZYPREXA) 5 mg in sterile water 1 mL injection, 5 mg, IntraMUSCular, Q8H PRN  sterile water injection 2.1 mL, 2.1 mL, IntraMUSCular, Q4H PRN  diphenhydrAMINE (BENADRYL) injection 50 mg, 50 mg, IntraMUSCular, Q4H PRN  traZODone (DESYREL) tablet 50 mg, 50 mg, Oral, Nightly PRN  atorvastatin (LIPITOR) tablet 20 mg, 20 mg, Oral, Daily  fluticasone (FLONASE) 50 MCG/ACT nasal spray 1 spray, 1 spray, Each Nostril, Daily  lisinopril-hydroCHLOROthiazide (PRINZIDE;ZESTORETIC) 20-12.5 MG per tablet 1 tablet, 1 tablet, Oral, Daily  levothyroxine (SYNTHROID) tablet 25 mcg, 25 mcg, Oral, Daily  metFORMIN (GLUCOPHAGE) tablet 1,000 mg, 1,000 mg, Oral, BID WC  pantoprazole (PROTONIX) tablet 40 mg, 40 mg, Oral, Daily    ASSESSMENT AND PLAN    Principal Problem:    Dementia with behavioral disturbance (HCC)  Active Problems:    Encounter for routine adult medical examination    Psychosis ([facility] [address]    Hyperlipidemia  Resolved Problems:    * No resolved hospital problems. *       1. Patient s symptoms   show no change  2. Probable discharge is 2-3 days  3. Discharge planning is incomplete  4. Suicidal ideation is none  5. Total time with patient was 40 minutes and more than 50 % of that time was spent counseling the patient on their symptoms, treatment and expected goals. Addendum to PA student note:  Pt seen, examined, and evaluated with PA student , who acted as my scribe for the above documentation. I have reviewed the current history, physical findings, labs, assessment and plan; and agree with note as documented.

## 2022-05-31 ENCOUNTER — APPOINTMENT (OUTPATIENT)
Dept: GENERAL RADIOLOGY | Age: 77
DRG: 884 | End: 2022-05-31
Attending: PSYCHIATRY & NEUROLOGY
Payer: MEDICARE

## 2022-05-31 LAB — VALPROIC ACID LEVEL: 13.8 UG/ML (ref 50–100)

## 2022-05-31 PROCEDURE — 6370000000 HC RX 637 (ALT 250 FOR IP): Performed by: PSYCHIATRY & NEUROLOGY

## 2022-05-31 PROCEDURE — 73630 X-RAY EXAM OF FOOT: CPT

## 2022-05-31 PROCEDURE — 97535 SELF CARE MNGMENT TRAINING: CPT

## 2022-05-31 PROCEDURE — 80164 ASSAY DIPROPYLACETIC ACD TOT: CPT

## 2022-05-31 PROCEDURE — 36415 COLL VENOUS BLD VENIPUNCTURE: CPT

## 2022-05-31 PROCEDURE — 1240000000 HC EMOTIONAL WELLNESS R&B

## 2022-05-31 PROCEDURE — 6370000000 HC RX 637 (ALT 250 FOR IP)

## 2022-05-31 PROCEDURE — 99239 HOSP IP/OBS DSCHRG MGMT >30: CPT | Performed by: PSYCHIATRY & NEUROLOGY

## 2022-05-31 PROCEDURE — 6370000000 HC RX 637 (ALT 250 FOR IP): Performed by: NURSE PRACTITIONER

## 2022-05-31 RX ORDER — DIVALPROEX SODIUM 125 MG/1
125 CAPSULE, COATED PELLETS ORAL ONCE
Status: COMPLETED | OUTPATIENT
Start: 2022-05-31 | End: 2022-05-31

## 2022-05-31 RX ORDER — DIVALPROEX SODIUM 125 MG/1
250 CAPSULE, COATED PELLETS ORAL 2 TIMES DAILY
Status: DISCONTINUED | OUTPATIENT
Start: 2022-05-31 | End: 2022-06-08

## 2022-05-31 RX ORDER — RISPERIDONE 1 MG/1
1 TABLET, FILM COATED ORAL 2 TIMES DAILY
Status: DISCONTINUED | OUTPATIENT
Start: 2022-05-31 | End: 2022-06-01

## 2022-05-31 RX ADMIN — RISPERIDONE 0.5 MG: 0.25 TABLET ORAL at 08:34

## 2022-05-31 RX ADMIN — Medication 400 UNITS: at 08:35

## 2022-05-31 RX ADMIN — METFORMIN HYDROCHLORIDE 1000 MG: 500 TABLET ORAL at 17:50

## 2022-05-31 RX ADMIN — LISINOPRIL AND HYDROCHLOROTHIAZIDE 1 TABLET: 12.5; 2 TABLET ORAL at 08:35

## 2022-05-31 RX ADMIN — DIVALPROEX SODIUM 125 MG: 125 CAPSULE, COATED PELLETS ORAL at 08:34

## 2022-05-31 RX ADMIN — ACETAMINOPHEN 650 MG: 325 TABLET ORAL at 09:02

## 2022-05-31 RX ADMIN — DIVALPROEX SODIUM 250 MG: 125 CAPSULE, COATED PELLETS ORAL at 17:50

## 2022-05-31 RX ADMIN — RISPERIDONE 1 MG: 1 TABLET ORAL at 20:58

## 2022-05-31 RX ADMIN — ATORVASTATIN CALCIUM 20 MG: 10 TABLET, FILM COATED ORAL at 08:35

## 2022-05-31 RX ADMIN — PANTOPRAZOLE SODIUM 40 MG: 40 TABLET, DELAYED RELEASE ORAL at 08:34

## 2022-05-31 RX ADMIN — DIVALPROEX SODIUM 125 MG: 125 CAPSULE, COATED PELLETS ORAL at 11:16

## 2022-05-31 RX ADMIN — DICLOFENAC SODIUM 2 G: 10 GEL TOPICAL at 20:59

## 2022-05-31 RX ADMIN — METFORMIN HYDROCHLORIDE 1000 MG: 500 TABLET ORAL at 08:35

## 2022-05-31 RX ADMIN — LEVOTHYROXINE SODIUM 25 MCG: 25 TABLET ORAL at 08:34

## 2022-05-31 RX ADMIN — MULTIPLE VITAMINS W/ MINERALS TAB 1 TABLET: TAB at 08:34

## 2022-05-31 ASSESSMENT — PAIN DESCRIPTION - LOCATION
LOCATION: FOOT
LOCATION: FOOT

## 2022-05-31 ASSESSMENT — PAIN DESCRIPTION - DESCRIPTORS
DESCRIPTORS: ACHING
DESCRIPTORS: SORE;TIGHTNESS

## 2022-05-31 ASSESSMENT — PAIN SCALES - GENERAL
PAINLEVEL_OUTOF10: 0
PAINLEVEL_OUTOF10: 2
PAINLEVEL_OUTOF10: 6

## 2022-05-31 ASSESSMENT — PAIN - FUNCTIONAL ASSESSMENT: PAIN_FUNCTIONAL_ASSESSMENT: ACTIVITIES ARE NOT PREVENTED

## 2022-05-31 ASSESSMENT — PAIN DESCRIPTION - FREQUENCY: FREQUENCY: INTERMITTENT

## 2022-05-31 ASSESSMENT — PAIN DESCRIPTION - PAIN TYPE: TYPE: ACUTE PAIN

## 2022-05-31 ASSESSMENT — PAIN DESCRIPTION - ORIENTATION
ORIENTATION: LEFT
ORIENTATION: LEFT

## 2022-05-31 ASSESSMENT — PAIN DESCRIPTION - ONSET: ONSET: ON-GOING

## 2022-05-31 NOTE — PROGRESS NOTES
Pt c/o 6/10 pain on top of left foot this morning. Pt reports that she woke up w/ the top of her foot feeling stiff and sore. Pt appears to be limping somewhat on the left side. Given tylenol @ 0902 for pain. This was somewhat effective. Upon assessment, pt left foot is somewhat swollen and discolored. Medical notified regarding pt's left foot. Voltaren gel and xrays ordered. Will continue to monitor.

## 2022-05-31 NOTE — GROUP NOTE
Group Therapy Note    Date: 5/31/2022    Group Start Time: 1000  Group End Time: 5956  Group Topic: Recreational    [address] [facility],[address], [place]        Group Therapy Note    Attendees: 4    Participants went outside on the patio to enjoy the sun and fresh air while playing cornhole and listening to music. Notes: Donnie Ca attended group and was engaged in playing cornhole while interacting with peers. She appeared to enjoy the fresh air and sunshine.      Status After Intervention:  Improved    Participation Level: Interactive    Participation Quality: Appropriate and Attentive      Speech:  normal      Thought Process/Content: Logical      Affective Functioning: Congruent      Mood: euthymic      Level of consciousness:  Alert and Attentive      Response to Learning: Progressing to goal      Endings: None Reported    Modes of Intervention: Socialization and Activity      Discipline Responsible: /Counselor      Signature:  [place] Kuhn

## 2022-05-31 NOTE — PROGRESS NOTES
Inpatient Occupational Therapy Treatment Note    Unit:  [facility]  Date:  5/31/2022  Patient Name:    Nash Watson  Admitting diagnosis:  Dementia with behavioral disturbance [facility]) [F03.91]  Admit Date:  5/23/2022  Precautions/Restrictions/WB Status/ Lines/ Wounds/ Oxygen:  Standard BHI Precautions  History of Present Illness:  Per 5/23/22 ED note, pt arrived from [facility] ED for worsening dementia and wandering down the street naked looking for Jehovah. She came with no belongings. Per transport, belongings were sent home with her daughter from [facility]. She is alert and oriented to person, place, month and year currently but is forgetful. She is pleasant and cooperative but anxious. She is preoccupied and endorses audio command hallucinations that told her to walk down the street naked. States \"the devil was pretending to be Jehovah. \" She denies SI/HI. Her daughter Cristian Cuellar is POA.       Treatment Number:  2    Treatment Time: 952-376  Timed Code Treatment Minutes:   23  minutes   Total Treatment Time:  23    minutes    Staff Recommendations:    Supervision with ambulation on unit     Discharge Recommendations: Home with 24/7 supervision due to hallucinations    DME needs for discharge:  Needs Met    AM-PAC Score: AM-PAC Inpatient Daily Activity Raw Score: 23   Home Health S4 Level: NA     MOCA:  Performed 5/25/22    Education Level HS+      Total Score     22/30       Subjective:  \"I need to take my clothes off to save my daughter. \" Pt was reassured daughter was safe and that taking her clothes off would not have any affect on that. ADLs:  Sleep Hygiene:  Issued sleep hygiene handout to pt and reviewed. Pt found several techniques helpful, including staying out of bed unless going to sleep, and avoiding napping. Leisure Interests/Coping skills:  Pt performed the Interest Checklist today and found activities in various areas in which she is interested.  A copy of his list of activities was given to pt.  Discussed how these activities can be used as coping skills. Pt was able to develop a short list of 3 activities that could also be used as coping skills, including fashion, board games and television. Several other activities could be easily performed, including reading (including the bible), and listening to music. Self Care: Toileting: NT  Grooming: NT  Dressing: NT    Pain   No  Rating:NA  Location: NA  Pain Medicine Status: No request made      Cognition    A&O to Person and orientation not directly assessed. Able to follow:  2 step commands   Slow to respond at times    Balance:     WFLs    Bed mobility:  Not tested    Transfer Training:   Sit to stand:   Independent  Stand to sit: Independent  Bed to Chair:  Not Tested  Standard toilet:   Not Tested    Activity Tolerance   Pt completed therapy session with No adverse symptoms noted w/activity    Therapeutic Exercise: NA    Patient Education:   Role of OT, Sleep hygiene, Coping skills and Recommendations for DC    Positioning Needs: In common room with needs met    Family Present:  No    Assessment: Pt tolerated interest checklist well as well as sleep hygiene discussion. Interest checklist was performed to facilitate understanding of coping skills/strategies. Pt should continue to benefit from skilled OT tx to maximize independence so that she may eventually return home with the least amount of assistance. GOALS  To be met in 3 Visits:  1). Pt. To complete interest check list.  MET 5/31/22  2). Pt will participate in [facility] [place] [facility] assessment. -MET 5/24/22     To be met in 5 Visits:  1). Pt. To identify 2 memory strategies to take medications as prescribed. 2). Pt will verbalize 3 new coping skills. MET 5/31/22  3). Pt.  To verbalize understanding of sleep hygiene education/handouts.         Plan: cont with [facility] MS, OTR/L  #[id number]        If patient discharges from this facility prior to next visit, this note will serve as the Discharge Summary

## 2022-05-31 NOTE — FLOWSHEET NOTE
05/31/22 1437   Encounter Summary   Encounter Overview/Reason  Initial Encounter   Service Provided For: Patient   Referral/Consult From: [facility] Family members   Last Encounter    (5/31 initial, listened and sppt, played Ricardo)   Complexity of Encounter Moderate   Begin Time 1345   End Time  1435   Total Time Calculated 50 min

## 2022-05-31 NOTE — FLOWSHEET NOTE
[address]   OPERATIVE REPORT    Name:  Shiv Sullivan  MR#:  [id number]  :  1941  ACCOUNT #:  [de-identified]  DATE OF SERVICE:  04/15/2019      PREOPERATIVE DIAGNOSIS:  Recurrent left inguinal hernia. POSTOPERATIVE DIAGNOSIS:  Recurrent left inguinal hernia. PROCEDURE PERFORMED:  Open left inguinal hernia repair with mesh. ATTENDING SURGEON:  Jennifer Stephenson MD    ASSISTANT:  Susan Guerin SA    ANESTHESIA:  General endotracheal.    COMPLICATIONS:  None. SPECIMENS REMOVED:  Hernia sac. IMPLANTS:  Extended Prolene hernia system mesh. ESTIMATED BLOOD LOSS:  50 mL. DRAIN:  None. SPONGE COUNT:  Correct. NEEDLE COUNT:  Correct. INDICATIONS:  The patient is a 70-year-old white male with a history of prior prostatectomy, who developed a left groin bulge. He was diagnosed with a left inguinal hernia and underwent robotic-assisted laparoscopic repair in 2019. Three weeks postoperatively, he noticed a left groin bulge, which enlarged and became tender last week. He presented to [facility] Emergency Department and was found to have an incarcerated recurrent hernia. This was reduced, but his white blood cell count remained elevated. He underwent diagnostic laparoscopy 3 days ago, with sampling of peritoneal fluid and inspection of the left groin and colon. The colon was noted to be bruised, but viable. Over the weekend, white blood cell count has normalized, and fluid has come back negative for growth on all cultures. He is taken to the operating today for open mesh repair. FINDINGS:  1. Recurrent indirect left inguinal hernia. 2.  Satisfactory mesh repair using Hari technique. PROCEDURE:  The patient was identified as the correct patient in the preoperative holding area and informed consent was confirmed.   After answering the patient's remaining questions and performing site verification, he was taken to the operating room and Senior Purposeful Rounding     Position:Repositions Self     Physical Environment:Room free from clutter, Clear path to bathroom , Adequate lighting and No safety hazards noted     Pain Rating/ Nonverbal Pain Behaviors:denies;      Pain interventions Attempted: none     Patient Toileted:Independent    placed on the operating room table in the supine position. Sequential compression devices were placed on both lower extremities. Following the uneventful initiation of general anesthesia, he was carefully secured to the operating room table with safety strap in place. All potential pressure points were padded with egg crate. His left groin was prepped and draped in usual sterile fashion. Final time-out was performed, it was confirmed he had received intravenous antibiotics. A 6-cm left groin incision was made. The dissection was carried through the subcutaneous fatty tissue, through Keri's fascia, down to the external oblique aponeurosis. The external inguinal ring was identified. External oblique aponeurosis was incised in the direction of its fibers, opening this layer from the external inguinal ring laterally towards the left anterior superior iliac spine. The ilioinguinal nerve was identified. It was freed from surrounding structures and ligated and divided between 3-0 Vicryl sutures. The spermatic cord was mobilized at the level of the pubic tubercle, and a Penrose drain was placed around the structure. Cremasteric fibers were divided using electrocautery, followed by reduction and excision of 2 cord lipomas. This exposed the recurrent indirect hernia sac, which was freed from cord structures to the level of the peritoneal reflection. The hernia sac was divided at the level of the pubic tubercle, leading the distal edge in situ. The proximal end was twisted upon itself and controlled with a transfixing 2-0 silk suture. Redundant hernia sac was then excised and sent to pathology for review. An extended Prolene hernia system mesh was opened on the back table. It was fashioned for [facility] repair with removal of the underlay. The mesh was secured medially to the pubic tubercle area and along the transversus abdominis and internal oblique musculature using 0 Prolene sutures.   The mesh was also secured to the shelving edge of the inguinal ligament using identical technique. A slit was cut in the mesh for recreation of the internal inguinal ring, also utilizing 0 Prolene sutures. The wound was irrigated with sterile saline. After confirming adequate hemostasis, the external oblique aponeurosis was reapproximated with a running 3-0 Vicryl suture. Keri's fascia was reapproximated with interrupted 3-0 Vicryl sutures. Skin edges were reapproximated with a combination of subcuticular 4-0 Monocryl suture and Dermabond. The patient tolerated the procedure well. He was extubated in the operating room and transported to the recovery area in stable condition. The attending surgeon, Dr. tIzel Rios, was scrubbed and present for the entire procedure.         MD LAMONT [email or web address]  D:  04/15/2019 19:01  T:  04/15/2019 19:05  JOB #:  [id number]
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
## 2022-05-31 NOTE — PLAN OF CARE
Pt is A+Ox3, calm, pleasant, cooperative and medication compliant. Pt denies SI/HI/AVH and no RTIS observed. Pt denies any other needs at this time.
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
## 2022-05-31 NOTE — PLAN OF CARE
[facility] Interdisciplinary Treatment Plan Note     Review Date & Time: 5/31/22 0945    Patient was not in treatment team.    Estimated Length of Stay Update:  2-3 days   Estimated Discharge Date Update: 6/2/22-6/3/22    EDUCATION:   Learner Progress Toward Treatment Goals: Reviewed results and recommendations of this team    Method: Individual    Outcome: Verbalized understanding    PATIENT GOALS: none expressed    PLAN/TREATMENT RECOMMENDATIONS UPDATE: continue treatment    GOALS UPDATE:  Time frame for Short-Term Goals: 2 days      Edilberto Miramontes RN

## 2022-06-01 PROCEDURE — 99233 SBSQ HOSP IP/OBS HIGH 50: CPT | Performed by: PSYCHIATRY & NEUROLOGY

## 2022-06-01 PROCEDURE — 6370000000 HC RX 637 (ALT 250 FOR IP): Performed by: PSYCHIATRY & NEUROLOGY

## 2022-06-01 PROCEDURE — 6370000000 HC RX 637 (ALT 250 FOR IP)

## 2022-06-01 PROCEDURE — 1240000000 HC EMOTIONAL WELLNESS R&B

## 2022-06-01 PROCEDURE — 99232 SBSQ HOSP IP/OBS MODERATE 35: CPT | Performed by: NURSE PRACTITIONER

## 2022-06-01 PROCEDURE — 6370000000 HC RX 637 (ALT 250 FOR IP): Performed by: NURSE PRACTITIONER

## 2022-06-01 RX ORDER — FLUVOXAMINE MALEATE 50 MG/1
25 TABLET, COATED ORAL NIGHTLY
Status: DISCONTINUED | OUTPATIENT
Start: 2022-06-01 | End: 2022-06-07

## 2022-06-01 RX ORDER — PERPHENAZINE 2 MG/1
2 TABLET, FILM COATED ORAL 2 TIMES DAILY WITH MEALS
Status: DISCONTINUED | OUTPATIENT
Start: 2022-06-01 | End: 2022-06-08

## 2022-06-01 RX ORDER — DIAPER,BRIEF,INFANT-TODD,DISP
EACH MISCELLANEOUS 2 TIMES DAILY
Status: DISCONTINUED | OUTPATIENT
Start: 2022-06-01 | End: 2022-06-13

## 2022-06-01 RX ORDER — DIMETHICONE, OXYBENZONE, AND PADIMATE O 2; 2.5; 6.6 G/100G; G/100G; G/100G
STICK TOPICAL PRN
Status: DISCONTINUED | OUTPATIENT
Start: 2022-06-01 | End: 2022-06-17 | Stop reason: HOSPADM

## 2022-06-01 RX ADMIN — DICLOFENAC SODIUM 2 G: 10 GEL TOPICAL at 20:48

## 2022-06-01 RX ADMIN — DIVALPROEX SODIUM 250 MG: 125 CAPSULE, COATED PELLETS ORAL at 14:43

## 2022-06-01 RX ADMIN — Medication 400 UNITS: at 09:02

## 2022-06-01 RX ADMIN — METFORMIN HYDROCHLORIDE 1000 MG: 500 TABLET ORAL at 08:52

## 2022-06-01 RX ADMIN — FLUVOXAMINE MALEATE 25 MG: 50 TABLET, COATED ORAL at 20:47

## 2022-06-01 RX ADMIN — HYDROXYZINE HYDROCHLORIDE 50 MG: 50 TABLET, FILM COATED ORAL at 08:52

## 2022-06-01 RX ADMIN — MULTIPLE VITAMINS W/ MINERALS TAB 1 TABLET: TAB at 08:52

## 2022-06-01 RX ADMIN — ATORVASTATIN CALCIUM 20 MG: 10 TABLET, FILM COATED ORAL at 09:01

## 2022-06-01 RX ADMIN — PANTOPRAZOLE SODIUM 40 MG: 40 TABLET, DELAYED RELEASE ORAL at 08:52

## 2022-06-01 RX ADMIN — LISINOPRIL AND HYDROCHLOROTHIAZIDE 1 TABLET: 12.5; 2 TABLET ORAL at 08:52

## 2022-06-01 RX ADMIN — METFORMIN HYDROCHLORIDE 1000 MG: 500 TABLET ORAL at 18:29

## 2022-06-01 RX ADMIN — IBUPROFEN 400 MG: 400 TABLET, FILM COATED ORAL at 08:52

## 2022-06-01 RX ADMIN — PERPHENAZINE 2 MG: 2 TABLET, FILM COATED ORAL at 18:31

## 2022-06-01 RX ADMIN — OLANZAPINE 5 MG: 5 TABLET, FILM COATED ORAL at 08:52

## 2022-06-01 RX ADMIN — Medication: at 21:02

## 2022-06-01 RX ADMIN — HYDROCORTISONE: 10 CREAM TOPICAL at 20:48

## 2022-06-01 RX ADMIN — RISPERIDONE 1 MG: 1 TABLET ORAL at 08:52

## 2022-06-01 RX ADMIN — LEVOTHYROXINE SODIUM 25 MCG: 25 TABLET ORAL at 08:52

## 2022-06-01 RX ADMIN — DIVALPROEX SODIUM 250 MG: 125 CAPSULE, COATED PELLETS ORAL at 08:52

## 2022-06-01 ASSESSMENT — PAIN SCALES - GENERAL
PAINLEVEL_OUTOF10: 1
PAINLEVEL_OUTOF10: 0

## 2022-06-01 ASSESSMENT — PAIN DESCRIPTION - LOCATION: LOCATION: FOOT

## 2022-06-01 NOTE — PROGRESS NOTES
Progress Note    Admit Date:  5/23/2022    Subjective:  Ms. Alo Cosme seen sitting at table. She has areas that are pruritic. The left shoulder bothers her the most.  She also had complained of pain to the top of her left foot. Objective:   Vitals:    06/01/22 0743   BP: (!) 142/76   Pulse: 75   Resp: 16   Temp: 97.9 °F (36.6 °C)   SpO2: 96%            Intake/Output Summary (Last 24 hours) at 6/1/2022 1405  Last data filed at 5/31/2022 1705  Gross per 24 hour   Intake 360 ml   Output --   Net 360 ml       Physical Exam:    Gen: No distress. Alert. Eyes: PERRL. No sclera icterus. No conjunctival injection. ENT: No discharge. Pharynx clear. Neck: No JVD. Trachea midline. Resp: No accessory muscle use. No crackles. No wheezes. No rhonchi. CV: Regular rate. Regular rhythm. No murmur. No rub. No edema. GI: Non-tender. Non-distended. Normal bowel sounds. Skin: Warm and dry. Urticarial rash left shoulder  M/S: No cyanosis. No joint deformity. No clubbing. Trace swelling left foot. Neuro: Awake. No focal neurologic deficit on exam.  Cranial nerves II through XII intact. Patient is able to ambulate without difficulty. Psych: Per psychiatry team evaluation     Data:  CBC: No results for input(s): WBC, HGB, HCT, MCV, PLT in the last 72 hours. BMP: No results for input(s): NA, K, CL, CO2, PHOS, BUN, CREATININE, CA in the last 72 hours. LIVER PROFILE: No results for input(s): AST, ALT, LIPASE, BILIDIR, BILITOT, ALKPHOS in the last 72 hours. Invalid input(s): AMYLASE,  ALB  PT/INR: No results for input(s): PROTIME, INR in the last 72 hours. CULTURES  Results for Deejay Boyer (MRN [id number]) as of 5/24/2022 09:22    Ref. Range 5/23/2022 16:13   SARS-CoV-2, NAAT Latest Ref Range: Not Detected  Not Detected      Results for Deejay Boyer (MRN [id number]) as of 5/24/2022 09:22    Ref.  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 CT HEAD WO CONTRAST   Final Result   1. No acute intracranial abnormality. Assessment/Plan:  #Dementia with behavioral disturbance  - per psychiatry team     #DMII  -BG controlled  -Continue metformin  -Consider SSI  -Monitor BG for now     #HTN  -BP stable  -Continue lisinopril-hctz  -Monitor     #HLD  -Continue statin     #GERD  -Continue PPI     #Hypothyroidism  -Continue synthroid    #Urticarial Rash left shoulder  -Cortisone cream ordered    #Left foot pain/swelling  -to anterior portion of foot  -X-ray    Diet: ADULT DIET;  Regular  Code Status: Full Code    Nnamdi Lizarraga [employer]  6/1/2022

## 2022-06-01 NOTE — GROUP NOTE
Group Therapy Note    Date: 6/1/2022    Group Start Time: 1000  Group End Time: 3581  Group Topic: Recreational    [address] [facility],[address], [place]        Group Therapy Note    Attendees: 4    Participants created a flower craft with construction paper and glue. They socialized with one another and this clinician during group. Notes: [facility] attended group and was engaged in making a flower craft. Albertina Ryan needed no assistance with making the craft other than instructions. She was smiling and appeared to be enjoying herself.      Status After Intervention:  Improved    Participation Level: Interactive    Participation Quality: Appropriate, Attentive and Sharing      Speech:  normal      Thought Process/Content: Logical      Affective Functioning: Congruent      Mood: euthymic      Level of consciousness:  Alert, Oriented x4 and Attentive      Response to Learning: Able to verbalize/acknowledge new learning and Progressing to goal      Endings: None Reported    Modes of Intervention: Socialization and Activity      Discipline Responsible: /Counselor      Signature:  [address] Kimberley Mijares, YVONNE

## 2022-06-01 NOTE — FLOWSHEET NOTE
Senior Purposeful Rounding    Position:Repositions Self    Physical Environment:Room free from clutter, Clear path to bathroom , Adequate lighting and No safety hazards noted    Pain Rating/ Nonverbal Pain Behaviors:2 left foot pain    Pain interventions Attempted: scheduled voltaren gel given    Patient Toileted:Independent
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
## 2022-06-01 NOTE — GROUP NOTE
Group Therapy Note    Date: 6/1/2022    Group Start Time: 1300  Group End Time: 0583  Group Topic: Psychoeducation    [facility]    ANGE Wright        Group Therapy Note  Clinician met with the group members and introduced the zones of regulation. They used bingo dabbers to put the feelings in the 4 quarters, red, yellow, blue and green. They created a colorful picture of balanced/regulated emotions. The clinician likened it to a balanced nutritional diet. The group members continued using the dabbers and creating pictures after the group ended. Attendees: 4       Patient's Goal:      Notes:  Patient was cooperative and fully engaged in the discussion and activity. She was able to match the feelings to the correct color throughout the group. She continued creating pictures after the group ended. Status After Intervention:  Improved    Participation Level:  Active Listener and Interactive    Participation Quality: Appropriate, Attentive, Sharing and Supportive      Speech:  normal      Thought Process/Content: Logical      Affective Functioning: Congruent      Mood: euthymic      Level of consciousness:  Alert      Response to Learning: Able to verbalize/acknowledge new learning      Endings: None Reported    Modes of Intervention: Education, Support and Activity      Discipline Responsible: /Counselor      Signature:  ANGE Wright
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
## 2022-06-01 NOTE — GROUP NOTE
Group Therapy Note    Date: 6/1/2022    Group Start Time: 1100  Group End Time: 6470  Group Topic: Cognitive Skills    [facility]        Group Therapy Note    [place] - \"Deep Thinking\" Discussion cards    Attendees: 4         Notes:  Clarisse Sharpe was present and engaged across session with consistently positive interactions with peers. Due to limited insight with open-ended questions, facilitator simplified questions to \"either/or\" \"yes/no\" format with increased appropriate response. Status After Intervention:  Improved    Participation Level:  Active Listener and Interactive    Participation Quality: Appropriate, Attentive and Sharing      Speech:  normal      Thought Process/Content: Disorganized      Affective Functioning: Congruent      Mood: euthymic      Level of consciousness:  Alert and Attentive      Response to Learning: Capable of insight and Progressing to goal      Endings: None Reported    Modes of Intervention: Support, Socialization, Exploration, Activity and Reality-testing      Discipline Responsible: Psychoeducational Specialist      Signature:  José Manuel Negrete MM, MT-BC

## 2022-06-02 PROCEDURE — 6370000000 HC RX 637 (ALT 250 FOR IP)

## 2022-06-02 PROCEDURE — 97535 SELF CARE MNGMENT TRAINING: CPT

## 2022-06-02 PROCEDURE — 1240000000 HC EMOTIONAL WELLNESS R&B

## 2022-06-02 PROCEDURE — 6370000000 HC RX 637 (ALT 250 FOR IP): Performed by: PSYCHIATRY & NEUROLOGY

## 2022-06-02 PROCEDURE — 99233 SBSQ HOSP IP/OBS HIGH 50: CPT | Performed by: PSYCHIATRY & NEUROLOGY

## 2022-06-02 RX ADMIN — HYDROCORTISONE: 10 CREAM TOPICAL at 08:34

## 2022-06-02 RX ADMIN — DICLOFENAC SODIUM 2 G: 10 GEL TOPICAL at 21:47

## 2022-06-02 RX ADMIN — METFORMIN HYDROCHLORIDE 1000 MG: 500 TABLET ORAL at 08:33

## 2022-06-02 RX ADMIN — LEVOTHYROXINE SODIUM 25 MCG: 25 TABLET ORAL at 08:34

## 2022-06-02 RX ADMIN — DIVALPROEX SODIUM 250 MG: 125 CAPSULE, COATED PELLETS ORAL at 08:33

## 2022-06-02 RX ADMIN — PERPHENAZINE 2 MG: 2 TABLET, FILM COATED ORAL at 08:33

## 2022-06-02 RX ADMIN — PANTOPRAZOLE SODIUM 40 MG: 40 TABLET, DELAYED RELEASE ORAL at 08:33

## 2022-06-02 RX ADMIN — METFORMIN HYDROCHLORIDE 1000 MG: 500 TABLET ORAL at 17:10

## 2022-06-02 RX ADMIN — DICLOFENAC SODIUM 2 G: 10 GEL TOPICAL at 08:42

## 2022-06-02 RX ADMIN — DIVALPROEX SODIUM 250 MG: 125 CAPSULE, COATED PELLETS ORAL at 14:12

## 2022-06-02 RX ADMIN — ATORVASTATIN CALCIUM 20 MG: 10 TABLET, FILM COATED ORAL at 08:34

## 2022-06-02 RX ADMIN — MULTIPLE VITAMINS W/ MINERALS TAB 1 TABLET: TAB at 08:33

## 2022-06-02 RX ADMIN — LISINOPRIL AND HYDROCHLOROTHIAZIDE 1 TABLET: 12.5; 2 TABLET ORAL at 08:34

## 2022-06-02 RX ADMIN — FLUVOXAMINE MALEATE 25 MG: 50 TABLET, COATED ORAL at 21:47

## 2022-06-02 RX ADMIN — HYDROCORTISONE: 10 CREAM TOPICAL at 21:48

## 2022-06-02 RX ADMIN — Medication 400 UNITS: at 08:33

## 2022-06-02 RX ADMIN — PERPHENAZINE 2 MG: 2 TABLET, FILM COATED ORAL at 17:10

## 2022-06-02 NOTE — FLOWSHEET NOTE
Group Therapy Note    Date: 6/2/2022  Start Time: 1000  End Time:  1100  Number of Participants: 7    Type of Group: Music group    Notes:  Pt was present in group, participated actively in group, made selection of songs for the group and sang along with a few songs.       Response to Learning: Capable of insight and Able to change behavior      Endings: None Reported    Modes of Intervention: Socialization and Media      Discipline Responsible: Spiritual care      Signature:  Bradley Parks
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
## 2022-06-02 NOTE — PLAN OF CARE
Pt alert x 3. Calm, cooperative, and friendly. Participated in groups. Patient did not disrobe as much today. Patient easily redirected. Patient had no complaints of pain. Independent in gait. Continent of bowel and stool. She took her meds well and ate all her meals. Patient did not ask to use the phone near as much. Patient received a shower. Problem: Chronic Conditions and Co-morbidities  Goal: Patient's chronic conditions and co-morbidity symptoms are monitored and maintained or improved  Outcome: Not Progressing     Problem: Safety - Adult  Goal: Free from fall injury  Outcome: Not Progressing     Problem: ABCDS Injury Assessment  Goal: Absence of physical injury  Outcome: Not Progressing     Problem: Anxiety  Goal: Will report anxiety at manageable levels  Description: INTERVENTIONS:  1. Administer medication as ordered  2. Teach and rehearse alternative coping skills  3. Provide emotional support with 1:1 interaction with staff  Outcome: Not Progressing     Problem: Coping  Goal: Pt/Family able to verbalize concerns and demonstrate effective coping strategies  Description: INTERVENTIONS:  1. Assist patient/family to identify coping skills, available support systems and cultural and spiritual values  2. Provide emotional support, including active listening and acknowledgement of concerns of patient and caregivers  3. Reduce environmental stimuli, as able  4. Instruct patient/family in relaxation techniques, as appropriate  5. Assess for spiritual pain/suffering and initiate Spiritual Care, Psychosocial Clinical Specialist consults as needed  Outcome: Not Progressing     Problem: Decision Making  Goal: Pt/Family able to effectively weigh alternatives and participate in decision making related to treatment and care  Description: INTERVENTIONS:  1. Determine when there are differences between patient's view, family's view, and healthcare provider's view of condition  2.  Facilitate patient and family articulation of goals for care  3. Help patient and family identify pros/cons of alternative solutions  4. Provide information as requested by patient/family  5. Respect patient/family right to receive or not to receive information  6. Serve as a liaison between patient and family and health care team  7. Initiate Consults from Ethics, Palliative Care or initiate [address] [place] [address] as is appropriate  Outcome: Not Progressing     Problem: Confusion  Goal: Confusion, delirium, dementia, or psychosis is improved or at baseline  Description: INTERVENTIONS:  1. Assess for possible contributors to thought disturbance, including medications, impaired vision or hearing, underlying metabolic abnormalities, dehydration, psychiatric diagnoses, and notify attending LIP  2. [place] high risk fall precautions, as indicated  3. Provide frequent short contacts to provide reality reorientation, refocusing and direction  4. Decrease environmental stimuli, including noise as appropriate  5. Monitor and intervene to maintain adequate nutrition, hydration, elimination, sleep and activity  6. If unable to ensure safety without constant attention obtain sitter and review sitter guidelines with assigned personnel  7. Initiate Psychosocial CNS and Spiritual Care consult, as indicated  Outcome: Not Progressing     Problem: Psychosis  Goal: Will report no hallucinations or delusions  Description: INTERVENTIONS:  1. Administer medication as  ordered  2. Assist with reality testing to support increasing orientation  3.  Assess if patient's hallucinations or delusions are encouraging self harm or harm to others and intervene as appropriate  Outcome: Not Progressing     Problem: Pain  Goal: Verbalizes/displays adequate comfort level or baseline comfort level  Outcome: Not Progressing

## 2022-06-02 NOTE — PROGRESS NOTES
Inpatient Occupational Therapy Treatment/ Discharge Note    Unit:  [facility]  Date:  6/2/2022  Patient Name:    Romoe Valladares  Admitting diagnosis:  Dementia with behavioral disturbance [facility]) [F03.91]  Admit Date:  5/23/2022  Precautions/Restrictions/WB Status/ Lines/ Wounds/ Oxygen:  Standard BHI Precautions  History of Present Illness:  Per 5/23/22 ED note, pt arrived from [facility] ED for worsening dementia and wandering down the street naked looking for Jehovah. She came with no belongings. Per transport, belongings were sent home with her daughter from [facility]. She is alert and oriented to person, place, month and year currently but is forgetful. She is pleasant and cooperative but anxious. She is preoccupied and endorses audio command hallucinations that told her to walk down the street naked. States \"the devil was pretending to be Jehovah. \" She denies SI/HI. Her daughter Humberto Shepherd is POA.         Treatment Number:  2    Treatment Time: [id number]  Timed Code Treatment Minutes:  17   minutes   Total Treatment Time: 17   minutes    Staff Recommendations:    Supervision with ambulation on unit     Discharge Recommendations: Home with 24/7 supervision due to hallucinations    DME needs for discharge:  Needs Met    AM-PAC Score: AM-PAC Inpatient Daily Activity Raw Score: 23   Home Health S4 Level: NA     MOCA:  Performed 5/25/22    Education Level HS+      Total Score     22/30       Subjective:  \"I need to take my clothes off to help my daughter. \" Pt was reassured daughter was safe and that taking her clothes off would not have any affect on that. ADLs:  Sleep Hygiene:  Issued sleep hygiene handout to pt and reviewed. She was able to list several ideas that she will use, including having bathtime before bed, and eating right. Med Management: Discussed med mgmt with pt. She uses a timed machine that sets an alarm when it is time to take meds, and dispenses them. Her daughter sets this up for pt. She keeps the machine near her kitchen so that she can see/hear it easily. Self Care: Toileting: NT  Grooming: NT  Dressing: supervision/encouragement to don B socks completely (initially pt donned only enough to cover her toes. Pain   No  Rating:NA  Location: NA  Pain Medicine Status: No request made      Cognition    A&Ox4  Able to follow:  2 step commands   Slow to respond at times    Balance:     WFLs    Bed mobility:  Not tested    Transfer Training:   Sit to stand:   Independent  Stand to sit: Independent  Bed to Chair:  Independent  Standard toilet:   Not Tested    Activity Tolerance   Pt completed therapy session with No adverse symptoms noted w/activity    Therapeutic Exercise: NA    Patient Education:   Role of OT, Sleep hygiene, Coping skills and Recommendations for DC    Positioning Needs: In common room with needs met    Family Present:  No    Assessment: Pt tolerated review of sleep hygiene discussion. Pt should continue to benefit from skilled OT tx to maximize independence so that she may eventually return home with the least amount of assistance. All goals met. GOALS  To be met in 3 Visits:  1). Pt. To complete interest check list.  MET 5/31/22  2). Pt will participate in [address] [place] [address] assessment. -MET 5/24/22     To be met in 5 Visits:  1). Pt. To identify 2 memory strategies to take medications as prescribed. MET 6/2/22  2). Pt will verbalize 3 new coping skills. MET 5/31/22  3). Pt. To verbalize understanding of sleep hygiene education/handouts. MET 6/2/22         Plan: Goals met. Discharge this date.     Dai Parks MS, OTR/L  #[id number]        If patient discharges from this facility prior to next visit, this note will serve as the Discharge Summary
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
## 2022-06-03 PROCEDURE — 6370000000 HC RX 637 (ALT 250 FOR IP): Performed by: PSYCHIATRY & NEUROLOGY

## 2022-06-03 PROCEDURE — 99231 SBSQ HOSP IP/OBS SF/LOW 25: CPT | Performed by: NURSE PRACTITIONER

## 2022-06-03 PROCEDURE — 99233 SBSQ HOSP IP/OBS HIGH 50: CPT | Performed by: PSYCHIATRY & NEUROLOGY

## 2022-06-03 PROCEDURE — 1240000000 HC EMOTIONAL WELLNESS R&B

## 2022-06-03 PROCEDURE — 6370000000 HC RX 637 (ALT 250 FOR IP)

## 2022-06-03 RX ADMIN — DIVALPROEX SODIUM 250 MG: 125 CAPSULE, COATED PELLETS ORAL at 10:27

## 2022-06-03 RX ADMIN — DICLOFENAC SODIUM 2 G: 10 GEL TOPICAL at 10:20

## 2022-06-03 RX ADMIN — DICLOFENAC SODIUM 2 G: 10 GEL TOPICAL at 20:19

## 2022-06-03 RX ADMIN — PERPHENAZINE 2 MG: 2 TABLET, FILM COATED ORAL at 10:22

## 2022-06-03 RX ADMIN — METFORMIN HYDROCHLORIDE 1000 MG: 500 TABLET ORAL at 17:38

## 2022-06-03 RX ADMIN — FLUVOXAMINE MALEATE 25 MG: 50 TABLET, COATED ORAL at 20:18

## 2022-06-03 RX ADMIN — HYDROCORTISONE: 10 CREAM TOPICAL at 10:28

## 2022-06-03 RX ADMIN — HYDROCORTISONE: 10 CREAM TOPICAL at 20:19

## 2022-06-03 RX ADMIN — METFORMIN HYDROCHLORIDE 1000 MG: 500 TABLET ORAL at 10:28

## 2022-06-03 RX ADMIN — MULTIPLE VITAMINS W/ MINERALS TAB 1 TABLET: TAB at 10:27

## 2022-06-03 RX ADMIN — LISINOPRIL AND HYDROCHLOROTHIAZIDE 1 TABLET: 12.5; 2 TABLET ORAL at 13:23

## 2022-06-03 RX ADMIN — Medication 400 UNITS: at 10:22

## 2022-06-03 RX ADMIN — PANTOPRAZOLE SODIUM 40 MG: 40 TABLET, DELAYED RELEASE ORAL at 10:27

## 2022-06-03 RX ADMIN — ATORVASTATIN CALCIUM 20 MG: 10 TABLET, FILM COATED ORAL at 10:28

## 2022-06-03 RX ADMIN — LEVOTHYROXINE SODIUM 25 MCG: 25 TABLET ORAL at 10:28

## 2022-06-03 RX ADMIN — PERPHENAZINE 2 MG: 2 TABLET, FILM COATED ORAL at 17:38

## 2022-06-03 RX ADMIN — DIVALPROEX SODIUM 250 MG: 125 CAPSULE, COATED PELLETS ORAL at 16:30

## 2022-06-03 ASSESSMENT — PAIN SCALES - GENERAL
PAINLEVEL_OUTOF10: 0
PAINLEVEL_OUTOF10: 0
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
## 2022-06-03 NOTE — PROGRESS NOTES
Department of Psychiatry  AttendingProgress Note  Chief Complaint: behavioral disturbances  Tejal Soria reports that she is still hearing voices. She said she has heard the voices twice today. The first voice today told her to take her clothes off but the second time they told her to call and check on her son Portia aMhmood. She has been compliant with her medications and is not complaining of any new side effects. She has been enjoying groups and enjoys her new room and roommate. Patient's chart was reviewed and collaborated with  about the treatment plan. SUBJECTIVE:    Patient is feeling unchanged. Suicidal ideation:  denies suicidal ideation. Patient does not have medication side effects. ROS: Patient has new complaints: no  Sleeping adequately:  Yes   Appetite adequate: Yes  Attending groups: Yes  Visitors:No    OBJECTIVE    Physical  VITALS:  BP (!) 147/86   Pulse 85   Temp 98.2 °F (36.8 °C) (Oral)   Resp 16   Ht 4' 11\" (1.499 m)   Wt 142 lb (64.4 kg)   SpO2 99%   BMI 28.68 kg/m²     Mental Status Examination:  Patients appearance was well-appearing. Thoughts are Goal directed and Obsessive. Homicidal ideations none. No abnormal movements, tics or mannerisms. Memory impaired immediate recall A[place] 0. Concentration Fair. Alert and oriented X 4. Insight and Judgement impaired insight. Patient was cooperative.  Patient gait normal. Mood anxious, affect normal affect Hallucinations Present - auditory, suicidal ideations no specific plan to harm self Speech normal pitch and normal volume  Data  Labs:   Admission on 05/23/2022   Component Date Value Ref Range Status    TSH 05/24/2022 2.38  0.27 - 4.20 uIU/mL Final    Cholesterol, Total 05/25/2022 150  0 - 199 mg/dL Final    Triglycerides 05/25/2022 198* 0 - 150 mg/dL Final    HDL 05/25/2022 46  40 - 60 mg/dL Final    LDL Calculated 05/25/2022 64  <100 mg/dL Final    VLDL Cholesterol Calculated 05/25/2022 40  Not Established mg/dL Final    Hemoglobin A1C 05/25/2022 7.4  See comment % Final    Comment: Comment:  Diagnosis of Diabetes: > or = 6.5%  Increased risk of diabetes (Prediabetes): 5.7-6.4%  Glycemic Control: Nonpregnant Adults: <7.0%                    Pregnant: <6.0%        eAG 05/25/2022 165.7  mg/dL Final    Color, UA 05/27/2022 Yellow  Straw/Yellow Final    Clarity, UA 05/27/2022 Clear  Clear Final    Glucose, Ur 05/27/2022 Negative  Negative mg/dL Final    Bilirubin Urine 05/27/2022 Negative  Negative Final    Ketones, Urine 05/27/2022 Negative  Negative mg/dL Final    Specific Gravity, UA 05/27/2022 1.010  1.005 - 1.030 Final    Blood, Urine 05/27/2022 TRACE-INTACT* Negative Final    pH, UA 05/27/2022 6.0  5.0 - 8.0 Final    Protein, UA 05/27/2022 Negative  Negative mg/dL Final    Urobilinogen, Urine 05/27/2022 0.2  <2.0 E.U./dL Final    Nitrite, Urine 05/27/2022 Negative  Negative Final    Leukocyte Esterase, Urine 05/27/2022 SMALL* Negative Final    Microscopic Examination 05/27/2022 YES   Final    Urine Type 05/27/2022 NotGiven   Final    Urine received in a container without preservatives.     Urine Reflex to Culture 05/27/2022 Not Indicated   Final    WBC, UA 05/27/2022 3-5  0 - 5 /HPF Final    RBC, UA 05/27/2022 3-4  0 - 4 /HPF Final    Epithelial Cells, UA 05/27/2022 0-1  0 - 5 /HPF Final    Valproic Acid Lvl 05/31/2022 13.8* 50.0 - 100.0 ug/mL Final            Medications  Current Facility-Administered Medications: perphenazine tablet 2 mg, 2 mg, Oral, BID WC  fluvoxaMINE (LUVOX) tablet 25 mg, 25 mg, Oral, Nightly  hydrocortisone 1 % cream, , Topical, BID  medicated lip balm (BLISTEX/CARMEX) stick, , Topical, PRN  divalproex (DEPAKOTE SPRINKLE) capsule 250 mg, 250 mg, Oral, BID  diclofenac sodium (VOLTAREN) 1 % gel 2 g, 2 g, Topical, BID  therapeutic multivitamin-minerals 1 tablet, 1 tablet, Oral, Daily  vitamin E capsule 400 Units, 400 Units, Oral, Daily  acetaminophen (TYLENOL) tablet 650 mg, 650 mg, Oral, Q4H PRN  ibuprofen (ADVIL;MOTRIN) tablet 400 mg, 400 mg, Oral, Q6H PRN  magnesium hydroxide (MILK OF MAGNESIA) 400 MG/5ML suspension 30 mL, 30 mL, Oral, Daily PRN  nicotine polacrilex (COMMIT) lozenge 2 mg, 2 mg, Oral, Q1H PRN  aluminum & magnesium hydroxide-simethicone (MAALOX) 200-200-20 MG/5ML suspension 30 mL, 30 mL, Oral, Q6H PRN  hydrOXYzine (ATARAX) tablet 50 mg, 50 mg, Oral, TID PRN  OLANZapine (ZYPREXA) tablet 5 mg, 5 mg, Oral, Q8H PRN **OR** OLANZapine (ZYPREXA) 5 mg in sterile water 1 mL injection, 5 mg, IntraMUSCular, Q8H PRN  sterile water injection 2.1 mL, 2.1 mL, IntraMUSCular, Q4H PRN  diphenhydrAMINE (BENADRYL) injection 50 mg, 50 mg, IntraMUSCular, Q4H PRN  traZODone (DESYREL) tablet 50 mg, 50 mg, Oral, Nightly PRN  atorvastatin (LIPITOR) tablet 20 mg, 20 mg, Oral, Daily  fluticasone (FLONASE) 50 MCG/ACT nasal spray 1 spray, 1 spray, Each Nostril, Daily  lisinopril-hydroCHLOROthiazide (PRINZIDE;ZESTORETIC) 20-12.5 MG per tablet 1 tablet, 1 tablet, Oral, Daily  levothyroxine (SYNTHROID) tablet 25 mcg, 25 mcg, Oral, Daily  metFORMIN (GLUCOPHAGE) tablet 1,000 mg, 1,000 mg, Oral, BID WC  pantoprazole (PROTONIX) tablet 40 mg, 40 mg, Oral, Daily    ASSESSMENT AND PLAN    Principal Problem:    Dementia with behavioral disturbance (HCC)  Active Problems:    Encounter for routine adult medical examination    Psychosis ([address]    Left foot pain    Hyperlipidemia    Hypertension, essential  Resolved Problems:    * No resolved hospital problems. *       1. Patient s symptoms   show no change  2. Probable discharge is next week  3. Discharge planning is complete  4. Suicidal ideation is none  5. Total time with patient was 40 minutes and more than 50 % of that time was spent counseling the patient on their symptoms, treatment and expected goals. Addendum to PA student note:  Pt seen, examined, and evaluated with PA student , who acted as my scribe for the above documentation.  I have reviewed the current history, physical findings, labs, assessment and plan; and agree with note as documented.

## 2022-06-03 NOTE — PROGRESS NOTES
Progress Note    Admit Date:  5/23/2022    Subjective:  Ms. Ortega Rank seen sitting at table. She also had complained of pain to the top of her left foot. Has rash that itches to left shoulder. Objective:   Vitals:    06/02/22 1919   BP: (!) 147/86   Pulse: 85   Resp: 16   Temp: 98.2 °F (36.8 °C)   SpO2: 99%          No intake or output data in the 24 hours ending 06/03/22 1237    Physical Exam:    Gen: No distress. Alert. Eyes: PERRL. No sclera icterus. No conjunctival injection. ENT: No discharge. Pharynx clear. Neck: No JVD. Trachea midline. Resp: No accessory muscle use. No crackles. No wheezes. No rhonchi. CV: Regular rate. Regular rhythm. No murmur. No rub. No edema. GI: Non-tender. Non-distended. Normal bowel sounds. Skin: Warm and dry. Urticarial rash left shoulder  M/S: No cyanosis. No joint deformity. No clubbing. Trace swelling left foot. Neuro: Awake. No focal neurologic deficit on exam.  Cranial nerves II through XII intact. Patient is able to ambulate without difficulty. Psych: Per psychiatry team evaluation     Data:  CBC: No results for input(s): WBC, HGB, HCT, MCV, PLT in the last 72 hours. BMP: No results for input(s): NA, K, CL, CO2, PHOS, BUN, CREATININE, CA in the last 72 hours. LIVER PROFILE: No results for input(s): AST, ALT, LIPASE, BILIDIR, BILITOT, ALKPHOS in the last 72 hours. Invalid input(s): AMYLASE,  ALB  PT/INR: No results for input(s): PROTIME, INR in the last 72 hours. CULTURES  Results for Antoni Escobedo (MRN [id number]) as of 5/24/2022 09:22    Ref. Range 5/23/2022 16:13   SARS-CoV-2, NAAT Latest Ref Range: Not Detected  Not Detected      Results for Antoni Escobedo (MRN [id number]) as of 5/24/2022 09:22    Ref.  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 CT HEAD WO CONTRAST   Final Result   1. No acute intracranial abnormality. Assessment/Plan:  #Dementia with behavioral disturbance  - per psychiatry team     #DMII  -BG controlled  -Continue metformin  -Consider SSI  -Monitor BG for now     #HTN  -BP stable  -Continue lisinopril-hctz  -Monitor     #HLD  -Continue statin     #GERD  -Continue PPI     #Hypothyroidism  -Continue synthroid    #Urticarial Rash left shoulder  -Cortisone cream ordered    #Left foot pain/swelling  -to anterior portion of foot  -X-ray negative for fracture, as above. Diet: ADULT DIET;  Regular  Code Status: Full Code    Jatidner Kauffman [facility]  6/3/2022

## 2022-06-04 PROCEDURE — 1240000000 HC EMOTIONAL WELLNESS R&B

## 2022-06-04 PROCEDURE — 6370000000 HC RX 637 (ALT 250 FOR IP)

## 2022-06-04 PROCEDURE — 6370000000 HC RX 637 (ALT 250 FOR IP): Performed by: PSYCHIATRY & NEUROLOGY

## 2022-06-04 PROCEDURE — 99233 SBSQ HOSP IP/OBS HIGH 50: CPT

## 2022-06-04 RX ADMIN — IBUPROFEN 400 MG: 400 TABLET, FILM COATED ORAL at 10:29

## 2022-06-04 RX ADMIN — HYDROCORTISONE: 10 CREAM TOPICAL at 20:11

## 2022-06-04 RX ADMIN — LISINOPRIL AND HYDROCHLOROTHIAZIDE 1 TABLET: 12.5; 2 TABLET ORAL at 10:29

## 2022-06-04 RX ADMIN — DIVALPROEX SODIUM 250 MG: 125 CAPSULE, COATED PELLETS ORAL at 10:31

## 2022-06-04 RX ADMIN — LEVOTHYROXINE SODIUM 25 MCG: 25 TABLET ORAL at 10:30

## 2022-06-04 RX ADMIN — DICLOFENAC SODIUM 2 G: 10 GEL TOPICAL at 20:11

## 2022-06-04 RX ADMIN — Medication 400 UNITS: at 10:28

## 2022-06-04 RX ADMIN — MULTIPLE VITAMINS W/ MINERALS TAB 1 TABLET: TAB at 10:30

## 2022-06-04 RX ADMIN — PERPHENAZINE 2 MG: 2 TABLET, FILM COATED ORAL at 17:42

## 2022-06-04 RX ADMIN — METFORMIN HYDROCHLORIDE 1000 MG: 500 TABLET ORAL at 10:29

## 2022-06-04 RX ADMIN — ATORVASTATIN CALCIUM 20 MG: 10 TABLET, FILM COATED ORAL at 10:29

## 2022-06-04 RX ADMIN — METFORMIN HYDROCHLORIDE 1000 MG: 500 TABLET ORAL at 17:35

## 2022-06-04 RX ADMIN — DIVALPROEX SODIUM 250 MG: 125 CAPSULE, COATED PELLETS ORAL at 15:51

## 2022-06-04 RX ADMIN — PERPHENAZINE 2 MG: 2 TABLET, FILM COATED ORAL at 10:28

## 2022-06-04 RX ADMIN — PANTOPRAZOLE SODIUM 40 MG: 40 TABLET, DELAYED RELEASE ORAL at 10:31

## 2022-06-04 RX ADMIN — FLUVOXAMINE MALEATE 25 MG: 50 TABLET, COATED ORAL at 20:12

## 2022-06-04 ASSESSMENT — PAIN SCALES - GENERAL: PAINLEVEL_OUTOF10: 0

## 2022-06-04 NOTE — FLOWSHEET NOTE
Senior Purposeful Rounding     Position: Repositions Self     Physical Environment:Room free from clutter, Clear path to bathroom , Adequate lighting and No safety hazards noted     Pain Rating/ Nonverbal Pain Behaviors:0, asleep     Pain interventions Attempted: None     Patient Toileted: Independent

## 2022-06-04 NOTE — PROGRESS NOTES
Senior Purposeful Rounding    Position:Sitting    Physical Environment:Room free from clutter, Clear path to bathroom , Adequate lighting and No safety hazards noted    Pain Rating/ Nonverbal Pain Behaviors:denies    Pain interventions Attempted: None    Patient Toileted:Continent and Independent

## 2022-06-04 NOTE — PLAN OF CARE
Problem: Chronic Conditions and Co-morbidities  Goal: Patient's chronic conditions and co-morbidity symptoms are monitored and maintained or improved  Outcome: Progressing     Problem: Safety - Adult  Goal: Free from fall injury  Outcome: Progressing     Problem: ABCDS Injury Assessment  Goal: Absence of physical injury  Outcome: Progressing     Problem: Anxiety  Goal: Will report anxiety at manageable levels  Description: INTERVENTIONS:  1. Administer medication as ordered  2. Teach and rehearse alternative coping skills  3. Provide emotional support with 1:1 interaction with staff  Outcome: Progressing     Problem: Coping  Goal: Pt/Family able to verbalize concerns and demonstrate effective coping strategies  Description: INTERVENTIONS:  1. Assist patient/family to identify coping skills, available support systems and cultural and spiritual values  2. Provide emotional support, including active listening and acknowledgement of concerns of patient and caregivers  3. Reduce environmental stimuli, as able  4. Instruct patient/family in relaxation techniques, as appropriate  5. Assess for spiritual pain/suffering and initiate Spiritual Care, Psychosocial Clinical Specialist consults as needed  Outcome: Progressing     Problem: Decision Making  Goal: Pt/Family able to effectively weigh alternatives and participate in decision making related to treatment and care  Description: INTERVENTIONS:  1. Determine when there are differences between patient's view, family's view, and healthcare provider's view of condition  2. Facilitate patient and family articulation of goals for care  3. Help patient and family identify pros/cons of alternative solutions  4. Provide information as requested by patient/family  5. Respect patient/family right to receive or not to receive information  6. Serve as a liaison between patient and family and health care team  7.  Initiate Consults from Ethics, Palliative Care or initiate Family Care Conference as is appropriate  Outcome: Progressing     Problem: Confusion  Goal: Confusion, delirium, dementia, or psychosis is improved or at baseline  Description: INTERVENTIONS:  1. Assess for possible contributors to thought disturbance, including medications, impaired vision or hearing, underlying metabolic abnormalities, dehydration, psychiatric diagnoses, and notify attending LIP  2. [place] high risk fall precautions, as indicated  3. Provide frequent short contacts to provide reality reorientation, refocusing and direction  4. Decrease environmental stimuli, including noise as appropriate  5. Monitor and intervene to maintain adequate nutrition, hydration, elimination, sleep and activity  6. If unable to ensure safety without constant attention obtain sitter and review sitter guidelines with assigned personnel  7. Initiate Psychosocial CNS and Spiritual Care consult, as indicated  Outcome: Progressing     Patient pleasantly confused. Calm and cooperative. Medication compliant. Appropriately sits with peers and converses. No complaints of paranoid thoughts. Denies SI/HI, AH/VH this evening. Intake of snack and fluids. Completes care independently. No episodes of disrobing present. Remains free of falls or physical injury.

## 2022-06-05 PROCEDURE — 6370000000 HC RX 637 (ALT 250 FOR IP)

## 2022-06-05 PROCEDURE — 1240000000 HC EMOTIONAL WELLNESS R&B

## 2022-06-05 PROCEDURE — 6370000000 HC RX 637 (ALT 250 FOR IP): Performed by: PSYCHIATRY & NEUROLOGY

## 2022-06-05 RX ADMIN — HYDROCORTISONE: 10 CREAM TOPICAL at 13:29

## 2022-06-05 RX ADMIN — DICLOFENAC SODIUM 2 G: 10 GEL TOPICAL at 20:01

## 2022-06-05 RX ADMIN — PERPHENAZINE 2 MG: 2 TABLET, FILM COATED ORAL at 13:00

## 2022-06-05 RX ADMIN — HYDROCORTISONE: 10 CREAM TOPICAL at 20:02

## 2022-06-05 RX ADMIN — MULTIPLE VITAMINS W/ MINERALS TAB 1 TABLET: TAB at 11:14

## 2022-06-05 RX ADMIN — DIVALPROEX SODIUM 250 MG: 125 CAPSULE, COATED PELLETS ORAL at 16:22

## 2022-06-05 RX ADMIN — PANTOPRAZOLE SODIUM 40 MG: 40 TABLET, DELAYED RELEASE ORAL at 11:14

## 2022-06-05 RX ADMIN — LISINOPRIL AND HYDROCHLOROTHIAZIDE 1 TABLET: 12.5; 2 TABLET ORAL at 11:14

## 2022-06-05 RX ADMIN — PERPHENAZINE 2 MG: 2 TABLET, FILM COATED ORAL at 18:52

## 2022-06-05 RX ADMIN — METFORMIN HYDROCHLORIDE 1000 MG: 500 TABLET ORAL at 16:22

## 2022-06-05 RX ADMIN — LEVOTHYROXINE SODIUM 25 MCG: 25 TABLET ORAL at 11:14

## 2022-06-05 RX ADMIN — FLUVOXAMINE MALEATE 25 MG: 50 TABLET, COATED ORAL at 20:01

## 2022-06-05 RX ADMIN — Medication 400 UNITS: at 13:00

## 2022-06-05 RX ADMIN — ATORVASTATIN CALCIUM 20 MG: 10 TABLET, FILM COATED ORAL at 11:14

## 2022-06-05 RX ADMIN — DIVALPROEX SODIUM 250 MG: 125 CAPSULE, COATED PELLETS ORAL at 11:14

## 2022-06-05 RX ADMIN — METFORMIN HYDROCHLORIDE 1000 MG: 500 TABLET ORAL at 11:14

## 2022-06-05 ASSESSMENT — PAIN SCALES - GENERAL: PAINLEVEL_OUTOF10: 0

## 2022-06-05 NOTE — FLOWSHEET NOTE
Senior Purposeful Rounding     Position:Repositions Self     Physical Environment:Room free from clutter, Clear path to bathroom , Adequate lighting, Bed alarm functioning and No safety hazards noted     Pain Rating/ Nonverbal Pain Behaviors:denies;      Pain interventions Attempted: none     Patient Toileted:Independent
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
## 2022-06-05 NOTE — PROGRESS NOTES
Department of Psychiatry  AttendingProgress Note  Chief Complaint: Dementia    Patient's chart was reviewed and collaborated with  about the treatment plan. SUBJECTIVE:    Pt up, dressed, visiting with family. Pt appears pleasant, answering questions appropriately. Pt anxious at times, no agitation. Patient is feeling better. Suicidal ideation:  denies suicidal ideation. Patient does not have medication side effects. ROS: Patient has new complaints: no  Sleeping adequately:  Yes   Appetite adequate: Yes  Attending groups: Yes  Visitors:Yes    OBJECTIVE    Physical  VITALS:  BP (!) 144/79   Pulse 67   Temp 97.7 °F (36.5 °C) (Oral)   Resp 18   Ht 4' 11\" (1.499 m)   Wt 142 lb (64.4 kg)   SpO2 98%   BMI 28.68 kg/m²     Mental Status Examination:  Patients appearance was well-appearing, hospital attire and in chair. Thoughts are Logical. Homicidal ideations none. No abnormal movements, tics or mannerisms. Memory impaired recent memory and remote memory A[place] 0. Concentration Fair. Alert and oriented X 4. Insight and Judgement impaired insight. Patient was cooperative.  Patient gait normal. Mood euthymic, affect anxiety Hallucinations Absent, suicidal ideations no specific plan to harm self Speech normal pitch and normal volume    Data  Labs:   Admission on 05/23/2022   Component Date Value Ref Range Status    TSH 05/24/2022 2.38  0.27 - 4.20 uIU/mL Final    Cholesterol, Total 05/25/2022 150  0 - 199 mg/dL Final    Triglycerides 05/25/2022 198* 0 - 150 mg/dL Final    HDL 05/25/2022 46  40 - 60 mg/dL Final    LDL Calculated 05/25/2022 64  <100 mg/dL Final    VLDL Cholesterol Calculated 05/25/2022 40  Not Established mg/dL Final    Hemoglobin A1C 05/25/2022 7.4  See comment % Final    Comment: Comment:  Diagnosis of Diabetes: > or = 6.5%  Increased risk of diabetes (Prediabetes): 5.7-6.4%  Glycemic Control: Nonpregnant Adults: <7.0%                    Pregnant: <6.0%        eAG 05/25/2022 165.7  mg/dL Final    Color, UA 05/27/2022 Yellow  Straw/Yellow Final    Clarity, UA 05/27/2022 Clear  Clear Final    Glucose, Ur 05/27/2022 Negative  Negative mg/dL Final    Bilirubin Urine 05/27/2022 Negative  Negative Final    Ketones, Urine 05/27/2022 Negative  Negative mg/dL Final    Specific Gravity, UA 05/27/2022 1.010  1.005 - 1.030 Final    Blood, Urine 05/27/2022 TRACE-INTACT* Negative Final    pH, UA 05/27/2022 6.0  5.0 - 8.0 Final    Protein, UA 05/27/2022 Negative  Negative mg/dL Final    Urobilinogen, Urine 05/27/2022 0.2  <2.0 E.U./dL Final    Nitrite, Urine 05/27/2022 Negative  Negative Final    Leukocyte Esterase, Urine 05/27/2022 SMALL* Negative Final    Microscopic Examination 05/27/2022 YES   Final    Urine Type 05/27/2022 NotGiven   Final    Urine received in a container without preservatives.     Urine Reflex to Culture 05/27/2022 Not Indicated   Final    WBC, UA 05/27/2022 3-5  0 - 5 /HPF Final    RBC, UA 05/27/2022 3-4  0 - 4 /HPF Final    Epithelial Cells, UA 05/27/2022 0-1  0 - 5 /HPF Final    Valproic Acid Lvl 05/31/2022 13.8* 50.0 - 100.0 ug/mL Final            Medications  Current Facility-Administered Medications: perphenazine tablet 2 mg, 2 mg, Oral, BID WC  fluvoxaMINE (LUVOX) tablet 25 mg, 25 mg, Oral, Nightly  hydrocortisone 1 % cream, , Topical, BID  medicated lip balm (BLISTEX/CARMEX) stick, , Topical, PRN  divalproex (DEPAKOTE SPRINKLE) capsule 250 mg, 250 mg, Oral, BID  diclofenac sodium (VOLTAREN) 1 % gel 2 g, 2 g, Topical, BID  therapeutic multivitamin-minerals 1 tablet, 1 tablet, Oral, Daily  vitamin E capsule 400 Units, 400 Units, Oral, Daily  acetaminophen (TYLENOL) tablet 650 mg, 650 mg, Oral, Q4H PRN  ibuprofen (ADVIL;MOTRIN) tablet 400 mg, 400 mg, Oral, Q6H PRN  magnesium hydroxide (MILK OF MAGNESIA) 400 MG/5ML suspension 30 mL, 30 mL, Oral, Daily PRN  nicotine polacrilex (COMMIT) lozenge 2 mg, 2 mg, Oral, Q1H PRN  aluminum & magnesium hydroxide-simethicone (MAALOX) 162-044-65 MG/5ML suspension 30 mL, 30 mL, Oral, Q6H PRN  hydrOXYzine (ATARAX) tablet 50 mg, 50 mg, Oral, TID PRN  OLANZapine (ZYPREXA) tablet 5 mg, 5 mg, Oral, Q8H PRN **OR** OLANZapine (ZYPREXA) 5 mg in sterile water 1 mL injection, 5 mg, IntraMUSCular, Q8H PRN  sterile water injection 2.1 mL, 2.1 mL, IntraMUSCular, Q4H PRN  diphenhydrAMINE (BENADRYL) injection 50 mg, 50 mg, IntraMUSCular, Q4H PRN  traZODone (DESYREL) tablet 50 mg, 50 mg, Oral, Nightly PRN  atorvastatin (LIPITOR) tablet 20 mg, 20 mg, Oral, Daily  fluticasone (FLONASE) 50 MCG/ACT nasal spray 1 spray, 1 spray, Each Nostril, Daily  lisinopril-hydroCHLOROthiazide (PRINZIDE;ZESTORETIC) 20-12.5 MG per tablet 1 tablet, 1 tablet, Oral, Daily  levothyroxine (SYNTHROID) tablet 25 mcg, 25 mcg, Oral, Daily  metFORMIN (GLUCOPHAGE) tablet 1,000 mg, 1,000 mg, Oral, BID WC  pantoprazole (PROTONIX) tablet 40 mg, 40 mg, Oral, Daily    ASSESSMENT AND PLAN    Principal Problem:    Dementia with behavioral disturbance (HCC)  Active Problems:    Encounter for routine adult medical examination    Psychosis ([facility] [address]    Left foot pain    Hyperlipidemia    Hypertension, essential  Resolved Problems:    * No resolved hospital problems. *       1. Patient's symptoms   are improving  2. Probable discharge is next week  3. Discharge planning is incomplete  4. Suicidal ideation is better  5. Total time with patient was 40 minutes and more than 50 % of that time was spent counseling the patient on their symptoms, treatment and expected goals.      Thesantiago Solares, PMSAMSONP-BC

## 2022-06-05 NOTE — PLAN OF CARE
Pt A+O to self only, anxious, worried, cooperative, and medication compliant. She denies SI/HI/AVH and no RTIS noted yet this shift. She is visible on the unit and denies any needs at this time.

## 2022-06-06 PROCEDURE — 99232 SBSQ HOSP IP/OBS MODERATE 35: CPT | Performed by: PSYCHIATRY & NEUROLOGY

## 2022-06-06 PROCEDURE — 99231 SBSQ HOSP IP/OBS SF/LOW 25: CPT

## 2022-06-06 PROCEDURE — 6370000000 HC RX 637 (ALT 250 FOR IP)

## 2022-06-06 PROCEDURE — 1240000000 HC EMOTIONAL WELLNESS R&B

## 2022-06-06 PROCEDURE — 6370000000 HC RX 637 (ALT 250 FOR IP): Performed by: PSYCHIATRY & NEUROLOGY

## 2022-06-06 RX ADMIN — HYDROCORTISONE: 10 CREAM TOPICAL at 09:04

## 2022-06-06 RX ADMIN — ATORVASTATIN CALCIUM 20 MG: 10 TABLET, FILM COATED ORAL at 09:04

## 2022-06-06 RX ADMIN — METFORMIN HYDROCHLORIDE 1000 MG: 500 TABLET ORAL at 09:04

## 2022-06-06 RX ADMIN — FLUTICASONE PROPIONATE 1 SPRAY: 50 SPRAY, METERED NASAL at 09:03

## 2022-06-06 RX ADMIN — DICLOFENAC SODIUM 2 G: 10 GEL TOPICAL at 20:17

## 2022-06-06 RX ADMIN — PANTOPRAZOLE SODIUM 40 MG: 40 TABLET, DELAYED RELEASE ORAL at 09:04

## 2022-06-06 RX ADMIN — FLUVOXAMINE MALEATE 25 MG: 50 TABLET, COATED ORAL at 20:16

## 2022-06-06 RX ADMIN — PERPHENAZINE 2 MG: 2 TABLET, FILM COATED ORAL at 09:05

## 2022-06-06 RX ADMIN — DIVALPROEX SODIUM 250 MG: 125 CAPSULE, COATED PELLETS ORAL at 09:04

## 2022-06-06 RX ADMIN — LISINOPRIL AND HYDROCHLOROTHIAZIDE 1 TABLET: 12.5; 2 TABLET ORAL at 09:04

## 2022-06-06 RX ADMIN — LEVOTHYROXINE SODIUM 25 MCG: 25 TABLET ORAL at 09:04

## 2022-06-06 RX ADMIN — DIVALPROEX SODIUM 250 MG: 125 CAPSULE, COATED PELLETS ORAL at 17:04

## 2022-06-06 RX ADMIN — MULTIPLE VITAMINS W/ MINERALS TAB 1 TABLET: TAB at 09:05

## 2022-06-06 RX ADMIN — Medication 400 UNITS: at 09:05

## 2022-06-06 RX ADMIN — DICLOFENAC SODIUM 2 G: 10 GEL TOPICAL at 09:05

## 2022-06-06 RX ADMIN — METFORMIN HYDROCHLORIDE 1000 MG: 500 TABLET ORAL at 17:05

## 2022-06-06 RX ADMIN — HYDROCORTISONE: 10 CREAM TOPICAL at 20:16

## 2022-06-06 RX ADMIN — PERPHENAZINE 2 MG: 2 TABLET, FILM COATED ORAL at 17:05

## 2022-06-06 ASSESSMENT — PAIN SCALES - GENERAL: PAINLEVEL_OUTOF10: 0

## 2022-06-06 NOTE — PROGRESS NOTES
Department of Psychiatry  AttendingProgress Note  Chief Complaint: behavioral disturbances   Mami Linares is doing well. She said her family came to visit her this past weekend and she was happy to see them. She said she is hearing the voices less and today they told her Sally Fend is going to [place]. \" When asked what this meant she was unsure. Mami Linares has been compliant on her medications and is not complaining of any new symptoms. Depakote level will be checked tomorrow morning due to the dose increase last week. Patient's chart was reviewed and collaborated with  about the treatment plan. SUBJECTIVE:    Patient is feeling better. Suicidal ideation:  denies suicidal ideation. Patient does not have medication side effects. ROS: Patient has new complaints: no  Sleeping adequately:  Yes   Appetite adequate: Yes  Attending groups: Yes  Visitors:No    OBJECTIVE    Physical  VITALS:  BP (!) 155/73   Pulse 75   Temp 98.1 °F (36.7 °C) (Oral)   Resp 18   Ht 4' 11\" (1.499 m)   Wt 142 lb (64.4 kg)   SpO2 98%   BMI 28.68 kg/m²     Mental Status Examination:  Patients appearance was well-appearing and street clothes. Thoughts are Logical. Homicidal ideations none. No abnormal movements, tics or mannerisms. Memory impaired recent memory and remote memory A[place] 0. Concentration Fair. Alert and oriented X 4. Insight and Judgement impaired insight. Patient was cooperative.  Patient gait normal. Mood within normal limits, euthymic, affect anxiety Hallucinations Present - auditory, suicidal ideations no specific plan to harm self Speech normal pitch and normal volume  Data  Labs:   Admission on 05/23/2022   Component Date Value Ref Range Status    TSH 05/24/2022 2.38  0.27 - 4.20 uIU/mL Final    Cholesterol, Total 05/25/2022 150  0 - 199 mg/dL Final    Triglycerides 05/25/2022 198* 0 - 150 mg/dL Final    HDL 05/25/2022 46  40 - 60 mg/dL Final    LDL Calculated 05/25/2022 64  <100 mg/dL Final    VLDL Cholesterol Calculated 05/25/2022 40  Not Established mg/dL Final    Hemoglobin A1C 05/25/2022 7.4  See comment % Final    Comment: Comment:  Diagnosis of Diabetes: > or = 6.5%  Increased risk of diabetes (Prediabetes): 5.7-6.4%  Glycemic Control: Nonpregnant Adults: <7.0%                    Pregnant: <6.0%        eAG 05/25/2022 165.7  mg/dL Final    Color, UA 05/27/2022 Yellow  Straw/Yellow Final    Clarity, UA 05/27/2022 Clear  Clear Final    Glucose, Ur 05/27/2022 Negative  Negative mg/dL Final    Bilirubin Urine 05/27/2022 Negative  Negative Final    Ketones, Urine 05/27/2022 Negative  Negative mg/dL Final    Specific Gravity, UA 05/27/2022 1.010  1.005 - 1.030 Final    Blood, Urine 05/27/2022 TRACE-INTACT* Negative Final    pH, UA 05/27/2022 6.0  5.0 - 8.0 Final    Protein, UA 05/27/2022 Negative  Negative mg/dL Final    Urobilinogen, Urine 05/27/2022 0.2  <2.0 E.U./dL Final    Nitrite, Urine 05/27/2022 Negative  Negative Final    Leukocyte Esterase, Urine 05/27/2022 SMALL* Negative Final    Microscopic Examination 05/27/2022 YES   Final    Urine Type 05/27/2022 NotGiven   Final    Urine received in a container without preservatives.     Urine Reflex to Culture 05/27/2022 Not Indicated   Final    WBC, UA 05/27/2022 3-5  0 - 5 /HPF Final    RBC, UA 05/27/2022 3-4  0 - 4 /HPF Final    Epithelial Cells, UA 05/27/2022 0-1  0 - 5 /HPF Final    Valproic Acid Lvl 05/31/2022 13.8* 50.0 - 100.0 ug/mL Final            Medications  Current Facility-Administered Medications: perphenazine tablet 2 mg, 2 mg, Oral, BID WC  fluvoxaMINE (LUVOX) tablet 25 mg, 25 mg, Oral, Nightly  hydrocortisone 1 % cream, , Topical, BID  medicated lip balm (BLISTEX/CARMEX) stick, , Topical, PRN  divalproex (DEPAKOTE SPRINKLE) capsule 250 mg, 250 mg, Oral, BID  diclofenac sodium (VOLTAREN) 1 % gel 2 g, 2 g, Topical, BID  therapeutic multivitamin-minerals 1 tablet, 1 tablet, Oral, Daily  vitamin E capsule 400 Units, 400 Units, Oral, Daily  acetaminophen (TYLENOL) tablet 650 mg, 650 mg, Oral, Q4H PRN  ibuprofen (ADVIL;MOTRIN) tablet 400 mg, 400 mg, Oral, Q6H PRN  magnesium hydroxide (MILK OF MAGNESIA) 400 MG/5ML suspension 30 mL, 30 mL, Oral, Daily PRN  nicotine polacrilex (COMMIT) lozenge 2 mg, 2 mg, Oral, Q1H PRN  aluminum & magnesium hydroxide-simethicone (MAALOX) 200-200-20 MG/5ML suspension 30 mL, 30 mL, Oral, Q6H PRN  hydrOXYzine (ATARAX) tablet 50 mg, 50 mg, Oral, TID PRN  OLANZapine (ZYPREXA) tablet 5 mg, 5 mg, Oral, Q8H PRN **OR** OLANZapine (ZYPREXA) 5 mg in sterile water 1 mL injection, 5 mg, IntraMUSCular, Q8H PRN  sterile water injection 2.1 mL, 2.1 mL, IntraMUSCular, Q4H PRN  diphenhydrAMINE (BENADRYL) injection 50 mg, 50 mg, IntraMUSCular, Q4H PRN  traZODone (DESYREL) tablet 50 mg, 50 mg, Oral, Nightly PRN  atorvastatin (LIPITOR) tablet 20 mg, 20 mg, Oral, Daily  fluticasone (FLONASE) 50 MCG/ACT nasal spray 1 spray, 1 spray, Each Nostril, Daily  lisinopril-hydroCHLOROthiazide (PRINZIDE;ZESTORETIC) 20-12.5 MG per tablet 1 tablet, 1 tablet, Oral, Daily  levothyroxine (SYNTHROID) tablet 25 mcg, 25 mcg, Oral, Daily  metFORMIN (GLUCOPHAGE) tablet 1,000 mg, 1,000 mg, Oral, BID WC  pantoprazole (PROTONIX) tablet 40 mg, 40 mg, Oral, Daily    ASSESSMENT AND PLAN    Principal Problem:    Dementia with behavioral disturbance (HCC)  Active Problems:    Encounter for routine adult medical examination    Psychosis ([facility] [address]    Left foot pain    Urticarial rash    Hyperlipidemia    Hypertension  Resolved Problems:    * No resolved hospital problems. *       1. Patients symptoms are improving  2. Probable discharge is 2-3 days   3. Discharge planning is complete  4. Suicidal ideation is none  5. Total time with patient was 40 minutes and more than 50 % of that time was spent counseling the patient on their symptoms, treatment and expected goals.      Addendum to PA student note:  Pt seen, examined, and evaluated with PA student , who acted as my scribe for the above documentation. I have reviewed the current history, physical findings, labs, assessment and plan; and agree with note as documented.

## 2022-06-06 NOTE — PLAN OF CARE
Problem: Anxiety  Goal: Will report anxiety at manageable levels  Description: INTERVENTIONS:  1. Administer medication as ordered  2. Teach and rehearse alternative coping skills  3. Provide emotional support with 1:1 interaction with staff  Outcome: Progressing     Problem: Coping  Goal: Pt/Family able to verbalize concerns and demonstrate effective coping strategies  Description: INTERVENTIONS:  1. Assist patient/family to identify coping skills, available support systems and cultural and spiritual values  2. Provide emotional support, including active listening and acknowledgement of concerns of patient and caregivers  3. Reduce environmental stimuli, as able  Outcome: Progressing     Problem: Decision Making  Goal: Pt/Family able to effectively weigh alternatives and participate in decision making related to treatment and care  Description: INTERVENTIONS:  1. Determine when there are differences between patient's view, family's view, and healthcare provider's view of condition  2. Facilitate patient and family articulation of goals for care  3. Help patient and family identify pros/cons of alternative solutions  Outcome: Progressing     Problem: Confusion  Goal: Confusion, delirium, dementia, or psychosis is improved or at baseline  Description: INTERVENTIONS:  1. Assess for possible contributors to thought disturbance, including medications, impaired vision or hearing, underlying metabolic abnormalities, dehydration, psychiatric diagnoses, and notify attending LIP  2. [place] high risk fall precautions, as indicated  3. Provide frequent short contacts to provide reality reorientation, refocusing and direction  Outcome: Progressing    Pt is alert and oriented x3. Pt has not had any episodes of disrobing at this time, which is her first day without any episodes of this since admission.  Pt continued to be anxious today regarding her daughter, attempting to call her daughter on the phone many times throughout the day. Still appeared worried about her but no mentions of her daughter being on fire this shift. Needed limits set regarding phone calls but pt was very redirectable and cooperative w/ this. Good appetite. Denies SI/HI/AVH, no RTIS noted. Pt is up ad damon w/ steady gait. Will continue to monitor.

## 2022-06-06 NOTE — PLAN OF CARE
Pt A+Ox2, mildly anxious, friendly, cooperative and medication compliant. She is visible on the unit watching TV. She denies SI/HI/AVH and no RTIS noted yet this shift. She was provided with a snack and denies any other needs at this time.

## 2022-06-06 NOTE — PROGRESS NOTES
Progress Note    Admit Date:  5/23/2022    68 y.o. female with DMII, HTN, HLD, GERD and hypothyroidism who presented to [facility] for dementia with behavioral disturbance    Subjective:  Ms. Steffi Sandhoff denies complaints. Very pleasant. Rash less itchy. Objective:   Vitals:    06/05/22 1939   BP: (!) 155/73   Pulse: 75   Resp: 18   Temp: 98.1 °F (36.7 °C)   SpO2:             Intake/Output Summary (Last 24 hours) at 6/6/2022 1047  Last data filed at 6/5/2022 1732  Gross per 24 hour   Intake 480 ml   Output --   Net 480 ml       Physical Exam:    Gen: No distress. Alert. Eyes: PERRL. No sclera icterus. No conjunctival injection. ENT: No discharge. Pharynx clear. Neck: No JVD. Trachea midline. Resp: No accessory muscle use. No crackles. No wheezes. No rhonchi. CV: Regular rate. Regular rhythm. No murmur. No rub. No edema. GI: Non-tender. Non-distended. Normal bowel sounds. Skin: Warm and dry. Urticarial rash left shoulder, no vesicles or pustules noted. M/S: No cyanosis. No joint deformity. No clubbing. Trace swelling left foot. Neuro: Awake. No focal neurologic deficit on exam.  Cranial nerves II through XII intact. Patient is able to ambulate without difficulty. Psych: Per psychiatry team evaluation     CULTURES  Results for Uriah Blackwell (MRN [id number]) as of 5/24/2022 09:22    Ref. Range 5/23/2022 16:13   SARS-CoV-2, NAAT Latest Ref Range: Not Detected  Not Detected      Results for Uriah Blackwell (MRN [id number]) as of 5/24/2022 09:22    Ref. 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 CT HEAD WO CONTRAST   Final Result   1. No acute intracranial abnormality.              Assessment/Plan:  #Dementia with behavioral disturbance  - per psychiatry team     #DMII  -BG controlled  -Continue metformin  -Consider SSI  -Monitor BG for now     #HTN  -BP stable  -Continue lisinopril-hctz  -Monitor     #HLD  -Continue statin     #GERD  -Continue PPI     #Hypothyroidism  -Continue synthroid    #Urticarial Rash left shoulder  -Cortisone cream ordered  -This is my first time seeing her rash today. She says it's no longer itching.   -Continue to monitor. #Left foot pain/swelling  -to anterior portion of foot  -X-ray negative for fracture, as above.     Carmen Landeros PA-C  6/6/2022 10:47 AM

## 2022-06-07 PROCEDURE — 6370000000 HC RX 637 (ALT 250 FOR IP): Performed by: PSYCHIATRY & NEUROLOGY

## 2022-06-07 PROCEDURE — 99233 SBSQ HOSP IP/OBS HIGH 50: CPT | Performed by: PSYCHIATRY & NEUROLOGY

## 2022-06-07 PROCEDURE — 1240000000 HC EMOTIONAL WELLNESS R&B

## 2022-06-07 PROCEDURE — 6370000000 HC RX 637 (ALT 250 FOR IP)

## 2022-06-07 RX ORDER — FLUVOXAMINE MALEATE 50 MG/1
50 TABLET, COATED ORAL NIGHTLY
Status: DISCONTINUED | OUTPATIENT
Start: 2022-06-07 | End: 2022-06-17 | Stop reason: HOSPADM

## 2022-06-07 RX ADMIN — DIVALPROEX SODIUM 250 MG: 125 CAPSULE, COATED PELLETS ORAL at 15:35

## 2022-06-07 RX ADMIN — FLUVOXAMINE MALEATE 50 MG: 50 TABLET, COATED ORAL at 20:44

## 2022-06-07 RX ADMIN — HYDROCORTISONE: 10 CREAM TOPICAL at 20:45

## 2022-06-07 RX ADMIN — HYDROCORTISONE: 10 CREAM TOPICAL at 09:20

## 2022-06-07 RX ADMIN — METFORMIN HYDROCHLORIDE 1000 MG: 500 TABLET ORAL at 09:15

## 2022-06-07 RX ADMIN — DIVALPROEX SODIUM 250 MG: 125 CAPSULE, COATED PELLETS ORAL at 09:14

## 2022-06-07 RX ADMIN — MULTIPLE VITAMINS W/ MINERALS TAB 1 TABLET: TAB at 09:15

## 2022-06-07 RX ADMIN — ATORVASTATIN CALCIUM 20 MG: 10 TABLET, FILM COATED ORAL at 09:15

## 2022-06-07 RX ADMIN — METFORMIN HYDROCHLORIDE 1000 MG: 500 TABLET ORAL at 15:32

## 2022-06-07 RX ADMIN — DICLOFENAC SODIUM 2 G: 10 GEL TOPICAL at 20:45

## 2022-06-07 RX ADMIN — DIVALPROEX SODIUM 250 MG: 125 CAPSULE, COATED PELLETS ORAL at 17:02

## 2022-06-07 RX ADMIN — LEVOTHYROXINE SODIUM 25 MCG: 25 TABLET ORAL at 09:15

## 2022-06-07 RX ADMIN — LISINOPRIL AND HYDROCHLOROTHIAZIDE 1 TABLET: 12.5; 2 TABLET ORAL at 09:14

## 2022-06-07 RX ADMIN — PERPHENAZINE 2 MG: 2 TABLET, FILM COATED ORAL at 09:15

## 2022-06-07 RX ADMIN — DICLOFENAC SODIUM 2 G: 10 GEL TOPICAL at 09:18

## 2022-06-07 RX ADMIN — PANTOPRAZOLE SODIUM 40 MG: 40 TABLET, DELAYED RELEASE ORAL at 09:15

## 2022-06-07 RX ADMIN — Medication 400 UNITS: at 09:15

## 2022-06-07 ASSESSMENT — PAIN SCALES - GENERAL: PAINLEVEL_OUTOF10: 0

## 2022-06-07 NOTE — PROGRESS NOTES
Patient pleasant and cooperative. Patient stated that she is not hearing any voices to tell her to undress. Patient still thinks that her daughter is going to be killed. Patient has all the time stating it was her daughter Sd Smith but today she has been stating it is her daughter Tim Sun. Patient started Luvox 50mg nightly. Patient +meds and +meals. Received shower.

## 2022-06-07 NOTE — PROGRESS NOTES
Behavioral Services                                              Medicare Re-Certification    I certify that the inpatient psychiatric hospital services furnished since the previous certification/re-certification were, and continue to be, medically necessary for;    [x] (1) Treatment which could reasonably be expected to improve the patient's condition,    [x] (2) Or for diagnostic study. Estimated length of stay/service 5 d    Plan for post-hospital care outpt    This patient continues to need, on a daily basis, active treatment furnished directly by or requiring the supervision of inpatient psychiatric personnel.     Electronically signed by Phoebe Boles MD on 6/7/2022 at 1:05 PM

## 2022-06-07 NOTE — PLAN OF CARE
Problem: Chronic Conditions and Co-morbidities  Goal: Patient's chronic conditions and co-morbidity symptoms are monitored and maintained or improved  6/7/2022 0439 by Krissy Garcia RN  Outcome: Progressing     Problem: Safety - Adult  Goal: Free from fall injury  6/7/2022 0439 by Krissy Garcia RN  Outcome: Progressing     Problem: ABCDS Injury Assessment  Goal: Absence of physical injury  Outcome: Progressing     Problem: Anxiety  Goal: Will report anxiety at manageable levels  Description: INTERVENTIONS:  1. Administer medication as ordered  2. Teach and rehearse alternative coping skills  3. Provide emotional support with 1:1 interaction with staff  6/7/2022 0439 by Krissy Garcia RN  Outcome: Progressing     Problem: Coping  Goal: Pt/Family able to verbalize concerns and demonstrate effective coping strategies  Description: INTERVENTIONS:  1. Assist patient/family to identify coping skills, available support systems and cultural and spiritual values  2. Provide emotional support, including active listening and acknowledgement of concerns of patient and caregivers  3. Reduce environmental stimuli, as able  4. Instruct patient/family in relaxation techniques, as appropriate  5. Assess for spiritual pain/suffering and initiate Spiritual Care, Psychosocial Clinical Specialist consults as needed  6/7/2022 0439 by Krissy Garcia RN  Outcome: Progressing     Problem: Decision Making  Goal: Pt/Family able to effectively weigh alternatives and participate in decision making related to treatment and care  Description: INTERVENTIONS:  1. Determine when there are differences between patient's view, family's view, and healthcare provider's view of condition  2. Facilitate patient and family articulation of goals for care  3. Help patient and family identify pros/cons of alternative solutions  4. Provide information as requested by patient/family  5.  Respect patient/family right to receive or not to receive information  6. Serve as a liaison between patient and family and health care team  7. Initiate Consults from Ethics, Palliative Care or initiate [facility] as is appropriate  6/7/2022 0439 by Stefan Vieira RN  Outcome: Progressing     Problem: Confusion  Goal: Confusion, delirium, dementia, or psychosis is improved or at baseline  Description: INTERVENTIONS:  1. Assess for possible contributors to thought disturbance, including medications, impaired vision or hearing, underlying metabolic abnormalities, dehydration, psychiatric diagnoses, and notify attending LIP  2. [place] high risk fall precautions, as indicated  3. Provide frequent short contacts to provide reality reorientation, refocusing and direction  4. Decrease environmental stimuli, including noise as appropriate  5. Monitor and intervene to maintain adequate nutrition, hydration, elimination, sleep and activity  6. If unable to ensure safety without constant attention obtain sitter and review sitter guidelines with assigned personnel  7. Initiate Psychosocial CNS and Spiritual Care consult, as indicated  6/7/2022 0439 by Stefan Vieira RN  Outcome: Progressing     Problem: Pain  Goal: Verbalizes/displays adequate comfort level or baseline comfort level  6/7/2022 0439 by Stefan Vieira RN  Outcome: Progressing    Patient pleasantly confused. Denies pain. Free of falls or physical injury. Appropriately interacts with peers. Responding to internal stimulus x1 observed episode. No disrobing this evening. Enjoys watching television. Medication compliant. Patient denies SI/HI, AH/VH.

## 2022-06-07 NOTE — GROUP NOTE
Group Therapy Note    Date: 6/7/2022    Group Start Time: 1000  Group End Time: 1163  Group Topic: Cognitive Skills    [id number] [facility]        Group Therapy Note    Attendees: 4    Group members were given \"Slogans\" from products through the years and had to guess what each slogan belonged to. Notes:  Patient attended in group for dull duration. Patient remained engaged and interacted approrpiately with others in the group. Status After Intervention:  Improved    Participation Level:  Active Listener and Interactive    Participation Quality: Appropriate      Speech:  hesitant      Thought Process/Content: Linear      Affective Functioning: Congruent       Mood: Euthymic      Level of consciousness:  Alert      Response to Learning: Able to verbalize current knowledge/experience      Endings: None Reported    Modes of Intervention: Socialization, Exploration and Activity      Discipline Responsible: Behavorial Health Tech      Signature:  ANGE Shea

## 2022-06-07 NOTE — PROGRESS NOTES
Senior Purposeful Rounding    Position:Left Side and Repositions Self    Physical Environment:Room free from clutter, Clear path to bathroom , Adequate lighting and No safety hazards noted    Pain Rating/ Nonverbal Pain Behaviors:0    Pain interventions Attempted: None    Patient Toileted:Continent and Independent

## 2022-06-07 NOTE — GROUP NOTE
Group Therapy Note    Date: 6/7/2022    Group Start Time: 1100  Group End Time: 5291  Group Topic: Activity    [facility]    ANGE Aguilar        Group Therapy Note  Group members asked to use music videos to their free expression group. Some of the members played air guitar, other's danced in their chairs while most of them sang the word to their requested songs out loud. Members reminisced about the good ole times that the songs reminded them of. Attendees: 4         Patient's Goal:      Notes:  Patient was cooperative and fully engaged in the group. She requested Viola Magnolias \"Everybody loves somebody\". She sang along to the music and danced in her chair as she sang along. Status After Intervention:  Improved    Participation Level:  Active Listener and Interactive    Participation Quality: Appropriate and Attentive      Speech:  normal      Thought Process/Content: Logical      Affective Functioning: Congruent      Mood: euthymic      Level of consciousness:  Alert      Response to Learning: Able to retain information      Endings: None Reported    Modes of Intervention: Activity and Media      Discipline Responsible: /Counselor      Signature:  ANGE Aguilar

## 2022-06-07 NOTE — PROGRESS NOTES
Senior Purposeful Rounding    Position:Left Side and Repositions Self    Physical Environment:Room free from clutter, Clear path to bathroom , Adequate lighting and No safety hazards noted    Pain Rating/ Nonverbal Pain Behaviors:0; Patient rests with eyes closed. Expression calm. Respirations regular and even.      Pain interventions Attempted: None    Patient Toileted:Continent and Independent

## 2022-06-07 NOTE — PROGRESS NOTES
Department of Psychiatry  AttendingProgress Note  Chief Complaint: behavioral disturbances   Lloyd Smith reports feeling anxious about her daughters Anthony Pena and Anshu. She endorses auditory hallucinations and the voices told her today that \"Becki passed away a couple minutes ago. \" Per nursing she has not been having episodes of taking her clothes off. She is preoccupied worrying about her daughters. She appears to be more concerned about [place] now than Erla Stephen which is a change from previous. She has been compliant on her medications. Medication changes include the following: Luvox was increased to 50mg qhs for her recurrent obsessions. Patient's chart was reviewed and collaborated with  about the treatment plan. SUBJECTIVE:    Patient is feeling unchanged. Suicidal ideation:  denies suicidal ideation. Patient does not have medication side effects. ROS: Patient has new complaints: no  Sleeping adequately:  Yes   Appetite adequate: Yes  Attending groups: Yes  Visitors:Yes    OBJECTIVE    Physical  VITALS:  /70   Pulse 71   Temp 97.7 °F (36.5 °C) (Oral)   Resp 16   Ht 4' 11\" (1.499 m)   Wt 142 lb (64.4 kg)   SpO2 95%   BMI 28.68 kg/m²     Mental Status Examination:  Patients appearance was well-appearing and street clothes. Thoughts are Obsessive. Homicidal ideations none. No abnormal movements, tics or mannerisms. Memory intact. A[place] 0. Concentration Fair. Alert and oriented X 4. Insight and Judgement impaired insight. Patient was cooperative.  Patient gait normal. Mood anxious, affect anxiety Hallucinations Present - auditory, suicidal ideations no specific plan to harm self Speech normal pitch and normal volume  Data  Labs:   Admission on 05/23/2022   Component Date Value Ref Range Status    TSH 05/24/2022 2.38  0.27 - 4.20 uIU/mL Final    Cholesterol, Total 05/25/2022 150  0 - 199 mg/dL Final    Triglycerides 05/25/2022 198* 0 - 150 mg/dL Final    HDL 05/25/2022 46  40 - 60 mg/dL Final    LDL Calculated 05/25/2022 64  <100 mg/dL Final    VLDL Cholesterol Calculated 05/25/2022 40  Not Established mg/dL Final    Hemoglobin A1C 05/25/2022 7.4  See comment % Final    Comment: Comment:  Diagnosis of Diabetes: > or = 6.5%  Increased risk of diabetes (Prediabetes): 5.7-6.4%  Glycemic Control: Nonpregnant Adults: <7.0%                    Pregnant: <6.0%        eAG 05/25/2022 165.7  mg/dL Final    Color, UA 05/27/2022 Yellow  Straw/Yellow Final    Clarity, UA 05/27/2022 Clear  Clear Final    Glucose, Ur 05/27/2022 Negative  Negative mg/dL Final    Bilirubin Urine 05/27/2022 Negative  Negative Final    Ketones, Urine 05/27/2022 Negative  Negative mg/dL Final    Specific Gravity, UA 05/27/2022 1.010  1.005 - 1.030 Final    Blood, Urine 05/27/2022 TRACE-INTACT* Negative Final    pH, UA 05/27/2022 6.0  5.0 - 8.0 Final    Protein, UA 05/27/2022 Negative  Negative mg/dL Final    Urobilinogen, Urine 05/27/2022 0.2  <2.0 E.U./dL Final    Nitrite, Urine 05/27/2022 Negative  Negative Final    Leukocyte Esterase, Urine 05/27/2022 SMALL* Negative Final    Microscopic Examination 05/27/2022 YES   Final    Urine Type 05/27/2022 NotGiven   Final    Urine received in a container without preservatives.     Urine Reflex to Culture 05/27/2022 Not Indicated   Final    WBC, UA 05/27/2022 3-5  0 - 5 /HPF Final    RBC, UA 05/27/2022 3-4  0 - 4 /HPF Final    Epithelial Cells, UA 05/27/2022 0-1  0 - 5 /HPF Final    Valproic Acid Lvl 05/31/2022 13.8* 50.0 - 100.0 ug/mL Final            Medications  Current Facility-Administered Medications: fluvoxaMINE (LUVOX) tablet 50 mg, 50 mg, Oral, Nightly  perphenazine tablet 2 mg, 2 mg, Oral, BID WC  hydrocortisone 1 % cream, , Topical, BID  medicated lip balm (BLISTEX/CARMEX) stick, , Topical, PRN  divalproex (DEPAKOTE SPRINKLE) capsule 250 mg, 250 mg, Oral, BID  diclofenac sodium (VOLTAREN) 1 % gel 2 g, 2 g, Topical, BID  therapeutic multivitamin-minerals 1 tablet, 1 tablet, Oral, Daily  vitamin E capsule 400 Units, 400 Units, Oral, Daily  acetaminophen (TYLENOL) tablet 650 mg, 650 mg, Oral, Q4H PRN  ibuprofen (ADVIL;MOTRIN) tablet 400 mg, 400 mg, Oral, Q6H PRN  magnesium hydroxide (MILK OF MAGNESIA) 400 MG/5ML suspension 30 mL, 30 mL, Oral, Daily PRN  nicotine polacrilex (COMMIT) lozenge 2 mg, 2 mg, Oral, Q1H PRN  aluminum & magnesium hydroxide-simethicone (MAALOX) 200-200-20 MG/5ML suspension 30 mL, 30 mL, Oral, Q6H PRN  hydrOXYzine (ATARAX) tablet 50 mg, 50 mg, Oral, TID PRN  OLANZapine (ZYPREXA) tablet 5 mg, 5 mg, Oral, Q8H PRN **OR** OLANZapine (ZYPREXA) 5 mg in sterile water 1 mL injection, 5 mg, IntraMUSCular, Q8H PRN  sterile water injection 2.1 mL, 2.1 mL, IntraMUSCular, Q4H PRN  diphenhydrAMINE (BENADRYL) injection 50 mg, 50 mg, IntraMUSCular, Q4H PRN  traZODone (DESYREL) tablet 50 mg, 50 mg, Oral, Nightly PRN  atorvastatin (LIPITOR) tablet 20 mg, 20 mg, Oral, Daily  fluticasone (FLONASE) 50 MCG/ACT nasal spray 1 spray, 1 spray, Each Nostril, Daily  lisinopril-hydroCHLOROthiazide (PRINZIDE;ZESTORETIC) 20-12.5 MG per tablet 1 tablet, 1 tablet, Oral, Daily  levothyroxine (SYNTHROID) tablet 25 mcg, 25 mcg, Oral, Daily  metFORMIN (GLUCOPHAGE) tablet 1,000 mg, 1,000 mg, Oral, BID WC  pantoprazole (PROTONIX) tablet 40 mg, 40 mg, Oral, Daily    ASSESSMENT AND PLAN    Principal Problem:    Dementia with behavioral disturbance (HCC)  Active Problems:    Encounter for routine adult medical examination    Psychosis ([facility] [address]    Left foot pain    Urticarial rash    Hyperlipidemia    Hypertension  Resolved Problems:    * No resolved hospital problems. *       1. Patient s symptoms   show no change  2. Probable discharge is 2-3 days  3. Discharge planning is complete  4. Suicidal ideation is none  5. Total time with patient was 40 minutes and more than 50 % of that time was spent counseling the patient on their symptoms, treatment and expected goals.      Addendum to PA student note:  Pt seen, examined, and evaluated with PA student , who acted as my scribe for the above documentation. I have reviewed the current history, physical findings, labs, assessment and plan; and agree with note as documented.

## 2022-06-07 NOTE — PROGRESS NOTES
Senior Purposeful Rounding    Position:Right Side and Repositions Self    Physical Environment:Room free from clutter, Clear path to bathroom , Adequate lighting and No safety hazards noted    Pain Rating/ Nonverbal Pain Behaviors:0, rests with eyes closed. Expression calm. Respirations regular and even.      Pain interventions Attempted: None    Patient Toileted:Continent and Independent

## 2022-06-07 NOTE — GROUP NOTE
Group Therapy Note    Date: 6/7/2022    Group Start Time: 1013  Group End Time: 1400  Group Topic: Recreational    [id number]        Group Therapy Note    Attendees: 6    Group members sat at the table and listened to music while engaged in mindfulness with multimedia arts. Notes:  Patient attended in group for dull duration. Patient remained engaged and interacted approrpiately with others in the group. Status After Intervention:  Improved    Participation Level:  Active Listener    Participation Quality: Appropriate, Attentive and Sharing      Speech:  hesitant      Thought Process/Content: Linear      Affective Functioning: Congruent      Mood: euthymic      Level of consciousness:  Alert      Response to Learning: Able to verbalize current knowledge/experience      Endings: None Reported    Modes of Intervention: Socialization, Exploration and Activity      Discipline Responsible: Behavorial Health Tech      Signature:  ANGE Horner

## 2022-06-08 LAB — VALPROIC ACID LEVEL: 36.8 UG/ML (ref 50–100)

## 2022-06-08 PROCEDURE — 6370000000 HC RX 637 (ALT 250 FOR IP)

## 2022-06-08 PROCEDURE — 1240000000 HC EMOTIONAL WELLNESS R&B

## 2022-06-08 PROCEDURE — 80164 ASSAY DIPROPYLACETIC ACD TOT: CPT

## 2022-06-08 PROCEDURE — 99233 SBSQ HOSP IP/OBS HIGH 50: CPT | Performed by: PSYCHIATRY & NEUROLOGY

## 2022-06-08 PROCEDURE — 6370000000 HC RX 637 (ALT 250 FOR IP): Performed by: PSYCHIATRY & NEUROLOGY

## 2022-06-08 PROCEDURE — 36415 COLL VENOUS BLD VENIPUNCTURE: CPT

## 2022-06-08 RX ORDER — DIVALPROEX SODIUM 125 MG/1
500 CAPSULE, COATED PELLETS ORAL 2 TIMES DAILY
Status: DISCONTINUED | OUTPATIENT
Start: 2022-06-09 | End: 2022-06-17 | Stop reason: HOSPADM

## 2022-06-08 RX ORDER — PERPHENAZINE 4 MG/1
4 TABLET, FILM COATED ORAL 2 TIMES DAILY WITH MEALS
Status: DISCONTINUED | OUTPATIENT
Start: 2022-06-08 | End: 2022-06-13

## 2022-06-08 RX ADMIN — DICLOFENAC SODIUM 2 G: 10 GEL TOPICAL at 21:26

## 2022-06-08 RX ADMIN — FLUVOXAMINE MALEATE 50 MG: 50 TABLET, COATED ORAL at 21:24

## 2022-06-08 RX ADMIN — DIVALPROEX SODIUM 250 MG: 125 CAPSULE, COATED PELLETS ORAL at 09:22

## 2022-06-08 RX ADMIN — LEVOTHYROXINE SODIUM 25 MCG: 25 TABLET ORAL at 09:22

## 2022-06-08 RX ADMIN — METFORMIN HYDROCHLORIDE 1000 MG: 500 TABLET ORAL at 09:22

## 2022-06-08 RX ADMIN — DIVALPROEX SODIUM 250 MG: 125 CAPSULE, COATED PELLETS ORAL at 13:11

## 2022-06-08 RX ADMIN — METFORMIN HYDROCHLORIDE 1000 MG: 500 TABLET ORAL at 17:12

## 2022-06-08 RX ADMIN — HYDROCORTISONE: 10 CREAM TOPICAL at 09:28

## 2022-06-08 RX ADMIN — DICLOFENAC SODIUM 2 G: 10 GEL TOPICAL at 09:26

## 2022-06-08 RX ADMIN — LISINOPRIL AND HYDROCHLOROTHIAZIDE 1 TABLET: 12.5; 2 TABLET ORAL at 09:22

## 2022-06-08 RX ADMIN — PERPHENAZINE 4 MG: 4 TABLET, FILM COATED ORAL at 17:12

## 2022-06-08 RX ADMIN — MULTIPLE VITAMINS W/ MINERALS TAB 1 TABLET: TAB at 09:22

## 2022-06-08 RX ADMIN — ATORVASTATIN CALCIUM 20 MG: 10 TABLET, FILM COATED ORAL at 09:23

## 2022-06-08 RX ADMIN — PERPHENAZINE 2 MG: 2 TABLET, FILM COATED ORAL at 09:21

## 2022-06-08 RX ADMIN — PANTOPRAZOLE SODIUM 40 MG: 40 TABLET, DELAYED RELEASE ORAL at 09:22

## 2022-06-08 RX ADMIN — Medication 400 UNITS: at 09:23

## 2022-06-08 RX ADMIN — HYDROCORTISONE: 10 CREAM TOPICAL at 21:27

## 2022-06-08 ASSESSMENT — PAIN SCALES - GENERAL: PAINLEVEL_OUTOF10: 0

## 2022-06-08 NOTE — PROGRESS NOTES
Patient pleasant and cooperative. Pratima did unrobed and walked down the hallway this morning. Patient stated she was hearing the voice again telling her to unrobe. Denies SI/HI. Takes all meds without any issues and eats all her meals.

## 2022-06-08 NOTE — PROGRESS NOTES
Department of Psychiatry  AttendingProgress Note  Chief Complaint: behavioral disturbances   Frantz Layton reports that she is still hearing voices. It is the same males voice she has been hearing. Voice told her to \"get naked\" today. She is enjoying groups. She has been compliant with her medications and is not complaining of any side effects from them. Will increase Depakote to 500mg BID and Perphenazine 4mg BID. Patient's chart was reviewed and collaborated with  about the treatment plan. SUBJECTIVE:    Patient is feeling unchanged. Suicidal ideation:  denies suicidal ideation. Patient does not have medication side effects. ROS: Patient has new complaints: no  Sleeping adequately:  Yes   Appetite adequate: Yes  Attending groups: Yes  Visitors:No    OBJECTIVE    Physical  VITALS:  /76   Pulse 66   Temp 98.4 °F (36.9 °C) (Oral)   Resp 16   Ht 4' 11\" (1.499 m)   Wt 142 lb (64.4 kg)   SpO2 97%   BMI 28.68 kg/m²     Mental Status Examination:  Patients appearance was well-appearing. Thoughts are Obsessive. Homicidal ideations none. No abnormal movements, tics or mannerisms. Memory intact A[place] 0. Concentration Fair. Alert and oriented X 4. Insight and Judgement impaired insight. Patient was cooperative.  Patient gait normal. Mood anxious, affect normal affect Hallucinations Present - Command Hallucinations , suicidal ideations no specific plan to harm self Speech normal pitch and normal volume  Data  Labs:   Admission on 05/23/2022   Component Date Value Ref Range Status    TSH 05/24/2022 2.38  0.27 - 4.20 uIU/mL Final    Cholesterol, Total 05/25/2022 150  0 - 199 mg/dL Final    Triglycerides 05/25/2022 198* 0 - 150 mg/dL Final    HDL 05/25/2022 46  40 - 60 mg/dL Final    LDL Calculated 05/25/2022 64  <100 mg/dL Final    VLDL Cholesterol Calculated 05/25/2022 40  Not Established mg/dL Final    Hemoglobin A1C 05/25/2022 7.4  See comment % Final    Comment: Comment:  Diagnosis of Diabetes: > or = 6.5%  Increased risk of diabetes (Prediabetes): 5.7-6.4%  Glycemic Control: Nonpregnant Adults: <7.0%                    Pregnant: <6.0%        eAG 05/25/2022 165.7  mg/dL Final    Color, UA 05/27/2022 Yellow  Straw/Yellow Final    Clarity, UA 05/27/2022 Clear  Clear Final    Glucose, Ur 05/27/2022 Negative  Negative mg/dL Final    Bilirubin Urine 05/27/2022 Negative  Negative Final    Ketones, Urine 05/27/2022 Negative  Negative mg/dL Final    Specific Gravity, UA 05/27/2022 1.010  1.005 - 1.030 Final    Blood, Urine 05/27/2022 TRACE-INTACT* Negative Final    pH, UA 05/27/2022 6.0  5.0 - 8.0 Final    Protein, UA 05/27/2022 Negative  Negative mg/dL Final    Urobilinogen, Urine 05/27/2022 0.2  <2.0 E.U./dL Final    Nitrite, Urine 05/27/2022 Negative  Negative Final    Leukocyte Esterase, Urine 05/27/2022 SMALL* Negative Final    Microscopic Examination 05/27/2022 YES   Final    Urine Type 05/27/2022 NotGiven   Final    Urine received in a container without preservatives.     Urine Reflex to Culture 05/27/2022 Not Indicated   Final    WBC, UA 05/27/2022 3-5  0 - 5 /HPF Final    RBC, UA 05/27/2022 3-4  0 - 4 /HPF Final    Epithelial Cells, UA 05/27/2022 0-1  0 - 5 /HPF Final    Valproic Acid Lvl 05/31/2022 13.8* 50.0 - 100.0 ug/mL Final    Valproic Acid Lvl 06/08/2022 36.8* 50.0 - 100.0 ug/mL Final            Medications  Current Facility-Administered Medications: fluvoxaMINE (LUVOX) tablet 50 mg, 50 mg, Oral, Nightly  perphenazine tablet 2 mg, 2 mg, Oral, BID WC  hydrocortisone 1 % cream, , Topical, BID  medicated lip balm (BLISTEX/CARMEX) stick, , Topical, PRN  divalproex (DEPAKOTE SPRINKLE) capsule 250 mg, 250 mg, Oral, BID  diclofenac sodium (VOLTAREN) 1 % gel 2 g, 2 g, Topical, BID  therapeutic multivitamin-minerals 1 tablet, 1 tablet, Oral, Daily  vitamin E capsule 400 Units, 400 Units, Oral, Daily  acetaminophen (TYLENOL) tablet 650 mg, 650 mg, Oral, Q4H PRN  ibuprofen (ADVIL;MOTRIN) tablet 400 mg, 400 mg, Oral, Q6H PRN  magnesium hydroxide (MILK OF MAGNESIA) 400 MG/5ML suspension 30 mL, 30 mL, Oral, Daily PRN  nicotine polacrilex (COMMIT) lozenge 2 mg, 2 mg, Oral, Q1H PRN  aluminum & magnesium hydroxide-simethicone (MAALOX) 200-200-20 MG/5ML suspension 30 mL, 30 mL, Oral, Q6H PRN  hydrOXYzine (ATARAX) tablet 50 mg, 50 mg, Oral, TID PRN  OLANZapine (ZYPREXA) tablet 5 mg, 5 mg, Oral, Q8H PRN **OR** OLANZapine (ZYPREXA) 5 mg in sterile water 1 mL injection, 5 mg, IntraMUSCular, Q8H PRN  sterile water injection 2.1 mL, 2.1 mL, IntraMUSCular, Q4H PRN  diphenhydrAMINE (BENADRYL) injection 50 mg, 50 mg, IntraMUSCular, Q4H PRN  traZODone (DESYREL) tablet 50 mg, 50 mg, Oral, Nightly PRN  atorvastatin (LIPITOR) tablet 20 mg, 20 mg, Oral, Daily  fluticasone (FLONASE) 50 MCG/ACT nasal spray 1 spray, 1 spray, Each Nostril, Daily  lisinopril-hydroCHLOROthiazide (PRINZIDE;ZESTORETIC) 20-12.5 MG per tablet 1 tablet, 1 tablet, Oral, Daily  levothyroxine (SYNTHROID) tablet 25 mcg, 25 mcg, Oral, Daily  metFORMIN (GLUCOPHAGE) tablet 1,000 mg, 1,000 mg, Oral, BID WC  pantoprazole (PROTONIX) tablet 40 mg, 40 mg, Oral, Daily    ASSESSMENT AND PLAN    Principal Problem:    Dementia with behavioral disturbance (HCC)  Active Problems:    Encounter for routine adult medical examination    Psychosis ([facility] [address]    Left foot pain    Urticarial rash    Hyperlipidemia    Hypertension  Resolved Problems:    * No resolved hospital problems. *       1. Patients symptoms show no change  2. Probable discharge is 5-7 days   3. Discharge planning is complete  4. Suicidal ideation is none  5. Total time with patient was 40 minutes and more than 50 % of that time was spent counseling the patient on their symptoms, treatment and expected goals. Addendum to PA student note:  Pt seen, examined, and evaluated with PA student , who acted as my scribe for the above documentation.  I have reviewed the current history, physical findings, labs, assessment and plan; and agree with note as documented. Katie Reyes

## 2022-06-08 NOTE — PROGRESS NOTES
Patient returned to room and changed into gown briefly then disrobed many times leaving room or opening door to room. Patient redirected many times to refrain from such behavior. Patient remains pleasant with flat affect. Did eventually lay down for sleep.

## 2022-06-08 NOTE — GROUP NOTE
Group Therapy Note    Date: 6/8/2022    Group Start Time: 1000  Group End Time: 4222  Group Topic: Recreational    [id number]        Group Therapy Note    Attendees: 3    Group members sat at the table and engaged in a puzzle. Notes:  Patient attended group for the full duration. Patient remained engaged and interacted appropriately with other members of the group. Status After Intervention:  Improved    Participation Level:  Active Listener and Interactive    Participation Quality: Appropriate and Attentive      Thought Process/Content: Linear      Affective Functioning: Congruent      Mood: euthymic      Level of consciousness:  Alert      Response to Learning: Able to verbalize current knowledge/experience      Endings: None Reported    Modes of Intervention: Activity      Discipline Responsible: Behavorial Health Tech      Signature:  ANGE Celis

## 2022-06-08 NOTE — GROUP NOTE
Group Therapy Note    Date: 6/8/2022    Group Start Time: 8197  Group End Time: 1400  Group Topic: Cognitive Skills    [id number] [facility]        Group Therapy Note    Attendees: 4    Group members sat at the table and engaged in conversation about sign language. Group members were shown the alphabet in sign language and signed each others names. Notes:  Patient attended group for the full duration. Patient remained engaged and interacted appropriately with other members of the group. Status After Intervention:  Improved    Participation Level:  Active Listener and Interactive    Participation Quality: Appropriate and Attentive      Speech:  normal      Thought Process/Content: Linear      Affective Functioning: Congruent      Mood: euthymic      Level of consciousness:  Alert      Response to Learning: Able to verbalize current knowledge/experience      Endings: None Reported    Modes of Intervention: Education and Exploration      Discipline Responsible: Behavorial Health Tech      Signature:  ANGE Newby

## 2022-06-08 NOTE — FLOWSHEET NOTE
Senior Purposeful Rounding    Position:Repositions Self    Physical Environment:Room free from clutter, Clear path to bathroom , Adequate lighting and No safety hazards noted    Pain Rating/ Nonverbal Pain Behaviors:0    Pain interventions Attempted: none    Patient Toileted:Independent

## 2022-06-08 NOTE — PLAN OF CARE
Problem: Chronic Conditions and Co-morbidities  Goal: Patient's chronic conditions and co-morbidity symptoms are monitored and maintained or improved  6/8/2022 1605 by CLIVE HARTMANN  Outcome: Not Progressing     Problem: Safety - Adult  Goal: Free from fall injury  6/8/2022 1605 by CLIVE HARTMANN  Outcome: Not Progressing     Problem: ABCDS Injury Assessment  Goal: Absence of physical injury  6/8/2022 1605 by CLIVE HARTMANN  Outcome: Not Progressing     Problem: Anxiety  Goal: Will report anxiety at manageable levels  Description: INTERVENTIONS:  1. Administer medication as ordered  2. Teach and rehearse alternative coping skills  3. Provide emotional support with 1:1 interaction with staff  6/8/2022 1605 by CLIVE HARTMANN  Outcome: Not Progressing     Problem: Coping  Goal: Pt/Family able to verbalize concerns and demonstrate effective coping strategies  Description: INTERVENTIONS:  1. Assist patient/family to identify coping skills, available support systems and cultural and spiritual values  2. Provide emotional support, including active listening and acknowledgement of concerns of patient and caregivers  3. Reduce environmental stimuli, as able  4. Instruct patient/family in relaxation techniques, as appropriate  5. Assess for spiritual pain/suffering and initiate Spiritual Care, Psychosocial Clinical Specialist consults as needed  6/8/2022 1605 by CLIVE HARTMANN  Outcome: Not Progressing     Problem: Decision Making  Goal: Pt/Family able to effectively weigh alternatives and participate in decision making related to treatment and care  Description: INTERVENTIONS:  1. Determine when there are differences between patient's view, family's view, and healthcare provider's view of condition  2. Facilitate patient and family articulation of goals for care  3. Help patient and family identify pros/cons of alternative solutions  4. Provide information as requested by patient/family  5.  Respect patient/family right to receive
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
## 2022-06-09 PROCEDURE — 1240000000 HC EMOTIONAL WELLNESS R&B

## 2022-06-09 PROCEDURE — 6370000000 HC RX 637 (ALT 250 FOR IP): Performed by: PSYCHIATRY & NEUROLOGY

## 2022-06-09 PROCEDURE — 6370000000 HC RX 637 (ALT 250 FOR IP)

## 2022-06-09 PROCEDURE — 99233 SBSQ HOSP IP/OBS HIGH 50: CPT | Performed by: PSYCHIATRY & NEUROLOGY

## 2022-06-09 PROCEDURE — 99231 SBSQ HOSP IP/OBS SF/LOW 25: CPT

## 2022-06-09 RX ADMIN — ATORVASTATIN CALCIUM 20 MG: 10 TABLET, FILM COATED ORAL at 08:33

## 2022-06-09 RX ADMIN — Medication 400 UNITS: at 15:28

## 2022-06-09 RX ADMIN — HYDROCORTISONE: 10 CREAM TOPICAL at 08:37

## 2022-06-09 RX ADMIN — HYDROCORTISONE: 10 CREAM TOPICAL at 20:40

## 2022-06-09 RX ADMIN — DIVALPROEX SODIUM 500 MG: 125 CAPSULE, COATED PELLETS ORAL at 08:33

## 2022-06-09 RX ADMIN — PANTOPRAZOLE SODIUM 40 MG: 40 TABLET, DELAYED RELEASE ORAL at 08:33

## 2022-06-09 RX ADMIN — FLUVOXAMINE MALEATE 50 MG: 50 TABLET, COATED ORAL at 20:42

## 2022-06-09 RX ADMIN — DICLOFENAC SODIUM 2 G: 10 GEL TOPICAL at 20:41

## 2022-06-09 RX ADMIN — PERPHENAZINE 4 MG: 4 TABLET, FILM COATED ORAL at 08:37

## 2022-06-09 RX ADMIN — METFORMIN HYDROCHLORIDE 1000 MG: 500 TABLET ORAL at 08:33

## 2022-06-09 RX ADMIN — MULTIPLE VITAMINS W/ MINERALS TAB 1 TABLET: TAB at 08:33

## 2022-06-09 RX ADMIN — PERPHENAZINE 4 MG: 4 TABLET, FILM COATED ORAL at 17:58

## 2022-06-09 RX ADMIN — DIVALPROEX SODIUM 500 MG: 125 CAPSULE, COATED PELLETS ORAL at 15:27

## 2022-06-09 RX ADMIN — DICLOFENAC SODIUM 2 G: 10 GEL TOPICAL at 08:37

## 2022-06-09 RX ADMIN — LISINOPRIL AND HYDROCHLOROTHIAZIDE 1 TABLET: 12.5; 2 TABLET ORAL at 08:33

## 2022-06-09 RX ADMIN — LEVOTHYROXINE SODIUM 25 MCG: 25 TABLET ORAL at 08:33

## 2022-06-09 RX ADMIN — METFORMIN HYDROCHLORIDE 1000 MG: 500 TABLET ORAL at 17:58

## 2022-06-09 ASSESSMENT — PAIN SCALES - GENERAL: PAINLEVEL_OUTOF10: 0

## 2022-06-09 NOTE — GROUP NOTE
Group Therapy Note    Date: 6/9/2022    Group Start Time: 1330  Group End Time: 9955  Group Topic: Recreational    [id number]        Group Therapy Note    Attendees: 4    Group members engaged in a game of DIANE. Notes:  Patient attended group for the full duration. Patient remained engaged and interacted approrpiately with other members of the group. Status After Intervention:  Improved    Participation Level:  Active Listener and Interactive    Participation Quality: Appropriate      Speech:  hesitant      Thought Process/Content: Linear      Affective Functioning: Congruent      Level of consciousness:  Alert      Response to Learning: Able to verbalize current knowledge/experience      Endings: None Reported    Modes of Intervention: Socialization, Exploration and Activity      Discipline Responsible: Behavorial Health Tech      Signature:  ANGE Malone
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
## 2022-06-09 NOTE — FLOWSHEET NOTE
Senior Purposeful Rounding    Position:Right Side    Physical Environment:Room free from clutter, Clear path to bathroom , Adequate lighting and No safety hazards noted    Pain Rating/ Nonverbal Pain Behaviors; sleeping    Patient Toileted:Independent

## 2022-06-09 NOTE — FLOWSHEET NOTE
Senior Purposeful Rounding    Position:Sitting    Physical Environment:Room free from clutter, Clear path to bathroom , Adequate lighting and No safety hazards noted    Pain Rating/ Nonverbal Pain Behaviors:denies    Pain interventions Attempted: n/a    Patient Toileted:Continent

## 2022-06-09 NOTE — PROGRESS NOTES
Brief Progress Note    Admit Date:  5/23/2022    68 y.o. female with DMII, HTN, HLD, GERD and hypothyroidism who presented to [facility] for dementia with behavioral disturbance    Subjective:  Ms. Carlos Crabtree is seen up and watching tv. Denies complaints. Rash about the same. Objective:   Vitals:    06/09/22 0800   BP: 124/69   Pulse: 60   Resp: 16   Temp: 97.3 °F (36.3 °C)   SpO2: 95%          No intake or output data in the 24 hours ending 06/09/22 1527    Physical Exam:    Gen: No distress. Alert. Eyes: PERRL. No sclera icterus. No conjunctival injection. ENT: No discharge. Pharynx clear. Neck: No JVD. Trachea midline. Resp: No accessory muscle use. No crackles. No wheezes. No rhonchi. CV: Regular rate. Regular rhythm. No murmur. No rub. No edema. GI: Non-tender. Non-distended. Normal bowel sounds. Skin: Warm and dry. Urticarial rash left shoulder improving  M/S: No cyanosis. No joint deformity. No clubbing. Trace swelling left foot. Neuro: Awake. No focal neurologic deficit on exam.  Cranial nerves II through XII intact. Patient is able to ambulate without difficulty. Psych: Per psychiatry team evaluation     CULTURES  Results for Rene Wiggins (MRN [id number]) as of 5/24/2022 09:22    Ref. Range 5/23/2022 16:13   SARS-CoV-2, NAAT Latest Ref Range: Not Detected  Not Detected      Results for Rene Wiggins (MRN [id number]) as of 5/24/2022 09:22    Ref. 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 CT HEAD WO CONTRAST   Final Result   1. No acute intracranial abnormality.              Assessment/Plan:  #Dementia with behavioral disturbance  - per psychiatry team     #DMII  -BG controlled  -Continue metformin  -Consider SSI  -Monitor BG for now     #HTN  -BP stable  -Continue lisinopril-hctz  -Monitor     #HLD  -Continue statin     #GERD  -Continue PPI     #Hypothyroidism  -Continue synthroid    #Urticarial Rash left shoulder  -Cortisone cream ordered  -This is my first time seeing her rash today. She says it's no longer itching.   -Continue to monitor.  -improving    #Left foot pain/swelling  -to anterior portion of foot  -X-ray negative for fracture, as above.     Mackenzie Harper PA-C  6/9/2022 3:27 PM
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
## 2022-06-09 NOTE — PLAN OF CARE
Nursing shift report 1900 to 0700. Patient was visible on the unit in the evening with no behaviors or stripping of her clothes outside her room. She denied SI and HI. She reported having AH telling her to get naked still. She was medication compliant with her pills[ and creams. Rash. Erythema remains to bilateral upper arms and chest where hydrocortisone cream applied Her gait is steady. No PRN medications needed. She has slept well. Safety checks continue. Problem: Safety - Adult  Goal: Free from fall injury  6/9/2022 0502 by Danica Buck RN  Outcome: Progressing    Problem: Anxiety  Goal: Will report anxiety at manageable levels  Description: INTERVENTIONS:  1. Administer medication as ordered  2. Teach and rehearse alternative coping skills  3. Provide emotional support with 1:1 interaction with staff  6/9/2022 0502 by Danica Buck RN  Outcome: Progressing       Problem: Psychosis  Goal: Will report no hallucinations or delusions  Description: INTERVENTIONS:  1. Administer medication as  ordered  2. Assist with reality testing to support increasing orientation  3.  Assess if patient's hallucinations or delusions are encouraging self harm or harm to others and intervene as appropriate  6/9/2022 0502 by Danica Buck RN  Outcome: Not Progressing

## 2022-06-09 NOTE — FLOWSHEET NOTE
Group Therapy Note    Date: 6/9/2022  Start Time: 1300  End Time:  1400  Number of Participants: 5    Type of Group: Music Group    Notes:  Pt present for Music Group. Pt actively participated by making song selections and singing along to music. Participation Level:  Active Listener and Interactive    Participation Quality: Appropriate and Attentive      Speech:  normal      Affective Functioning: Congruent      Endings: None Reported    Modes of Intervention: Support, Socialization, Activity and Media      Discipline Responsible: Chaplain Lacy Sidhu       06/09/22 1432   Encounter Summary   Encounter Overview/Reason  Behavioral Health   Service Provided For: Patient   Last Encounter    (6/9 Music Group)   Complexity of Encounter Moderate   Begin Time 1000   End Time  1045   Total Time Calculated 45 min

## 2022-06-09 NOTE — FLOWSHEET NOTE
Senior Purposeful Rounding    Position:Sitting    Physical Environment:Room free from clutter, Clear path to bathroom , Adequate lighting and No safety hazards noted    Pain Rating/ Nonverbal Pain Behaviors:denies    Patient Toileted:Independent

## 2022-06-09 NOTE — FLOWSHEET NOTE
Senior Purposeful Rounding    Position:Sitting    Physical Environment:Room free from clutter, Clear path to bathroom , Adequate lighting and No safety hazards noted    Pain Rating/ Nonverbal Pain Behaviors:denies      Patient Toileted:Independent English

## 2022-06-10 PROCEDURE — 6370000000 HC RX 637 (ALT 250 FOR IP): Performed by: PSYCHIATRY & NEUROLOGY

## 2022-06-10 PROCEDURE — 6370000000 HC RX 637 (ALT 250 FOR IP)

## 2022-06-10 PROCEDURE — 99233 SBSQ HOSP IP/OBS HIGH 50: CPT | Performed by: PSYCHIATRY & NEUROLOGY

## 2022-06-10 PROCEDURE — 1240000000 HC EMOTIONAL WELLNESS R&B

## 2022-06-10 RX ADMIN — LEVOTHYROXINE SODIUM 25 MCG: 25 TABLET ORAL at 08:52

## 2022-06-10 RX ADMIN — LISINOPRIL AND HYDROCHLOROTHIAZIDE 1 TABLET: 12.5; 2 TABLET ORAL at 08:51

## 2022-06-10 RX ADMIN — DICLOFENAC SODIUM 2 G: 10 GEL TOPICAL at 09:00

## 2022-06-10 RX ADMIN — Medication 400 UNITS: at 08:51

## 2022-06-10 RX ADMIN — MULTIPLE VITAMINS W/ MINERALS TAB 1 TABLET: TAB at 08:51

## 2022-06-10 RX ADMIN — DIVALPROEX SODIUM 500 MG: 125 CAPSULE, COATED PELLETS ORAL at 08:52

## 2022-06-10 RX ADMIN — METFORMIN HYDROCHLORIDE 1000 MG: 500 TABLET ORAL at 18:12

## 2022-06-10 RX ADMIN — FLUVOXAMINE MALEATE 50 MG: 50 TABLET, COATED ORAL at 21:16

## 2022-06-10 RX ADMIN — HYDROCORTISONE: 10 CREAM TOPICAL at 09:01

## 2022-06-10 RX ADMIN — DIVALPROEX SODIUM 500 MG: 125 CAPSULE, COATED PELLETS ORAL at 16:01

## 2022-06-10 RX ADMIN — PERPHENAZINE 4 MG: 4 TABLET, FILM COATED ORAL at 18:12

## 2022-06-10 RX ADMIN — ATORVASTATIN CALCIUM 20 MG: 10 TABLET, FILM COATED ORAL at 08:51

## 2022-06-10 RX ADMIN — DICLOFENAC SODIUM 2 G: 10 GEL TOPICAL at 21:16

## 2022-06-10 RX ADMIN — PERPHENAZINE 4 MG: 4 TABLET, FILM COATED ORAL at 08:51

## 2022-06-10 RX ADMIN — PANTOPRAZOLE SODIUM 40 MG: 40 TABLET, DELAYED RELEASE ORAL at 08:51

## 2022-06-10 RX ADMIN — HYDROCORTISONE: 10 CREAM TOPICAL at 21:16

## 2022-06-10 RX ADMIN — METFORMIN HYDROCHLORIDE 1000 MG: 500 TABLET ORAL at 08:51

## 2022-06-10 ASSESSMENT — PAIN DESCRIPTION - LOCATION: LOCATION: FOOT

## 2022-06-10 ASSESSMENT — PAIN SCALES - GENERAL
PAINLEVEL_OUTOF10: 0
PAINLEVEL_OUTOF10: 4
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
## 2022-06-10 NOTE — FLOWSHEET NOTE
Senior Purposeful Rounding     Position:Sitting     Physical Environment:Room free from clutter, Clear path to bathroom , Adequate lighting and No safety hazards noted     Pain Rating/ Nonverbal Pain Behaviors:0, asleep     Pain interventions Attempted: n/a     Patient Toileted:Independent
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
## 2022-06-10 NOTE — FLOWSHEET NOTE
Senior Purposeful Rounding     Position:Repositions self     Physical Environment:Room free from clutter, Clear path to bathroom , Adequate lighting and No safety hazards noted     Pain Rating/ Nonverbal Pain Behaviors:0, asleep     Pain interventions Attempted: n/a     Patient Toileted:Independent

## 2022-06-10 NOTE — GROUP NOTE
Group Therapy Note    Date: 6/10/2022    Group Start Time: 1000  Group End Time: 6691  Group Topic: Cognitive Skills    [id number] [facility]        Group Therapy Note    Attendees: 4    Group members listened to 15 second clips of theme songs from shows in the 63's and had to guess what show each theme song belonged to. Notes: Jose Martin Mcdermott attended group part of the time. Jose Martin Frenchp remained engaged and interacted appropriately with other members of the group. Status After Intervention:  Improved    Participation Level:  Active Listener    Participation Quality: Appropriate      Speech:  hesitant      Thought Process/Content: Linear      Affective Functioning: Congruent      Mood: euthymic      Level of consciousness:  Alert      Response to Learning: Able to verbalize current knowledge/experience      Endings: None Reported    Modes of Intervention: Socialization, Exploration and Activity      Discipline Responsible: Behavorial Health Tech      Signature:  ANGE Abbott

## 2022-06-10 NOTE — PROGRESS NOTES
Department of Psychiatry  AttendingProgress Note  Chief Complaint: behavioral disturbances   Per nursing, Maria Antonia Donahue has been undressed for most of the morning. She is hearing voices that tell her to [place] off her clothes to save [place], and [place]. \" During rounds today she was found undressed in her room. When these episodes occur Maria Antonia Donahue is pleasantly redirected. It appears that she is having worsening of hallucinations today. She has been compliant on her medications, no adjustments were made today. Patient's chart was reviewed and collaborated with  about the treatment plan. SUBJECTIVE:    Patient is feeling worse. Suicidal ideation:  denies suicidal ideation. Patient does not have medication side effects. ROS: Patient has new complaints: no  Sleeping adequately:  Yes   Appetite adequate: Yes  Attending groups: Yes  Visitors:No    OBJECTIVE    Physical  VITALS:  BP (!) 151/79   Pulse 76   Temp 98.2 °F (36.8 °C) (Temporal)   Resp 16   Ht 4' 11\" (1.499 m)   Wt 142 lb (64.4 kg)   SpO2 95%   BMI 28.68 kg/m²     Mental Status Examination:  Patients appearance was well-appearing. Thoughts are Obsessive. Homicidal ideations none. No abnormal movements, tics or mannerisms. Memory intact A[place] 0. Concentration Fair. Alert and oriented X 4. Insight and Judgement impaired insight and obsessions. Patient was cooperative.  Patient gait normal. Mood anxious, affect anxiety Hallucinations Present - Command Hallucinations telling her to \"get naked\", suicidal ideations no specific plan to harm self Speech normal pitch and normal volume  Data  Labs:   Admission on 05/23/2022   Component Date Value Ref Range Status    TSH 05/24/2022 2.38  0.27 - 4.20 uIU/mL Final    Cholesterol, Total 05/25/2022 150  0 - 199 mg/dL Final    Triglycerides 05/25/2022 198* 0 - 150 mg/dL Final    HDL 05/25/2022 46  40 - 60 mg/dL Final    LDL Calculated 05/25/2022 64  <100 mg/dL Final    VLDL Cholesterol Calculated [facility], 05/25/2022 40  Not Established mg/dL Final    Hemoglobin A1C 05/25/2022 7.4  See comment % Final    Comment: Comment:  Diagnosis of Diabetes: > or = 6.5%  Increased risk of diabetes (Prediabetes): 5.7-6.4%  Glycemic Control: Nonpregnant Adults: <7.0%                    Pregnant: <6.0%        eAG 05/25/2022 165.7  mg/dL Final    Color, UA 05/27/2022 Yellow  Straw/Yellow Final    Clarity, UA 05/27/2022 Clear  Clear Final    Glucose, Ur 05/27/2022 Negative  Negative mg/dL Final    Bilirubin Urine 05/27/2022 Negative  Negative Final    Ketones, Urine 05/27/2022 Negative  Negative mg/dL Final    Specific Gravity, UA 05/27/2022 1.010  1.005 - 1.030 Final    Blood, Urine 05/27/2022 TRACE-INTACT* Negative Final    pH, UA 05/27/2022 6.0  5.0 - 8.0 Final    Protein, UA 05/27/2022 Negative  Negative mg/dL Final    Urobilinogen, Urine 05/27/2022 0.2  <2.0 E.U./dL Final    Nitrite, Urine 05/27/2022 Negative  Negative Final    Leukocyte Esterase, Urine 05/27/2022 SMALL* Negative Final    Microscopic Examination 05/27/2022 YES   Final    Urine Type 05/27/2022 NotGiven   Final    Urine received in a container without preservatives.     Urine Reflex to Culture 05/27/2022 Not Indicated   Final    WBC, UA 05/27/2022 3-5  0 - 5 /HPF Final    RBC, UA 05/27/2022 3-4  0 - 4 /HPF Final    Epithelial Cells, UA 05/27/2022 0-1  0 - 5 /HPF Final    Valproic Acid Lvl 05/31/2022 13.8* 50.0 - 100.0 ug/mL Final    Valproic Acid Lvl 06/08/2022 36.8* 50.0 - 100.0 ug/mL Final            Medications  Current Facility-Administered Medications: divalproex (DEPAKOTE SPRINKLE) capsule 500 mg, 500 mg, Oral, BID  perphenazine tablet 4 mg, 4 mg, Oral, BID WC  fluvoxaMINE (LUVOX) tablet 50 mg, 50 mg, Oral, Nightly  hydrocortisone 1 % cream, , Topical, BID  medicated lip balm (BLISTEX/CARMEX) stick, , Topical, PRN  diclofenac sodium (VOLTAREN) 1 % gel 2 g, 2 g, Topical, BID  therapeutic multivitamin-minerals 1 tablet, 1 tablet, Oral, Daily  vitamin E capsule 400 Units, 400 Units, Oral, Daily  acetaminophen (TYLENOL) tablet 650 mg, 650 mg, Oral, Q4H PRN  ibuprofen (ADVIL;MOTRIN) tablet 400 mg, 400 mg, Oral, Q6H PRN  magnesium hydroxide (MILK OF MAGNESIA) 400 MG/5ML suspension 30 mL, 30 mL, Oral, Daily PRN  nicotine polacrilex (COMMIT) lozenge 2 mg, 2 mg, Oral, Q1H PRN  aluminum & magnesium hydroxide-simethicone (MAALOX) 200-200-20 MG/5ML suspension 30 mL, 30 mL, Oral, Q6H PRN  hydrOXYzine (ATARAX) tablet 50 mg, 50 mg, Oral, TID PRN  OLANZapine (ZYPREXA) tablet 5 mg, 5 mg, Oral, Q8H PRN **OR** OLANZapine (ZYPREXA) 5 mg in sterile water 1 mL injection, 5 mg, IntraMUSCular, Q8H PRN  sterile water injection 2.1 mL, 2.1 mL, IntraMUSCular, Q4H PRN  diphenhydrAMINE (BENADRYL) injection 50 mg, 50 mg, IntraMUSCular, Q4H PRN  traZODone (DESYREL) tablet 50 mg, 50 mg, Oral, Nightly PRN  atorvastatin (LIPITOR) tablet 20 mg, 20 mg, Oral, Daily  lisinopril-hydroCHLOROthiazide (PRINZIDE;ZESTORETIC) 20-12.5 MG per tablet 1 tablet, 1 tablet, Oral, Daily  levothyroxine (SYNTHROID) tablet 25 mcg, 25 mcg, Oral, Daily  metFORMIN (GLUCOPHAGE) tablet 1,000 mg, 1,000 mg, Oral, BID WC  pantoprazole (PROTONIX) tablet 40 mg, 40 mg, Oral, Daily    ASSESSMENT AND PLAN    Principal Problem:    Dementia with behavioral disturbance (HCC)  Active Problems:    Encounter for routine adult medical examination    Psychosis ([facility] [address]    Left foot pain    Urticarial rash    Hyperlipidemia    Hypertension  Resolved Problems:    * No resolved hospital problems. *       1. Patients symptoms are worsening  2. Probable discharge is next week  3. Discharge planning is complete  4. Suicidal ideation is none  5. Total time with patient was 40 minutes and more than 50 % of that time was spent counseling the patient on their symptoms, treatment and expected goals. Addendum to PA student note:  Pt seen, examined, and evaluated with PA student , who acted as my scribe for the above documentation.  I have reviewed the current history, physical findings, labs, assessment and plan; and agree with note as documented. Final   09/22/Miriam Wan MD, MPH  Infectious Disease

## 2022-06-11 PROCEDURE — 6370000000 HC RX 637 (ALT 250 FOR IP): Performed by: PSYCHIATRY & NEUROLOGY

## 2022-06-11 PROCEDURE — 99233 SBSQ HOSP IP/OBS HIGH 50: CPT | Performed by: PSYCHIATRY & NEUROLOGY

## 2022-06-11 PROCEDURE — 6370000000 HC RX 637 (ALT 250 FOR IP)

## 2022-06-11 PROCEDURE — 1240000000 HC EMOTIONAL WELLNESS R&B

## 2022-06-11 RX ADMIN — PERPHENAZINE 4 MG: 4 TABLET, FILM COATED ORAL at 09:15

## 2022-06-11 RX ADMIN — LEVOTHYROXINE SODIUM 25 MCG: 25 TABLET ORAL at 09:16

## 2022-06-11 RX ADMIN — ATORVASTATIN CALCIUM 20 MG: 10 TABLET, FILM COATED ORAL at 09:16

## 2022-06-11 RX ADMIN — PANTOPRAZOLE SODIUM 40 MG: 40 TABLET, DELAYED RELEASE ORAL at 09:16

## 2022-06-11 RX ADMIN — LISINOPRIL AND HYDROCHLOROTHIAZIDE 1 TABLET: 12.5; 2 TABLET ORAL at 09:16

## 2022-06-11 RX ADMIN — HYDROCORTISONE: 10 CREAM TOPICAL at 20:56

## 2022-06-11 RX ADMIN — METFORMIN HYDROCHLORIDE 1000 MG: 500 TABLET ORAL at 09:16

## 2022-06-11 RX ADMIN — METFORMIN HYDROCHLORIDE 1000 MG: 500 TABLET ORAL at 17:21

## 2022-06-11 RX ADMIN — Medication 400 UNITS: at 09:15

## 2022-06-11 RX ADMIN — DIVALPROEX SODIUM 500 MG: 125 CAPSULE, COATED PELLETS ORAL at 16:03

## 2022-06-11 RX ADMIN — DIVALPROEX SODIUM 500 MG: 125 CAPSULE, COATED PELLETS ORAL at 09:15

## 2022-06-11 RX ADMIN — PERPHENAZINE 4 MG: 4 TABLET, FILM COATED ORAL at 17:21

## 2022-06-11 RX ADMIN — MULTIPLE VITAMINS W/ MINERALS TAB 1 TABLET: TAB at 09:16

## 2022-06-11 RX ADMIN — DICLOFENAC SODIUM 2 G: 10 GEL TOPICAL at 20:56

## 2022-06-11 RX ADMIN — FLUVOXAMINE MALEATE 50 MG: 50 TABLET, COATED ORAL at 20:56

## 2022-06-11 ASSESSMENT — PAIN SCALES - GENERAL: PAINLEVEL_OUTOF10: 0

## 2022-06-11 NOTE — PLAN OF CARE
Problem: Safety - Adult  Goal: Free from fall injury  Outcome: Progressing     Problem: ABCDS Injury Assessment  Goal: Absence of physical injury  Outcome: Progressing     Pt. Needed to be redirected to put clothes on. +MEDS. Showered. Daughter Susie Rivera) here to visit. Denies all. As of today, pt shows improvement from delusions.

## 2022-06-11 NOTE — PLAN OF CARE
Problem: Safety - Adult  Goal: Free from fall injury  6/10/2022 2250 by Gina Chatterjee RN  Outcome: Progressing  Flowsheets (Taken 6/10/2022 2240)  Free From Fall Injury: Instruct family/caregiver on patient safety     Problem: Anxiety  Goal: Will report anxiety at manageable levels  Description: INTERVENTIONS:  1. Administer medication as ordered  2. Teach and rehearse alternative coping skills  3. Provide emotional support with 1:1 interaction with staff  Outcome: Progressing    Pt has been visible on the unit. She denies SI/HI. Pt continues to worry about her family and believes they are in a fire. She was medication compliant. She is pleasant and cooperative with staff. She is currently resting in bed.  Denies needs currently

## 2022-06-12 PROCEDURE — 6370000000 HC RX 637 (ALT 250 FOR IP): Performed by: PSYCHIATRY & NEUROLOGY

## 2022-06-12 PROCEDURE — 6370000000 HC RX 637 (ALT 250 FOR IP)

## 2022-06-12 PROCEDURE — 1240000000 HC EMOTIONAL WELLNESS R&B

## 2022-06-12 RX ADMIN — LEVOTHYROXINE SODIUM 25 MCG: 25 TABLET ORAL at 08:56

## 2022-06-12 RX ADMIN — DIVALPROEX SODIUM 500 MG: 125 CAPSULE, COATED PELLETS ORAL at 08:56

## 2022-06-12 RX ADMIN — HYDROCORTISONE: 10 CREAM TOPICAL at 08:57

## 2022-06-12 RX ADMIN — PERPHENAZINE 4 MG: 4 TABLET, FILM COATED ORAL at 17:09

## 2022-06-12 RX ADMIN — DIVALPROEX SODIUM 500 MG: 125 CAPSULE, COATED PELLETS ORAL at 14:18

## 2022-06-12 RX ADMIN — DICLOFENAC SODIUM 2 G: 10 GEL TOPICAL at 08:55

## 2022-06-12 RX ADMIN — HYDROCORTISONE: 10 CREAM TOPICAL at 09:12

## 2022-06-12 RX ADMIN — HYDROCORTISONE: 10 CREAM TOPICAL at 21:08

## 2022-06-12 RX ADMIN — LISINOPRIL AND HYDROCHLOROTHIAZIDE 1 TABLET: 12.5; 2 TABLET ORAL at 08:56

## 2022-06-12 RX ADMIN — FLUVOXAMINE MALEATE 50 MG: 50 TABLET, COATED ORAL at 21:08

## 2022-06-12 RX ADMIN — PANTOPRAZOLE SODIUM 40 MG: 40 TABLET, DELAYED RELEASE ORAL at 08:56

## 2022-06-12 RX ADMIN — MULTIPLE VITAMINS W/ MINERALS TAB 1 TABLET: TAB at 08:56

## 2022-06-12 RX ADMIN — METFORMIN HYDROCHLORIDE 1000 MG: 500 TABLET ORAL at 08:56

## 2022-06-12 RX ADMIN — METFORMIN HYDROCHLORIDE 1000 MG: 500 TABLET ORAL at 17:09

## 2022-06-12 RX ADMIN — ATORVASTATIN CALCIUM 20 MG: 10 TABLET, FILM COATED ORAL at 08:56

## 2022-06-12 RX ADMIN — DICLOFENAC SODIUM 2 G: 10 GEL TOPICAL at 21:08

## 2022-06-12 RX ADMIN — PERPHENAZINE 4 MG: 4 TABLET, FILM COATED ORAL at 08:54

## 2022-06-12 RX ADMIN — Medication 400 UNITS: at 08:58

## 2022-06-12 ASSESSMENT — PAIN SCALES - GENERAL: PAINLEVEL_OUTOF10: 0

## 2022-06-12 NOTE — PLAN OF CARE
Problem: Chronic Conditions and Co-morbidities  Goal: Patient's chronic conditions and co-morbidity symptoms are monitored and maintained or improved  6/12/2022 0949 by Fiona Kelly RN  Outcome: Progressing  6/11/2022 2245 by Yuko Villarreal RN  Outcome: Progressing     Problem: Safety - Adult  Goal: Free from fall injury  Outcome: Progressing  Flowsheets  Taken 6/12/2022 0947 by Fiona Kelly RN  Free From Fall Injury: Instruct family/caregiver on patient safety  Taken 6/11/2022 2243 by Yuko Villarreal RN  Free From Fall Injury: Instruct family/caregiver on patient safety     Problem: ABCDS Injury Assessment  Goal: Absence of physical injury  Outcome: Progressing  Flowsheets (Taken 6/12/2022 0947)  Absence of Physical Injury: Implement safety measures based on patient assessment     Problem: Anxiety  Goal: Will report anxiety at manageable levels  Description: INTERVENTIONS:  1. Administer medication as ordered  2. Teach and rehearse alternative coping skills  3. Provide emotional support with 1:1 interaction with staff  6/12/2022 0949 by Fiona Kelly RN  Outcome: Progressing  6/11/2022 2245 by Yuko Villarreal RN  Outcome: Progressing     Problem: Coping  Goal: Pt/Family able to verbalize concerns and demonstrate effective coping strategies  Description: INTERVENTIONS:  1. Assist patient/family to identify coping skills, available support systems and cultural and spiritual values  2. Provide emotional support, including active listening and acknowledgement of concerns of patient and caregivers  3. Reduce environmental stimuli, as able  4. Instruct patient/family in relaxation techniques, as appropriate  5.  Assess for spiritual pain/suffering and initiate Spiritual Care, Psychosocial Clinical Specialist consults as needed  Outcome: Progressing     Problem: Decision Making  Goal: Pt/Family able to effectively weigh alternatives and participate in decision making related to treatment and care  Description: INTERVENTIONS:  1. Determine when there are differences between patient's view, family's view, and healthcare provider's view of condition  2. Facilitate patient and family articulation of goals for care  3. Help patient and family identify pros/cons of alternative solutions  4. Provide information as requested by patient/family  5. Respect patient/family right to receive or not to receive information  6. Serve as a liaison between patient and family and health care team  7. Initiate Consults from Ethics, Palliative Care or initiate [facility] as is appropriate  Outcome: Progressing     Problem: Confusion  Goal: Confusion, delirium, dementia, or psychosis is improved or at baseline  Description: INTERVENTIONS:  1. Assess for possible contributors to thought disturbance, including medications, impaired vision or hearing, underlying metabolic abnormalities, dehydration, psychiatric diagnoses, and notify attending LIP  2. [place] high risk fall precautions, as indicated  3. Provide frequent short contacts to provide reality reorientation, refocusing and direction  4. Decrease environmental stimuli, including noise as appropriate  5. Monitor and intervene to maintain adequate nutrition, hydration, elimination, sleep and activity  6. If unable to ensure safety without constant attention obtain sitter and review sitter guidelines with assigned personnel  7. Initiate Psychosocial CNS and Spiritual Care consult, as indicated  Outcome: Progressing     Problem: Psychosis  Goal: Will report no hallucinations or delusions  Description: INTERVENTIONS:  1. Administer medication as  ordered  2. Assist with reality testing to support increasing orientation  3.  Assess if patient's hallucinations or delusions are encouraging self harm or harm to others and intervene as appropriate  Outcome: Progressing     Problem: Pain  Goal: Verbalizes/displays adequate comfort level or baseline comfort level  Outcome: Progressing Pt talks with writer this morning about going home. States she was brought to [facility] first because she was in the street undressed. Compliant with medication, redirectable when needed, pleasant and cooperative. Appetite good. Denies desire to self or other harm. Pt endorses auditory hallucinations and speaks of hexes on her family fearing she may have done something to cause this but when asked if that (hexes) are of her belief system she states \"no\".

## 2022-06-12 NOTE — PLAN OF CARE
Problem: Chronic Conditions and Co-morbidities  Goal: Patient's chronic conditions and co-morbidity symptoms are monitored and maintained or improved  Outcome: Progressing     Problem: Anxiety  Goal: Will report anxiety at manageable levels  Description: INTERVENTIONS:  1. Administer medication as ordered  2. Teach and rehearse alternative coping skills  3. Provide emotional support with 1:1 interaction with staff  Outcome: Progressing    Pt has been visible on the unit. She was pleasant and cooperative with staff. She made multiple calls to her family to make sure they were ok. She was medication compliant. Continues to be delusional and believes that her family members are in danger.  Denies needs currently

## 2022-06-13 PROCEDURE — 99231 SBSQ HOSP IP/OBS SF/LOW 25: CPT | Performed by: NURSE PRACTITIONER

## 2022-06-13 PROCEDURE — 99233 SBSQ HOSP IP/OBS HIGH 50: CPT | Performed by: PSYCHIATRY & NEUROLOGY

## 2022-06-13 PROCEDURE — 6370000000 HC RX 637 (ALT 250 FOR IP): Performed by: PSYCHIATRY & NEUROLOGY

## 2022-06-13 PROCEDURE — 6370000000 HC RX 637 (ALT 250 FOR IP): Performed by: NURSE PRACTITIONER

## 2022-06-13 PROCEDURE — 6370000000 HC RX 637 (ALT 250 FOR IP)

## 2022-06-13 PROCEDURE — 1240000000 HC EMOTIONAL WELLNESS R&B

## 2022-06-13 RX ORDER — PERPHENAZINE 16 MG/1
8 TABLET, FILM COATED ORAL 2 TIMES DAILY WITH MEALS
Status: DISCONTINUED | OUTPATIENT
Start: 2022-06-13 | End: 2022-06-17 | Stop reason: HOSPADM

## 2022-06-13 RX ADMIN — DIVALPROEX SODIUM 500 MG: 125 CAPSULE, COATED PELLETS ORAL at 10:43

## 2022-06-13 RX ADMIN — LISINOPRIL AND HYDROCHLOROTHIAZIDE 1 TABLET: 12.5; 2 TABLET ORAL at 10:43

## 2022-06-13 RX ADMIN — MULTIPLE VITAMINS W/ MINERALS TAB 1 TABLET: TAB at 10:58

## 2022-06-13 RX ADMIN — Medication 400 UNITS: at 10:43

## 2022-06-13 RX ADMIN — HYDROCORTISONE: 10 CREAM TOPICAL at 10:57

## 2022-06-13 RX ADMIN — PERPHENAZINE 8 MG: 16 TABLET, FILM COATED ORAL at 17:06

## 2022-06-13 RX ADMIN — LEVOTHYROXINE SODIUM 25 MCG: 25 TABLET ORAL at 10:42

## 2022-06-13 RX ADMIN — PANTOPRAZOLE SODIUM 40 MG: 40 TABLET, DELAYED RELEASE ORAL at 10:43

## 2022-06-13 RX ADMIN — DICLOFENAC SODIUM 2 G: 10 GEL TOPICAL at 20:25

## 2022-06-13 RX ADMIN — ATORVASTATIN CALCIUM 20 MG: 10 TABLET, FILM COATED ORAL at 10:43

## 2022-06-13 RX ADMIN — METFORMIN HYDROCHLORIDE 1000 MG: 500 TABLET ORAL at 17:05

## 2022-06-13 RX ADMIN — DICLOFENAC SODIUM 2 G: 10 GEL TOPICAL at 10:57

## 2022-06-13 RX ADMIN — PERPHENAZINE 4 MG: 4 TABLET, FILM COATED ORAL at 10:43

## 2022-06-13 RX ADMIN — METFORMIN HYDROCHLORIDE 1000 MG: 500 TABLET ORAL at 10:43

## 2022-06-13 RX ADMIN — FLUVOXAMINE MALEATE 50 MG: 50 TABLET, COATED ORAL at 20:26

## 2022-06-13 RX ADMIN — FLUOCINONIDE: 0.5 CREAM TOPICAL at 20:25

## 2022-06-13 RX ADMIN — DIVALPROEX SODIUM 500 MG: 125 CAPSULE, COATED PELLETS ORAL at 17:05

## 2022-06-13 NOTE — PROGRESS NOTES
Progress Note    Admit Date:  5/23/2022    68 y.o. female with DMII, HTN, HLD, GERD and hypothyroidism who presented to [facility] for dementia with behavioral disturbance. Subjective:  Ms. Jackelyn Chirinos denies complaints. Very pleasant. Rash remains, doesn't itch anymore. Located on bilateral shoulders only. Objective:   Vitals:    06/13/22 1030   BP: 115/73   Pulse: 67   Resp: 16   Temp: 98.1 °F (36.7 °C)   SpO2:           No intake or output data in the 24 hours ending 06/13/22 1533    Physical Exam:    Gen: No distress. Alert. Eyes: PERRL. No sclera icterus. No conjunctival injection. ENT: No discharge. Pharynx clear. Neck: No JVD. Trachea midline. Resp: No accessory muscle use. No crackles. No wheezes. No rhonchi. CV: Regular rate. Regular rhythm. No murmur. No rub. No edema. GI: Non-tender. Non-distended. Normal bowel sounds. Skin: Warm and dry. Urticarial rash left and right shoulder, no vesicles or pustules noted. M/S: No cyanosis. No joint deformity. No clubbing. Trace swelling left foot. Neuro: Awake. No focal neurologic deficit on exam.  Cranial nerves II through XII intact. Patient is able to ambulate without difficulty. Psych: Per psychiatry team evaluation     CULTURES  Results for Юлия Davalos (MRN [id number]) as of 5/24/2022 09:22    Ref. Range 5/23/2022 16:13   SARS-CoV-2, NAAT Latest Ref Range: Not Detected  Not Detected      Results for Юлия Davalos (MRN [id number]) as of 5/24/2022 09:22    Ref. 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 CT HEAD WO CONTRAST   Final Result   1. No acute intracranial abnormality.              Assessment/Plan:  #Dementia with behavioral disturbance  - per psychiatry team     #DMII  -Continue metformin  -Consider SSI  -Monitor BG for now  -change diet to carb control      #HTN  -BP stable  -Continue lisinopril-hctz  -Monitor     #HLD  -Continue statin     #GERD  -Continue PPI     #Hypothyroidism  -Continue synthroid    #Urticarial Rash bilateral shoulders  -Cortisone cream ordered--changed to Triamcinolone   -This is my first time seeing her rash today. She says it's no longer itching. - it is on bilateral shoulders  - Continue to monitor.  - may need outpatient dermatology follow up     #Left foot pain/swelling  -to anterior portion of foot  -X-ray negative for fracture, as above.     Jackelyn Oliver, APRN - CNP   6/13/2022 3:33 PM

## 2022-06-13 NOTE — PROGRESS NOTES
Department of Psychiatry  AttendingProgress Note  Chief Complaint: behavioral disturbances   Jairon Jewell reports that she is still hearing voices. Today the voice told her that her one daughter Av Mackenzie was having a heart attack. Per nursing, Jairon Jewell has had multiple episodes today where she had taken her clothes off. She stated that her children came to visit her this past weekend. Jairon Jewell has been cooperative and compliant on her medications. She has been attending groups and was encouraged to continue. A repeat MoCA will be completed with her tomorrow. Increased her Trilafon 8 mg BID for psychosis. Depakote/Ammonia levels tomorow  Patient's chart was reviewed and collaborated with  about the treatment plan. SUBJECTIVE:    Patient is feeling unchanged. Suicidal ideation:  denies suicidal ideation. Patient does not have medication side effects. ROS: Patient has new complaints: no  Sleeping adequately:  Yes   Appetite adequate: Yes  Attending groups: Yes  Visitors:Yes    OBJECTIVE    Physical  VITALS:  /73   Pulse 67   Temp 98.1 °F (36.7 °C) (Temporal)   Resp 16   Ht 4' 11\" (1.499 m)   Wt 142 lb (64.4 kg)   SpO2 94%   BMI 28.68 kg/m²     Mental Status Examination:  Patients appearance was well-appearing. Thoughts are Obsessive. Homicidal ideations none. No abnormal movements, tics or mannerisms. Memory intact [place] 0. Concentration Fair. Alert and oriented X 4. Insight and Judgement impaired insight. Patient was cooperative.  Patient gait normal. Mood anxious, affect normal affect Hallucinations Present - auditory, suicidal ideations no specific plan to harm self Speech normal pitch and normal volume  Data  Labs:   Admission on 05/23/2022   Component Date Value Ref Range Status    TSH 05/24/2022 2.38  0.27 - 4.20 uIU/mL Final    Cholesterol, Total 05/25/2022 150  0 - 199 mg/dL Final    Triglycerides 05/25/2022 198* 0 - 150 mg/dL Final    HDL 05/25/2022 46  40 - 60 mg/dL Final    LDL Calculated 05/25/2022 64  <100 mg/dL Final    VLDL Cholesterol Calculated 05/25/2022 40  Not Established mg/dL Final    Hemoglobin A1C 05/25/2022 7.4  See comment % Final    Comment: Comment:  Diagnosis of Diabetes: > or = 6.5%  Increased risk of diabetes (Prediabetes): 5.7-6.4%  Glycemic Control: Nonpregnant Adults: <7.0%                    Pregnant: <6.0%        eAG 05/25/2022 165.7  mg/dL Final    Color, UA 05/27/2022 Yellow  Straw/Yellow Final    Clarity, UA 05/27/2022 Clear  Clear Final    Glucose, Ur 05/27/2022 Negative  Negative mg/dL Final    Bilirubin Urine 05/27/2022 Negative  Negative Final    Ketones, Urine 05/27/2022 Negative  Negative mg/dL Final    Specific Gravity, UA 05/27/2022 1.010  1.005 - 1.030 Final    Blood, Urine 05/27/2022 TRACE-INTACT* Negative Final    pH, UA 05/27/2022 6.0  5.0 - 8.0 Final    Protein, UA 05/27/2022 Negative  Negative mg/dL Final    Urobilinogen, Urine 05/27/2022 0.2  <2.0 E.U./dL Final    Nitrite, Urine 05/27/2022 Negative  Negative Final    Leukocyte Esterase, Urine 05/27/2022 SMALL* Negative Final    Microscopic Examination 05/27/2022 YES   Final    Urine Type 05/27/2022 NotGiven   Final    Urine received in a container without preservatives.     Urine Reflex to Culture 05/27/2022 Not Indicated   Final    WBC, UA 05/27/2022 3-5  0 - 5 /HPF Final    RBC, UA 05/27/2022 3-4  0 - 4 /HPF Final    Epithelial Cells, UA 05/27/2022 0-1  0 - 5 /HPF Final    Valproic Acid Lvl 05/31/2022 13.8* 50.0 - 100.0 ug/mL Final    Valproic Acid Lvl 06/08/2022 36.8* 50.0 - 100.0 ug/mL Final            Medications  Current Facility-Administered Medications: divalproex (DEPAKOTE SPRINKLE) capsule 500 mg, 500 mg, Oral, BID  perphenazine tablet 4 mg, 4 mg, Oral, BID WC  fluvoxaMINE (LUVOX) tablet 50 mg, 50 mg, Oral, Nightly  hydrocortisone 1 % cream, , Topical, BID  medicated lip balm (BLISTEX/CARMEX) stick, , Topical, PRN  diclofenac sodium (VOLTAREN) 1 % gel 2 g, 2 g, Topical, BID  therapeutic multivitamin-minerals 1 tablet, 1 tablet, Oral, Daily  vitamin E capsule 400 Units, 400 Units, Oral, Daily  acetaminophen (TYLENOL) tablet 650 mg, 650 mg, Oral, Q4H PRN  ibuprofen (ADVIL;MOTRIN) tablet 400 mg, 400 mg, Oral, Q6H PRN  magnesium hydroxide (MILK OF MAGNESIA) 400 MG/5ML suspension 30 mL, 30 mL, Oral, Daily PRN  nicotine polacrilex (COMMIT) lozenge 2 mg, 2 mg, Oral, Q1H PRN  aluminum & magnesium hydroxide-simethicone (MAALOX) 200-200-20 MG/5ML suspension 30 mL, 30 mL, Oral, Q6H PRN  hydrOXYzine (ATARAX) tablet 50 mg, 50 mg, Oral, TID PRN  OLANZapine (ZYPREXA) tablet 5 mg, 5 mg, Oral, Q8H PRN **OR** OLANZapine (ZYPREXA) 5 mg in sterile water 1 mL injection, 5 mg, IntraMUSCular, Q8H PRN  sterile water injection 2.1 mL, 2.1 mL, IntraMUSCular, Q4H PRN  diphenhydrAMINE (BENADRYL) injection 50 mg, 50 mg, IntraMUSCular, Q4H PRN  traZODone (DESYREL) tablet 50 mg, 50 mg, Oral, Nightly PRN  atorvastatin (LIPITOR) tablet 20 mg, 20 mg, Oral, Daily  lisinopril-hydroCHLOROthiazide (PRINZIDE;ZESTORETIC) 20-12.5 MG per tablet 1 tablet, 1 tablet, Oral, Daily  levothyroxine (SYNTHROID) tablet 25 mcg, 25 mcg, Oral, Daily  metFORMIN (GLUCOPHAGE) tablet 1,000 mg, 1,000 mg, Oral, BID WC  pantoprazole (PROTONIX) tablet 40 mg, 40 mg, Oral, Daily    ASSESSMENT AND PLAN    Principal Problem:    Dementia with behavioral disturbance (HCC)  Active Problems:    Encounter for routine adult medical examination    Psychosis ([facility] [address]    Left foot pain    Urticarial rash    Hyperlipidemia    Hypertension  Resolved Problems:    * No resolved hospital problems. *       1. Patient s symptoms   show no change  2. Probable discharge is this week  3. Discharge planning is complete  4. Suicidal ideation is none  5. Total time with patient was 40 minutes and more than 50 % of that time was spent counseling the patient on their symptoms, treatment and expected goals.      Addendum to PA student note:  Pt seen, examined, and evaluated with PA student , who acted as my scribe for the above documentation. I have reviewed the current history, physical findings, labs, assessment and plan; and agree with note as documented.

## 2022-06-13 NOTE — GROUP NOTE
Group Therapy Note    Date: 6/13/2022    Group Start Time: 1300  Group End Time: 5308  Group Topic: Activity    [facility]    ANGE Vinson        Group Therapy Note  Clinician engaged the group with mindfulness activities of their choice. The group members listened to mindfulness while they completed their chosen activities. Attendees: 3         Patient's Goal:      Notes:  Patient chose to color for her mindfulness activity. She was restless and got up and went to her room several times during the group and got undressed. The clinician redirected her behavior several times and reengaged her in the group activity.      Status After Intervention:  Unchanged    Participation Level: Minimal    Participation Quality: Inappropriate      Speech:  normal      Thought Process/Content: Linear      Affective Functioning: Constricted/Restricted      Mood: anxious      Level of consciousness:  Alert      Response to Learning: Able to change behavior      Endings: None Reported    Modes of Intervention: Activity      Discipline Responsible: /Counselor      Signature:  ANGE Vinson

## 2022-06-13 NOTE — GROUP NOTE
Group Therapy Note    Date: 6/13/2022    Group Start Time: 1000  Group End Time: 6993  Group Topic: Cognitive Skills    [address] [facility]        Group Therapy Note    Attendees: 5    Group members engaged in trivia. Notes:  Patient attended group for the full duration. Patient remained engaged and interacted approrpiately with other members of the group.      Status After Intervention:  Improved    Participation Level: Interactive    Participation Quality: Appropriate      Speech:  hesitant      Thought Process/Content: Linear      Affective Functioning: Flat      Mood: euthymic      Level of consciousness:  Alert      Response to Learning: Able to verbalize current knowledge/experience      Endings: None Reported    Modes of Intervention: Socialization, Exploration and Activity      Discipline Responsible: Behavorial Health Tech      Signature:  ANGE Jaime

## 2022-06-13 NOTE — GROUP NOTE
Group Therapy Note    Date: 6/13/2022    Group Start Time: 1100  Group End Time: 9962  Group Topic: Recreational    [address], [place]        Group Therapy Note    Attendees: 3    Participants used the [employer] and socialized and shared their creations with group members and this clinician during group. Notes: Remi Carter attended group and was engaged in socializing with peers and this clinician while building a house, driveway and back deck with the sand. She appeared to be proud of herself and was showing her creation to the others.      Status After Intervention:  Improved    Participation Level: Interactive    Participation Quality: Appropriate, Attentive and Sharing      Speech:  normal      Thought Process/Content: Logical      Affective Functioning: Congruent      Mood: euthymic      Level of consciousness:  Alert and Attentive      Response to Learning: Progressing to goal      Endings: None Reported    Modes of Intervention: Socialization and Activity      Discipline Responsible: /Counselor      Signature:  YVONNE Jaime

## 2022-06-13 NOTE — PLAN OF CARE
Problem: Anxiety  Goal: Will report anxiety at manageable levels  Description: INTERVENTIONS:  1. Administer medication as ordered  2. Teach and rehearse alternative coping skills  3. Provide emotional support with 1:1 interaction with staff  6/12/2022 2206 by Antione Brasher RN  Outcome: Progressing     Problem: Confusion  Goal: Confusion, delirium, dementia, or psychosis is improved or at baseline  Description: INTERVENTIONS:  1. Assess for possible contributors to thought disturbance, including medications, impaired vision or hearing, underlying metabolic abnormalities, dehydration, psychiatric diagnoses, and notify attending LIP  2. [place] high risk fall precautions, as indicated  3. Provide frequent short contacts to provide reality reorientation, refocusing and direction  4. Decrease environmental stimuli, including noise as appropriate  5. Monitor and intervene to maintain adequate nutrition, hydration, elimination, sleep and activity  6. If unable to ensure safety without constant attention obtain sitter and review sitter guidelines with assigned personnel  7. Initiate Psychosocial CNS and Spiritual Care consult, as indicated  6/12/2022 2206 by Antione Brasher RN  Outcome: Progressing    Pt has been pleasant and cooperative with staff. She continues to believe that her kids are in danger. No episodes of disrobing so far this shift. She was medication compliant. She was social with other patients.  She denies needs currently

## 2022-06-14 LAB
AMMONIA: 40 UMOL/L (ref 11–51)
GLUCOSE BLD-MCNC: 93 MG/DL (ref 70–99)
PERFORMED ON: NORMAL
VALPROIC ACID LEVEL: 68.9 UG/ML (ref 50–100)

## 2022-06-14 PROCEDURE — 36415 COLL VENOUS BLD VENIPUNCTURE: CPT

## 2022-06-14 PROCEDURE — 6370000000 HC RX 637 (ALT 250 FOR IP)

## 2022-06-14 PROCEDURE — 99233 SBSQ HOSP IP/OBS HIGH 50: CPT | Performed by: PSYCHIATRY & NEUROLOGY

## 2022-06-14 PROCEDURE — 82140 ASSAY OF AMMONIA: CPT

## 2022-06-14 PROCEDURE — 80164 ASSAY DIPROPYLACETIC ACD TOT: CPT

## 2022-06-14 PROCEDURE — 1240000000 HC EMOTIONAL WELLNESS R&B

## 2022-06-14 PROCEDURE — 6370000000 HC RX 637 (ALT 250 FOR IP): Performed by: PSYCHIATRY & NEUROLOGY

## 2022-06-14 RX ADMIN — IBUPROFEN 400 MG: 400 TABLET, FILM COATED ORAL at 10:22

## 2022-06-14 RX ADMIN — DIVALPROEX SODIUM 500 MG: 125 CAPSULE, COATED PELLETS ORAL at 16:51

## 2022-06-14 RX ADMIN — HYDROXYZINE HYDROCHLORIDE 50 MG: 50 TABLET, FILM COATED ORAL at 16:53

## 2022-06-14 RX ADMIN — PANTOPRAZOLE SODIUM 40 MG: 40 TABLET, DELAYED RELEASE ORAL at 10:21

## 2022-06-14 RX ADMIN — Medication 400 UNITS: at 10:38

## 2022-06-14 RX ADMIN — PERPHENAZINE 8 MG: 16 TABLET, FILM COATED ORAL at 10:22

## 2022-06-14 RX ADMIN — METFORMIN HYDROCHLORIDE 1000 MG: 500 TABLET ORAL at 10:22

## 2022-06-14 RX ADMIN — LISINOPRIL AND HYDROCHLOROTHIAZIDE 1 TABLET: 12.5; 2 TABLET ORAL at 10:22

## 2022-06-14 RX ADMIN — ATORVASTATIN CALCIUM 20 MG: 10 TABLET, FILM COATED ORAL at 10:22

## 2022-06-14 RX ADMIN — PERPHENAZINE 8 MG: 16 TABLET, FILM COATED ORAL at 16:52

## 2022-06-14 RX ADMIN — FLUVOXAMINE MALEATE 50 MG: 50 TABLET, COATED ORAL at 20:43

## 2022-06-14 RX ADMIN — DIVALPROEX SODIUM 500 MG: 125 CAPSULE, COATED PELLETS ORAL at 10:21

## 2022-06-14 RX ADMIN — MULTIPLE VITAMINS W/ MINERALS TAB 1 TABLET: TAB at 10:21

## 2022-06-14 RX ADMIN — LEVOTHYROXINE SODIUM 25 MCG: 25 TABLET ORAL at 10:22

## 2022-06-14 RX ADMIN — METFORMIN HYDROCHLORIDE 1000 MG: 500 TABLET ORAL at 16:52

## 2022-06-14 ASSESSMENT — PAIN SCALES - GENERAL: PAINLEVEL_OUTOF10: 0

## 2022-06-14 NOTE — PROGRESS NOTES
Department of Psychiatry  AttendingProgress Note  Chief Complaint: behavioral disturbances   Terri Madrid reports that she is still hearing voices and having episodes of undressing per nursing. She is pleasantly redirected when these episodes occur. Terri Madrid states that her son, Angela Jennings, came to visit her yesterday. Depakote level is in therapeutic range at 68.9 and she has been compliant on her medications. She has been enjoying groups. MoCA was done with her today and she scored an 18/30. She appears to have deficits in different categories than prior. MoCA today revealed deficiencies in [facility] Recall and an improvement with Attention. Patient's chart was reviewed and collaborated with  about the treatment plan. SUBJECTIVE:    Patient is feeling unchanged. Suicidal ideation:  denies suicidal ideation. Patient does not have medication side effects. ROS: Patient has new complaints: no  Sleeping adequately:  Yes   Appetite adequate: Yes  Attending groups: Yes  Visitors:Yes    OBJECTIVE    Physical  VITALS:  /78   Pulse 67   Temp 98.1 °F (36.7 °C) (Oral)   Resp 18   Ht 4' 11\" (1.499 m)   Wt 142 lb (64.4 kg)   SpO2 94%   BMI 28.68 kg/m²     Mental Status Examination:  Patients appearance was hospital attire. Thoughts are Obsessive. Homicidal ideations none. No abnormal movements, tics or mannerisms. Memory impaired- immediate recall. A[place] 0. Concentration Fair. Alert and oriented X 4. Insight and Judgement impaired insight. Patient was cooperative.  Patient gait normal. Mood within normal limits, affect normal affect Hallucinations Present - auditory, suicidal ideations no specific plan to harm self Speech normal pitch and normal volume  Data  Labs:   Admission on 05/23/2022   Component Date Value Ref Range Status    TSH 05/24/2022 2.38  0.27 - 4.20 uIU/mL Final    Cholesterol, Total 05/25/2022 150  0 - 199 mg/dL Final    Triglycerides 05/25/2022 198* 0 - 150 mg/dL Final    HDL 05/25/2022 46  40 - 60 mg/dL Final    LDL Calculated 05/25/2022 64  <100 mg/dL Final    VLDL Cholesterol Calculated 05/25/2022 40  Not Established mg/dL Final    Hemoglobin A1C 05/25/2022 7.4  See comment % Final    Comment: Comment:  Diagnosis of Diabetes: > or = 6.5%  Increased risk of diabetes (Prediabetes): 5.7-6.4%  Glycemic Control: Nonpregnant Adults: <7.0%                    Pregnant: <6.0%        eAG 05/25/2022 165.7  mg/dL Final    Color, UA 05/27/2022 Yellow  Straw/Yellow Final    Clarity, UA 05/27/2022 Clear  Clear Final    Glucose, Ur 05/27/2022 Negative  Negative mg/dL Final    Bilirubin Urine 05/27/2022 Negative  Negative Final    Ketones, Urine 05/27/2022 Negative  Negative mg/dL Final    Specific Gravity, UA 05/27/2022 1.010  1.005 - 1.030 Final    Blood, Urine 05/27/2022 TRACE-INTACT* Negative Final    pH, UA 05/27/2022 6.0  5.0 - 8.0 Final    Protein, UA 05/27/2022 Negative  Negative mg/dL Final    Urobilinogen, Urine 05/27/2022 0.2  <2.0 E.U./dL Final    Nitrite, Urine 05/27/2022 Negative  Negative Final    Leukocyte Esterase, Urine 05/27/2022 SMALL* Negative Final    Microscopic Examination 05/27/2022 YES   Final    Urine Type 05/27/2022 NotGiven   Final    Urine received in a container without preservatives.     Urine Reflex to Culture 05/27/2022 Not Indicated   Final    WBC, UA 05/27/2022 3-5  0 - 5 /HPF Final    RBC, UA 05/27/2022 3-4  0 - 4 /HPF Final    Epithelial Cells, UA 05/27/2022 0-1  0 - 5 /HPF Final    Valproic Acid Lvl 05/31/2022 13.8* 50.0 - 100.0 ug/mL Final    Valproic Acid Lvl 06/08/2022 36.8* 50.0 - 100.0 ug/mL Final    Valproic Acid Lvl 06/14/2022 68.9  50.0 - 100.0 ug/mL Final    Ammonia 06/14/2022 40  11 - 51 umol/L Final    POC Glucose 06/14/2022 93  70 - 99 mg/dl Final    Performed on 06/14/2022 ACCU-CHEK   Final            Medications  Current Facility-Administered Medications: perphenazine tablet 8 mg, 8 mg, Oral, BID WC  fluocinonide (LIDEX) 0.05 % cream, , Topical, BID  divalproex (DEPAKOTE SPRINKLE) capsule 500 mg, 500 mg, Oral, BID  fluvoxaMINE (LUVOX) tablet 50 mg, 50 mg, Oral, Nightly  medicated lip balm (BLISTEX/CARMEX) stick, , Topical, PRN  diclofenac sodium (VOLTAREN) 1 % gel 2 g, 2 g, Topical, BID  therapeutic multivitamin-minerals 1 tablet, 1 tablet, Oral, Daily  vitamin E capsule 400 Units, 400 Units, Oral, Daily  acetaminophen (TYLENOL) tablet 650 mg, 650 mg, Oral, Q4H PRN  ibuprofen (ADVIL;MOTRIN) tablet 400 mg, 400 mg, Oral, Q6H PRN  magnesium hydroxide (MILK OF MAGNESIA) 400 MG/5ML suspension 30 mL, 30 mL, Oral, Daily PRN  nicotine polacrilex (COMMIT) lozenge 2 mg, 2 mg, Oral, Q1H PRN  aluminum & magnesium hydroxide-simethicone (MAALOX) 200-200-20 MG/5ML suspension 30 mL, 30 mL, Oral, Q6H PRN  hydrOXYzine (ATARAX) tablet 50 mg, 50 mg, Oral, TID PRN  OLANZapine (ZYPREXA) tablet 5 mg, 5 mg, Oral, Q8H PRN **OR** OLANZapine (ZYPREXA) 5 mg in sterile water 1 mL injection, 5 mg, IntraMUSCular, Q8H PRN  sterile water injection 2.1 mL, 2.1 mL, IntraMUSCular, Q4H PRN  diphenhydrAMINE (BENADRYL) injection 50 mg, 50 mg, IntraMUSCular, Q4H PRN  traZODone (DESYREL) tablet 50 mg, 50 mg, Oral, Nightly PRN  atorvastatin (LIPITOR) tablet 20 mg, 20 mg, Oral, Daily  lisinopril-hydroCHLOROthiazide (PRINZIDE;ZESTORETIC) 20-12.5 MG per tablet 1 tablet, 1 tablet, Oral, Daily  levothyroxine (SYNTHROID) tablet 25 mcg, 25 mcg, Oral, Daily  metFORMIN (GLUCOPHAGE) tablet 1,000 mg, 1,000 mg, Oral, BID WC  pantoprazole (PROTONIX) tablet 40 mg, 40 mg, Oral, Daily    ASSESSMENT AND PLAN    Principal Problem:    Dementia with behavioral disturbance (HCC)  Active Problems:    Encounter for routine adult medical examination    Psychosis ([address]    Left foot pain    Urticarial rash    Hyperlipidemia    Hypertension  Resolved Problems:    * No resolved hospital problems. *       1. Patient s symptoms   show no change  2. Probable discharge is Friday  3. Discharge planning is complete  4. Suicidal ideation is none  5. Total time with patient was 40 minutes and more than 50 % of that time was spent counseling the patient on their symptoms, treatment and expected goals. Addendum to PA student note:  Pt seen, examined, and evaluated with PA student , who acted as my scribe for the above documentation. I have reviewed the current history, physical findings, labs, assessment and plan; and agree with note as documented.
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
## 2022-06-14 NOTE — BH NOTE
Maninder Sloan was working on a puzzle so this clinician went around asking all the other patients if they would like to attend group and they all declined. This clinician sat with Maninder Sloan and we worked on the puzzle together and socialized as well as talked about discharge. Maninder Sloan said no one has talked about discharge with her. This clinician told Maninder Sloan she would be missed when she leaved because she is always in groups and is always friendly. Maninder Sloan said she would miss us as well.      [address], YVONNE

## 2022-06-14 NOTE — FLOWSHEET NOTE
Senior Purposeful Rounding     Position:Repositions self     Physical Environment:Room free from clutter, Clear path to bathroom , Adequate lighting and No safety hazards noted     Pain Rating/ Nonverbal Pain Behaviors:denies; 0     Pain interventions Attempted: n/a     Patient Toileted:Independent

## 2022-06-14 NOTE — GROUP NOTE
Group Therapy Note    Date: 6/14/2022    Group Start Time: 1000  Group End Time: 3402  Group Topic: Cognitive Skills    [id number] [facility]        Group Therapy Note    Attendees: 6    Group members sat at the table engaged in reminiscing therapy. Notes: Mami Linares attended group for the full duration. Mami Linares remained engaged and interacted approrpiately with other members of the group. Status After Intervention:  Improved    Participation Level:  Active Listener and Interactive    Participation Quality: Appropriate      Speech:  normal      Thought Process/Content: Linear      Affective Functioning: Congruent      Mood: euthymic      Level of consciousness:  Alert      Response to Learning: Able to verbalize current knowledge/experience      Endings: None Reported    Modes of Intervention: Socialization, Exploration and Activity      Discipline Responsible: Behavorial Health Tech      Signature:  ANGE Hassan

## 2022-06-14 NOTE — GROUP NOTE
Group Therapy Note    Date: 6/14/2022    Group Start Time: 1300  Group End Time: 3951  Group Topic: Activity    [facility]    ANGE Eng        Group Therapy Note  Clinician met with the group members who wanted to work on putting a puzzle together. Clinician put on mindfulness music and the members reminisced about the memories the songs elicited. Attendees: 2         Patient's Goal:      Notes:  Patient wanted to work on a puzzle that she started earlier. She was fully engaged in the group activity and conversation. Status After Intervention:  Improved    Participation Level:  Active Listener    Participation Quality: Attentive      Speech:  normal      Thought Process/Content: Linear      Affective Functioning: Congruent      Mood: euthymic      Level of consciousness:  Alert      Response to Learning: Able to retain information      Endings: None Reported    Modes of Intervention: Socialization and Activity      Discipline Responsible: /Counselor      Signature:  ANGE Eng

## 2022-06-15 LAB
GLUCOSE BLD-MCNC: 250 MG/DL (ref 70–99)
PERFORMED ON: ABNORMAL

## 2022-06-15 PROCEDURE — 6370000000 HC RX 637 (ALT 250 FOR IP): Performed by: PSYCHIATRY & NEUROLOGY

## 2022-06-15 PROCEDURE — 99233 SBSQ HOSP IP/OBS HIGH 50: CPT | Performed by: PSYCHIATRY & NEUROLOGY

## 2022-06-15 PROCEDURE — 1240000000 HC EMOTIONAL WELLNESS R&B

## 2022-06-15 PROCEDURE — 6370000000 HC RX 637 (ALT 250 FOR IP)

## 2022-06-15 PROCEDURE — 6370000000 HC RX 637 (ALT 250 FOR IP): Performed by: NURSE PRACTITIONER

## 2022-06-15 RX ADMIN — LEVOTHYROXINE SODIUM 25 MCG: 25 TABLET ORAL at 09:55

## 2022-06-15 RX ADMIN — PANTOPRAZOLE SODIUM 40 MG: 40 TABLET, DELAYED RELEASE ORAL at 09:55

## 2022-06-15 RX ADMIN — PERPHENAZINE 8 MG: 16 TABLET, FILM COATED ORAL at 17:31

## 2022-06-15 RX ADMIN — MULTIPLE VITAMINS W/ MINERALS TAB 1 TABLET: TAB at 09:54

## 2022-06-15 RX ADMIN — Medication: at 14:40

## 2022-06-15 RX ADMIN — DICLOFENAC SODIUM 2 G: 10 GEL TOPICAL at 14:38

## 2022-06-15 RX ADMIN — PERPHENAZINE 8 MG: 16 TABLET, FILM COATED ORAL at 09:54

## 2022-06-15 RX ADMIN — ATORVASTATIN CALCIUM 20 MG: 10 TABLET, FILM COATED ORAL at 09:55

## 2022-06-15 RX ADMIN — METFORMIN HYDROCHLORIDE 1000 MG: 500 TABLET ORAL at 17:31

## 2022-06-15 RX ADMIN — FLUVOXAMINE MALEATE 50 MG: 50 TABLET, COATED ORAL at 20:29

## 2022-06-15 RX ADMIN — LISINOPRIL AND HYDROCHLOROTHIAZIDE 1 TABLET: 12.5; 2 TABLET ORAL at 09:55

## 2022-06-15 RX ADMIN — Medication: at 20:31

## 2022-06-15 RX ADMIN — DIVALPROEX SODIUM 500 MG: 125 CAPSULE, COATED PELLETS ORAL at 14:17

## 2022-06-15 RX ADMIN — Medication 400 UNITS: at 09:56

## 2022-06-15 RX ADMIN — METFORMIN HYDROCHLORIDE 1000 MG: 500 TABLET ORAL at 14:41

## 2022-06-15 RX ADMIN — DIVALPROEX SODIUM 500 MG: 125 CAPSULE, COATED PELLETS ORAL at 09:54

## 2022-06-15 RX ADMIN — DICLOFENAC SODIUM 2 G: 10 GEL TOPICAL at 20:30

## 2022-06-15 ASSESSMENT — PAIN SCALES - GENERAL: PAINLEVEL_OUTOF10: 0

## 2022-06-15 NOTE — PROGRESS NOTES
Department of Psychiatry  AttendingProgress Note  Chief Complaint: behavioral disturbances   Jairon Jewell reports feeling more tired today. She said she slept well last night. She is still having episodes of taking her clothes off and undressing. She said this is a \"habit\" for her. When asked if she is still hearing voices she said [place] is getting cremated. \" Jairon Jewell has been cooperative and complaint on her medications. Patient will most likely be discharged to home on Friday, depending on how tonight/tomorrow go. Patient's chart was reviewed and collaborated with  about the treatment plan. SUBJECTIVE:    Patient is feeling unchanged. Suicidal ideation:  denies suicidal ideation. Patient does not have medication side effects. ROS: Patient has new complaints: no  Sleeping adequately:  Yes   Appetite adequate: Yes  Attending groups: Yes  Visitors:Yes    OBJECTIVE    Physical  VITALS:  /60   Pulse 76   Temp 98.2 °F (36.8 °C) (Temporal)   Resp 16   Ht 4' 11\" (1.499 m)   Wt 142 lb (64.4 kg)   SpO2 96%   BMI 28.68 kg/m²     Mental Status Examination:  Patients appearance was hospital attire. Thoughts are [place]. Homicidal ideations none. No abnormal movements, tics or mannerisms. Memory intact A[place] 0. Concentration Fair. Alert and oriented X 4. Insight and Judgement impaired insight. Patient was cooperative. Patient gait not assessed.  Mood decreased range, affect normal affect Hallucinations Present - auditory, suicidal ideations no specific plan to harm self Speech increased latency of response  Data  Labs:   Admission on 05/23/2022   Component Date Value Ref Range Status    TSH 05/24/2022 2.38  0.27 - 4.20 uIU/mL Final    Cholesterol, Total 05/25/2022 150  0 - 199 mg/dL Final    Triglycerides 05/25/2022 198* 0 - 150 mg/dL Final    HDL 05/25/2022 46  40 - 60 mg/dL Final    LDL Calculated 05/25/2022 64  <100 mg/dL Final    VLDL Cholesterol Calculated 05/25/2022 40  Not Established mg/dL Final    Hemoglobin A1C 05/25/2022 7.4  See comment % Final    Comment: Comment:  Diagnosis of Diabetes: > or = 6.5%  Increased risk of diabetes (Prediabetes): 5.7-6.4%  Glycemic Control: Nonpregnant Adults: <7.0%                    Pregnant: <6.0%        eAG 05/25/2022 165.7  mg/dL Final    Color, UA 05/27/2022 Yellow  Straw/Yellow Final    Clarity, UA 05/27/2022 Clear  Clear Final    Glucose, Ur 05/27/2022 Negative  Negative mg/dL Final    Bilirubin Urine 05/27/2022 Negative  Negative Final    Ketones, Urine 05/27/2022 Negative  Negative mg/dL Final    Specific Gravity, UA 05/27/2022 1.010  1.005 - 1.030 Final    Blood, Urine 05/27/2022 TRACE-INTACT* Negative Final    pH, UA 05/27/2022 6.0  5.0 - 8.0 Final    Protein, UA 05/27/2022 Negative  Negative mg/dL Final    Urobilinogen, Urine 05/27/2022 0.2  <2.0 E.U./dL Final    Nitrite, Urine 05/27/2022 Negative  Negative Final    Leukocyte Esterase, Urine 05/27/2022 SMALL* Negative Final    Microscopic Examination 05/27/2022 YES   Final    Urine Type 05/27/2022 NotGiven   Final    Urine received in a container without preservatives.     Urine Reflex to Culture 05/27/2022 Not Indicated   Final    WBC, UA 05/27/2022 3-5  0 - 5 /HPF Final    RBC, UA 05/27/2022 3-4  0 - 4 /HPF Final    Epithelial Cells, UA 05/27/2022 0-1  0 - 5 /HPF Final    Valproic Acid Lvl 05/31/2022 13.8* 50.0 - 100.0 ug/mL Final    Valproic Acid Lvl 06/08/2022 36.8* 50.0 - 100.0 ug/mL Final    Valproic Acid Lvl 06/14/2022 68.9  50.0 - 100.0 ug/mL Final    Ammonia 06/14/2022 40  11 - 51 umol/L Final    POC Glucose 06/14/2022 93  70 - 99 mg/dl Final    Performed on 06/14/2022 ACCU-CHEK   Final            Medications  Current Facility-Administered Medications: fluocinonide (LIDEX) 0.05 % cream, , Topical, BID  perphenazine tablet 8 mg, 8 mg, Oral, BID WC  divalproex (DEPAKOTE SPRINKLE) capsule 500 mg, 500 mg, Oral, BID  fluvoxaMINE (LUVOX) tablet 50 mg, 50 mg, Oral, Nightly  medicated lip balm (BLISTEX/CARMEX) stick, , Topical, PRN  diclofenac sodium (VOLTAREN) 1 % gel 2 g, 2 g, Topical, BID  therapeutic multivitamin-minerals 1 tablet, 1 tablet, Oral, Daily  vitamin E capsule 400 Units, 400 Units, Oral, Daily  acetaminophen (TYLENOL) tablet 650 mg, 650 mg, Oral, Q4H PRN  ibuprofen (ADVIL;MOTRIN) tablet 400 mg, 400 mg, Oral, Q6H PRN  magnesium hydroxide (MILK OF MAGNESIA) 400 MG/5ML suspension 30 mL, 30 mL, Oral, Daily PRN  nicotine polacrilex (COMMIT) lozenge 2 mg, 2 mg, Oral, Q1H PRN  aluminum & magnesium hydroxide-simethicone (MAALOX) 200-200-20 MG/5ML suspension 30 mL, 30 mL, Oral, Q6H PRN  hydrOXYzine (ATARAX) tablet 50 mg, 50 mg, Oral, TID PRN  OLANZapine (ZYPREXA) tablet 5 mg, 5 mg, Oral, Q8H PRN **OR** OLANZapine (ZYPREXA) 5 mg in sterile water 1 mL injection, 5 mg, IntraMUSCular, Q8H PRN  sterile water injection 2.1 mL, 2.1 mL, IntraMUSCular, Q4H PRN  diphenhydrAMINE (BENADRYL) injection 50 mg, 50 mg, IntraMUSCular, Q4H PRN  traZODone (DESYREL) tablet 50 mg, 50 mg, Oral, Nightly PRN  atorvastatin (LIPITOR) tablet 20 mg, 20 mg, Oral, Daily  lisinopril-hydroCHLOROthiazide (PRINZIDE;ZESTORETIC) 20-12.5 MG per tablet 1 tablet, 1 tablet, Oral, Daily  levothyroxine (SYNTHROID) tablet 25 mcg, 25 mcg, Oral, Daily  metFORMIN (GLUCOPHAGE) tablet 1,000 mg, 1,000 mg, Oral, BID WC  pantoprazole (PROTONIX) tablet 40 mg, 40 mg, Oral, Daily    ASSESSMENT AND PLAN    Principal Problem:    Dementia with behavioral disturbance (HCC)  Active Problems:    Encounter for routine adult medical examination    Psychosis ([facility] [address]    Left foot pain    Urticarial rash    Hyperlipidemia    Hypertension  Resolved Problems:    * No resolved hospital problems. *       1. Patient s symptoms   show no change  2. Probable discharge is Friday  3. Discharge planning is complete  4. Suicidal ideation is none  5.  Total time with patient was 40 minutes and more than 50 % of that time was spent counseling the patient on their symptoms, treatment and expected goals. Addendum to PA student note:  Pt seen, examined, and evaluated with PA student , who acted as my scribe for the above documentation. I have reviewed the current history, physical findings, labs, assessment and plan; and agree with note as documented.

## 2022-06-15 NOTE — PROGRESS NOTES
Behavioral Services                                              Medicare Re-Certification    I certify that the inpatient psychiatric hospital services furnished since the previous certification/re-certification were, and continue to be, medically necessary for;    [x] (1) Treatment which could reasonably be expected to improve the patient's condition,    [x] (2) Or for diagnostic study. Estimated length of stay/service 3 d    Plan for post-hospital care outpt    This patient continues to need, on a daily basis, active treatment furnished directly by or requiring the supervision of inpatient psychiatric personnel.     Electronically signed by Yudelka Larsen MD on 6/15/2022 at 2:25 PM

## 2022-06-15 NOTE — FLOWSHEET NOTE
Senior Purposeful Rounding     Position:Repositions Self     Physical Environment:Room free from clutter, Clear path to bathroom , Adequate lighting and No safety hazards noted     Pain Rating/ Nonverbal Pain Behaviors:denies;      Pain interventions Attempted: none     Patient Toileted:Independent

## 2022-06-15 NOTE — BH NOTE
Spoke with pt's daughter Rocio Ledesma and provided an update regarding pt's progress and treatment. Also, discussed options for services post dc.

## 2022-06-15 NOTE — PLAN OF CARE
Problem: Chronic Conditions and Co-morbidities  Goal: Patient's chronic conditions and co-morbidity symptoms are monitored and maintained or improved  Outcome: Progressing     Problem: Safety - Adult  Goal: Free from fall injury  Outcome: Progressing     Pt. Did not get up until 1030A after attempting to be woke up. Pt. Slept majority of the day, very tired. Ate well. +MEDS. Daughter her to visit. Denies all. Redirected multiple times to keep clothes on. No other complaints at this time. Plan to D/C Friday.

## 2022-06-16 LAB
GLUCOSE BLD-MCNC: 107 MG/DL (ref 70–99)
PERFORMED ON: ABNORMAL

## 2022-06-16 PROCEDURE — 6370000000 HC RX 637 (ALT 250 FOR IP)

## 2022-06-16 PROCEDURE — 99233 SBSQ HOSP IP/OBS HIGH 50: CPT

## 2022-06-16 PROCEDURE — 1240000000 HC EMOTIONAL WELLNESS R&B

## 2022-06-16 PROCEDURE — 6370000000 HC RX 637 (ALT 250 FOR IP): Performed by: PSYCHIATRY & NEUROLOGY

## 2022-06-16 RX ADMIN — ATORVASTATIN CALCIUM 20 MG: 10 TABLET, FILM COATED ORAL at 08:30

## 2022-06-16 RX ADMIN — LEVOTHYROXINE SODIUM 25 MCG: 25 TABLET ORAL at 08:29

## 2022-06-16 RX ADMIN — PERPHENAZINE 8 MG: 16 TABLET, FILM COATED ORAL at 08:30

## 2022-06-16 RX ADMIN — MULTIPLE VITAMINS W/ MINERALS TAB 1 TABLET: TAB at 08:30

## 2022-06-16 RX ADMIN — Medication: at 20:55

## 2022-06-16 RX ADMIN — PANTOPRAZOLE SODIUM 40 MG: 40 TABLET, DELAYED RELEASE ORAL at 08:30

## 2022-06-16 RX ADMIN — IBUPROFEN 400 MG: 400 TABLET, FILM COATED ORAL at 08:29

## 2022-06-16 RX ADMIN — METFORMIN HYDROCHLORIDE 1000 MG: 500 TABLET ORAL at 17:37

## 2022-06-16 RX ADMIN — DIVALPROEX SODIUM 500 MG: 125 CAPSULE, COATED PELLETS ORAL at 14:36

## 2022-06-16 RX ADMIN — DICLOFENAC SODIUM 2 G: 10 GEL TOPICAL at 20:55

## 2022-06-16 RX ADMIN — FLUVOXAMINE MALEATE 50 MG: 50 TABLET, COATED ORAL at 20:56

## 2022-06-16 RX ADMIN — METFORMIN HYDROCHLORIDE 1000 MG: 500 TABLET ORAL at 08:29

## 2022-06-16 RX ADMIN — HYDROXYZINE HYDROCHLORIDE 50 MG: 50 TABLET, FILM COATED ORAL at 08:29

## 2022-06-16 RX ADMIN — LISINOPRIL AND HYDROCHLOROTHIAZIDE 1 TABLET: 12.5; 2 TABLET ORAL at 08:30

## 2022-06-16 RX ADMIN — Medication 400 UNITS: at 08:42

## 2022-06-16 RX ADMIN — DIVALPROEX SODIUM 500 MG: 125 CAPSULE, COATED PELLETS ORAL at 08:26

## 2022-06-16 RX ADMIN — PERPHENAZINE 8 MG: 16 TABLET, FILM COATED ORAL at 17:37

## 2022-06-16 ASSESSMENT — PAIN SCALES - GENERAL: PAINLEVEL_OUTOF10: 0

## 2022-06-16 NOTE — PROGRESS NOTES
Senior Purposeful Rounding    Position:Sitting    Physical Environment:Room free from clutter, Clear path to bathroom , Adequate lighting, Bed alarm functioning and No safety hazards noted    Pain Rating/ Nonverbal Pain Behaviors: denies    Pain interventions Attempted: None    Patient Toileted:Continent and Independent

## 2022-06-16 NOTE — PROGRESS NOTES
Senior Purposeful Rounding    Position:Left Side and Repositions Self    Physical Environment:Room free from clutter, Clear path to bathroom , Adequate lighting and No safety hazards noted    Pain Rating/ Nonverbal Pain Behaviors: 0, rests with eyes closed. Calm expression. Respirations regular and even.      Pain interventions Attempted: None    Patient Toileted:Continent and Independent

## 2022-06-16 NOTE — PLAN OF CARE
Problem: Anxiety  Goal: Will report anxiety at manageable levels  Description: INTERVENTIONS:  1. Administer medication as ordered  2. Teach and rehearse alternative coping skills  3. Provide emotional support with 1:1 interaction with staff  Outcome: Progressing     Problem: Coping  Goal: Pt/Family able to verbalize concerns and demonstrate effective coping strategies  Description: INTERVENTIONS:  1. Assist patient/family to identify coping skills, available support systems and cultural and spiritual values  2. Provide emotional support, including active listening and acknowledgement of concerns of patient and caregivers  3. Reduce environmental stimuli, as able  4. Instruct patient/family in relaxation techniques, as appropriate  5. Assess for spiritual pain/suffering and initiate Spiritual Care, Psychosocial Clinical Specialist consults as needed  Outcome: Progressing     Problem: Confusion  Goal: Confusion, delirium, dementia, or psychosis is improved or at baseline  Description: INTERVENTIONS:  1. Assess for possible contributors to thought disturbance, including medications, impaired vision or hearing, underlying metabolic abnormalities, dehydration, psychiatric diagnoses, and notify attending LIP  2. [place] high risk fall precautions, as indicated  3. Provide frequent short contacts to provide reality reorientation, refocusing and direction  4. Decrease environmental stimuli, including noise as appropriate  5. Monitor and intervene to maintain adequate nutrition, hydration, elimination, sleep and activity  6. If unable to ensure safety without constant attention obtain sitter and review sitter guidelines with assigned personnel  7. Initiate Psychosocial CNS and Spiritual Care consult, as indicated  Outcome: Progressing     Problem: Psychosis  Goal: Will report no hallucinations or delusions  Description: INTERVENTIONS:  1. Administer medication as  ordered  2.  Assist with reality testing to support increasing orientation  3. Assess if patient's hallucinations or delusions are encouraging self harm or harm to others and intervene as appropriate  Outcome: Progressing     Problem: Pain  Goal: Verbalizes/displays adequate comfort level or baseline comfort level  Outcome: Progressing    Patient calm pleasant and quiet. Watched television this evening. Denies SI/HI, AH/VH. No RTIS observed. Did not voice any paranoid thoughts this evening. No episodes of disrobing observed. Patient medication compliant. Rests throughout night without disturbance.

## 2022-06-16 NOTE — PROGRESS NOTES
Senior Purposeful Rounding    Position:Back and Repositions Self    Physical Environment:Room free from clutter, Clear path to bathroom , Adequate lighting and No safety hazards noted    Pain Rating/ Nonverbal Pain Behaviors: 0, Calm expression. Rests with eyes closed. Respirations regular and even.      Pain interventions Attempted: None    Patient Toileted:Continent and Independent

## 2022-06-16 NOTE — PROGRESS NOTES
Department of Psychiatry  AttendingProgress Note  Chief Complaint: behavioral disturbances     Pt is up talking with peers and then over to watch tv. Pt is dressed, calm and pleasant as we spoke. Pt states that she is tired this morning, got up early. Pt aware that she is going home soon, states she is excited to be home and sleep in her own bed. Pt states she plans to sleep late every day once she is home. Pt denies any issues at this time. Plan is to discharge home with family tomorrow. Patient's chart was reviewed and collaborated with  about the treatment plan. SUBJECTIVE:    Patient is feeling unchanged. Suicidal ideation:  denies suicidal ideation. Patient does not have medication side effects. ROS: Patient has new complaints: no  Sleeping adequately:  Yes   Appetite adequate: Yes  Attending groups: Yes  Visitors:Yes    OBJECTIVE    Physical  VITALS:  /70   Pulse 63   Temp 97.9 °F (36.6 °C) (Oral)   Resp 16   Ht 4' 11\" (1.499 m)   Wt 142 lb (64.4 kg)   SpO2 93%   BMI 28.68 kg/m²     Mental Status Examination:  Patients appearance was hospital attire. Thoughts are [place]. Homicidal ideations none. No abnormal movements, tics or mannerisms. Memory intact [place] 0. Concentration Fair. Alert and oriented X 4. Insight and Judgement impaired insight. Patient was cooperative. Patient gait not assessed.  Mood decreased range, affect normal affect Hallucinations Present - auditory, suicidal ideations no specific plan to harm self Speech increased latency of response  Data  Labs:   Admission on 05/23/2022   Component Date Value Ref Range Status    TSH 05/24/2022 2.38  0.27 - 4.20 uIU/mL Final    Cholesterol, Total 05/25/2022 150  0 - 199 mg/dL Final    Triglycerides 05/25/2022 198* 0 - 150 mg/dL Final    HDL 05/25/2022 46  40 - 60 mg/dL Final    LDL Calculated 05/25/2022 64  <100 mg/dL Final    VLDL Cholesterol Calculated 05/25/2022 40  Not Established mg/dL Final    Hemoglobin A1C 05/25/2022 7.4  See comment % Final    Comment: Comment:  Diagnosis of Diabetes: > or = 6.5%  Increased risk of diabetes (Prediabetes): 5.7-6.4%  Glycemic Control: Nonpregnant Adults: <7.0%                    Pregnant: <6.0%        eAG 05/25/2022 165.7  mg/dL Final    Color, UA 05/27/2022 Yellow  Straw/Yellow Final    Clarity, UA 05/27/2022 Clear  Clear Final    Glucose, Ur 05/27/2022 Negative  Negative mg/dL Final    Bilirubin Urine 05/27/2022 Negative  Negative Final    Ketones, Urine 05/27/2022 Negative  Negative mg/dL Final    Specific Gravity, UA 05/27/2022 1.010  1.005 - 1.030 Final    Blood, Urine 05/27/2022 TRACE-INTACT* Negative Final    pH, UA 05/27/2022 6.0  5.0 - 8.0 Final    Protein, UA 05/27/2022 Negative  Negative mg/dL Final    Urobilinogen, Urine 05/27/2022 0.2  <2.0 E.U./dL Final    Nitrite, Urine 05/27/2022 Negative  Negative Final    Leukocyte Esterase, Urine 05/27/2022 SMALL* Negative Final    Microscopic Examination 05/27/2022 YES   Final    Urine Type 05/27/2022 NotGiven   Final    Urine received in a container without preservatives.     Urine Reflex to Culture 05/27/2022 Not Indicated   Final    WBC, UA 05/27/2022 3-5  0 - 5 /HPF Final    RBC, UA 05/27/2022 3-4  0 - 4 /HPF Final    Epithelial Cells, UA 05/27/2022 0-1  0 - 5 /HPF Final    Valproic Acid Lvl 05/31/2022 13.8* 50.0 - 100.0 ug/mL Final    Valproic Acid Lvl 06/08/2022 36.8* 50.0 - 100.0 ug/mL Final    Valproic Acid Lvl 06/14/2022 68.9  50.0 - 100.0 ug/mL Final    Ammonia 06/14/2022 40  11 - 51 umol/L Final    POC Glucose 06/14/2022 93  70 - 99 mg/dl Final    Performed on 06/14/2022 ACCU-CHEK   Final    POC Glucose 06/15/2022 250* 70 - 99 mg/dl Final    Performed on 06/15/2022 ACCU-CHEK   Final            Medications  Current Facility-Administered Medications: fluocinonide (LIDEX) 0.05 % cream, , Topical, BID  perphenazine tablet 8 mg, 8 mg, Oral, BID WC  divalproex (DEPAKOTE SPRINKLE) capsule 500 mg, 500 mg, Oral, BID  fluvoxaMINE (LUVOX) tablet 50 mg, 50 mg, Oral, Nightly  medicated lip balm (BLISTEX/CARMEX) stick, , Topical, PRN  diclofenac sodium (VOLTAREN) 1 % gel 2 g, 2 g, Topical, BID  therapeutic multivitamin-minerals 1 tablet, 1 tablet, Oral, Daily  vitamin E capsule 400 Units, 400 Units, Oral, Daily  acetaminophen (TYLENOL) tablet 650 mg, 650 mg, Oral, Q4H PRN  ibuprofen (ADVIL;MOTRIN) tablet 400 mg, 400 mg, Oral, Q6H PRN  magnesium hydroxide (MILK OF MAGNESIA) 400 MG/5ML suspension 30 mL, 30 mL, Oral, Daily PRN  nicotine polacrilex (COMMIT) lozenge 2 mg, 2 mg, Oral, Q1H PRN  aluminum & magnesium hydroxide-simethicone (MAALOX) 200-200-20 MG/5ML suspension 30 mL, 30 mL, Oral, Q6H PRN  hydrOXYzine (ATARAX) tablet 50 mg, 50 mg, Oral, TID PRN  OLANZapine (ZYPREXA) tablet 5 mg, 5 mg, Oral, Q8H PRN **OR** OLANZapine (ZYPREXA) 5 mg in sterile water 1 mL injection, 5 mg, IntraMUSCular, Q8H PRN  sterile water injection 2.1 mL, 2.1 mL, IntraMUSCular, Q4H PRN  diphenhydrAMINE (BENADRYL) injection 50 mg, 50 mg, IntraMUSCular, Q4H PRN  traZODone (DESYREL) tablet 50 mg, 50 mg, Oral, Nightly PRN  atorvastatin (LIPITOR) tablet 20 mg, 20 mg, Oral, Daily  lisinopril-hydroCHLOROthiazide (PRINZIDE;ZESTORETIC) 20-12.5 MG per tablet 1 tablet, 1 tablet, Oral, Daily  levothyroxine (SYNTHROID) tablet 25 mcg, 25 mcg, Oral, Daily  metFORMIN (GLUCOPHAGE) tablet 1,000 mg, 1,000 mg, Oral, BID WC  pantoprazole (PROTONIX) tablet 40 mg, 40 mg, Oral, Daily    ASSESSMENT AND PLAN    Principal Problem:    Dementia with behavioral disturbance (HCC)  Active Problems:    Encounter for routine adult medical examination    Psychosis ([facility] [address]    Left foot pain    Urticarial rash    Hyperlipidemia    Hypertension  Resolved Problems:    * No resolved hospital problems. *       1. Patient s symptoms   show no change  2. Probable discharge is Friday  3. Discharge planning is complete  4. Suicidal ideation is none  5.  Total time with patient was 40 minutes and more than 50 % of that time was spent counseling the patient on their symptoms, treatment and expected goals.      Libby Martin, PMHNP-BC

## 2022-06-16 NOTE — PROGRESS NOTES
Senior Purposeful Rounding     Position: Back and Repositions Self     Physical Environment:Room free from clutter, Clear path to bathroom , Adequate lighting, Bed alarm functioning and No safety hazards noted     Pain Rating/ Nonverbal Pain Behaviors: 0, rests with eyes closed. Calm  Expression.  Respirations regular and even.      Pain interventions Attempted: None     Patient Toileted:Continent and Independent

## 2022-06-16 NOTE — PLAN OF CARE
psychiatric diagnoses, and notify attending LIP  2. [place] high risk fall precautions, as indicated  3. Provide frequent short contacts to provide reality reorientation, refocusing and direction  4. Decrease environmental stimuli, including noise as appropriate  5. Monitor and intervene to maintain adequate nutrition, hydration, elimination, sleep and activity  6. If unable to ensure safety without constant attention obtain sitter and review sitter guidelines with assigned personnel  7. Initiate Psychosocial CNS and Spiritual Care consult, as indicated  Outcome: Progressing     Pt is currently Watching TV , Patient ate over 75% of meals and took all oral meds declined ointments and lotion medication this shift. Pt takes meds whole and requires staff x 0 for ambulation. Pt is Alert and oriented x 2    Pt behavior was less paranoid and impulsive this shift with NO occurences of streaking this shift and there were 0 occurences of acting out. Pt received Tylenol and Atarax around 800 this shift for PRN    No New orders were recieved. Pt remains continent of Bowel and Bladder. Pt slept or rested about 1-2 hours today. They had 1 visitor. The pt received  a shower   Pt denies SI/HI/AVH this shift and no  RTIS was observed or reported.      Pt discharges tomorrow

## 2022-06-17 VITALS
RESPIRATION RATE: 18 BRPM | DIASTOLIC BLOOD PRESSURE: 71 MMHG | BODY MASS INDEX: 28.63 KG/M2 | TEMPERATURE: 97.3 F | OXYGEN SATURATION: 93 % | WEIGHT: 142 LBS | HEIGHT: 59 IN | SYSTOLIC BLOOD PRESSURE: 128 MMHG | HEART RATE: 75 BPM

## 2022-06-17 LAB
GLUCOSE BLD-MCNC: 74 MG/DL (ref 70–99)
PERFORMED ON: NORMAL

## 2022-06-17 PROCEDURE — 6370000000 HC RX 637 (ALT 250 FOR IP): Performed by: PSYCHIATRY & NEUROLOGY

## 2022-06-17 PROCEDURE — 5130000000 HC BRIDGE APPOINTMENT

## 2022-06-17 PROCEDURE — 6370000000 HC RX 637 (ALT 250 FOR IP)

## 2022-06-17 RX ORDER — PERPHENAZINE 8 MG
8 TABLET ORAL 2 TIMES DAILY WITH MEALS
Qty: 60 TABLET | Refills: 0 | Status: SHIPPED | OUTPATIENT
Start: 2022-06-17 | End: 2022-07-13 | Stop reason: SDUPTHER

## 2022-06-17 RX ORDER — FLUVOXAMINE MALEATE 50 MG/1
50 TABLET, COATED ORAL NIGHTLY
Qty: 30 TABLET | Refills: 0 | Status: SHIPPED | OUTPATIENT
Start: 2022-06-17 | End: 2022-07-13 | Stop reason: SDUPTHER

## 2022-06-17 RX ORDER — LEVOTHYROXINE SODIUM 0.03 MG/1
25 TABLET ORAL DAILY
Qty: 30 TABLET | Refills: 0 | Status: SHIPPED | OUTPATIENT
Start: 2022-06-18 | End: 2022-06-30

## 2022-06-17 RX ORDER — DIVALPROEX SODIUM 125 MG/1
250 CAPSULE, COATED PELLETS ORAL 2 TIMES DAILY
Qty: 120 CAPSULE | Refills: 0 | Status: SHIPPED | OUTPATIENT
Start: 2022-06-17 | End: 2022-07-13 | Stop reason: SDUPTHER

## 2022-06-17 RX ORDER — PANTOPRAZOLE SODIUM 40 MG/1
40 TABLET, DELAYED RELEASE ORAL DAILY
Qty: 30 TABLET | Refills: 3 | Status: SHIPPED | OUTPATIENT
Start: 2022-06-18 | End: 2022-10-13 | Stop reason: SDUPTHER

## 2022-06-17 RX ADMIN — PANTOPRAZOLE SODIUM 40 MG: 40 TABLET, DELAYED RELEASE ORAL at 10:02

## 2022-06-17 RX ADMIN — Medication 400 UNITS: at 10:04

## 2022-06-17 RX ADMIN — LISINOPRIL AND HYDROCHLOROTHIAZIDE 1 TABLET: 12.5; 2 TABLET ORAL at 09:55

## 2022-06-17 RX ADMIN — HYDROXYZINE HYDROCHLORIDE 50 MG: 50 TABLET, FILM COATED ORAL at 09:54

## 2022-06-17 RX ADMIN — PERPHENAZINE 8 MG: 16 TABLET, FILM COATED ORAL at 09:55

## 2022-06-17 RX ADMIN — IBUPROFEN 400 MG: 400 TABLET, FILM COATED ORAL at 09:54

## 2022-06-17 RX ADMIN — DIVALPROEX SODIUM 500 MG: 125 CAPSULE, COATED PELLETS ORAL at 09:54

## 2022-06-17 RX ADMIN — MULTIPLE VITAMINS W/ MINERALS TAB 1 TABLET: TAB at 09:55

## 2022-06-17 RX ADMIN — ATORVASTATIN CALCIUM 20 MG: 10 TABLET, FILM COATED ORAL at 09:55

## 2022-06-17 RX ADMIN — METFORMIN HYDROCHLORIDE 1000 MG: 500 TABLET ORAL at 09:54

## 2022-06-17 RX ADMIN — LEVOTHYROXINE SODIUM 25 MCG: 25 TABLET ORAL at 10:00
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
## 2022-06-17 NOTE — PLAN OF CARE
Problem: Chronic Conditions and Co-morbidities  Goal: Patient's chronic conditions and co-morbidity symptoms are monitored and maintained or improved  6/16/2022 2220 by Carrington Sprague RN  Outcome: Progressing     Problem: Anxiety  Goal: Will report anxiety at manageable levels  Description: INTERVENTIONS:  1. Administer medication as ordered  2. Teach and rehearse alternative coping skills  3. Provide emotional support with 1:1 interaction with staff  6/16/2022 2220 by Carrington Sprague RN  Outcome: Progressing    Pt has been pleasant and cooperative with staff. She denies SI/HI/AVH and no RTIS noted. She was medication compliant. She is excited to be discharged tomorrow. She has been visible in the day room watching tv.  Denies needs currently

## 2022-06-17 NOTE — PROGRESS NOTES
[facility]  Discharge Note    Pt discharged with followings belongings:   Dental Appliances: None  Vision - Corrective Lenses: Eyeglasses  Hearing Aid: None  Jewelry: None  Body Piercings Removed: N/A  Clothing: Sent home  Other Valuables: At home   Valuables sent home with  patient. Patient education on aftercare instructions: SIGNED AND REVIEWED WITH [facility] faxed to [facility] by LRS  at 2:50 PM .Patient verbalize understanding of AVS:  YES. Status EXAM upon discharge:  Mental Status and Behavioral Exam  Normal: No  Level of Assistance: Set up  Facial Expression: Brightened  Affect: Appropriate  Level of Consciousness: Alert  Frequency of Checks: 4 times per hour, close  Mood:Normal: No  Mood: Anxious  Motor Activity:Normal: Yes  Motor Activity: Decreased  Eye Contact: Good  Observed Behavior: Cooperative  Sexual Misconduct History: Current - no  Preception: [place] to person,[place] to place,[place] to time  Attention:Normal: No  Attention: Distractible  Thought Processes: Perseveration  Thought Content:Normal: No  Thought Content: Delusions  Depression Symptoms: No problems reported or observed.   Anxiety Symptoms: Generalized  Radha Symptoms: Poor judgment  Hallucinations: None  Delusions: Yes  Delusions: Paranoid  Memory:Normal: No  Memory: Confabulation  Insight and Judgment: No  Insight and Judgment: Poor judgment,Poor insight      Metabolic Screening:    Lab Results   Component Value Date    LABA1C 7.4 05/25/2022       Lab Results   Component Value Date    CHOL 150 05/25/2022    CHOL 145 10/19/2021    CHOL 144 10/10/2020    CHOL 238 (H) 02/11/2019    CHOL 152 07/19/2018     Lab Results   Component Value Date    TRIG 198 (H) 05/25/2022    TRIG 210 (H) 10/19/2021    TRIG 190 (H) 10/10/2020    TRIG 274 (H) 02/11/2019    TRIG 379 (H) 07/19/2018     Lab Results   Component Value Date    HDL 46 05/25/2022    HDL 45 10/19/2021    HDL 45 10/10/2020    HDL 45 02/11/2019    HDL 32 (L) 07/19/2018     No components found for: [facility]  Lab Results   Component Value Date    LABVLDL 40 05/25/2022    LABVLDL 42 10/19/2021    LABVLDL 38 10/10/2020    LABVLDL 55 02/11/2019    LABVLDL see below 07/19/2018     Bridge Appointment completed: Reviewed Discharge Instructions with patient. Patient verbalizes understanding and agreement with the discharge plan using the teachback method.      Referral for Outpatient Tobacco Cessation Counseling, upon discharge (mauricio X if applicable and completed):    ( )  Hospital staff assisted patient to call Quit Line or faxed referral                                   during hospitalization                  ( )  Recognizing danger situations (included triggers and roadblocks), if not completed on admission                    ( )  Coping skills (new ways to manage stress, exercise, relaxation techniques, changing routine, distraction), if not completed on admission                                                           ( )  Basic information about quitting (benefits of quitting, techniques in how to quit, available resources, if not completed on admission  ( ) Referral for counseling faxed to [facility]   ( ) Patient refused referral  () Patient refused counseling  ( ) Patient refused smoking cessation medication upon discharge  DOES NOT SMOKE  Vaccinations (mauricio X if applicable and completed):  ( ) Patient states already received influenza vaccine elsewhere  ( ) Patient received influenza vaccine during this hospitalization  (X ) Patient refused influenza vaccine at this time    Teodoro Casper RN

## 2022-06-19 DIAGNOSIS — I10 HYPERTENSION, UNSPECIFIED TYPE: ICD-10-CM

## 2022-06-20 RX ORDER — LISINOPRIL AND HYDROCHLOROTHIAZIDE 20; 12.5 MG/1; MG/1
TABLET ORAL
Qty: 30 TABLET | Refills: 0 | Status: SHIPPED | OUTPATIENT
Start: 2022-06-20 | End: 2022-06-30
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
## 2022-06-26 ENCOUNTER — PATIENT MESSAGE (OUTPATIENT)
Dept: FAMILY MEDICINE CLINIC | Age: 77
End: 2022-06-26

## 2022-06-27 ENCOUNTER — OFFICE VISIT (OUTPATIENT)
Dept: FAMILY MEDICINE CLINIC | Age: 77
End: 2022-06-27
Payer: MEDICARE

## 2022-06-27 VITALS
WEIGHT: 140 LBS | SYSTOLIC BLOOD PRESSURE: 130 MMHG | OXYGEN SATURATION: 93 % | BODY MASS INDEX: 28.22 KG/M2 | RESPIRATION RATE: 18 BRPM | DIASTOLIC BLOOD PRESSURE: 84 MMHG | HEIGHT: 59 IN | HEART RATE: 90 BPM

## 2022-06-27 DIAGNOSIS — Z09 HOSPITAL DISCHARGE FOLLOW-UP: Primary | ICD-10-CM

## 2022-06-27 DIAGNOSIS — R53.1 GENERAL WEAKNESS: ICD-10-CM

## 2022-06-27 DIAGNOSIS — R19.7 DIARRHEA, UNSPECIFIED TYPE: ICD-10-CM

## 2022-06-27 DIAGNOSIS — F03.91 DEMENTIA WITH BEHAVIORAL DISTURBANCE, UNSPECIFIED DEMENTIA TYPE: ICD-10-CM

## 2022-06-27 DIAGNOSIS — R29.818 EXTRAPYRAMIDAL SYMPTOM: ICD-10-CM

## 2022-06-27 DIAGNOSIS — F29 PSYCHOSIS, UNSPECIFIED PSYCHOSIS TYPE (HCC): ICD-10-CM

## 2022-06-27 DIAGNOSIS — D50.8 IRON DEFICIENCY ANEMIA SECONDARY TO INADEQUATE DIETARY IRON INTAKE: ICD-10-CM

## 2022-06-27 LAB
ANION GAP SERPL CALCULATED.3IONS-SCNC: 13 MMOL/L (ref 3–16)
BASOPHILS ABSOLUTE: 0.1 K/UL (ref 0–0.2)
BASOPHILS RELATIVE PERCENT: 0.7 %
BUN BLDV-MCNC: 14 MG/DL (ref 7–20)
CALCIUM SERPL-MCNC: 10.4 MG/DL (ref 8.3–10.6)
CHLORIDE BLD-SCNC: 96 MMOL/L (ref 99–110)
CO2: 27 MMOL/L (ref 21–32)
CREAT SERPL-MCNC: 0.6 MG/DL (ref 0.6–1.2)
EOSINOPHILS ABSOLUTE: 0.1 K/UL (ref 0–0.6)
EOSINOPHILS RELATIVE PERCENT: 0.9 %
GFR AFRICAN AMERICAN: >60
GFR NON-AFRICAN AMERICAN: >60
GLUCOSE BLD-MCNC: 126 MG/DL (ref 70–99)
HCT VFR BLD CALC: 36 % (ref 36–48)
HEMOGLOBIN: 12.3 G/DL (ref 12–16)
LYMPHOCYTES ABSOLUTE: 2.9 K/UL (ref 1–5.1)
LYMPHOCYTES RELATIVE PERCENT: 32.6 %
MCH RBC QN AUTO: 28.4 PG (ref 26–34)
MCHC RBC AUTO-ENTMCNC: 34.3 G/DL (ref 31–36)
MCV RBC AUTO: 82.8 FL (ref 80–100)
MONOCYTES ABSOLUTE: 0.8 K/UL (ref 0–1.3)
MONOCYTES RELATIVE PERCENT: 9.3 %
NEUTROPHILS ABSOLUTE: 5 K/UL (ref 1.7–7.7)
NEUTROPHILS RELATIVE PERCENT: 56.5 %
PDW BLD-RTO: 14.7 % (ref 12.4–15.4)
PLATELET # BLD: 242 K/UL (ref 135–450)
PMV BLD AUTO: 8.4 FL (ref 5–10.5)
POTASSIUM SERPL-SCNC: 4.2 MMOL/L (ref 3.5–5.1)
RBC # BLD: 4.34 M/UL (ref 4–5.2)
SODIUM BLD-SCNC: 136 MMOL/L (ref 136–145)
WBC # BLD: 8.8 K/UL (ref 4–11)

## 2022-06-27 PROCEDURE — 36415 COLL VENOUS BLD VENIPUNCTURE: CPT | Performed by: FAMILY MEDICINE

## 2022-06-27 PROCEDURE — 99215 OFFICE O/P EST HI 40 MIN: CPT | Performed by: FAMILY MEDICINE

## 2022-06-27 PROCEDURE — 1111F DSCHRG MED/CURRENT MED MERGE: CPT | Performed by: FAMILY MEDICINE

## 2022-06-27 PROCEDURE — 1123F ACP DISCUSS/DSCN MKR DOCD: CPT | Performed by: FAMILY MEDICINE

## 2022-06-27 RX ORDER — BENZTROPINE MESYLATE 1 MG/1
1 TABLET ORAL DAILY
Qty: 60 TABLET | Refills: 3 | Status: SHIPPED | OUTPATIENT
Start: 2022-06-27 | End: 2022-08-01 | Stop reason: SDUPTHER

## 2022-06-27 ASSESSMENT — ENCOUNTER SYMPTOMS
CONSTIPATION: 0
BLOOD IN STOOL: 1
SHORTNESS OF BREATH: 0
DIARRHEA: 1
COUGH: 0
ABDOMINAL DISTENTION: 0
ABDOMINAL PAIN: 0
RECTAL PAIN: 0
VOMITING: 0
NAUSEA: 0
BACK PAIN: 1
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
## 2022-06-27 NOTE — PATIENT INSTRUCTIONS
Darren Jeronimo was seen today for follow-up from hospital and diabetes. Diagnoses and all orders for this visit:    Hospital discharge follow-up  -     [place] DISCHARGE MEDS RECONCILED W/ CURRENT OUTPATIENT MED LIST  -     Ambulatory referral to [address] [place] [address] Yoni Pool    Psychosis, unspecified psychosis type ([facility]  -     [facility]  -     Ambulatory referral to [address] [place] [address] Yoni Pool  Per psychiatry. Dementia with behavioral disturbance, unspecified dementia type ([address]  -     TX DISCHARGE MEDS RECONCILED W/ CURRENT OUTPATIENT MED LIST  -     Ambulatory referral to [address] [place] [address] Yoni Pool  Per psychiatry. General weakness/Unsteady gait  -     Ambulatory referral to [facility] will help. Extrapyramidal symptom  -     benztropine (COGENTIN) 1 MG tablet; Take 1 tablet by mouth daily  -     Ambulatory referral to [address] [place] [address] Ventura Gross  To help tremor. Iron deficiency anemia secondary to inadequate dietary iron intake  -     CBC with Auto Differential; Future    Diarrhea, unspecified type  Acidophilus 2x/day  Zinc 50 mg pill take 1/2 twice daily until diarrhea improved then  1/2 daily till formed stool then 1/2 pill 3x/wk ie Mon, Wed, Fri till normal.  Maximum dose is 1 pill twice daily. Take metformin with some carbs/starches with dose. Patient Education     benztropine (oral/injection)  Pronunciation: DERIAN zavala  Brand: Cogfarooq  What is the most important information I should know about benztropine? Use only as directed. Tell your doctor if you use other medicines or have othermedical conditions or allergies. What is benztropine? Benztropine is used with other medicines to treat symptoms of Parkinson'sdisease, such as stiffness or tremors. Benztropine is also used to treat Parkinson-like symptoms caused by usingcertain medicines. Benztropine may also be used for purposes not listed in this medication guide.   What should I discuss with my healthcare provider before taking benztropine? You should not use benztropine if you are allergic to it. Not approved for use by anyone younger than 1years old. Tell your doctor if you have ever had:   an enlarged prostate;   urination problems;   glaucoma; or   muscle problems. Older adults may be more sensitive to the effects of this medicine. It is not known if benztropine will harm an unborn baby. Tell your doctor ifyou are pregnant or plan to become pregnant. Tell your doctor if you are breastfeeding. How should I take benztropine? Follow all directions on your prescription label and read all medication guides or instruction sheets. Your doctor may occasionally change your dose. Use themedicine exactly as directed. Benztropine oral is taken by mouth. Benztropine is usually taken at bedtime. Benztropine injection is given in a muscle or vein if you are unable to take the medicine by mouth. Drink plenty of water to prevent dry mouth while taking benztropine. Dry mouth may lead to gum disease or cavities. Brush and floss your teethregularly and visit your dentist for routine dental care. Call your doctor if your symptoms do not improve, or if they get worse. Your symptoms may get worse if you stop using benztropine suddenly. Ask your doctor before stopping any of your anti-Parkinson medications. Store at room temperature away from moisture, heat, and light. What happens if I miss a dose? Take the medicine as soon as you can, but skip the missed dose if it is almost time for your next dose. Do not take two doses at one time. What happens if I overdose? Seek emergency medical attention or call the Poison Help line at 1-757.941.2331. Overdose may cause drowsiness, confusion, nervousness, hallucinations, fastheart rate, vomiting, numbness in your fingers, hot or dry skin, or fainting. What should I avoid while taking benztropine? Avoid driving or hazardous activity until you know how this medicine willaffect you.  Your reactions could be impaired. Avoid becoming overheated or dehydrated during exercise and in hot weather. Benztropine can decrease sweating and you may be more prone to heat stroke. What are the possible side effects of benztropine? Get emergency medical help if you have signs of an allergic reaction: hives; difficulty breathing; swelling of your face, lips, tongue, or throat. Call your doctor at once if you have:   dry mouth that causes trouble talking or swallowing;   little or no urination;   uncontrolled muscle movements;   vomiting, severe constipation;   fast heartbeats;   muscle weakness;   blurred vision, tunnel vision, eye pain, or seeing halos around lights;   confusion, hallucinations; or   severe skin rash. Common side effects may include:   dry mouth;   blurred vision;   constipation; or   nausea. This is not a complete list of side effects and others may occur. Call your doctor for medical advice about side effects. You may report side effects toFDA at 6-354-GHY-4567. What other drugs will affect benztropine? Tell your doctor about all your other medicines, especially:   other medicine to treat Parkinson's disease;   medicine to treat depression, anxiety, mood disorders, or mental illness;   cold or allergy medicine (Benadryl and others);   medicine to treat stomach problems, motion sickness, or irritable bowel syndrome;   medicine to treat overactive bladder; or   bronchodilator asthma medication. This list is not complete. Other drugs may affect benztropine, including prescription and over-the-counter medicines, vitamins, and herbal products. Notall possible drug interactions are listed here. Where can I get more information? Your pharmacist can provide more information about benztropine. Remember, keep this and all other medicines out of the reach of children, never share your medicines with others, and use this medication only for the indication prescribed.    Every effort has been made to ensure that the information provided by [address] is accurate, up-to-date, and complete, but no guarantee is made to that effect. Drug information contained herein may be time sensitive. [facility] information has been compiled for use by healthcare practitioners and consumers in the [facility] and therefore [facility] does not warrant that uses outside of the [facility] are appropriate, unless specifically indicated otherwise. [facility]'s drug information does not endorse drugs, diagnose patients or recommend therapy. [facility]'s drug information is an informational resource designed to assist licensed healthcare practitioners in caring for their patients and/or to serve consumers viewing this service as a supplement to, and not a substitute for, the expertise, skill, knowledge and judgment of healthcare practitioners. The absence of a warning for a given drug or drug combination in no way should be construed to indicate that the drug or drug combination is safe, effective or appropriate for any given patient. [facility] does not assume any responsibility for any aspect of healthcare administered with the aid of information [facility] provides. The information contained herein is not intended to cover all possible uses, directions, precautions, warnings, drug interactions, allergic reactions, or adverse effects. If you have questions about the drugs you are taking, check with yourdoctor, nurse or pharmacist.  Copyright 7518-8555 [address]. Version: 9.01. Revision date: 5/13/2021. Care instructions adapted under license by [facility] ([place] [place] [facility]). If you have questions about a medical condition or this instruction, always ask your healthcare professional. [place] [address] any warranty or liability for your use of this information.          Patient Education        metformin  Pronunciation: met FOR min  Brand:  Fortamet, Glucophage, Glucophage XR, Glumetza, MetFORMIN (Eqv-Fortamet),MetFORMIN (Eqv-Glucophage XR), MetFORMIN (Eqv-Glumetza), Riomet, Riomet ER  What is the most important information I should know about metformin? You should not use this medicine if you have severe kidney disease, metabolicacidosis, or diabetic ketoacidosis (call your doctor for treatment). If you need to have any type of x-ray or CT scan using a dye that is injected into your veins, you may need to temporarily stop taking metformin. You may develop lactic acidosis, a dangerous build-up of lactic acid in your blood. Call your doctor or get emergency medical help if you have unusual muscle pain, trouble breathing,stomach pain, dizziness, feeling cold, or feeling very weak or tired. What is metformin? Metformin is used together with diet and exercise to improve blood sugarcontrol in adults with type 2 diabetes mellitus. Metformin is sometimes used together with insulin or other medications, but metformin is not for treating type 1 diabetes. Metformin may also be used for purposes not listed in this medication guide. What should I discuss with my healthcare provider before taking metformin? You should not use metformin if you are allergic to it, or if you have:   severe kidney disease; or   metabolic acidosis or diabetic ketoacidosis (call your doctor for treatment). If you need to have surgery or any type of x-ray or CT scan using a dye that is injected into your veins, you may need to temporarily stop taking metformin. Be sure your caregivers know ahead of time that you are using this medication. Tell your doctor if you have ever had:   kidney disease (your kidney function may need to be checked before you take this medicine);   high ketone levels in your blood or urine;   heart disease, congestive heart failure;   liver disease; or   if you also use insulin, or other oral diabetes medications. You may develop lactic acidosis, a dangerous build-up of lactic acid in your blood.  This may be more likely if you have other medical conditions, a severe infection, chronic alcoholism, orif you are 72 or older. Ask your doctor about your risk. Follow your doctor's instructions about using this medicine if you are pregnant or you become pregnant. Controlling diabetes is very important during pregnancy, and having high bloodsugar may cause complications in both the mother and the baby. Metformin may stimulate ovulation in a premenopausal woman and may increase therisk of unintended pregnancy. Talk to your doctor about your risk. You should not breastfeed while using this medicine. Metformin should not be given to a child younger than 8years old. Some forms of metformin are not approved for use by anyone younger than 25years old. How should I take metformin? Follow all directions on your prescription label and read all medication guides or instruction sheets. Your doctor may occasionally change your dose. Use themedicine exactly as directed. Take metformin with a meal, unless your doctor tells you otherwise. Some forms of metformin are taken only once daily with the evening meal. Follow yourdoctor's instructions. Do not crush, chew, or break an extended-release tablet. Swallow it whole. Measure liquid medicine carefully. Use the dosing syringe provided, or use a medicine dose-measuringdevice (not a kitchen spoon). Shake the oral suspension (liquid) before you measure a dose. Use the dosing syringe provided, or use amedicine dose-measuring device (not a kitchen spoon). Some tablets are made with a shell that is not absorbed or melted in the body. Part of this shell may appear in your stool. This is normal and will not makethe medicine less effective. You may have low blood sugar (hypoglycemia) and feel very hungry, dizzy, irritable, confused, anxious, or shaky.  To quickly treat hypoglycemia, eat or drink a fast-acting source of sugar (fruitjuice, hard candy, crackers, raisins, or non-diet doctor for medical advice about side effects. You may report side effects toFDA at 4-567-KEP-[phone]. What other drugs will affect metformin? Many drugs can affect metformin, making this medicine less effective or increasing your risk of lactic acidosis. This includes prescription and over-the-counter medicines, vitamins, and herbal products. Not all possible interactions are listed here. Tell your doctor about all your current medicines and any medicine you startor stop using. Where can I get more information? Your pharmacist can provide more information about metformin. Remember, keep this and all other medicines out of the reach of children, never share your medicines with others, and use this medication only for the indication prescribed. Every effort has been made to ensure that the information provided by [facility]  is accurate, up-to-date, and complete, but no guarantee is made to that effect. Drug information contained herein may be time sensitive. [employer] information has been compiled for use by healthcare practitioners and consumers in the [place] and therefore [employer] does not warrant that uses outside of the [place] are appropriate, unless specifically indicated otherwise. [facility]"[employer]s drug information does not endorse drugs, diagnose patients or recommend therapy. [facility] drug information is an informational resource designed to assist licensed healthcare practitioners in caring for their patients and/or to serve consumers viewing this service as a supplement to, and not a substitute for, the expertise, skill, knowledge and judgment of healthcare practitioners. The absence of a warning for a given drug or drug combination in no way should be construed to indicate that the drug or drug combination is safe, effective or appropriate for any given patient.  [facility] does not assume any responsibility for any aspect of healthcare administered with the aid of information [facility] provides. The information contained herein is not intended to cover all possible uses, directions, precautions, warnings, drug interactions, allergic reactions, or adverse effects. If you have questions about the drugs you are taking, check with yourdoctor, nurse or pharmacist.  Copyright 7648-9859 [address] [place]. Version: 17.02. Revision date: 4/9/2020. Care instructions adapted under license by [facility] ([facility]). If you have questions about a medical condition or this instruction, always ask your healthcare professional. [address] any warranty or liability for your use of this information.

## 2022-06-30 DIAGNOSIS — I10 HYPERTENSION, UNSPECIFIED TYPE: ICD-10-CM

## 2022-06-30 RX ORDER — LISINOPRIL AND HYDROCHLOROTHIAZIDE 20; 12.5 MG/1; MG/1
TABLET ORAL
Qty: 30 TABLET | Refills: 0 | Status: ON HOLD | OUTPATIENT
Start: 2022-06-30 | End: 2022-08-23 | Stop reason: HOSPADM

## 2022-06-30 RX ORDER — LEVOTHYROXINE SODIUM 0.03 MG/1
TABLET ORAL
Qty: 30 TABLET | Refills: 0 | Status: SHIPPED | OUTPATIENT
Start: 2022-06-30 | End: 2022-10-21 | Stop reason: SDUPTHER

## 2022-07-01 ENCOUNTER — TELEPHONE (OUTPATIENT)
Dept: FAMILY MEDICINE CLINIC | Age: 77
End: 2022-07-01

## 2022-07-01 NOTE — TELEPHONE ENCOUNTER
Patient needs PT OT for weakness she must be seen by nursing in order to be seen by physical occupational therapy is my understanding.

## 2022-07-01 NOTE — TELEPHONE ENCOUNTER
elvis from [facility] care called and stated they can not accept the referral for home health due to the dx being behavior health concerns.     They do not have a nurse in that office that does behavior health

## 2022-07-05 ENCOUNTER — TELEPHONE (OUTPATIENT)
Dept: FAMILY MEDICINE CLINIC | Age: 77
End: 2022-07-05

## 2022-07-05 NOTE — TELEPHONE ENCOUNTER
----- Message from Curtherreranirali Alvarado sent at 7/5/2022 11:50 AM EDT -----  Subject: Message to Provider    QUESTIONS  Information for Provider? PT's daughter called in regards to needing a   primary doctor authorization form sent to PT's insurance to have someone   sit with PT while daughter is at work.  ---------------------------------------------------------------------------  --------------  [employer]  [phone]; OK to leave message on voicemail  ---------------------------------------------------------------------------  --------------  SCRIPT ANSWERS  Relationship to Patient? Other  Representative Name? Roxan Heimlich  Is the Representative on the appropriate HIPAA document in Epic?  Yes

## 2022-07-05 NOTE — TELEPHONE ENCOUNTER
Left message for Soledad Salmon. Insurance will be very unlikely to cover someone to sit with her mom every day while patient is at work. There is such a thing as adult  that she could go into. Just like childcare patient gets dropped off for a certain amount of time and then returns home later. This may not be covered by insurance but would certainly be cheaper. Check into coverage.   If they find there is coverage for anything we will put in a referral.

## 2022-07-06 NOTE — TELEPHONE ENCOUNTER
Call [facility]. Alicia Manning said they could not see the patient based on a diagnosis of psychosis, dementia with behavioral problems, generalized weakness, extrapyramidal symptoms. Diabetes is not a covered diagnosis either. If he went to resubmit the referral with diagnosis that are covered they will try to see the patient.

## 2022-07-07 ENCOUNTER — TELEPHONE (OUTPATIENT)
Dept: FAMILY MEDICINE CLINIC | Age: 77
End: 2022-07-07

## 2022-07-07 NOTE — TELEPHONE ENCOUNTER
----- Message from Zane Bocanegra LPN sent at 7/1/4779  1:22 PM EDT -----  Subject: Referral Request    Reason for referral request? Daughter is requesting referral to be sent to   Home care she needs it sent to insurance as well as an agency so she can   have someone set with her while she is at work fax to insurance at   [phone]  Provider patient wants to be referred to(if known):     Provider Phone Number(if known):     Additional Information for Provider?   ---------------------------------------------------------------------------  --------------  [address] [facility]    [phone]; OK to leave message on voicemail  ---------------------------------------------------------------------------  --------------

## 2022-07-07 NOTE — TELEPHONE ENCOUNTER
DO YOU NEED TO SEE PATIENT IN OFFICE FIRST FOR A FACE TO FACE TO INITIATE HH? [facility]  HUNTER

## 2022-07-13 RX ORDER — DIVALPROEX SODIUM 125 MG/1
250 CAPSULE, COATED PELLETS ORAL 2 TIMES DAILY
Qty: 120 CAPSULE | Refills: 0 | Status: SHIPPED | OUTPATIENT
Start: 2022-07-13

## 2022-07-13 RX ORDER — FLUVOXAMINE MALEATE 50 MG/1
50 TABLET, COATED ORAL NIGHTLY
Qty: 30 TABLET | Refills: 0 | Status: SHIPPED | OUTPATIENT
Start: 2022-07-13 | End: 2022-08-09

## 2022-07-13 RX ORDER — ATORVASTATIN CALCIUM 20 MG/1
TABLET, FILM COATED ORAL
Qty: 90 TABLET | Refills: 0 | Status: SHIPPED | OUTPATIENT
Start: 2022-07-13 | End: 2022-10-11

## 2022-07-13 RX ORDER — PERPHENAZINE 8 MG
8 TABLET ORAL 2 TIMES DAILY WITH MEALS
Qty: 60 TABLET | Refills: 0 | Status: SHIPPED | OUTPATIENT
Start: 2022-07-13 | End: 2022-08-10

## 2022-07-13 NOTE — TELEPHONE ENCOUNTER
Patient needs a refill on her medication:     DIVALPROEX 125 MG CAPSULE  PERPHENAZINE 8 MG TABLET  FLUVOXAMINE 50 MG TABLET   ATORVASTATIN (LIPITOR) 20 MG TABLET       [facility]- [address] [place] [address].  PHONE NO. [phone]    PLEASE GIVE HER A CALL BACK

## 2022-07-14 ENCOUNTER — OFFICE VISIT (OUTPATIENT)
Dept: FAMILY MEDICINE CLINIC | Age: 77
End: 2022-07-14
Payer: MEDICARE

## 2022-07-14 VITALS
OXYGEN SATURATION: 98 % | HEART RATE: 78 BPM | SYSTOLIC BLOOD PRESSURE: 118 MMHG | DIASTOLIC BLOOD PRESSURE: 68 MMHG | WEIGHT: 138 LBS | BODY MASS INDEX: 27.82 KG/M2 | HEIGHT: 59 IN

## 2022-07-14 DIAGNOSIS — R53.1 GENERAL WEAKNESS: ICD-10-CM

## 2022-07-14 DIAGNOSIS — F03.91 DEMENTIA WITH BEHAVIORAL DISTURBANCE, UNSPECIFIED DEMENTIA TYPE: Primary | ICD-10-CM

## 2022-07-14 DIAGNOSIS — R19.7 DIARRHEA, UNSPECIFIED TYPE: ICD-10-CM

## 2022-07-14 DIAGNOSIS — G25.9 EXTRAPYRAMIDAL SYNDROME: ICD-10-CM

## 2022-07-14 LAB
ANION GAP SERPL CALCULATED.3IONS-SCNC: 12 MMOL/L (ref 3–16)
BUN BLDV-MCNC: 9 MG/DL (ref 7–20)
C-REACTIVE PROTEIN: <3 MG/L (ref 0–5.1)
CALCIUM SERPL-MCNC: 9.9 MG/DL (ref 8.3–10.6)
CHLORIDE BLD-SCNC: 94 MMOL/L (ref 99–110)
CO2: 29 MMOL/L (ref 21–32)
CREAT SERPL-MCNC: 0.6 MG/DL (ref 0.6–1.2)
GFR AFRICAN AMERICAN: >60
GFR NON-AFRICAN AMERICAN: >60
GLUCOSE BLD-MCNC: 112 MG/DL (ref 70–99)
POTASSIUM SERPL-SCNC: 4.1 MMOL/L (ref 3.5–5.1)
SEDIMENTATION RATE, ERYTHROCYTE: 3 MM/HR (ref 0–30)
SODIUM BLD-SCNC: 135 MMOL/L (ref 136–145)

## 2022-07-14 PROCEDURE — 99215 OFFICE O/P EST HI 40 MIN: CPT | Performed by: FAMILY MEDICINE

## 2022-07-14 PROCEDURE — 36415 COLL VENOUS BLD VENIPUNCTURE: CPT | Performed by: FAMILY MEDICINE

## 2022-07-14 PROCEDURE — 1123F ACP DISCUSS/DSCN MKR DOCD: CPT | Performed by: FAMILY MEDICINE

## 2022-07-14 RX ORDER — ZINC GLUCONATE 50 MG
50 TABLET ORAL
COMMUNITY

## 2022-07-14 SDOH — ECONOMIC STABILITY: FOOD INSECURITY: WITHIN THE PAST 12 MONTHS, YOU WORRIED THAT YOUR FOOD WOULD RUN OUT BEFORE YOU GOT MONEY TO BUY MORE.: NEVER TRUE

## 2022-07-14 SDOH — ECONOMIC STABILITY: FOOD INSECURITY: WITHIN THE PAST 12 MONTHS, THE FOOD YOU BOUGHT JUST DIDN'T LAST AND YOU DIDN'T HAVE MONEY TO GET MORE.: NEVER TRUE

## 2022-07-14 SDOH — ECONOMIC STABILITY: TRANSPORTATION INSECURITY
IN THE PAST 12 MONTHS, HAS THE LACK OF TRANSPORTATION KEPT YOU FROM MEDICAL APPOINTMENTS OR FROM GETTING MEDICATIONS?: NO

## 2022-07-14 ASSESSMENT — ENCOUNTER SYMPTOMS
BLOOD IN STOOL: 0
ANAL BLEEDING: 0
DIARRHEA: 1
ABDOMINAL PAIN: 0
NAUSEA: 0
ABDOMINAL DISTENTION: 0
VOMITING: 0

## 2022-07-14 ASSESSMENT — SOCIAL DETERMINANTS OF HEALTH (SDOH): HOW HARD IS IT FOR YOU TO PAY FOR THE VERY BASICS LIKE FOOD, HOUSING, MEDICAL CARE, AND HEATING?: NOT HARD AT ALL

## 2022-07-14 NOTE — PATIENT INSTRUCTIONS
Nabil Araujo was seen today for diarrhea, diabetes, tremors and other. Diagnoses and all orders for this visit:    Dementia with behavioral disturbance, unspecified dementia type ([address]  Will write any paperwork. General weakness     Diarrhea, unspecified type  -     Basic Metabolic Panel; Future  -     Sedimentation Rate; Future  -     C-Reactive Protein; Future  Adding elements of the BRAT diet (Bananas/ Rice/ Applesauce/ Toast) diet will help slow diarrhea. Make sure you are drinking plenty of fluids with potasium such as sports drinks or diluted low sugar juices, water and other caffeine-free clear liquids until you feel better and enough so that your urine is light yellow or clear like water. If you have kidney, heart, or liver disease and have to limit fluids, ask us before you increase the amount of fluids you drink. Use Tylenol if having pain or fevers NOT aspirin/ Ibuprofen/ Aleve. Be seen if worsening symptoms or abdominal pain or bloody stools. Watch for signs of dehydration, which means your body has lost too much water. Dehydration is a serious condition and should be treated right away. Signs of dehydration are:    Increasing thirst and dry eyes and mouth.  Feeling faint or lightheaded.  Darker urine, and a smaller amount of urine than normal.  Things to avoid.  Avoid spicy foods, fruits, alcohol, and caffeine until 48 hours after all symptoms are gone.  Avoid chewing gum that contains sorbitol.  Avoid dairy products (except for yogurt with Lactobacillus) while you have diarrhea and for 3 days after symptoms are gone. Avoid sweets and eat a low fat diet. Stop fish oil and other oil supplements. Zinc pill (not lozenges) 50 mg twice daily WITH FOOD taper 1/2 pill twice daily as stools firm and then continue to taper as stools become normal.   Acidophilus pills/caps/chews 3x/day.  (Probiotics: Align or others which have multiple different good bacteria in them.)  Vitamin C 500 mg twice daily. If you take you metformin for diabetes make sure it is taken with carbs/starches. If diarrhea persists on metformin call for an alternate medicine until diarrhea resolves. You can take over-the-counter medicine, such as loperamide (Imodium), if you still have diarrhea after 6 hours. Read and follow all instructions on the label. Do not use this medicine if you have bloody diarrhea, a high fever, or other signs of serious illness. Call your doctor if you think you are having a problem with your medicine. Take your temperature if you are feeling hot. If diarrhea persists we will take stool samples for:  Stool WBCs, Stool cultures, O&P may be needed. We may need to start cholestyramine 1 packet twice daily 2 hours after breakfast and 2 hours after dinner. This medicine absorbs other medicines. This means other medicines cannot be taken at the same time as the cholestyramine. Diarrhea of presumed infectious origin  -     C-REACTIVE PROTEIN; Future  -     SEDIMENTATION RATE; Future  -     CBC WITH AUTO DIFFERENTIAL; Future  -     BASIC METABOLIC PANEL; Future  -     TSH WITH REFLEX TO FT4; Future  -     MAGNESIUM; Future  -     C DIFF TOXIN/ANTIGEN; Future  -     OVA & PARASITE ID/COUNT #1; Future  -     EIA ANTIGEN TEST STOOL #1; Future  -     Fecal Leukocytes; Future  -     BLOOD OCCULT STOOL #1; Future    Extrapyramidal syndrome  Exercises. Patient Education        Learning About Movement Disorders From Antipsychotic Medicines  What are movement disorders from antipsychotic medicines? Movement disorders can sometimes be a side effect from taking medicines called antipsychotics. Doctors use these medicines to treat mental health problemssuch as schizophrenia and bipolar disorder. Movement disorders are body movements that are hard to control. Some can happen soon after you start taking the medicines. These are called extrapyramidalsymptoms (EPS).  They Work with your doctor to find the right dose.  Take your medicines exactly as prescribed. Call your doctor if you think you're having a problem with your medicine.  Don't stop taking your medicine unless your doctor says it's okay. Discuss with your doctor how this change might affect you. How are they treated? Treatment depends on how much you need the medicine that causes the side effects. If side effects are causing big problems for you, your doctor may have you lower the dose or stop the medicine. Or your doctor may switch you to adifferent medicine. You may get other medicines to treat the movements. Where can you learn more? Go to [email or web address] org and sign in to your [employer] account. Enter K147 in the The[employer] box to learn more about \"Learning About Movement Disorders From Antipsychotic Medicines. \"     If you do not have an account, please click on the \"Sign Up Now\" link. Current as of: December 13, 2021               Content Version: 13.3  © 2006-2022 Healthwise, Incorporated. Care instructions adapted under license by [facility] ([facility]). If you have questions about a medical condition or this instruction, always ask your healthcare professional. [place] [address] any warranty or liability for your use of this information.

## 2022-07-14 NOTE — TELEPHONE ENCOUNTER
Left message for Jose Holly. Still trying to get home health care for her mom. We found a home health that has psychiatric nursing. We are awaiting a call back from them I [facility] but [facility].
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
## 2022-07-15 ENCOUNTER — OFFICE VISIT (OUTPATIENT)
Dept: NEUROLOGY | Age: 77
End: 2022-07-15
Payer: MEDICARE

## 2022-07-15 VITALS
SYSTOLIC BLOOD PRESSURE: 140 MMHG | HEIGHT: 59 IN | DIASTOLIC BLOOD PRESSURE: 86 MMHG | BODY MASS INDEX: 27.82 KG/M2 | WEIGHT: 138 LBS | HEART RATE: 86 BPM

## 2022-07-15 DIAGNOSIS — G21.19 DRUG-INDUCED PARKINSON'S DISEASE (HCC): ICD-10-CM

## 2022-07-15 DIAGNOSIS — G30.1 LATE ONSET ALZHEIMER'S DEMENTIA WITH BEHAVIORAL DISTURBANCE (HCC): Primary | ICD-10-CM

## 2022-07-15 DIAGNOSIS — F02.818 LATE ONSET ALZHEIMER'S DEMENTIA WITH BEHAVIORAL DISTURBANCE (HCC): Primary | ICD-10-CM

## 2022-07-15 DIAGNOSIS — F22 PARANOID IDEATION (HCC): ICD-10-CM

## 2022-07-15 PROCEDURE — 99214 OFFICE O/P EST MOD 30 MIN: CPT | Performed by: PSYCHIATRY & NEUROLOGY

## 2022-07-15 PROCEDURE — 1123F ACP DISCUSS/DSCN MKR DOCD: CPT | Performed by: PSYCHIATRY & NEUROLOGY

## 2022-07-15 RX ORDER — DONEPEZIL HYDROCHLORIDE 10 MG/1
TABLET, FILM COATED ORAL
Qty: 90 TABLET | Refills: 1 | Status: SHIPPED | OUTPATIENT
Start: 2022-07-15

## 2022-07-15 NOTE — PROGRESS NOTES
Aayush Lewis   Neurology followup    Subjective:   CC/HP  History was obtained from the patient. Additional history was obtained from her  Daughter. Interval history: There has been some significant changes since her last office visit. Patient developed extrapyramidal tremors. Hence her Zyprexa was stopped and she was switched to Seroquel. However the Seroquel did not help. Patient had severe behavioral problems. In fact she was admitted to [facility] behavioral unit for several weeks. They have changed her medications. Currently patient is on perphenazine 8 mg twice daily. She is also on Depakote 125 mg 2 sprinkles twice daily. Detailed history:  Short-term memory impairment started in the summer 2018. Onset was gradual and initially slowly progressive   No clear aggravating or relieving factors for symptoms. Patient does not drive an automobile at this time. REVIEW OF SYSTEMS    Constitutional:  []   Chills   []  Fatigue   []  Fevers   []  Malaise   []  Weight loss     [x] Denies all of the above    Respiratory:   [x]  Cough    []  Shortness of breath         [] Denies all of the above     Cardiovascular:   [x]  Chest pain    []  Exertional chest pressure/discomfort           [] Palpitations    []  Syncope     [] Denies all of the above        Past Medical History:   Diagnosis Date    COVID-19 virus infection 01/07/2022    tESTED = 1/24/21. Dementia with behavioral disturbance ([address] 05/23/2022    Diabetes mellitus (HCC)     GERD (gastroesophageal reflux disease)     Hyperlipidemia     Hypertension     Hypothyroidism     MGUS (monoclonal gammopathy of unknown significance) 2019    Mild dementia (HCC)     Osteopenia of multiple sites 10/15/2015    Other idiopathic scoliosis, thoracolumbar region 08/03/2017    Moderate to marked scoliosis chest x-ray.   On CT 2/14/2019.  3/7/2019 on bone scan     Family History   Problem Relation Age of Onset    High Cholesterol Mother     Stroke true    Ran Out of Food in the Last Year: Never true   Transportation Needs: No Transportation Needs    Lack of Transportation (Medical): No    Lack of Transportation (Non-Medical): No        Objective:  Exam:  BP (!) 140/86   Pulse 86   Ht 4' 11\" (1.499 m)   Wt 138 lb (62.6 kg)   BMI 27.87 kg/m²   This is a well-nourished patient in no acute distress  Patient is awake, alert and oriented x1. Speech is normal.  Poor short-term memory  Pupils are equal round reacting to light. Extraocular movements intact. Face symmetrical. Tongue midline. Motor exam shows normal symmetrical strength. Deep tendon reflexes normal. Plantar reflexes downgoing. Classical pill-rolling tremors present  Sensory exam normal. Coordination normal. Gait normal. No carotid bruit. No neck stiffness.       Data :  LABS:  General Labs:    CBC:   Lab Results   Component Value Date/Time    WBC 8.8 06/27/2022 03:45 PM    RBC 4.34 06/27/2022 03:45 PM    HGB 12.3 06/27/2022 03:45 PM    HCT 36.0 06/27/2022 03:45 PM    MCV 82.8 06/27/2022 03:45 PM    MCH 28.4 06/27/2022 03:45 PM    MCHC 34.3 06/27/2022 03:45 PM    RDW 14.7 06/27/2022 03:45 PM     06/27/2022 03:45 PM    MPV 8.4 06/27/2022 03:45 PM     BMP:    Lab Results   Component Value Date/Time     07/14/2022 03:06 PM    K 4.1 07/14/2022 03:06 PM    K 3.5 05/23/2022 01:42 PM    CL 94 07/14/2022 03:06 PM    CO2 29 07/14/2022 03:06 PM    BUN 9 07/14/2022 03:06 PM    LABALBU 4.0 05/23/2022 01:42 PM    CREATININE 0.6 07/14/2022 03:06 PM    CALCIUM 9.9 07/14/2022 03:06 PM    GFRAA >60 07/14/2022 03:06 PM    GFRAA >60 09/02/2010 11:15 AM    LABGLOM >60 07/14/2022 03:06 PM    GLUCOSE 112 07/14/2022 03:06 PM     RADIOLOGY REVIEW:  I have reviewed radiology image(s) and reports(s) of: CT scan of the head    Impression :  Late onset Alzheimer's dementia,, symptoms worse  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 Patient has been evaluated by hematology/oncology and they are monitoring it. Plan :  Discussed with patient and her daughter  Continue Aricept 10 mg at night  Patient's family is planning to follow with psychologist as well. I will see her in 6 months. Please note a portion of  this chart was generated using dragon dictation software. Although every effort was made to ensure the accuracy of this automated transcription, some errors in transcription may have occurred.  1

## 2022-08-01 ENCOUNTER — TELEPHONE (OUTPATIENT)
Dept: FAMILY MEDICINE CLINIC | Age: 77
End: 2022-08-01

## 2022-08-01 DIAGNOSIS — R29.818 EXTRAPYRAMIDAL SYMPTOM: ICD-10-CM

## 2022-08-01 DIAGNOSIS — E11.9 TYPE 2 DIABETES MELLITUS WITHOUT COMPLICATION, WITHOUT LONG-TERM CURRENT USE OF INSULIN (HCC): ICD-10-CM

## 2022-08-01 RX ORDER — BENZTROPINE MESYLATE 1 MG/1
1 TABLET ORAL 2 TIMES DAILY
Qty: 60 TABLET | Refills: 2 | Status: SHIPPED | OUTPATIENT
Start: 2022-08-01

## 2022-08-02 ENCOUNTER — TELEPHONE (OUTPATIENT)
Dept: FAMILY MEDICINE CLINIC | Age: 77
End: 2022-08-02

## 2022-08-02 NOTE — TELEPHONE ENCOUNTER
Teresa 69 @  [phone]    PT [address] A BUMP OR REDNESS ON THE BACK OF HER HEAD. BUT NOT HARD. DO YOU WANT HER TO GO GET A TEST/SCAN?   VITALS SEEM FINE.   SHE DIDN'T LOOSE CONSCIENCENESS -  AND IS TALKING - FAMILY ON HER WAY TO GET HER

## 2022-08-04 NOTE — TELEPHONE ENCOUNTER
Spoke with daughter. Pt is doing fine. No HA, dizziness, increased confusion. Pt fell b/c she was bending over to pick something up.

## 2022-08-09 RX ORDER — FLUVOXAMINE MALEATE 50 MG/1
TABLET, COATED ORAL
Qty: 30 TABLET | Refills: 0 | Status: SHIPPED | OUTPATIENT
Start: 2022-08-09

## 2022-08-10 RX ORDER — FLUVOXAMINE MALEATE 50 MG/1
TABLET, COATED ORAL
Qty: 30 TABLET | Refills: 0 | OUTPATIENT
Start: 2022-08-10

## 2022-08-10 RX ORDER — PERPHENAZINE 8 MG
TABLET ORAL
Qty: 60 TABLET | Refills: 0 | Status: SHIPPED | OUTPATIENT
Start: 2022-08-10 | End: 2022-09-22 | Stop reason: SDUPTHER

## 2022-08-10 NOTE — TELEPHONE ENCOUNTER
Sig: TAKE 1 TABLET BY MOUTH EVERY DAY AT NIGHT    Sent to pharmacy as: fluvoxaMINE Maleate 50 MG Oral Tablet (Kali Selby)    Cosign for Ordering: Required by Yohana Win DO    E-Prescribing Status: Receipt confirmed by pharmacy (8/9/2022 77:21 AM EDT)    Demetrius Collazo REQUEST

## 2022-08-21 ENCOUNTER — APPOINTMENT (OUTPATIENT)
Dept: CT IMAGING | Age: 77
End: 2022-08-21
Payer: MEDICARE

## 2022-08-21 ENCOUNTER — HOSPITAL ENCOUNTER (OUTPATIENT)
Age: 77
Setting detail: OBSERVATION
Discharge: NURSING, REHAB OR LONG-TERM CARE | End: 2022-08-25
Attending: STUDENT IN AN ORGANIZED HEALTH CARE EDUCATION/TRAINING PROGRAM | Admitting: INTERNAL MEDICINE
Payer: MEDICARE

## 2022-08-21 ENCOUNTER — APPOINTMENT (OUTPATIENT)
Dept: GENERAL RADIOLOGY | Age: 77
End: 2022-08-21
Payer: MEDICARE

## 2022-08-21 DIAGNOSIS — F02.80 ALZHEIMER'S DEMENTIA WITHOUT BEHAVIORAL DISTURBANCE, UNSPECIFIED TIMING OF DEMENTIA ONSET: ICD-10-CM

## 2022-08-21 DIAGNOSIS — M89.9 SKULL LESION: ICD-10-CM

## 2022-08-21 DIAGNOSIS — G30.9 ALZHEIMER'S DEMENTIA WITHOUT BEHAVIORAL DISTURBANCE, UNSPECIFIED TIMING OF DEMENTIA ONSET: ICD-10-CM

## 2022-08-21 DIAGNOSIS — R53.1 UNILATERAL WEAKNESS: Primary | ICD-10-CM

## 2022-08-21 PROBLEM — R26.89 LOSS OF BALANCE: Status: ACTIVE | Noted: 2022-08-21

## 2022-08-21 LAB
A/G RATIO: 1.4 (ref 1.1–2.2)
ALBUMIN SERPL-MCNC: 4.1 G/DL (ref 3.4–5)
ALP BLD-CCNC: 81 U/L (ref 40–129)
ALT SERPL-CCNC: 16 U/L (ref 10–40)
ANION GAP SERPL CALCULATED.3IONS-SCNC: 11 MMOL/L (ref 3–16)
AST SERPL-CCNC: 36 U/L (ref 15–37)
BASOPHILS ABSOLUTE: 0 K/UL (ref 0–0.2)
BASOPHILS RELATIVE PERCENT: 0.6 %
BILIRUB SERPL-MCNC: 0.3 MG/DL (ref 0–1)
BILIRUBIN URINE: NEGATIVE
BLOOD, URINE: NEGATIVE
BUN BLDV-MCNC: 10 MG/DL (ref 7–20)
CALCIUM SERPL-MCNC: 9.7 MG/DL (ref 8.3–10.6)
CHLORIDE BLD-SCNC: 92 MMOL/L (ref 99–110)
CLARITY: CLEAR
CO2: 26 MMOL/L (ref 21–32)
COLOR: YELLOW
CREAT SERPL-MCNC: 0.5 MG/DL (ref 0.6–1.2)
EOSINOPHILS ABSOLUTE: 0.2 K/UL (ref 0–0.6)
EOSINOPHILS RELATIVE PERCENT: 2.2 %
GFR AFRICAN AMERICAN: >60
GFR NON-AFRICAN AMERICAN: >60
GLUCOSE BLD-MCNC: 99 MG/DL (ref 70–99)
GLUCOSE URINE: NEGATIVE MG/DL
HCT VFR BLD CALC: 34.9 % (ref 36–48)
HEMOGLOBIN: 11.3 G/DL (ref 12–16)
KETONES, URINE: NEGATIVE MG/DL
LEUKOCYTE ESTERASE, URINE: NEGATIVE
LYMPHOCYTES ABSOLUTE: 2.4 K/UL (ref 1–5.1)
LYMPHOCYTES RELATIVE PERCENT: 31.1 %
MCH RBC QN AUTO: 26 PG (ref 26–34)
MCHC RBC AUTO-ENTMCNC: 32.5 G/DL (ref 31–36)
MCV RBC AUTO: 80.2 FL (ref 80–100)
MICROSCOPIC EXAMINATION: NORMAL
MONOCYTES ABSOLUTE: 0.8 K/UL (ref 0–1.3)
MONOCYTES RELATIVE PERCENT: 10.3 %
NEUTROPHILS ABSOLUTE: 4.2 K/UL (ref 1.7–7.7)
NEUTROPHILS RELATIVE PERCENT: 55.8 %
NITRITE, URINE: NEGATIVE
PDW BLD-RTO: 15.3 % (ref 12.4–15.4)
PH UA: 6 (ref 5–8)
PLATELET # BLD: 229 K/UL (ref 135–450)
PMV BLD AUTO: 8.9 FL (ref 5–10.5)
POTASSIUM SERPL-SCNC: 5.1 MMOL/L (ref 3.5–5.1)
PROTEIN UA: NEGATIVE MG/DL
RBC # BLD: 4.35 M/UL (ref 4–5.2)
SARS-COV-2, NAAT: NOT DETECTED
SODIUM BLD-SCNC: 129 MMOL/L (ref 136–145)
SPECIFIC GRAVITY UA: 1.01 (ref 1–1.03)
TOTAL PROTEIN: 7.1 G/DL (ref 6.4–8.2)
TROPONIN: <0.01 NG/ML
URINE TYPE: NORMAL
UROBILINOGEN, URINE: 0.2 E.U./DL
WBC # BLD: 7.6 K/UL (ref 4–11)

## 2022-08-21 PROCEDURE — G0378 HOSPITAL OBSERVATION PER HR: HCPCS

## 2022-08-21 PROCEDURE — 6360000004 HC RX CONTRAST MEDICATION: Performed by: STUDENT IN AN ORGANIZED HEALTH CARE EDUCATION/TRAINING PROGRAM

## 2022-08-21 PROCEDURE — 83036 HEMOGLOBIN GLYCOSYLATED A1C: CPT

## 2022-08-21 PROCEDURE — 70496 CT ANGIOGRAPHY HEAD: CPT

## 2022-08-21 PROCEDURE — 80053 COMPREHEN METABOLIC PANEL: CPT

## 2022-08-21 PROCEDURE — 84484 ASSAY OF TROPONIN QUANT: CPT

## 2022-08-21 PROCEDURE — 71045 X-RAY EXAM CHEST 1 VIEW: CPT

## 2022-08-21 PROCEDURE — 70450 CT HEAD/BRAIN W/O DYE: CPT

## 2022-08-21 PROCEDURE — 93005 ELECTROCARDIOGRAM TRACING: CPT | Performed by: STUDENT IN AN ORGANIZED HEALTH CARE EDUCATION/TRAINING PROGRAM

## 2022-08-21 PROCEDURE — 85025 COMPLETE CBC W/AUTO DIFF WBC: CPT

## 2022-08-21 PROCEDURE — 96374 THER/PROPH/DIAG INJ IV PUSH: CPT

## 2022-08-21 PROCEDURE — 99285 EMERGENCY DEPT VISIT HI MDM: CPT

## 2022-08-21 PROCEDURE — 81003 URINALYSIS AUTO W/O SCOPE: CPT

## 2022-08-21 PROCEDURE — 87635 SARS-COV-2 COVID-19 AMP PRB: CPT

## 2022-08-21 RX ORDER — ACETAMINOPHEN 325 MG/1
650 TABLET ORAL EVERY 6 HOURS PRN
Status: DISCONTINUED | OUTPATIENT
Start: 2022-08-21 | End: 2022-08-25 | Stop reason: HOSPADM

## 2022-08-21 RX ORDER — DONEPEZIL HYDROCHLORIDE 5 MG/1
10 TABLET, FILM COATED ORAL NIGHTLY
Status: DISCONTINUED | OUTPATIENT
Start: 2022-08-21 | End: 2022-08-25 | Stop reason: HOSPADM

## 2022-08-21 RX ORDER — ACETAMINOPHEN 650 MG/1
650 SUPPOSITORY RECTAL EVERY 6 HOURS PRN
Status: DISCONTINUED | OUTPATIENT
Start: 2022-08-21 | End: 2022-08-25 | Stop reason: HOSPADM

## 2022-08-21 RX ORDER — SODIUM CHLORIDE 9 MG/ML
INJECTION, SOLUTION INTRAVENOUS PRN
Status: DISCONTINUED | OUTPATIENT
Start: 2022-08-21 | End: 2022-08-25 | Stop reason: HOSPADM

## 2022-08-21 RX ORDER — ONDANSETRON 4 MG/1
4 TABLET, ORALLY DISINTEGRATING ORAL EVERY 8 HOURS PRN
Status: DISCONTINUED | OUTPATIENT
Start: 2022-08-21 | End: 2022-08-25 | Stop reason: HOSPADM

## 2022-08-21 RX ORDER — PANTOPRAZOLE SODIUM 40 MG/1
40 TABLET, DELAYED RELEASE ORAL
Status: DISCONTINUED | OUTPATIENT
Start: 2022-08-22 | End: 2022-08-21

## 2022-08-21 RX ORDER — ONDANSETRON 2 MG/ML
4 INJECTION INTRAMUSCULAR; INTRAVENOUS EVERY 6 HOURS PRN
Status: DISCONTINUED | OUTPATIENT
Start: 2022-08-21 | End: 2022-08-25 | Stop reason: HOSPADM

## 2022-08-21 RX ORDER — PANTOPRAZOLE SODIUM 40 MG/10ML
40 INJECTION, POWDER, LYOPHILIZED, FOR SOLUTION INTRAVENOUS DAILY
Status: DISCONTINUED | OUTPATIENT
Start: 2022-08-22 | End: 2022-08-25 | Stop reason: HOSPADM

## 2022-08-21 RX ORDER — FLUVOXAMINE MALEATE 50 MG/1
50 TABLET, COATED ORAL NIGHTLY
Status: DISCONTINUED | OUTPATIENT
Start: 2022-08-21 | End: 2022-08-25 | Stop reason: HOSPADM

## 2022-08-21 RX ORDER — SODIUM CHLORIDE 0.9 % (FLUSH) 0.9 %
5-40 SYRINGE (ML) INJECTION EVERY 12 HOURS SCHEDULED
Status: DISCONTINUED | OUTPATIENT
Start: 2022-08-21 | End: 2022-08-25 | Stop reason: HOSPADM

## 2022-08-21 RX ORDER — SODIUM CHLORIDE 0.9 % (FLUSH) 0.9 %
5-40 SYRINGE (ML) INJECTION PRN
Status: DISCONTINUED | OUTPATIENT
Start: 2022-08-21 | End: 2022-08-25 | Stop reason: HOSPADM

## 2022-08-21 RX ORDER — BENZTROPINE MESYLATE 1 MG/1
1 TABLET ORAL 2 TIMES DAILY
Status: DISCONTINUED | OUTPATIENT
Start: 2022-08-21 | End: 2022-08-25 | Stop reason: HOSPADM

## 2022-08-21 RX ORDER — PERPHENAZINE 4 MG/1
8 TABLET, FILM COATED ORAL 2 TIMES DAILY WITH MEALS
Status: DISCONTINUED | OUTPATIENT
Start: 2022-08-22 | End: 2022-08-25 | Stop reason: HOSPADM

## 2022-08-21 RX ORDER — ENOXAPARIN SODIUM 100 MG/ML
40 INJECTION SUBCUTANEOUS NIGHTLY
Status: DISCONTINUED | OUTPATIENT
Start: 2022-08-22 | End: 2022-08-25 | Stop reason: HOSPADM

## 2022-08-21 RX ORDER — DIVALPROEX SODIUM 125 MG/1
250 CAPSULE, COATED PELLETS ORAL 2 TIMES DAILY
Status: DISCONTINUED | OUTPATIENT
Start: 2022-08-22 | End: 2022-08-25 | Stop reason: HOSPADM

## 2022-08-21 RX ORDER — ATORVASTATIN CALCIUM 20 MG/1
20 TABLET, FILM COATED ORAL NIGHTLY
Status: DISCONTINUED | OUTPATIENT
Start: 2022-08-21 | End: 2022-08-25 | Stop reason: HOSPADM

## 2022-08-21 RX ADMIN — IOPAMIDOL 75 ML: 755 INJECTION, SOLUTION INTRAVENOUS at 20:12

## 2022-08-21 ASSESSMENT — PAIN - FUNCTIONAL ASSESSMENT: PAIN_FUNCTIONAL_ASSESSMENT: NONE - DENIES PAIN

## 2022-08-21 ASSESSMENT — ENCOUNTER SYMPTOMS
SHORTNESS OF BREATH: 0
SINUS PRESSURE: 0
ABDOMINAL PAIN: 0
VOMITING: 0
NAUSEA: 0
SORE THROAT: 0
PHOTOPHOBIA: 0
COUGH: 0
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
## 2022-08-22 LAB
EKG ATRIAL RATE: 71 BPM
EKG DIAGNOSIS: NORMAL
EKG P AXIS: 39 DEGREES
EKG P-R INTERVAL: 134 MS
EKG Q-T INTERVAL: 394 MS
EKG QRS DURATION: 74 MS
EKG QTC CALCULATION (BAZETT): 428 MS
EKG R AXIS: 16 DEGREES
EKG T AXIS: 58 DEGREES
EKG VENTRICULAR RATE: 71 BPM
FERRITIN: 17.3 NG/ML (ref 15–150)
FOLATE: >20 NG/ML (ref 4.78–24.2)
GLUCOSE BLD-MCNC: 128 MG/DL (ref 70–99)
GLUCOSE BLD-MCNC: 90 MG/DL (ref 70–99)
IRON SATURATION: 8 % (ref 15–50)
IRON: 29 UG/DL (ref 37–145)
PERFORMED ON: ABNORMAL
PERFORMED ON: NORMAL
TOTAL IRON BINDING CAPACITY: 368 UG/DL (ref 260–445)
TSH REFLEX: 1.38 UIU/ML (ref 0.27–4.2)
VALPROIC ACID LEVEL: 38.6 UG/ML (ref 50–100)
VITAMIN B-12: 245 PG/ML (ref 211–911)

## 2022-08-22 PROCEDURE — 92523 SPEECH SOUND LANG COMPREHEN: CPT

## 2022-08-22 PROCEDURE — G0378 HOSPITAL OBSERVATION PER HR: HCPCS

## 2022-08-22 PROCEDURE — 2580000003 HC RX 258: Performed by: INTERNAL MEDICINE

## 2022-08-22 PROCEDURE — 96372 THER/PROPH/DIAG INJ SC/IM: CPT

## 2022-08-22 PROCEDURE — 96375 TX/PRO/DX INJ NEW DRUG ADDON: CPT

## 2022-08-22 PROCEDURE — 82728 ASSAY OF FERRITIN: CPT

## 2022-08-22 PROCEDURE — 83550 IRON BINDING TEST: CPT

## 2022-08-22 PROCEDURE — C9113 INJ PANTOPRAZOLE SODIUM, VIA: HCPCS | Performed by: NURSE PRACTITIONER

## 2022-08-22 PROCEDURE — 36415 COLL VENOUS BLD VENIPUNCTURE: CPT

## 2022-08-22 PROCEDURE — 6370000000 HC RX 637 (ALT 250 FOR IP): Performed by: INTERNAL MEDICINE

## 2022-08-22 PROCEDURE — 6360000002 HC RX W HCPCS: Performed by: NURSE PRACTITIONER

## 2022-08-22 PROCEDURE — 84443 ASSAY THYROID STIM HORMONE: CPT

## 2022-08-22 PROCEDURE — 97530 THERAPEUTIC ACTIVITIES: CPT

## 2022-08-22 PROCEDURE — 92610 EVALUATE SWALLOWING FUNCTION: CPT

## 2022-08-22 PROCEDURE — 6360000002 HC RX W HCPCS: Performed by: INTERNAL MEDICINE

## 2022-08-22 PROCEDURE — 97162 PT EVAL MOD COMPLEX 30 MIN: CPT

## 2022-08-22 PROCEDURE — 82746 ASSAY OF FOLIC ACID SERUM: CPT

## 2022-08-22 PROCEDURE — 99223 1ST HOSP IP/OBS HIGH 75: CPT | Performed by: PSYCHIATRY & NEUROLOGY

## 2022-08-22 PROCEDURE — 80164 ASSAY DIPROPYLACETIC ACD TOT: CPT

## 2022-08-22 PROCEDURE — 82607 VITAMIN B-12: CPT

## 2022-08-22 PROCEDURE — 83540 ASSAY OF IRON: CPT

## 2022-08-22 PROCEDURE — 92526 ORAL FUNCTION THERAPY: CPT

## 2022-08-22 PROCEDURE — 93010 ELECTROCARDIOGRAM REPORT: CPT | Performed by: INTERNAL MEDICINE

## 2022-08-22 PROCEDURE — 96376 TX/PRO/DX INJ SAME DRUG ADON: CPT

## 2022-08-22 RX ORDER — DEXTROSE MONOHYDRATE 100 MG/ML
INJECTION, SOLUTION INTRAVENOUS CONTINUOUS PRN
Status: DISCONTINUED | OUTPATIENT
Start: 2022-08-22 | End: 2022-08-25 | Stop reason: HOSPADM

## 2022-08-22 RX ORDER — INSULIN LISPRO 100 [IU]/ML
0-4 INJECTION, SOLUTION INTRAVENOUS; SUBCUTANEOUS NIGHTLY
Status: DISCONTINUED | OUTPATIENT
Start: 2022-08-22 | End: 2022-08-25 | Stop reason: HOSPADM

## 2022-08-22 RX ORDER — INSULIN LISPRO 100 [IU]/ML
0-4 INJECTION, SOLUTION INTRAVENOUS; SUBCUTANEOUS
Status: DISCONTINUED | OUTPATIENT
Start: 2022-08-22 | End: 2022-08-25 | Stop reason: HOSPADM

## 2022-08-22 RX ADMIN — DONEPEZIL HYDROCHLORIDE 10 MG: 5 TABLET, FILM COATED ORAL at 22:07

## 2022-08-22 RX ADMIN — DIVALPROEX SODIUM 250 MG: 125 CAPSULE ORAL at 15:30

## 2022-08-22 RX ADMIN — DIVALPROEX SODIUM 250 MG: 125 CAPSULE ORAL at 09:36

## 2022-08-22 RX ADMIN — Medication 10 ML: at 22:06

## 2022-08-22 RX ADMIN — PERPHENAZINE 8 MG: 4 TABLET, FILM COATED ORAL at 09:35

## 2022-08-22 RX ADMIN — PANTOPRAZOLE SODIUM 40 MG: 40 INJECTION, POWDER, FOR SOLUTION INTRAVENOUS at 09:36

## 2022-08-22 RX ADMIN — Medication 10 ML: at 09:36

## 2022-08-22 RX ADMIN — BENZTROPINE MESYLATE 1 MG: 1 TABLET ORAL at 22:07

## 2022-08-22 RX ADMIN — PERPHENAZINE 8 MG: 4 TABLET, FILM COATED ORAL at 17:50

## 2022-08-22 RX ADMIN — ATORVASTATIN CALCIUM 20 MG: 20 TABLET, FILM COATED ORAL at 22:07

## 2022-08-22 RX ADMIN — ENOXAPARIN SODIUM 40 MG: 100 INJECTION SUBCUTANEOUS at 22:06

## 2022-08-22 RX ADMIN — FLUVOXAMINE MALEATE 50 MG: 50 TABLET, COATED ORAL at 22:06

## 2022-08-22 RX ADMIN — Medication 10 ML: at 00:44

## 2022-08-22 RX ADMIN — BENZTROPINE MESYLATE 1 MG: 1 TABLET ORAL at 09:35

## 2022-08-22 NOTE — CARE COORDINATION
Met with yasir and her daughter to offer assistance with discharge. Patient lives with her daughter and the daughter reported that patient wants to return home at discharge. She spends the work day at patient's son's home for daytime supervision. The Plan for Transition of Care is related to the following treatment goals: home care    The Patient and/or patient representative daughter  was provided with a choice of provider and agrees   with the discharge plan. [x] Yes [] No    Freedom of choice list was provided with basic dialogue that supports the patient's individualized plan of care/goals, treatment preferences and shares the quality data associated with the providers. [x] Yes [] No   Met with patient to offer assistance with discharge, and he has no home care agency preference. Patient referred to [facility] Ortiz, pending a home care order.

## 2022-08-22 NOTE — PLAN OF CARE
Pt here from home where she lives with her daughter, Wyatt Marsh. Pt is orientated to person, place and date; pt does have delayed responses and memory deficits. Pt is easily reorientated. Pt's son, Helena Berg, stated pt has been falling for past three weeks and dragging her right leg. Pt does have abrasions on RUE, lower lip and bruising on right breast.  MRI checklist completed and faxed to radiology. Son stated that he has been carrying mother up & down stairs to have bowel movements and picks her up to get her in and out of car. Pt is a Jehovah Witness & does not want blood products. POA on file; family & pt understands treatment plan. Pt is unable to stand due to right sided weakness; pt would benefit from PT/OT.  VSS; video monitoring in place for pt safety. NURSE SWALLOW SCREEN RESULTS    3 oz Water Swallow Screen: (!) Fail    Additonal screening results:     Is the patient on a modified diet (thickened liquids) due to pre-existing dysphagia?: No  Is there presence of existing enteral tube feeding via the stomach or nose?: No  Is there presence of head-of-bed restrictions (less than 30 degrees)?: No  Is there presence of tracheotomy tube?: No  Is the patient on a modified diet (thickened liquids) due to pre-existing dysphagia?: No    Dr Minor Garcia, NP notified and NPO order obtained at 2300.   Speech and language therapy orders obtained /verfied       Problem: Discharge Planning  Goal: Discharge to home or other facility with appropriate resources  Outcome: Progressing     Problem: Neurosensory - Adult  Goal: Achieves maximal functionality and self care  Outcome: Progressing     Problem: Respiratory - Adult  Goal: Achieves optimal ventilation and oxygenation  Outcome: Progressing     Problem: Cardiovascular - Adult  Goal: Maintains optimal cardiac output and hemodynamic stability  Outcome: Progressing     Problem: Skin/Tissue Integrity - Adult  Goal: Skin integrity remains intact  Outcome: Progressing  Goal: Oral mucous membranes remain intact  Outcome: Progressing     Problem: Musculoskeletal - Adult  Goal: Return ADL status to a safe level of function  Outcome: Progressing     Problem: Genitourinary - Adult  Goal: Absence of urinary retention  Outcome: Progressing     Problem: Metabolic/Fluid and Electrolytes - Adult  Goal: Electrolytes maintained within normal limits  Outcome: Progressing  Goal: Hemodynamic stability and optimal renal function maintained  Outcome: Progressing  Goal: Glucose maintained within prescribed range  Outcome: Progressing     Problem: Hematologic - Adult  Goal: Maintains hematologic stability  Outcome: Progressing

## 2022-08-22 NOTE — PLAN OF CARE
Problem: Discharge Planning  Goal: Discharge to home or other facility with appropriate resources  Outcome: Progressing     Problem: Neurosensory - Adult  Goal: Achieves maximal functionality and self care  Outcome: Progressing     Problem: Respiratory - Adult  Goal: Achieves optimal ventilation and oxygenation  Outcome: Progressing     Problem: Cardiovascular - Adult  Goal: Maintains optimal cardiac output and hemodynamic stability  Outcome: Progressing     Problem: Musculoskeletal - Adult  Goal: Return ADL status to a safe level of function  Outcome: Progressing     Problem: Genitourinary - Adult  Goal: Absence of urinary retention  Outcome: Progressing     Problem: Metabolic/Fluid and Electrolytes - Adult  Goal: Electrolytes maintained within normal limits  Outcome: Progressing     Problem: Hematologic - Adult  Goal: Maintains hematologic stability  Outcome: Progressing     Problem: Skin/Tissue Integrity  Goal: Absence of new skin breakdown  Description: 1. Monitor for areas of redness and/or skin breakdown  2. Assess vascular access sites hourly  3. Every 4-6 hours minimum:  Change oxygen saturation probe site  4. Every 4-6 hours:  If on nasal continuous positive airway pressure, respiratory therapy assess nares and determine need for appliance change or resting period.   Outcome: Progressing     Problem: Safety - Adult  Goal: Free from fall injury  Outcome: Progressing     Problem: ABCDS Injury Assessment  Goal: Absence of physical injury  Outcome: Progressing     Problem: Chronic Conditions and Co-morbidities  Goal: Patient's chronic conditions and co-morbidity symptoms are monitored and maintained or improved  Outcome: Progressing

## 2022-08-22 NOTE — H&P
upt\[facility]\"" [address]                     [facility], [address]                              HISTORY AND PHYSICAL    PATIENT NAME: Steven Arrieta                  :        1945  MED REC NO:   [id number]                          ROOM:       [id number]  ACCOUNT NO:   [de-identified]                           ADMIT DATE: 2022  PROVIDER:     Juani Ware MD    I examined the patient in the emergency room on 2022. CHIEF COMPLAINT:  The patient is leaning to the right with gait  deviation to the right for the past three weeks. HISTORY OF PRESENT ILLNESS:  The patient is a 80-year-old   female with known history of dementia, who presented to the hospital  with chief complaint of three weeks of what her family has noted to be  deviation to the right when she tries to walk. The son notes that she  tries to avoid putting weight on the right side and at couple of times  in the last three weeks he has noted her leaning to the right even  though she is sitting up in bed. The patient denies any visual  disturbance with nausea or vomiting. Overall, the patient does know  that she is in the hospital and she is cognitively fairly well/was able  to answer many questions appropriately, but does have episodes where she  does not quite recall what exactly happened and is not able to follow  directions very well. Thereby the patient's ability to provide a good  coherent reliable history is greatly limited. Of late, the patient's son notes that she had to be admitted emergently  to the Inpatient Behavioral Unit for geriatric at [facility] and was  started on few new antipsychotic medications. After that the family  notes that the patient has started exhibiting _____ rolling and other  tremors in her hands. PAST MEDICAL/PAST SURGICAL HISTORY:  1. Dementia. 2.  Type 2 diabetes. 3.  Dyslipidemia. 4.  Hypertension. 5.  Hypothyroidism.     PAST SURGICAL HISTORY:  No major surgical procedures recently. ALLERGIC HISTORY:  No known drug allergies. FAMILY HISTORY:  Reviewed by me and is currently noncontributory. SOCIAL HISTORY:  Lives at home with her daughter. MEDICATIONS:  The patient's home medication list was reviewed and  documented in the EMR. The patient was noted to be on perphenazine,  fluvoxamine, benztropine, donepezil and Depakote in addition to her  Synthroid, lisinopril, hydrochlorothiazide, Lipitor, metformin and zinc.    REVIEW OF SYSTEMS:  The patient's review of systems is significant for  the swaying to the right and per the history of present illness. All  other systems have been reviewed and are negative except for the history  of present illness. PHYSICAL EXAMINATION:  GENERAL:  The patient was examined by me in the emergency room. VITAL SIGNS:  Temperature 98.3, respiratory rate is 16, pulse 80, blood  pressure 162/87, and saturating 95%. CNS:  The patient is currently alert and awake. She does know that she  is in the hospital.  The patient's reflex is 2+ in both upper and lower  extremities. Power is 4 to 5/5 in both upper and lower extremities. The patient does have some hypertonia especially in the right upper  extremity. The patient does not have any cogwheeling noted. PSYCHIATRIC:  The patient is cooperative. Does not have any evidence of  hallucinations or psychosis. EYES:  Pupils are bilaterally equal.  ENT:  Extraocular muscle movements are intact. An otoscopic exam done  by me bilaterally showed intact tympanic membranes without any evidence  of fluid. There is a good cone of light. No posterior or atticoantral  perforation noted. RESPIRATORY SYSTEM:  No obvious rales or rhonchi. CVS:  S1 and S2 are heard. No murmurs or rubs. ABDOMEN:  Nondistended. MUSCULOSKELETAL:  No acute obvious deformities. SKIN:  Without obvious rashes or lesions.     DIAGNOSTIC DATA:  CT of the head without contrast and CTA does not show  any evidence of acute infarction, stenosis or bleed. COVID test is  negative. EKG shows normal sinus rhythm. BUN 10, creatinine 0.5. Sodium 139, potassium 5.1. CBC showed hemoglobin 11.3, hematocrit 34.9,  platelets of 031. ASSESSMENT:  1.  Gait instability with deviation/balance disturbance to the right. 2.  Moderately advanced dementia with psychotic features. 3.  Hypertension. 4.  Hyponatremia. 5.  Dyslipidemia. PLAN OF CARE:  The patient has been admitted to observation. Telemetry  monitoring will be continued. Neurology consult is requested. The  patient's home antipsychotic medications will be continued. The  patient's hydrochlorothiazide will be withheld given the hyponatremia. Hydration cautiously as necessary. A urine will be checked for any evidence of urinary tract infection. CODE STATUS:  Full. This was discussed with the family and the patient  by me. EXPECTED LENGTH OF STAY:  Less than two midnights based on the plan of  care above. RISK:  High due to the patient's risk of fall and the possibility of a  subacute stroke. DISPOSITION:  Observation to Telemetry.         Fifi Kulkarni MD    D: 08/21/2022 21:58:20       T: 08/21/2022 22:54:16     [email or web address]  Job#: [id number]     Doc#: [id number]    CC:
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
## 2022-08-22 NOTE — PROGRESS NOTES
[facility]   Phone: [phone]    Physical Therapy    [x] Initial Evaluation            [] Daily Treatment Note         [] Discharge Summary      Patient: Caden Guallpa   : 1945   MRN: [id number]   Date of Service:  2022  Admitting Diagnosis: Loss of balance  Current Admission Summary: Patient is a 63-year-old female who lives at home with daughter able to care for self however has been having decline with multiple falls. Patient reports that she has been noticing that she becoming more weak and has been having difficulty with walking. Daughter does report the patient does have history of dementia and has noted that her weakness is progressively worsening is concerned that patient will need to be in a facility. Past Medical History:  has a past medical history of COVID-19 virus infection, Dementia with behavioral disturbance ([address] Diabetes mellitus ([address] GERD (gastroesophageal reflux disease), Hyperlipidemia, Hypertension, Hypothyroidism, MGUS (monoclonal gammopathy of unknown significance), Mild dementia ([address] Osteopenia of multiple sites, and Other idiopathic scoliosis, thoracolumbar region. Past Surgical History:  has a past surgical history that includes eye surgery (Left, ); bladder suspension (N/A, ); and Colonoscopy (2017). Discharge Recommendations: Caden Guallpa scored a 11/24 on the AM-PAC short mobility form. Current research shows that an AM-PAC score of 17 or less is typically not associated with a discharge to the patient's home setting. Based on the patient's AM-PAC score and their current functional mobility deficits, it is recommended that the patient have 3-5 sessions per week of Physical Therapy at d/c to increase the patient's independence. Please see assessment section for further patient specific details.     If patient discharges prior to next session this note will serve as a discharge summary. Please see below for the latest assessment towards goals. DME Required For Discharge: DME to be determined at next level of care  Precautions/Restrictions: high fall risk  Weight Bearing Restrictions: no restrictions  [] Right Upper Extremity  [] Left Upper Extremity [] Right Lower Extremity  [] Left Lower Extremity     Required Braces/Orthotics: no braces required   [] Right  [] Left  Positional Restrictions:no positional restrictions    Pre-Admission Information   Lives With: daughterJina; spends the day at her son's house which is a bilevel and pt must amb up/down steps   Type of Home: house  Home Layout: one level, laundry in basement  Home Access:  2 step to enter with handrail. Handrails are located on L side. Bathroom Layout: walker accessible, tub/shower unit  Bathroom Equipment: grab bars in shower, shower chair  Toilet Height: standard height  Home Equipment: rolling walker, single point cane, alert button  Transfer Assistance: requires assistance  Ambulation Assistance:requires assistance-son and daughter HHA  ADL Assistance: requires assistance with bathing, requires assistance with dressing, requires assistance with grooming, requires assistance with toileting  IADL Assistance: requires assistance with all homemaking tasks, pt will help with setting table and washing dishes  Active :        [] Yes  [x] No  Hand Dominance: [] Left  [] Right  Hobbies: watching tv; mostly sedentary   Recent Falls: 3 in one day last week; at least 10 falls in past 6 months; Per chart review and discussion with pt's children, pt spent a month in a behavioral health unit in May-June 2022. Pt did not receive therapy services and experienced a large decline in her mobility level during that time. Prior to that hospitalization, pt was much more independent with transfers, ambulation, stair climbing.      Examination   Vision:   Vision Gross Assessment: Impaired and Vision Corrective Device: wears glasses for reading  Hearing:   [facility]  Posture:   Pt leans R when seated EOB as pt fatigues  Sensation:   Unable to formally test secondary to cognition  Proprioception:    Unable to formally test secondary to cognition    ROM:   (B) LE AAROM WFL; pt able to follow repeated verbal, visual, tactile cues for ankle and knee AROM; ankle DF to neutral  (B) UE AAROM WFL; pt able to follow visual cues and touch back of head, flex/extend elbows >50%, AAROM BUE shoulder elevation WFLs  Strength:   B ankle, knee ~3/5; hip 2/5; unable to fully assess due to cognition  Decision Making: medium complexity  Clinical Presentation: evolving      Subjective  General: Pt supine in bed; daughter, Soledad Salmon, present; agreeable to PT; pt's son, [address], arrived later during evaluation  Pain: 0/10  Pain Interventions: not applicable       Functional Mobility  Bed Mobility  Supine to Sit: maximum assistance  Scooting: maximum assistance  Comments: pt assisted to roll to side and use bedrail; max cues and assistance  Transfers  Sit to stand transfer: moderate assistance  Stand to sit transfer: maximum assistance  Stand step transfer: moderate assistance, maximum assistance- mod A progressing to max A  Comments: pt had difficulty taking steps due to poor motor planning, poor cognition; pt initiated steps towards chair with min cues, then needed increased assistance to complete transfer to chair  Ambulation  Ambulation not tested on this date. Distance:   Gait Mechanics:   Comments:  Pt required max A to transfer to chair; will further assess ambulation at pt tolerates increased activity level; Recommend cotx with OT  Stair Mobility  Stair mobility not completed on this date.   Comments:  Balance  Static Sitting Balance: poor (+): requires min (A) to maintain balance  Dynamic Sitting Balance: poor (-): requires max (A) to maintain balance  Static Standing Balance: poor: requires mod (A) to maintain balance  Dynamic Standing Balance: poor (-): requires max (A) to maintain balance  Comments: pt sat EOB initially with CGA, midline balance; as pt fatigued, pt leaned to the right and posteriorly, requiring max A on EOB; pt stood with PT in front of pt with BUE support, posterior leaning; able to take a few steps to the chair with mod A progressing to max A    Other Therapeutic Interventions    Functional Outcomes  AM-PAC Inpatient Mobility Raw Score : 11              Cognition  Overall Cognitive Status: Impaired  Arousal/Alterness: delayed responses to stimuli, inconsistent responses to stimuli  Following Commands: follows one step commands with repetition, follows one step commands with increased time  Attention Span: difficulty attending to directions  Memory: decreased recall of precautions, decreased recall of recent events, decreased short term memory, decreased long term memory  Safety Judgement: decreased awareness of need for assistance, decreased awareness of need for safety  Problem Solving: assistance required to generate solutions, assistance required to implement solutions, assistance required to identify errors made, assistance required to correct errors made  Insights: not aware of deficits  Initiation: requires cues for all  Sequencing: requires cues for all  Orientation:    oriented to person, oriented to place, disoriented to time , and disoriented to situation  Command Following:   accurately follows one step commands    Education  Barriers To Learning: cognition and physical  Patient Education: patient educated on PT role and benefits, plan of care, general safety, discharge recommendations, Pt's son and daughter present  Learning Assessment:  patient will require reinforcement due to cognitive deficits, pt's son and daughter verbalize understanding of PT discharge recommendation    Assessment  Activity Tolerance: Orthostatics: Supine QE=766/83, njooeb=634/80; unable to take standing BP due to pt requiring max A in standing  Impairments Requiring Therapeutic Intervention: decreased functional mobility, decreased ADL status, decreased ROM, decreased strength, decreased safety awareness, decreased cognition, decreased endurance, decreased balance, decreased coordination, decreased posture  Prognosis: fair  Clinical Assessment:  Pt is limited by the above deficits and would benefit from skilled PT services to maximize pt functional mobility and safety prior to discharge. Pt is below baseline and unable to return home at this time due to requiring mod-max A for bed mobility and transfer to chair. Pt is unable to ambulate today. Recommend SNF at discharge.    Safety Interventions: patient left in chair, chair alarm in place, call light within reach, gait belt, patient at risk for falls, nurse notified, and family/caregiver present    Plan  Frequency: 3-5 x/per week  Current Treatment Recommendations: strengthening, ROM, balance training, functional mobility training, transfer training, gait training, and patient/caregiver education-trial of use of RW     Goals  Patient Goals: none stated   Short Term Goals:  Time Frame: discharge   Patient will complete bed mobility at minimal assistance   Patient will complete transfers at minimal assistance   Patient will ambulate 10 ft with use of rolling walker at minimal assistance    Therapy Session Time      Individual Group Co-treatment   Time In 1425       Time Out 1525       Minutes 60         Timed Code Treatment Minutes:  45 Minutes  Total Treatment Minutes:  60       Electronically Signed By: Tavares Coles, PT

## 2022-08-22 NOTE — PROGRESS NOTES
Called [facility], per Dr. mOar Meadows, this patient cannot be scanned in MRI due to the unknown object within the eye, which was placed in 1970's.

## 2022-08-22 NOTE — PROGRESS NOTES
Hospitalist Progress Note      PCP: Antonia Tsang DO    Date of Admission: 8/21/2022    Chief Complaint: Falls balance issues          Assessment/Plan:    Active Hospital Problems    Diagnosis     Loss of balance [R26.89]      Priority: Medium     Ataxia:  Etiology unclear at this time  Patient MRI is contraindicated due to having metal  Neurology consulted, much appreciated  CT head CTA negative  Recommendation PT OT, orthostatic,, TSH, B12 folate, Depakote level, A1c pending  Echo pending  Neurochecks    Essential Hypertension: Continue home medication  Hyperlipidemia: Controlled on home Statin. Outpatient PCP follow up post-discharge  Hypothyroidism: Continue home medication  GERD: Continue home medication  Type 2 Diabetes: Insulin sliding scale. Last A1c 7.4      DVT Prophylaxis: Lovenox  Diet: ADULT DIET; Regular; No Drinking Straws  Code Status: Full Code    PT/OT Eval Status: Ongoing    Dispo -DC tomorrow anticipated. Likely will need a facility. Hospital Course:      Patient remains weak, reports that she would rather be home but is amendable to be placed at a facility if needed   Patient otherwise reported no visual changes, nausea, vomiting, chest pain, abdominal pain, dysuria. Patient denied having any new changes to medication    Subjective: Patient is a 26-year-old female who lives at home with daughter able to care for self however has been having decline with multiple falls. Patient reports that she has been noticing that she becoming more weak and has been having difficulty with walking. Daughter does report the patient does have history of dementia and has noted that her weakness is progressively worsening is concerned that patient will need to be in a facility.             Medications:  Reviewed    Infusion Medications    sodium chloride       Scheduled Medications    atorvastatin  20 mg Oral Nightly    benztropine  1 mg Oral BID    divalproex  250 mg Oral BID    donepezil  10 mg Oral Nightly    fluvoxaMINE  50 mg Oral Nightly    perphenazine  8 mg Oral BID     sodium chloride flush  5-40 mL IntraVENous 2 times per day    enoxaparin  40 mg SubCUTAneous Nightly    pantoprazole  40 mg IntraVENous Daily     PRN Meds: sodium chloride flush, sodium chloride, ondansetron **OR** ondansetron, acetaminophen **OR** acetaminophen    No intake or output data in the 24 hours ending 08/22/22 8934    Physical Exam Performed:    /78   Pulse 91   Temp 97.5 °F (36.4 °C) (Oral)   Resp 18   Ht 4' 11\" (1.499 m)   Wt 138 lb 10.7 oz (62.9 kg)   SpO2 93%   BMI 28.01 kg/m²     General appearance: No apparent distress, appears stated age and cooperative. HEENT: Pupils equal, round, and reactive to light. Conjunctivae/corneas clear. Neck: Supple, with full range of motion. No jugular venous distention. Trachea midline. Respiratory:  Normal respiratory effort. Clear to auscultation, bilaterally without Rales/Wheezes/Rhonchi. Cardiovascular: Regular rate and rhythm with normal S1/S2 without murmurs, rubs or gallops. Abdomen: Soft, non-tender, non-distended with normal bowel sounds. Musculoskeletal: No clubbing, cyanosis or edema bilaterally. Full range of motion without deformity. Skin: Skin color, texture, turgor normal.  No rashes or lesions. Neurologic:  Neurovascularly intact without any focal sensory/motor deficits.  Cranial nerves: II-XII intact, grossly non-focal.  Psychiatric: Alert and oriented, thought content appropriate, normal insight, MSK 5/'s bilaterally upper and lower, no visual field deficits, hand to nose intact  Capillary Refill: Brisk,< 3 seconds   Peripheral Pulses: +2 palpable, equal bilaterally       Labs:   Recent Labs     08/21/22 1932   WBC 7.6   HGB 11.3*   HCT 34.9*        Recent Labs     08/21/22 1932   *   K 5.1   CL 92*   CO2 26   BUN 10   CREATININE 0.5*   CALCIUM 9.7     Recent Labs     08/21/22 1932   AST 36   ALT 16   BILITOT 0.3   ALKPHOS 81     No results for input(s): INR in the last 72 hours. Recent Labs     08/21/22 1932   TROPONINI <0.01       Urinalysis:      Lab Results   Component Value Date/Time    NITRU Negative 08/21/2022 10:07 PM    [address] 3-5 05/27/2022 02:05 AM    BACTERIA None Seen 05/23/2022 02:27 PM    RBCUA 3-4 05/27/2022 02:05 AM    BLOODU Negative 08/21/2022 10:07 PM    SPECGRAV 1.010 08/21/2022 10:07 PM    GLUCOSEU Negative 08/21/2022 10:07 PM    GLUCOSEU NEGATIVE 09/02/2010 11:27 AM       Radiology:  CTA HEAD NECK W CONTRAST   Final Result   No flow limiting stenosis or large vessel occlusion detected within the head   or neck. RECOMMENDATIONS:   Unavailable         CT HEAD WO CONTRAST   Final Result   No CT evidence of an acute infarct. XR CHEST PORTABLE   Final Result   No acute abnormality visualized.                Josie Rolle DO

## 2022-08-22 NOTE — PROGRESS NOTES
Facility/Department: [address]  SLP Clinical Swallow Evaluation and Speech Language Cognitive Assessment     Patient: Caden Guallpa   : 1945   MRN: [id number]      Evaluation Date: 2022      Admitting Dx: Skull lesion [M89.9]  Unilateral weakness [R53.1]  Loss of balance [R26.89]  Alzheimer's dementia without behavioral disturbance, unspecified timing of dementia onset ([facility] [address] [G30.9, F02.80]  Pain: Denies                                  H&P: Caden Gaullpa is a 68 y.o. female who presents to the emergency department after her daughter called EMS for frequent falls, confusion. Patient states that she fell earlier today. She denies pain from the fall but is not sure why she fell. The daughter reported that the patient seems more confused than baseline, having a harder time making sentences. This seems to have started about 3 days ago. Patient denies this. Daughter presents to bedside and provides additional history. Patient has been falling frequently, leaning to the right side and appears weak with bearing weight to the right leg. Daughter is unable to tell me exactly when this started, anywhere from 3 weeks to 3 days ago. Now she is falling frequently secondary to the right leg weakness. She denies any pain or injury to the hip. Per chart, patient has history of Alzheimer's dementia with extraparametal symptoms secondary to antipsychotic use. History of pill-rolling tremor. Imaging:  Chest X-ray: Completed 22  Impression   No acute abnormality visualized. Head CT: Completed 22  Impression   No CT evidence of an acute infarct. History/Prior Level of Function:   Living Status: Lives with daughter who provides assistance for pt with med/ fiance management. Pt reports daughter works during the day. Pt is not an active .    Prior Dysphagia History: No hx per chart review or pt report   Prior Speech History: None found per chart review, pt denies     Reason for referral: SLP evaluation orders received due to failed 3oz water swallow screen . DYSPHAGIA BEDSIDE SWALLOW EVALUATION   Dysphagia Impressions/Dysphagia Diagnosis: Oropharyngeal Dysphagia   Oral [facility] exam revealed slight L upper labial droop at rest, reduced lingual ROM/ coordination, reduced lingual strength > on R with a torus palatinus noted on roof of pt's mouth. Pt presents with mild oral dysphagia characterized by slightly prolonged oral prep phase, suspected premature bolus loss to pharynx with mild dispersed lingual residue/ minimal pocketing in R lateral sulcus in which pt was able to clear with a lingual sweep and lingual rinse. Pt presents with mild-mod pharyngeal dysphagia characterized by suspected pharyngeal pooling, delayed/ audible swallows, with reduced strength/ timing of laryngeal elevation via manual palpation. Pt willing to accept thins, puree, soft solids, mixed consistency texture, and regular solids to assess overall tolerance. Pt able to self feed with set up. Pt took small controlled sips via cup and straw and seemingly tolerated with no overt clinical s/s of aspiration in isolation. Pt with min lingual stasis post swallow following regular texture with pt stating there was more crumbs on her R side and was able to use lingual sweep and thins to clear oral cavity. Pt produced a delayed cough that did not appear related to po intake. Pt with x 1 immediate throat clear/ cough with mixed consistency item (peaches provided with juice) due to large bite and reduced bolus control. Would recommend a regular texture diet at this time with thins (no straws), meds either 1 at a time with water as tolerated or whole in puree. Diet may need to be downgraded based on overall tolerance. Pt may benefit from an instrumental swallow assessment if overt s/s are consistently noted across textures and pt seems to not be tolerating current diet level.  Recommend use of strict aspiration and GERD precautions at this time. Recommended Diet and Intervention:  Diet Solids Recommendation:  Regular texture diet  Liquid Consistency Recommendation: Thin liquids, No straws  Recommended form of Meds: Whole with water or Meds in puree      Dysphagia Therapeutic Intervention:  Diet Tolerance Monitoring , Patient/Family Education , To be determined     Compensatory Swallowing Strategies: Alternate solids/liquids , Check for pocketing of food R, Upright as possible with all PO intake , No straws , Small bites/sips , Eat/feed slowly, Remain upright 30-45 min , To be determined     Oral Mechanism Exam:  []WFL [x]Mild   [] Moderate  []Severe  []To be assessed  Labial Symmetry , Lingual ROM, Lingual Symmetry , Lingual Strength , Lingual Coordination     SHORT TERM DYSPHAGIA GOALS  Pt will functionally tolerate recommended diet with no overt clinical s/s of aspiration. Pt/ pt's family will demonstrate understanding of aspiration risk and precautions via education/demonstration with occasional prompting. If clinical s/s of aspiration/penetration continue to be noted, Pt will participate in Modified Barium Swallow Study. Patient Positioning: Upright in bed       SPEECH LANGUAGE COGNITIVE ASSESSMENT:     Speech Diagnosis:   Word Finding Difficulty, Cognitive-Linguistic Deficits , Speech Language Deficits     Impressions:  Pt pleasant and cooperative, per chart review pt has a dx of Alzheimer's Dementia but is more confused/ has delayed processing and is not currently at her baseline. Pt oriented to name, month/ year of  but stated date as 3 vs 15, oriented to place/ name of facility, current situation, able to state current address, month, time/ time of day and current president. Pt disoriented to SHIKHA/ date. Pt with significantly delayed response time, would often require repetitions and choices in order to answer questions.  Pt with significant word finding difficulty in conversation which would cause her to lose her train of thought. Pt able to answer personal y/n questions related to her current environment 8/8 (100%), y/n general knowledge questions (71%), and complex y/n questions 6/7 (86%). Pt had difficulty following 3-step commands, had difficulty with concrete divergent naming tasks and confrontational naming with mid-low frequency pictures. Pt presents with cognitive-linguistic deficits in the areas of attention, reasoning, problem solving and short term memory. Pt required reminders to use call button for assistance and was unable to recall specifics of what she wanted to order for breakfast with a delay. Pt would benefit from speech therapy for speech/ language and cognition as pt appears off from baseline level and to ensure a safe return to prior level of function. COMPREHENSION  Auditory Comprehension: Moderate   Impaired Complex questions  Impaired Two step commands  Impaired Multi-step commands  Impaired Complex/Abstract commands  Impaired Conversation  Significantly delayed processing speed    EXPRESSION  Verbal Expression: Mild  and Moderate   Impaired Confrontational Naming  Impaired Convergent Naming  Impaired Divergent Naming  Impaired Conversation  Impaired Thought Organization      Pragmatics/Social Functioning: Mild   Impaired Topic maintenance  Flat Affect      MOTOR SPEECH  Motor Speech: Within functional limits        VOICE  Voice: Within functional limits        COGNITION    Overall Orientation : Mild     Disoriented to date/ SHIKHA    Attention: Moderate    Impaired Selective Attention  Impaired Sustained Attention  Impaired Alternating Attention  Impaired Divided Attention    Memory: Moderate    Impaired Short-term Memory  Impaired Recall of New Learning   Impaired Immediate/working Memory  Impaired Prospective Memory    Problem Solving:  Moderate    Impaired Verbal Reasoning  Impaired Complex Tasks     Safety/Judgement: Moderate    Impaired Flexibility of thought    GOALS:  Short Term Speech/Language/Cognitive Goals:   Pt will improve auditory processing/comprehension of commands and questions to 80%, via graded tasks   Pt will improve orientation and short term recall via graded tasks to 80%  Pt will participate in ongoing cognitive assessment with goals to be established as indicated   Pt with improve functional verbal problem solving via graded tasks to 80% min cues     Plan of care: 3-5 times per week during acute care stay. Discharge Recommendations:  Discharge recommendations to be determined pending ongoing follow-up during acute care stay    EDUCATION:   Provided education regarding role of SLP, results of assessment, recommendations and general speech pathology plan of care. [x] Pt verbalized understanding and agreement   [x] Pt requires ongoing learning   [] No evidence of comprehension     If patient discharges prior to next visit, this note will serve as discharge. Time code minutes:  0   Total Time:  40    Electronically signed by:    Natanael YEPEZ CCC-SLP #[id number] 8/22/2022 10:07 AM  Speech-Language Pathologist

## 2022-08-23 ENCOUNTER — APPOINTMENT (OUTPATIENT)
Dept: CT IMAGING | Age: 77
End: 2022-08-23
Payer: MEDICARE

## 2022-08-23 LAB
ANION GAP SERPL CALCULATED.3IONS-SCNC: 6 MMOL/L (ref 3–16)
BUN BLDV-MCNC: 8 MG/DL (ref 7–20)
CALCIUM SERPL-MCNC: 9.5 MG/DL (ref 8.3–10.6)
CHLORIDE BLD-SCNC: 93 MMOL/L (ref 99–110)
CO2: 31 MMOL/L (ref 21–32)
CREAT SERPL-MCNC: <0.5 MG/DL (ref 0.6–1.2)
ESTIMATED AVERAGE GLUCOSE: 137 MG/DL
ESTIMATED AVERAGE GLUCOSE: 139.9 MG/DL
GFR AFRICAN AMERICAN: >60
GFR NON-AFRICAN AMERICAN: >60
GLUCOSE BLD-MCNC: 123 MG/DL (ref 70–99)
GLUCOSE BLD-MCNC: 153 MG/DL (ref 70–99)
GLUCOSE BLD-MCNC: 159 MG/DL (ref 70–99)
GLUCOSE BLD-MCNC: 90 MG/DL (ref 70–99)
GLUCOSE BLD-MCNC: 98 MG/DL (ref 70–99)
HBA1C MFR BLD: 6.4 %
HBA1C MFR BLD: 6.5 %
LV EF: 58 %
LVEF MODALITY: NORMAL
MAGNESIUM: 1.8 MG/DL (ref 1.8–2.4)
OCCULT BLOOD DIAGNOSTIC: NORMAL
PERFORMED ON: ABNORMAL
PERFORMED ON: NORMAL
POTASSIUM REFLEX MAGNESIUM: 3.5 MMOL/L (ref 3.5–5.1)
SODIUM BLD-SCNC: 130 MMOL/L (ref 136–145)

## 2022-08-23 PROCEDURE — 82270 OCCULT BLOOD FECES: CPT

## 2022-08-23 PROCEDURE — 96372 THER/PROPH/DIAG INJ SC/IM: CPT

## 2022-08-23 PROCEDURE — 93306 TTE W/DOPPLER COMPLETE: CPT

## 2022-08-23 PROCEDURE — 70450 CT HEAD/BRAIN W/O DYE: CPT

## 2022-08-23 PROCEDURE — 6370000000 HC RX 637 (ALT 250 FOR IP): Performed by: INTERNAL MEDICINE

## 2022-08-23 PROCEDURE — 92526 ORAL FUNCTION THERAPY: CPT

## 2022-08-23 PROCEDURE — C9113 INJ PANTOPRAZOLE SODIUM, VIA: HCPCS | Performed by: NURSE PRACTITIONER

## 2022-08-23 PROCEDURE — 97535 SELF CARE MNGMENT TRAINING: CPT

## 2022-08-23 PROCEDURE — 83735 ASSAY OF MAGNESIUM: CPT

## 2022-08-23 PROCEDURE — 2580000003 HC RX 258: Performed by: INTERNAL MEDICINE

## 2022-08-23 PROCEDURE — 83036 HEMOGLOBIN GLYCOSYLATED A1C: CPT

## 2022-08-23 PROCEDURE — 36415 COLL VENOUS BLD VENIPUNCTURE: CPT

## 2022-08-23 PROCEDURE — 6360000002 HC RX W HCPCS: Performed by: INTERNAL MEDICINE

## 2022-08-23 PROCEDURE — 6360000002 HC RX W HCPCS: Performed by: NURSE PRACTITIONER

## 2022-08-23 PROCEDURE — 99233 SBSQ HOSP IP/OBS HIGH 50: CPT | Performed by: PSYCHIATRY & NEUROLOGY

## 2022-08-23 PROCEDURE — G0378 HOSPITAL OBSERVATION PER HR: HCPCS

## 2022-08-23 PROCEDURE — 80048 BASIC METABOLIC PNL TOTAL CA: CPT

## 2022-08-23 PROCEDURE — 97129 THER IVNTJ 1ST 15 MIN: CPT

## 2022-08-23 PROCEDURE — 97530 THERAPEUTIC ACTIVITIES: CPT

## 2022-08-23 PROCEDURE — 97166 OT EVAL MOD COMPLEX 45 MIN: CPT

## 2022-08-23 RX ORDER — LANOLIN ALCOHOL/MO/W.PET/CERES
1000 CREAM (GRAM) TOPICAL DAILY
Status: DISCONTINUED | OUTPATIENT
Start: 2022-08-23 | End: 2022-08-25 | Stop reason: HOSPADM

## 2022-08-23 RX ADMIN — PERPHENAZINE 8 MG: 4 TABLET, FILM COATED ORAL at 16:58

## 2022-08-23 RX ADMIN — DIVALPROEX SODIUM 250 MG: 125 CAPSULE ORAL at 10:38

## 2022-08-23 RX ADMIN — BENZTROPINE MESYLATE 1 MG: 1 TABLET ORAL at 21:32

## 2022-08-23 RX ADMIN — BENZTROPINE MESYLATE 1 MG: 1 TABLET ORAL at 10:24

## 2022-08-23 RX ADMIN — ATORVASTATIN CALCIUM 20 MG: 20 TABLET, FILM COATED ORAL at 21:31

## 2022-08-23 RX ADMIN — DONEPEZIL HYDROCHLORIDE 10 MG: 5 TABLET, FILM COATED ORAL at 21:31

## 2022-08-23 RX ADMIN — DIVALPROEX SODIUM 250 MG: 125 CAPSULE ORAL at 15:32

## 2022-08-23 RX ADMIN — FLUVOXAMINE MALEATE 50 MG: 50 TABLET, COATED ORAL at 21:32

## 2022-08-23 RX ADMIN — PERPHENAZINE 8 MG: 4 TABLET, FILM COATED ORAL at 10:24

## 2022-08-23 RX ADMIN — CYANOCOBALAMIN TAB 1000 MCG 1000 MCG: 1000 TAB at 21:31

## 2022-08-23 RX ADMIN — PANTOPRAZOLE SODIUM 40 MG: 40 INJECTION, POWDER, FOR SOLUTION INTRAVENOUS at 10:24

## 2022-08-23 RX ADMIN — ENOXAPARIN SODIUM 40 MG: 100 INJECTION SUBCUTANEOUS at 21:32

## 2022-08-23 RX ADMIN — POTASSIUM BICARBONATE 40 MEQ: 782 TABLET, EFFERVESCENT ORAL at 21:32

## 2022-08-23 RX ADMIN — Medication 10 ML: at 10:34

## 2022-08-23 RX ADMIN — Medication 10 ML: at 21:32

## 2022-08-23 NOTE — FLOWSHEET NOTE
pt returning to unit from CT. Currently eating breakfast. denies pain. Pt A & O x 3. Disoriented to situation. see below vitals. call light, bedside table, and all personal belongings are within reach. will continue to closely monitor.         08/23/22 1017   Vital Signs   Temp 97.9 °F (36.6 °C)   Temp Source Oral   Heart Rate 65   Heart Rate Source Monitor   Resp 16   BP (!) 157/89   BP Location Left upper arm   BP Method Automatic   MAP (Calculated) 111.67   Pain Assessment   Pain Assessment None - Denies Pain   Oxygen Therapy   SpO2 94 %   Pulse Oximetry Type Intermittent   Pulse Oximeter Device Mode Intermittent   Pulse Oximeter Device Location Finger   O2 Device None (Room air)

## 2022-08-23 NOTE — PROGRESS NOTES
[facility]   Phone: [phone]    Occupational Therapy    [x] Initial Evaluation            [] Daily Treatment Note         [] Discharge Summary      Patient: Jazmine De La Rosa   : 1945   MRN: [id number]   Date of Service:  2022    Admitting Diagnosis:  Loss of balance  Current Admission Summary: The patient is a 77-year-old  female with known history of dementia, who presented to the hospital with chief complaint of three weeks of what her family has noted to be deviation to the right when she tries to walk. The son notes that she tries to avoid putting weight on the right side and at couple of times in the last three weeks he has noted her leaning to the right even though she is sitting up in bed. The patient denies any visual disturbance with nausea or vomiting. Overall, the patient does know that she is in the hospital and she is cognitively fairly well/was able to answer many questions appropriately, but does have episodes where she does not quite recall what exactly happened and is not able to follow  directions very well. Thereby the patient's ability to provide a good coherent reliable history is greatly limited. Unable to have MRI  Past Medical History:  has a past medical history of COVID-19 virus infection, Dementia with behavioral disturbance ([address] Diabetes mellitus ([address] GERD (gastroesophageal reflux disease), Hyperlipidemia, Hypertension, Hypothyroidism, MGUS (monoclonal gammopathy of unknown significance), Mild dementia ([address] Osteopenia of multiple sites, and Other idiopathic scoliosis, thoracolumbar region. Past Surgical History:  has a past surgical history that includes eye surgery (Left, ); bladder suspension (N/A, ); and Colonoscopy (2017). Discharge Recommendations: Jazmine De La Rosa scored a 19/24 on the AM-PAC ADL Inpatient form.  Current research shows that an AM-PAC score of 18 or greater is shower chair  Toilet Height: standard height  Home Equipment: rolling walker, single point cane, alert button  Transfer Assistance: requires assistance  Ambulation Assistance:requires assistance-son and daughter HHA  ADL Assistance: requires assistance with bathing, requires assistance with dressing, requires assistance with grooming, requires assistance with toileting  IADL Assistance: requires assistance with all homemaking tasks, pt will help with setting table and washing dishes  Active :        [] Yes                 [x] No  Hand Dominance: [] Left                 [] Right  Hobbies: watching tv; mostly sedentary   Recent Falls: 3 in one day last week; at least 10 falls in past 6 months; Per chart review and discussion with pt's children, pt spent a month in a behavioral health unit in May-June 2022. Pt did not receive therapy services and experienced a large decline in her mobility level during that time. Prior to that hospitalization, pt was much more independent with transfers, ambulation, stair climbing. Examination   Vision:   Vision Gross Assessment: Impaired and Vision Corrective Device: wears glasses for reading  Hearing:   Map Decisions [place]  Observation:   General Observation:  pt exhibits tremors in R hand/wrist  Posture:   Neck and shoulders rolled forward  Sensation:   Unable to formally test secondary to cognition  Proprioception:    Unable to formally test secondary to cognition  ROM:   (B) Shoulder AROM WFL  Strength:   (B) UE strength grossly +3    Decision Making: medium complexity  Clinical Presentation: evolving      Subjective  General: Pt laying supine in bed and agreeable to OT evaluation. Pt is pleasant and cooperates with all the activities asked of her. Pt required constant cues due to cognition. Pain: 0/10  Pain Interventions: not applicable        Activities of Daily Living  Basic Activities of Daily Living  Grooming: stand by assistance .   Comment: pt washed her face with a wipe  Upper Extremity Bathing: stand by assistance . Comment: pt washed the front side of her UB  Lower Extremity Dressing: moderate assistance . Comment: pt required assistance threading her feet through her brief and pulling her brief up  Toileting: stand by assistance. Toileting Comments: pt cleans all areas with SBA  General Comments: Pt required constant verbal and tactile cues to initiate and perform ADLs, increased time needed    Instrumental Activities of Daily Living  No IADL completed on this date. Functional Mobility  Bed Mobility  Supine to Sit: maximum assistance  Comments: HOB elevated, Vcs for sequence  Transfers  Sit to stand transfer:minimal assistance  Stand to sit transfer: minimal assistance  Stand step transfer: contact guard assistance  Toilet transfer: minimal assistance  Toilet transfer equipment: grab bars, walker  Comments: pt required constant verbal and tactile cueing  Functional Mobility:  Standing Balance: contact guard assistance.     Functional Mobility: .  contact guard assistance  Functional Mobility Activity: pt ambulated 8 feet from her bed to the bathroom and 15 feet from the bathroom to the recliner  Functional Mobility Device Use: rolling walker  Functional Mobility Comment: pt min assist with RW management when maneuvering around the room , Vcs needed    Other Therapeutic Interventions    Functional Outcomes  AM-PAC Inpatient Daily Activity Raw Score: 19    Cognition  Overall Cognitive Status: Impaired  Following Commands: follows one step commands with increased time  Initiation: requires cues for some  Orientation:    oriented to person, oriented to place, and oriented to situation  Command Following:   accurately follows one step commands     Education  Barriers To Learning: cognition  Patient Education: patient educated on goals, OT role and benefits, plan of care  Learning Assessment:  patient will require reinforcement due to cognitive deficits    Assessment  Activity Tolerance: Pt responded well to treatment session and performed all of the tasks asked of her. Pt required constant verbal and tactile cues to initiate task performance due to cognitive deficits. Impairments Requiring Therapeutic Intervention: decreased functional mobility, decreased ADL status, decreased strength, decreased cognition  Prognosis: good  Clinical Assessment: Pt presenting below functional baseline with above deficits associated with loss of balance. Limitations include decreased strength, decreased functional mobility, decreased balance, and decreased I with ADL/IADL performance. Pt to continue OT services until d/c in order to maximize safety and I with task performance. Safety Interventions: patient left in bed, bed alarm in place, call light within reach, and nurse notified, pt off to testing with transport    Plan  Frequency: 3-5 x/per week  Current Treatment Recommendations: strengthening, balance training, functional mobility training, transfer training, and ADL/self-care training    Goals  Patient Goals: none stated   Short Term Goals:  Time Frame: until d/c  Patient will complete upper body ADL at supervision   Patient will complete lower body ADL at minimal assistance   Patient will complete functional transfers at supervision   Patient will complete functional mobility at supervision   Patient will complete bed mobility at contact guard assistance         Therapy Session Time     Individual Group Co-treatment   Time In    0855   Time Out    0937   Minutes    42        Timed Code Treatment Minutes: 27 Minutes  Total Treatment Minutes:  [facility], S/OT     Occupational Therapist present and directed patient care, made skilled clinical decisions and was responsible for assessment and treatment this date.   TERE Witt/L [id number]      Pt in Observation Status, bills shared with PT    Electronically Signed By: Christina Dill OT

## 2022-08-23 NOTE — PLAN OF CARE
Problem: Discharge Planning  Goal: Discharge to home or other facility with appropriate resources  8/23/2022 1534 by Lowell Perez RN  Outcome: Progressing  Flowsheets (Taken 8/23/2022 6919)  Discharge to home or other facility with appropriate resources: Identify barriers to discharge with patient and caregiver  8/23/2022 0418 by Hannah Rothman RN  Outcome: Progressing     Problem: Neurosensory - Adult  Goal: Achieves maximal functionality and self care  8/23/2022 1534 by Lowell Perez RN  Outcome: Progressing  8/23/2022 0418 by Hannah Rothman RN  Outcome: Progressing     Problem: Respiratory - Adult  Goal: Achieves optimal ventilation and oxygenation  8/23/2022 1534 by Lowell Perez RN  Outcome: Progressing  8/23/2022 0418 by Hannah Rothman RN  Outcome: Progressing     Problem: Cardiovascular - Adult  Goal: Maintains optimal cardiac output and hemodynamic stability  8/23/2022 1534 by Lowell Perez RN  Outcome: Progressing  8/23/2022 0418 by Hannah Rothman RN  Outcome: Progressing     Problem: Skin/Tissue Integrity - Adult  Goal: Skin integrity remains intact  8/23/2022 1534 by Lowell Perez RN  Outcome: Progressing  Flowsheets (Taken 8/23/2022 0724)  Skin Integrity Remains Intact: Monitor for areas of redness and/or skin breakdown  8/23/2022 0418 by Hannah Rothman RN  Outcome: Progressing  Goal: Oral mucous membranes remain intact  8/23/2022 1534 by Lowell Perez RN  Outcome: Progressing  Flowsheets (Taken 8/23/2022 0724)  Oral Mucous Membranes Remain Intact: Assess oral mucosa and hygiene practices  8/23/2022 0418 by Hannah Rothman RN  Outcome: Progressing     Problem: Musculoskeletal - Adult  Goal: Return ADL status to a safe level of function  8/23/2022 1534 by Lowell Perez RN  Outcome: Progressing  8/23/2022 0418 by Hannah Rothman RN  Outcome: Progressing     Problem: Genitourinary - Adult  Goal: Absence of urinary retention  8/23/2022 1534 by Sheldon Samuel RN  Outcome: Progressing  8/23/2022 0418 by Franklin Celeste RN  Outcome: Progressing     Problem: Metabolic/Fluid and Electrolytes - Adult  Goal: Electrolytes maintained within normal limits  8/23/2022 1534 by Sheldon Samuel RN  Outcome: Progressing  8/23/2022 0418 by Franklin Celeste RN  Outcome: Progressing  Goal: Hemodynamic stability and optimal renal function maintained  8/23/2022 1534 by Sheldon Samuel RN  Outcome: Progressing  8/23/2022 0418 by Franklin Celeste RN  Outcome: Progressing  Goal: Glucose maintained within prescribed range  8/23/2022 1534 by Sheldon Samuel RN  Outcome: Progressing  8/23/2022 0418 by Franklin Celeste RN  Outcome: Progressing     Problem: Hematologic - Adult  Goal: Maintains hematologic stability  8/23/2022 1534 by Sheldon Samuel RN  Outcome: Progressing  8/23/2022 0418 by Franklin Celeste RN  Outcome: Progressing     Problem: Skin/Tissue Integrity  Goal: Absence of new skin breakdown  Description: 1. Monitor for areas of redness and/or skin breakdown  2. Assess vascular access sites hourly  3. Every 4-6 hours minimum:  Change oxygen saturation probe site  4. Every 4-6 hours:  If on nasal continuous positive airway pressure, respiratory therapy assess nares and determine need for appliance change or resting period.   8/23/2022 1534 by Sheldon Samuel RN  Outcome: Progressing  8/23/2022 0418 by Franklin Celeste RN  Outcome: Progressing     Problem: Safety - Adult  Goal: Free from fall injury  8/23/2022 1534 by Sheldon Samuel RN  Outcome: Progressing  8/23/2022 0418 by Franklin Celeste RN  Outcome: Progressing     Problem: ABCDS Injury Assessment  Goal: Absence of physical injury  8/23/2022 1534 by Sheldon Samuel RN  Outcome: Progressing  8/23/2022 0418 by Franklin Celeste RN  Outcome: Progressing     Problem: Chronic Conditions and Co-morbidities  Goal: Patient's chronic conditions and co-morbidity symptoms are monitored and maintained or improved  8/23/2022 1534 by Diane Orlando RN  Outcome: Progressing  8/23/2022 0418 by Jovan Orona RN  Outcome: Progressing

## 2022-08-23 NOTE — FLOWSHEET NOTE
Orthostatics completed. Pt asymptomatic. Currently resting in bed with family at bedside. Video monitoring remains in place. Bed remains in lowest position with bed alarm on. Call light, bedside table, and all personal belongings within reach. Will continue to monitor.        08/23/22 1456 08/23/22 1459 08/23/22 1502   Vital Signs   BP (!) 156/86 (!) 151/81 123/86   BP Location Left upper arm Left upper arm Left upper arm   BP Method Automatic Automatic Automatic   MAP (Calculated) 109.33 104.33 98.33   Patient Position Supine Sitting Standing

## 2022-08-23 NOTE — PROGRESS NOTES
Nisha Collado  Neurology Follow-up  [facility] Neurology    Date of Service: 8/23/2022    Subjective:   CC: Follow up today regarding: Recurrent falling and ataxia    Events noted. Chart and lab reviewed. The patient is about the same. Did well with PT and OT according to her daughter. Unable to get MRI. CT head showed no hydrocephalus or acute abnormalities. She denies any headache or chest pain. No dysphagia. Other review of system was unremarkable      ROS : A 10-12 system review obtained from discussion with patient's family/and or nurse, was unremarkable. family history includes Cancer in her sister; Colon Cancer in her father; Coronary Art Dis in her brother and brother; Diabetes type 2  in her brother and mother; Heart Attack in her brother, brother, and father; Heart Disease in her brother and father; High Cholesterol in her mother and son; Hypertension in her son; Mental Illness in her daughter and mother; No Known Problems in her sister; Other in her brother, brother, and son; Stroke in her mother. Past Medical History:   Diagnosis Date    COVID-19 virus infection 01/07/2022    tESTED = 1/24/21. Dementia with behavioral disturbance ([facility] [address] 05/23/2022    Diabetes mellitus (HCC)     GERD (gastroesophageal reflux disease)     Hyperlipidemia     Hypertension     Hypothyroidism     MGUS (monoclonal gammopathy of unknown significance) 2019    Mild dementia (HCC)     Osteopenia of multiple sites 10/15/2015    Other idiopathic scoliosis, thoracolumbar region 08/03/2017    Moderate to marked scoliosis chest x-ray.   On CT 2/14/2019.  3/7/2019 on bone scan     Current Facility-Administered Medications   Medication Dose Route Frequency Provider Last Rate Last Admin    perflutren lipid microspheres (DEFINITY) injection 1.65 mg  1.5 mL IntraVENous ONCE PRN Carlos Askwe MD        glucose-vitamin C chewable tablet 4 tablet  4 tablet Oral PRN Vashti Cui DO        dextrose bolus 10% 125 mL  125 mL IntraVENous PRN Ahmad TRAVIS Cui, DO        Or    dextrose bolus 10% 250 mL  250 mL IntraVENous PRN Ahmad TRAVIS Cui, DO        glucagon (rDNA) injection 1 mg  1 mg SubCUTAneous PRN Ahmad A Basilia, DO        dextrose 10 % infusion   IntraVENous Continuous PRN Ahmad TRAVIS Cui, DO        insulin lispro (HUMALOG) injection vial 0-4 Units  0-4 Units SubCUTAneous TID WC Ahmad TRAVIS Cui, DO        insulin lispro (HUMALOG) injection vial 0-4 Units  0-4 Units SubCUTAneous Nightly Ahmad TRAVIS Cui, DO        atorvastatin (LIPITOR) tablet 20 mg  20 mg Oral Nightly Caden Vivar MD   20 mg at 08/22/22 2207    benztropine (COGENTIN) tablet 1 mg  1 mg Oral BID Caden Vivar MD   1 mg at 08/23/22 1024    divalproex (DEPAKOTE SPRINKLE) capsule 250 mg  250 mg Oral BID Caden Vivar MD   250 mg at 08/23/22 1532    donepezil (ARICEPT) tablet 10 mg  10 mg Oral Nightly Cadne Vivar MD   10 mg at 08/22/22 2207    fluvoxaMINE (LUVOX) tablet 50 mg  50 mg Oral Nightly Caden Vivar MD   50 mg at 08/22/22 2206    perphenazine tablet 8 mg  8 mg Oral BID  Caden Vivar MD   8 mg at 08/23/22 1024    sodium chloride flush 0.9 % injection 5-40 mL  5-40 mL IntraVENous 2 times per day Caden Vivar MD   10 mL at 08/23/22 1034    sodium chloride flush 0.9 % injection 5-40 mL  5-40 mL IntraVENous PRN Caden Vivar MD        0.9 % sodium chloride infusion   IntraVENous PRN Caden Vivar MD        enoxaparin (LOVENOX) injection 40 mg  40 mg SubCUTAneous Nightly Caden Vivar MD   40 mg at 08/22/22 2206    ondansetron (ZOFRAN-ODT) disintegrating tablet 4 mg  4 mg Oral Q8H PRN Caden Vivar MD        Or    ondansetron (ZOFRAN) injection 4 mg  4 mg IntraVENous Q6H PRN Caden Vivar MD        acetaminophen (TYLENOL) tablet 650 mg  650 mg Oral Q6H PRN Caden Vivar MD        Or    acetaminophen (TYLENOL) suppository 650 mg  650 mg Rectal Q6H PRN Sepideh Deng MD pantoprazole (PROTONIX) injection 40 mg  40 mg IntraVENous Daily PRATIMA Ken - CNP   40 mg at 08/23/22 1024     No Known Allergies   reports that she has never smoked. She has never used smokeless tobacco. She reports that she does not drink alcohol and does not use drugs. Objective:  Constitutional:   Vitals:    08/23/22 1233 08/23/22 1456 08/23/22 1459 08/23/22 1502   BP: (!) 148/79 (!) 156/86 (!) 151/81 123/86   Pulse: 73      Resp: 16      Temp: 98.2 °F (36.8 °C)      TempSrc: Oral      SpO2: 91%      Weight:       Height:           General appearance: Confused   Mental Status:   AAO times one. Poor immediate recall and fund of knowledge  Attention and concentration: poor, waxing and waning. Language: Fluent but nonsensical speech. Does not follow direction. Recent memory: Impaired  Remote memory: Impaired  Poor fund of knowledge  Cranial Nerves:   II: Pupils: equal, round, reactive to light  III,IV,VI: No gaze preference. No nystagmus  V: Facial sensation: Grossly unremarkable. VII: Facial strength and movements: intact and symmetric  XII: Tongue movements : midline  Musculoskeletal:  The patient can move all 4 extremities. No apparent focal weakness. Tone: Normal tone. No rigidity.   Reflexes: symmetric 2+ in both arms and legs  Coordination: No tremors  Sensation: No sensory deficits        Data:  LABS:   Lab Results   Component Value Date/Time     08/23/2022 04:49 AM    K 3.5 08/23/2022 04:49 AM    CL 93 08/23/2022 04:49 AM    CO2 31 08/23/2022 04:49 AM    BUN 8 08/23/2022 04:49 AM    CREATININE <0.5 08/23/2022 04:49 AM    GFRAA >60 08/23/2022 04:49 AM    GFRAA >60 09/02/2010 11:15 AM    LABGLOM >60 08/23/2022 04:49 AM    GLUCOSE 98 08/23/2022 04:49 AM    MG 1.80 08/23/2022 04:49 AM    CALCIUM 9.5 08/23/2022 04:49 AM     Lab Results   Component Value Date/Time    WBC 7.6 08/21/2022 07:32 PM    RBC 4.35 08/21/2022 07:32 PM    HGB 11.3 08/21/2022 07:32 PM    HCT 34.9 08/21/2022 07:32 PM    MCV 80.2 08/21/2022 07:32 PM    RDW 15.3 08/21/2022 07:32 PM     08/21/2022 07:32 PM   No results found for: INR, PROTIME    Neuroimaging and labs were independently reviewed by me and discussed with her daughter. B12 is low. Reviewed notes from different physicians. Impression:  Recurrent ataxia and falling likely multifactorial.  Likely combination of sensory ataxia, dementia related and B12 deficiency. Dementia with psychosis  Hypertension  Diabetes  B12 deficiency      Recommendation  Continue current supportive care  IM B12 replacement  PT and OT  Likely will need placement and aggressive therapy  Continue Depakote and Aricept the same dose. Depakote level was low. Continue antipsychotic medication  Speech, and aspiration precautions  Neurochecks  Blood pressure control on current medications  Insulin sliding scale  Can be discharged from neurology once medically stable           Blair Parekh MD   [phone]      This dictation was generated by voice recognition computer software. Although all attempts are made to edit the dictation for accuracy, there may be errors in the transcription that are not intended.

## 2022-08-23 NOTE — CARE COORDINATION
Met with patient's daughter and now she is requesting a referral to an [facility] provider. \The Plan for Transition of Care is related to the following treatment goals: SNF    The Patient and/or patient representative daughter was provided with a choice of provider and agrees   with the discharge plan. [x] Yes [] No    Freedom of choice list was provided with basic dialogue that supports the patient's individualized plan of care/goals, treatment preferences and shares the quality data associated with the providers.  [x] Yes [] No

## 2022-08-23 NOTE — FLOWSHEET NOTE
pt returning to unit from Echo. denies pain. Family @ bedside. see below vitals. call light, bedside table, and all personal belongings are within reach. will continue to closely monitor.         08/23/22 1233   Vital Signs   Temp 98.2 °F (36.8 °C)   Temp Source Oral   Heart Rate 73   Heart Rate Source Monitor   Resp 16   BP (!) 148/79   BP Location Left upper arm   BP Method Automatic   MAP (Calculated) 102   Oxygen Therapy   SpO2 91 %   Pulse Oximetry Type Intermittent   Pulse Oximeter Device Mode Intermittent   Pulse Oximeter Device Location Finger   O2 Device None (Room air)

## 2022-08-23 NOTE — FLOWSHEET NOTE
Daughter @ bedside and informed this RN that pt is expected to be discharged to [facility] for extensive rehab per Neurology. Charge RN notified. Will discuss with team.  Pt currently resting in bed with daughter @ bedside. Bed remains in lowest and locked position. Call light, bedside table, and all personal belongings are within reach.        08/23/22 1645   Vital Signs   Temp 98.2 °F (36.8 °C)   Temp Source Oral   Heart Rate 67   Heart Rate Source Monitor   Resp 16   /86   BP Location Left upper arm   BP Method Automatic   MAP (Calculated) 103   Pain Assessment   Pain Assessment None - Denies Pain

## 2022-08-23 NOTE — PROGRESS NOTES
Hospitalist Progress Note      PCP: Donice Dance, DO    Date of Admission: 8/21/2022    Chief Complaint: Falls balance issues      Patient feels well  No chest pain shortness of breath  No nausea vomiting  No headache        Medications:  Reviewed    Physical Exam Performed:    BP (!) 151/84   Pulse 74   Temp 98.3 °F (36.8 °C) (Oral)   Resp 16   Ht 4' 11\" (1.499 m)   Wt 136 lb 0.4 oz (61.7 kg)   SpO2 97%   BMI 27.47 kg/m²     General appearance: No apparent distress, appears stated age and cooperative. HEENT: Pupils equal, round, and reactive to light. Conjunctivae/corneas clear. Neck: Supple, with full range of motion. No jugular venous distention. Trachea midline. Respiratory:  Normal respiratory effort. Clear to auscultation, bilaterally without Rales/Wheezes/Rhonchi. Cardiovascular: Regular rate and rhythm with normal S1/S2 without murmurs, rubs or gallops. Abdomen: Soft, non-tender, non-distended with normal bowel sounds. Musculoskeletal: No clubbing, cyanosis or edema bilaterally. Full range of motion without deformity. Skin: Skin color, texture, turgor normal.  No rashes or lesions. Neurologic:  Neurovascularly intact without any focal sensory/motor deficits. Cranial nerves: II-XII intact, grossly non-focal.  Psychiatric: Alert and oriented, thought content appropriate, normal insight, MSK 5/'s bilaterally upper and lower, no visual field deficits, hand to nose intact  Capillary Refill: Brisk,< 3 seconds   Peripheral Pulses: +2 palpable, equal bilaterally       Labs:   Recent Labs     08/21/22 1932   WBC 7.6   HGB 11.3*   HCT 34.9*          Recent Labs     08/21/22 1932 08/23/22  0449   * 130*   K 5.1 3.5   CL 92* 93*   CO2 26 31   BUN 10 8   CREATININE 0.5* <0.5*   CALCIUM 9.7 9.5       Recent Labs     08/21/22 1932   AST 36   ALT 16   BILITOT 0.3   ALKPHOS 81       No results for input(s): INR in the last 72 hours.   Recent Labs     08/21/22 1932   TROPONINI <0.01 Urinalysis:      Lab Results   Component Value Date/Time    NITRU Negative 08/21/2022 10:07 PM    WBCUA 3-5 05/27/2022 02:05 AM    BACTERIA None Seen 05/23/2022 02:27 PM    RBCUA 3-4 05/27/2022 02:05 AM    BLOODU Negative 08/21/2022 10:07 PM    SPECGRAV 1.010 08/21/2022 10:07 PM    GLUCOSEU Negative 08/21/2022 10:07 PM    GLUCOSEU NEGATIVE 09/02/2010 11:27 AM       Radiology:  CT HEAD WO CONTRAST   Final Result   1. No acute intracranial abnormality. 2. Mild global parenchymal volume loss with chronic microvascular ischemic   changes. 3. Minimal atherosclerosis of the intracranial vasculature. 4. Nonspecific lucencies within the calvarium measuring up to 10 mm in size. However, these appear similar in size dating back to the CT from 2019. CTA HEAD NECK W CONTRAST   Final Result   No flow limiting stenosis or large vessel occlusion detected within the head   or neck. RECOMMENDATIONS:   Unavailable         CT HEAD WO CONTRAST   Final Result   No CT evidence of an acute infarct. XR CHEST PORTABLE   Final Result   No acute abnormality visualized. Assessment/Plan:    Active Hospital Problems    Diagnosis     Loss of balance [R26.89]      Priority: Medium     Ataxia:  Etiology unclear at this time  Patient MRI is contraindicated due to having metal  Neurology consulted, much appreciated  CT head CTA negative  Recommendation PT OT, orthostatic,, TSH, B12 folate, Depakote level, A1c pending  Echo showed normal ejection fraction next  Neurochecks  B12 low normal will replace with oral 1000 mcg  Repeat CT head remains negative      Essential Hypertension: Continue home medication  Hyperlipidemia: Controlled on home Statin. Outpatient PCP follow up post-discharge  Hypothyroidism: Continue home medication  GERD: Continue home medication  Type 2 Diabetes: Insulin sliding scale. Last A1c 7.4      DVT Prophylaxis: Lovenox  Diet: ADULT DIET; Regular;  No Drinking Straws  Code Status:

## 2022-08-23 NOTE — PLAN OF CARE
Pt remains disorientated, yet easily reorientated. Pt is pleasant and cooperative. Pt expressed desire to go home. Whiteboard in pt room updated with treatment plan. Pt agrees when reorientated. VSS; standards safety precautions in place with video monitoring for pt safety. ECHO to be done on Tuesday.       Problem: Discharge Planning  Goal: Discharge to home or other facility with appropriate resources  8/23/2022 0418 by Chapis Pandya RN  Outcome: Progressing  8/22/2022 1423 by Shari Tovar RN  Outcome: Progressing     Problem: Neurosensory - Adult  Goal: Achieves maximal functionality and self care  8/23/2022 0418 by Chapis Pandya RN  Outcome: Progressing  8/22/2022 1423 by Shari Tovar RN  Outcome: Progressing     Problem: Respiratory - Adult  Goal: Achieves optimal ventilation and oxygenation  8/23/2022 0418 by Chapis Pandya RN  Outcome: Progressing  8/22/2022 1423 by Shari Tovar RN  Outcome: Progressing     Problem: Cardiovascular - Adult  Goal: Maintains optimal cardiac output and hemodynamic stability  8/23/2022 0418 by Chapis Pandya RN  Outcome: Progressing  8/22/2022 1423 by Shari Tovar RN  Outcome: Progressing     Problem: Skin/Tissue Integrity - Adult  Goal: Skin integrity remains intact  Outcome: Progressing  Goal: Oral mucous membranes remain intact  Outcome: Progressing     Problem: Musculoskeletal - Adult  Goal: Return ADL status to a safe level of function  8/23/2022 0418 by Chapis Pandya RN  Outcome: Progressing  8/22/2022 1423 by Shari Tovar RN  Outcome: Progressing     Problem: Genitourinary - Adult  Goal: Absence of urinary retention  8/23/2022 0418 by Chapis Pandya RN  Outcome: Progressing  8/22/2022 1423 by Shari Tovar RN  Outcome: Progressing     Problem: Metabolic/Fluid and Electrolytes - Adult  Goal: Electrolytes maintained within normal limits  8/23/2022 0418 by Chapis Pandya RN  Outcome: Progressing  8/22/2022 1423 by Ashanti Ypi RN  Outcome: Progressing  Goal: Hemodynamic stability and optimal renal function maintained  Outcome: Progressing  Goal: Glucose maintained within prescribed range  Outcome: Progressing     Problem: Hematologic - Adult  Goal: Maintains hematologic stability  8/23/2022 0418 by Rafi Moran RN  Outcome: Progressing  8/22/2022 1423 by Ashanti Yip RN  Outcome: Progressing     Problem: Skin/Tissue Integrity  Goal: Absence of new skin breakdown  Description: 1. Monitor for areas of redness and/or skin breakdown  2. Assess vascular access sites hourly  3. Every 4-6 hours minimum:  Change oxygen saturation probe site  4. Every 4-6 hours:  If on nasal continuous positive airway pressure, respiratory therapy assess nares and determine need for appliance change or resting period.   8/23/2022 0418 by Rafi Moran RN  Outcome: Progressing  8/22/2022 1423 by Ashanti Yip RN  Outcome: Progressing     Problem: Safety - Adult  Goal: Free from fall injury  8/23/2022 0418 by Rafi Moran RN  Outcome: Progressing  8/22/2022 1423 by Ashanti Yip RN  Outcome: Progressing     Problem: ABCDS Injury Assessment  Goal: Absence of physical injury  8/23/2022 0418 by Rafi Moran RN  Outcome: Progressing  8/22/2022 1423 by Ashanti Yip RN  Outcome: Progressing     Problem: Chronic Conditions and Co-morbidities  Goal: Patient's chronic conditions and co-morbidity symptoms are monitored and maintained or improved  8/23/2022 0418 by Rafi Moran RN  Outcome: Progressing  8/22/2022 1423 by Ashanti Yip RN  Outcome: Progressing

## 2022-08-23 NOTE — PROGRESS NOTES
[facility]   Phone: [phone]    Physical Therapy    [] Initial Evaluation            [x] Daily Treatment Note         [] Discharge Summary      Patient: Caden Guallpa   : 1945   MRN: [id number]   Date of Service:  2022  Admitting Diagnosis: Loss of balance  Current Admission Summary: Patient is a 59-year-old female who lives at home with daughter able to care for self however has been having decline with multiple falls. Patient reports that she has been noticing that she becoming more weak and has been having difficulty with walking. Daughter does report the patient does have history of dementia and has noted that her weakness is progressively worsening is concerned that patient will need to be in a facility. Past Medical History:  has a past medical history of COVID-19 virus infection, Dementia with behavioral disturbance ([address] Diabetes mellitus ([address] GERD (gastroesophageal reflux disease), Hyperlipidemia, Hypertension, Hypothyroidism, MGUS (monoclonal gammopathy of unknown significance), Mild dementia ([address] Osteopenia of multiple sites, and Other idiopathic scoliosis, thoracolumbar region. Past Surgical History:  has a past surgical history that includes eye surgery (Left, ); bladder suspension (N/A, ); and Colonoscopy (2017). Discharge Recommendations: Caedn Guallpa scored a 18/24 on the AM-PAC short mobility form. Current research shows that an AM-PAC score of 18 or greater is typically associated with a discharge to the patient's home setting. Based on the patient's AM-PAC score and their current functional mobility deficits, it is recommended that the patient have 2-3 sessions per week of Physical Therapy at d/c to increase the patient's independence. At this time, this patient demonstrates the endurance and safety to discharge home with HHPT and a follow up treatment frequency of 2-3x/wk.   Please see dishes  Active :        [] Yes  [x] No  Hand Dominance: [] Left  [] Right  Hobbies: watching tv; mostly sedentary   Recent Falls: 3 in one day last week; at least 10 falls in past 6 months; Per chart review and discussion with pt's children, pt spent a month in a behavioral health unit in May-June 2022. Pt did not receive therapy services and experienced a large decline in her mobility level during that time. Prior to that hospitalization, pt was much more independent with transfers, ambulation, stair climbing. Subjective  General: Pt supine in bed upon arrival. Agreeable to therapy session, requesting to use bathroom. Pain: 0/10  Pain Interventions: not applicable       Functional Mobility  Bed Mobility  Supine to Sit: maximum assistance  Sit to Supine: moderate assistance  Rolling Right: moderate assistance  Scooting: stand by assistance  Comments: pt assisted to roll to side and use bedrail for sup>sit  Transfers  Sit to stand transfer: minimal assistance  Stand to sit transfer: minimal assistance  Stand step transfer: minimal assistance  Toilet transfer: minimal assistance  Comments: STS from EOB x1, toilet x1, recliner x2; stand step recliner>EOB at end of session with RW   Ambulation  Surface:level surface  Assistive Device: rolling walker  Assistance: contact guard assistance (intermittent Min A for walker management)  Distance: 8'+20'  Gait Mechanics: narrow Kellie, decreased B step lengths/heights, slightly decreased genoveva  Comments: No LOB. Max VC for obstacle negotiation. Stair Mobility  Stair mobility not completed on this date.   Comments:  Balance  Static Sitting Balance: fair (+): maintains balance at SBA/supervision without use of UE support  Dynamic Sitting Balance: fair (+): maintains balance at SBA/supervision without use of UE support  Static Standing Balance: fair (-): maintains balance at CGA with use of UE support  Dynamic Standing Balance: fair (-): maintains balance at CGA with use of UE support  Comments: Pt sat EOB and at toilet for toileting ~10-15 minutes with supervision for balance. Pt stood at sink for ADLs ~3-5 minutes with CGA for balance. Other Therapeutic Interventions  Pt sat supported in recliner ~10 minutes total for ADLs (see OT note for details).      Functional Outcomes  AM-PAC Inpatient Mobility Raw Score : 18              Cognition  Overall Cognitive Status: Impaired  Arousal/Alterness: delayed responses to stimuli  Following Commands: follows one step commands with repetition, follows one step commands with increased time  Attention Span: attends with cues to redirect, difficulty attending to directions  Memory: decreased recall of precautions, decreased recall of recent events, decreased short term memory, decreased long term memory  Safety Judgement: decreased awareness of need for assistance, decreased awareness of need for safety  Problem Solving: assistance required to generate solutions, assistance required to implement solutions, assistance required to identify errors made, assistance required to correct errors made  Insights: not aware of deficits  Initiation: requires cues for all  Sequencing: requires cues for some  Orientation:    oriented to person, oriented to place, disoriented to time , and disoriented to situation  Command Following:   accurately follows one step commands    Education  Barriers To Learning: cognition and physical  Patient Education: patient educated on PT role and benefits, plan of care, general safety, discharge recommendations  Learning Assessment:  patient will require reinforcement due to cognitive deficits, pt's son and daughter verbalize understanding of PT discharge recommendation    Assessment  Activity Tolerance: tolerated session well, some limitations due to cognition  Impairments Requiring Therapeutic Intervention: decreased functional mobility, decreased ADL status, decreased ROM, decreased strength, decreased safety awareness, decreased cognition, decreased endurance, decreased balance, decreased coordination, decreased posture  Prognosis: fair  Clinical Assessment:  Pt with improved mobility this date. Able to perform transfers with decreased assist and initiate ambulation this session. Pt does continue to require increased assist for bed mobility and increased assist for walker management during ambulatory tasks. Pt would benefit from continued skilled PT services to address deficits and facilitate return to PLOF. Safety Interventions: patient left in bed, bed alarm in place, call light within reach, gait belt, patient at risk for falls, and nurse notified (pt left in bed with transporter preparing to take pt EMIL for testing)    Plan  Frequency: 3-5 x/per week  Current Treatment Recommendations: strengthening, ROM, balance training, functional mobility training, transfer training, gait training, and patient/caregiver education-trial of use of RW     Goals  Patient Goals: none stated   Short Term Goals:  Time Frame: discharge   Patient will complete bed mobility at minimal assistance   Patient will complete transfers at minimal assistance--MET 8/23/22   Patient will ambulate 10 ft with use of rolling walker at minimal assistance--MET 8/23/22  Pt will perform transfers with LRAD and CGA  Pt will ambulate 50' with LRAD and CGA  2 GOALS MET THIS DATE     Therapy Session Time      Individual Group Co-treatment   Time In     0855   Time Out     0937   Minutes     42     Timed Code Treatment Minutes:   42  Total Treatment Minutes:  42   Time spent with pt shared with OT as pt is under observation status. As such, pt is being billed 1 unit for session.      Electronically Signed By: Oleg Burns, [id number] [facility],[address], [address] [place] [address]

## 2022-08-23 NOTE — PROGRESS NOTES
Call from [address], pt son. Son wanted to know what time pt was to be discharged home tomorrow. Per  notes, TAYLOR Osborn - daughter) is requesting a referral to [facility] skilled nursing facility. Pt will be seen by physical & occupational therapy tomorrow, Wednesday. Son expressed concern that the family will need to make transportation arrangements, in the event pt is discharged home with home health care. Son was assure that doctors/hospital would not discharge pt and remove her from the room without have transportation arrangements. Son satisfied with status.

## 2022-08-23 NOTE — PROGRESS NOTES
Short Term Goals:  Pt will improve auditory processing/comprehension of commands and questions to 80%, via graded tasks (Ongoing 08/23/22)  Pt will improve orientation and short term recall via graded tasks to 80% (Ongoing 08/23/22)  Pt will participate in ongoing cognitive assessment with goals to be established as indicated (Ongoing 08/23/22)  Pt with improve functional verbal problem solving via graded tasks to 80% min cues (Ongoing 08/23/22)    Assessment: Patient progressing toward goals    Plan: 3-5 times per week during acute care stay. Patient/Family Education:  Provided education regarding role of SLP, recommendations and general speech pathology plan of care. [] Pt verbalized understanding and agreement   [x] Pt requires ongoing learning   [] No evidence of comprehension     Discharge Recommendations:  Discharge recommendations to be determined pending ongoing follow-up during acute care stay    Treatment time  Timed Code Treatment Minutes: 15 minutes  Total Treatment time: 30 minutes     If patient discharges prior to next session this note will serve as a discharge summary.       Signature:   Johana Neil M.A., [address] [place] [address]  Speech-Language Pathologist

## 2022-08-24 LAB
ANION GAP SERPL CALCULATED.3IONS-SCNC: 8 MMOL/L (ref 3–16)
BASOPHILS ABSOLUTE: 0 K/UL (ref 0–0.2)
BASOPHILS RELATIVE PERCENT: 0.6 %
BUN BLDV-MCNC: 6 MG/DL (ref 7–20)
CALCIUM SERPL-MCNC: 9.3 MG/DL (ref 8.3–10.6)
CHLORIDE BLD-SCNC: 100 MMOL/L (ref 99–110)
CO2: 26 MMOL/L (ref 21–32)
CREAT SERPL-MCNC: <0.5 MG/DL (ref 0.6–1.2)
EOSINOPHILS ABSOLUTE: 0.2 K/UL (ref 0–0.6)
EOSINOPHILS RELATIVE PERCENT: 3.3 %
GFR AFRICAN AMERICAN: >60
GFR NON-AFRICAN AMERICAN: >60
GLUCOSE BLD-MCNC: 105 MG/DL (ref 70–99)
GLUCOSE BLD-MCNC: 109 MG/DL (ref 70–99)
GLUCOSE BLD-MCNC: 137 MG/DL (ref 70–99)
GLUCOSE BLD-MCNC: 148 MG/DL (ref 70–99)
GLUCOSE BLD-MCNC: 172 MG/DL (ref 70–99)
GLUCOSE BLD-MCNC: 99 MG/DL (ref 70–99)
HCT VFR BLD CALC: 35.2 % (ref 36–48)
HEMOGLOBIN: 11.4 G/DL (ref 12–16)
LYMPHOCYTES ABSOLUTE: 2.5 K/UL (ref 1–5.1)
LYMPHOCYTES RELATIVE PERCENT: 38.7 %
MCH RBC QN AUTO: 25.7 PG (ref 26–34)
MCHC RBC AUTO-ENTMCNC: 32.2 G/DL (ref 31–36)
MCV RBC AUTO: 79.6 FL (ref 80–100)
MONOCYTES ABSOLUTE: 0.7 K/UL (ref 0–1.3)
MONOCYTES RELATIVE PERCENT: 11.1 %
NEUTROPHILS ABSOLUTE: 3 K/UL (ref 1.7–7.7)
NEUTROPHILS RELATIVE PERCENT: 46.3 %
PDW BLD-RTO: 15.3 % (ref 12.4–15.4)
PERFORMED ON: ABNORMAL
PERFORMED ON: NORMAL
PLATELET # BLD: 219 K/UL (ref 135–450)
PMV BLD AUTO: 8.3 FL (ref 5–10.5)
POTASSIUM SERPL-SCNC: 4.1 MMOL/L (ref 3.5–5.1)
RBC # BLD: 4.43 M/UL (ref 4–5.2)
SODIUM BLD-SCNC: 134 MMOL/L (ref 136–145)
WBC # BLD: 6.4 K/UL (ref 4–11)

## 2022-08-24 PROCEDURE — 96372 THER/PROPH/DIAG INJ SC/IM: CPT

## 2022-08-24 PROCEDURE — 6370000000 HC RX 637 (ALT 250 FOR IP): Performed by: INTERNAL MEDICINE

## 2022-08-24 PROCEDURE — 6360000002 HC RX W HCPCS: Performed by: NURSE PRACTITIONER

## 2022-08-24 PROCEDURE — 96376 TX/PRO/DX INJ SAME DRUG ADON: CPT

## 2022-08-24 PROCEDURE — C9113 INJ PANTOPRAZOLE SODIUM, VIA: HCPCS | Performed by: NURSE PRACTITIONER

## 2022-08-24 PROCEDURE — 36415 COLL VENOUS BLD VENIPUNCTURE: CPT

## 2022-08-24 PROCEDURE — 97535 SELF CARE MNGMENT TRAINING: CPT

## 2022-08-24 PROCEDURE — 85025 COMPLETE CBC W/AUTO DIFF WBC: CPT

## 2022-08-24 PROCEDURE — 2580000003 HC RX 258: Performed by: INTERNAL MEDICINE

## 2022-08-24 PROCEDURE — G0378 HOSPITAL OBSERVATION PER HR: HCPCS

## 2022-08-24 PROCEDURE — 92526 ORAL FUNCTION THERAPY: CPT

## 2022-08-24 PROCEDURE — 97116 GAIT TRAINING THERAPY: CPT

## 2022-08-24 PROCEDURE — 80048 BASIC METABOLIC PNL TOTAL CA: CPT

## 2022-08-24 PROCEDURE — 97129 THER IVNTJ 1ST 15 MIN: CPT

## 2022-08-24 PROCEDURE — 6360000002 HC RX W HCPCS: Performed by: INTERNAL MEDICINE

## 2022-08-24 RX ADMIN — CYANOCOBALAMIN TAB 1000 MCG 1000 MCG: 1000 TAB at 09:38

## 2022-08-24 RX ADMIN — Medication 10 ML: at 09:39

## 2022-08-24 RX ADMIN — DONEPEZIL HYDROCHLORIDE 10 MG: 5 TABLET, FILM COATED ORAL at 20:11

## 2022-08-24 RX ADMIN — PANTOPRAZOLE SODIUM 40 MG: 40 INJECTION, POWDER, FOR SOLUTION INTRAVENOUS at 09:38

## 2022-08-24 RX ADMIN — BENZTROPINE MESYLATE 1 MG: 1 TABLET ORAL at 09:38

## 2022-08-24 RX ADMIN — BENZTROPINE MESYLATE 1 MG: 1 TABLET ORAL at 20:11

## 2022-08-24 RX ADMIN — ENOXAPARIN SODIUM 40 MG: 100 INJECTION SUBCUTANEOUS at 20:11

## 2022-08-24 RX ADMIN — Medication 10 ML: at 20:11

## 2022-08-24 RX ADMIN — FLUVOXAMINE MALEATE 50 MG: 50 TABLET, COATED ORAL at 20:11

## 2022-08-24 RX ADMIN — PERPHENAZINE 8 MG: 4 TABLET, FILM COATED ORAL at 17:31

## 2022-08-24 RX ADMIN — ATORVASTATIN CALCIUM 20 MG: 20 TABLET, FILM COATED ORAL at 20:11

## 2022-08-24 RX ADMIN — DIVALPROEX SODIUM 250 MG: 125 CAPSULE ORAL at 09:38

## 2022-08-24 RX ADMIN — DIVALPROEX SODIUM 250 MG: 125 CAPSULE ORAL at 15:04

## 2022-08-24 NOTE — FLOWSHEET NOTE
Pt resting comfortably in bed while bed remains in lowest and locked position with alarm in place. Video monitoring remains in place. Denies pain @ this time. Denies further needs. Call light, bedside table, and all personal belongings are within reach. Will continue to closely monitor.          08/24/22 1049   Vital Signs   Temp 98 °F (36.7 °C)   Temp Source Oral   Heart Rate 72   Heart Rate Source Monitor   Resp 18   /82   BP Location Left upper arm   BP Method Automatic   MAP (Calculated) 100.33   Patient Position Semi fowlers   Oxygen Therapy   SpO2 94 %   O2 Device None (Room air)

## 2022-08-24 NOTE — FLOWSHEET NOTE
Pt resting comfortably. A & O with episodes of confusion. Denies pain. All LDA's remain C/D/I. See below vitals. Bed remains in lowest and locked position with alarm in place. Call light, bedside table, and all personal belongings are within reach. Will continue to closely monitor.         08/24/22 0930   Vital Signs   Temp 98 °F (36.7 °C)   Temp Source Oral   Heart Rate 64   Heart Rate Source Monitor   Resp 18   BP (!) 150/84   BP Location Left upper arm   BP Method Automatic   MAP (Calculated) 106   Patient Position Supine   Level of Consciousness Alert (0)   MEWS Score 1   Pain Assessment   Pain Assessment None - Denies Pain   Oxygen Therapy   SpO2 92 %   Pulse Oximetry Type Intermittent   Pulse Oximeter Device Mode Intermittent   Pulse Oximeter Device Location Left;Finger   O2 Device None (Room air)

## 2022-08-24 NOTE — PLAN OF CARE
Pt denies pain; pt alert to self and easily reorientated to place, situation & time. VSS; occult stool sent - negative. Pt is unsteady on feet, drags right leg; gait belt required. Pt has poor attention to safety. Video monitoring in place for her safety. Pt is pleasant & follows commands; pt will use call light occasionally. Problem: Discharge Planning  Goal: Discharge to home or other facility with appropriate resources  Outcome: Progressing     Problem: Neurosensory - Adult  Goal: Achieves maximal functionality and self care  Outcome: Progressing     Problem: Respiratory - Adult  Goal: Achieves optimal ventilation and oxygenation  Outcome: Progressing     Problem: Cardiovascular - Adult  Goal: Maintains optimal cardiac output and hemodynamic stability  Outcome: Progressing     Problem: Skin/Tissue Integrity - Adult  Goal: Skin integrity remains intact  Outcome: Progressing  Goal: Oral mucous membranes remain intact  Outcome: Progressing     Problem: Musculoskeletal - Adult  Goal: Return ADL status to a safe level of function  Outcome: Progressing     Problem: Genitourinary - Adult  Goal: Absence of urinary retention  Outcome: Progressing     Problem: Metabolic/Fluid and Electrolytes - Adult  Goal: Electrolytes maintained within normal limits  Outcome: Progressing  Goal: Hemodynamic stability and optimal renal function maintained  Outcome: Progressing  Goal: Glucose maintained within prescribed range  Outcome: Progressing     Problem: Hematologic - Adult  Goal: Maintains hematologic stability  Outcome: Progressing     Problem: Skin/Tissue Integrity  Goal: Absence of new skin breakdown  Description: 1. Monitor for areas of redness and/or skin breakdown  2. Assess vascular access sites hourly  3. Every 4-6 hours minimum:  Change oxygen saturation probe site  4.   Every 4-6 hours:  If on nasal continuous positive airway pressure, respiratory therapy assess nares and determine need for appliance change or resting period.   Outcome: Progressing     Problem: Safety - Adult  Goal: Free from fall injury  Outcome: Progressing     Problem: ABCDS Injury Assessment  Goal: Absence of physical injury  Outcome: Progressing     Problem: Chronic Conditions and Co-morbidities  Goal: Patient's chronic conditions and co-morbidity symptoms are monitored and maintained or improved  Outcome: Progressing

## 2022-08-24 NOTE — PROGRESS NOTES
Physical [facility] Department   Phone: [phone]    Physical Therapy    [] Initial Evaluation            [] Daily Treatment Note         [] Discharge Summary      Patient: Joana Gaspar   : 1945   MRN: [id number]   Date of Service:  2022  Admitting Diagnosis: Loss of balance  Current Admission Summary: Patient is a 54-year-old female who lives at home with daughter able to care for self however has been having decline with multiple falls. Patient reports that she has been noticing that she becoming more weak and has been having difficulty with walking. Daughter does report the patient does have history of dementia and has noted that her weakness is progressively worsening is concerned that patient will need to be in a facility. Past Medical History:  has a past medical history of COVID-19 virus infection, Dementia with behavioral disturbance ([address] Diabetes mellitus ([address] GERD (gastroesophageal reflux disease), Hyperlipidemia, Hypertension, Hypothyroidism, MGUS (monoclonal gammopathy of unknown significance), Mild dementia ([address] Osteopenia of multiple sites, and Other idiopathic scoliosis, thoracolumbar region. Past Surgical History:  has a past surgical history that includes eye surgery (Left, ); bladder suspension (N/A, ); and Colonoscopy (2017). Discharge Recommendations: Joana Gaspar scored a 17/24 on the AM-PAC short mobility form. Current research shows that an AM-PAC score of 17 or less is typically not associated with a discharge to the patient's home setting. Based on the patient's AM-PAC score and their current functional mobility deficits, it is recommended that the patient have 3-5 sessions per week of Physical Therapy at d/c to increase the patient's independence. Please see assessment section for further patient specific details.     If patient discharges prior to next session this note will serve as a discharge summary. Please see below for the latest assessment towards goals. DME Required For Discharge: DME to be determined at next level of care  Precautions/Restrictions:   Precautions/Restrictions: high fall risk  Weight Bearing Restrictions: no restrictions  [] Right Upper Extremity        [] Left Upper Extremity         [] Right Lower Extremity         [] Left Lower Extremity             Required Braces/Orthotics: no braces required                   [] Right            [] Left  Positional Restrictions:no positional restrictions    Pre-Admission Information   Lives With: daughter, Emelyn Barcenas; spends the day at her son's house which is a bilevel and pt must amb up/down steps     Type of Home: house  Home Layout: one level, laundry in basement  Home Access:  2 step to enter with handrail. Handrails are located on L side. Bathroom Layout: walker accessible, tub/shower unit  Bathroom Equipment: grab bars in shower, shower chair  Toilet Height: standard height  Home Equipment: rolling walker, single point cane, alert button  Transfer Assistance: requires assistance  Ambulation Assistance:requires assistance-son and daughter HHA  ADL Assistance: requires assistance with bathing, requires assistance with dressing, requires assistance with grooming, requires assistance with toileting  IADL Assistance: requires assistance with all homemaking tasks, pt will help with setting table and washing dishes  Active :        [] Yes                 [x] No  Hand Dominance: [] Left                 [] Right  Hobbies: watching tv; mostly sedentary   Recent Falls: 3 in one day last week; at least 10 falls in past 6 months; Per chart review and discussion with pt's children, pt spent a month in a behavioral health unit in May-June 2022. Pt did not receive therapy services and experienced a large decline in her mobility level during that time.  Prior to that hospitalization, pt was much more independent with transfers, attending to directions  Memory: decreased recall of precautions, decreased recall of recent events, decreased short term memory, decreased long term memory  Safety Judgement: decreased awareness of need for assistance, decreased awareness of need for safety  Problem Solving: assistance required to generate solutions, assistance required to implement solutions, assistance required to identify errors made, assistance required to correct errors made  Insights: not aware of deficits  Initiation: requires cues for all  Sequencing: requires cues for some  Orientation:    oriented to person, oriented to place, oriented to time , and disoriented to situation  Command Following:   accurately follows one step commands     Education  Barriers To Learning: cognition and physical  Patient Education: patient educated on PT role and benefits, plan of care, general safety, discharge recommendations  Learning Assessment:  patient will require reinforcement due to cognitive deficits, pt's son and daughter verbalize understanding of PT discharge recommendation     Assessment  Activity Tolerance: tolerated session well, some limitations due to cognition  Impairments Requiring Therapeutic Intervention: decreased functional mobility, decreased ADL status, decreased ROM, decreased strength, decreased safety awareness, decreased cognition, decreased endurance, decreased balance, decreased coordination, decreased posture  Prognosis: fair  Clinical Assessment:  Pt continues to require min A for all functional mobility and max cueing for sequencing and safety. Pt continues to require increased assist for bed mobility and increased assist for walker management during ambulatory tasks. Pt would benefit from continued skilled PT services to address deficits and facilitate return to PLOF.    Safety Interventions: patient left in chair, chair alarm in place, call light within reach, gait belt, patient at risk for falls, nurse notified and dtr in room   Plan  Frequency: 3-5 x/per week  Current Treatment Recommendations: strengthening, ROM, balance training, functional mobility training, transfer training, gait training, and patient/caregiver education-trial of use of RW      Goals  Patient Goals: none stated   Short Term Goals:  Time Frame: discharge              Patient will complete bed mobility at minimal assistance   Patient will complete transfers at minimal assistance--MET 8/23/22   Patient will ambulate 10 ft with use of rolling walker at minimal assistance--MET 8/23/22  Pt will perform transfers with LRAD and CGA  Pt will ambulate 50' with LRAD and CGA  NO GOALS MET THIS DATE     Therapy Session Time      Individual Group Co-treatment   Time In     7816   Time Out     3408   Minutes     44       Total Treatment Minutes:  29 (minutes split with OT due to obs status)        Electronically Signed By: Romelia Leventhal, [address]

## 2022-08-24 NOTE — FLOWSHEET NOTE
Pt resting comfortably in bed with daughter @ bedside. Video monitoring remains in place. Denies pain @ this time. Denies further needs. Call light, bedside table, and all personal belongings are within reach. Will continue to closely monitor.          08/24/22 1845   Vital Signs   Temp 98.2 °F (36.8 °C)   Temp Source Oral   Heart Rate 82   Heart Rate Source Monitor   Resp 18   BP (!) 165/99   BP Location Right lower arm   BP Method Automatic   MAP (Calculated) 121   Patient Position Lying left side   Pain Assessment   Pain Assessment None - Denies Pain   Oxygen Therapy   SpO2 92 %   O2 Device None (Room air)

## 2022-08-24 NOTE — PLAN OF CARE
Problem: Discharge Planning  Goal: Discharge to home or other facility with appropriate resources  8/24/2022 1531 by Sheldon Samuel RN  Outcome: Progressing  8/24/2022 0622 by Franklin Celeste RN  Outcome: Progressing     Problem: Neurosensory - Adult  Goal: Achieves maximal functionality and self care  8/24/2022 1531 by Sheldon Samuel RN  Outcome: Progressing  8/24/2022 0622 by Franklin Celeste RN  Outcome: Progressing     Problem: Respiratory - Adult  Goal: Achieves optimal ventilation and oxygenation  8/24/2022 1531 by Sheldon Samuel RN  Outcome: Progressing  8/24/2022 0622 by Franklin Celeste RN  Outcome: Progressing     Problem: Cardiovascular - Adult  Goal: Maintains optimal cardiac output and hemodynamic stability  8/24/2022 1531 by Sheldon Samuel RN  Outcome: Progressing  8/24/2022 0622 by Franklin Celeste RN  Outcome: Progressing     Problem: Skin/Tissue Integrity - Adult  Goal: Skin integrity remains intact  8/24/2022 1531 by Sheldon Samuel RN  Outcome: Progressing  8/24/2022 0622 by Franklin Celeste RN  Outcome: Progressing  Goal: Oral mucous membranes remain intact  8/24/2022 1531 by Sheldon Samuel RN  Outcome: Progressing  8/24/2022 0622 by Franklin Celeste RN  Outcome: Progressing     Problem: Musculoskeletal - Adult  Goal: Return ADL status to a safe level of function  8/24/2022 1531 by Sheldon Samuel RN  Outcome: Progressing  8/24/2022 0622 by Franklin Celeste RN  Outcome: Progressing     Problem: Genitourinary - Adult  Goal: Absence of urinary retention  8/24/2022 1531 by Sheldon Samuel RN  Outcome: Progressing  8/24/2022 0622 by Franklin Celeste RN  Outcome: Progressing     Problem: Metabolic/Fluid and Electrolytes - Adult  Goal: Electrolytes maintained within normal limits  8/24/2022 1531 by Sheldon Samuel RN  Outcome: Progressing  8/24/2022 0622 by Franklin Celeste RN  Outcome: Progressing  Goal: Hemodynamic

## 2022-08-24 NOTE — PROGRESS NOTES
demonstrates increased risk for aspiration due to cognitive state  and deconditioning . Based on today's assessment recommend Regular texture diet  with Thin liquids , Whole with water or Meds in puree  with use of compensatory swallow strategies (see above). Pt may benefit from an instrumental swallow assessment if overt s/s are consistently noted across textures and pt seems to not be tolerating current diet level. Recommend use of strict aspiration and GERD precautions at this time. Dysphagia Goals:  Pt will functionally tolerate recommended diet with no overt clinical s/s of aspiration (Ongoing 08/24/22)  Pt will demonstrate understanding of aspiration risk and precautions via education/demonstration with occasional prompting (Ongoing 08/24/22)  If clinical s/s of aspiration/penetration continue to be noted, Pt will participate in Modified Barium Swallow Study (Ongoing 08/24/22)      Speech Language/Cognitive Treatment:  Impressions: Pt was alert and oriented to self, facility, current month and year. Disoriented to current date. Delayed responses noted with basic questions with intermittent absent responses. Pt benefits from repetition and re-phrasing of questions for improved comprehension. Reduced recall of daily events noted, however Pt is easily redirected. Speech Language Short Term Goals:  Pt will improve auditory processing/comprehension of commands and questions to 80%, via graded tasks (Ongoing 08/24/22)  Pt will improve orientation and short term recall via graded tasks to 80% (Ongoing 08/24/22)  Pt will participate in ongoing cognitive assessment with goals to be established as indicated (Ongoing 08/24/22)  Pt with improve functional verbal problem solving via graded tasks to 80% min cues (Ongoing 08/24/22)    Assessment: Patient progressing toward goals    Plan: 3-5 times per week during acute care stay.       Patient/Family Education:  Provided education regarding role of SLP, recommendations and general speech pathology plan of care. [] Pt verbalized understanding and agreement   [x] Pt requires ongoing learning   [] No evidence of comprehension     Discharge Recommendations:  Discharge recommendations to be determined pending ongoing follow-up during acute care stay    Treatment time  Timed Code Treatment Minutes: 10 minutes  Total Treatment time: 24 minutes     If patient discharges prior to next session this note will serve as a discharge summary.       Signature:   Theodore Logan M.A., [address] [place] [address]  Speech-Language Pathologist

## 2022-08-24 NOTE — PROGRESS NOTES
Hospitalist Progress Note      PCP: Michael Mccullough DO    Date of Admission: 8/21/2022    Chief Complaint: Falls balance issues    Patient feels well  No chest pain shortness of breath  No nausea vomiting  No headache      Medications:  Reviewed    Physical Exam Performed:    /82   Pulse 72   Temp 98 °F (36.7 °C) (Oral)   Resp 18   Ht 4' 11\" (1.499 m)   Wt 133 lb 9.6 oz (60.6 kg)   SpO2 94%   BMI 26.98 kg/m²     General appearance: No apparent distress, appears stated age and cooperative. HEENT: Pupils equal, round, and reactive to light. Conjunctivae/corneas clear. Neck: Supple, with full range of motion. No jugular venous distention. Trachea midline. Respiratory:  Normal respiratory effort. Clear to auscultation, bilaterally without Rales/Wheezes/Rhonchi. Cardiovascular: Regular rate and rhythm with normal S1/S2 without murmurs, rubs or gallops. Abdomen: Soft, non-tender, non-distended with normal bowel sounds. Musculoskeletal: No clubbing, cyanosis or edema bilaterally. Full range of motion without deformity. Skin: Skin color, texture, turgor normal.  No rashes or lesions. Neurologic:  Neurovascularly intact without any focal sensory/motor deficits. Cranial nerves: II-XII intact, grossly non-focal.      Labs:   Recent Labs     08/21/22 1932 08/24/22  0514   WBC 7.6 6.4   HGB 11.3* 11.4*   HCT 34.9* 35.2*    219       Recent Labs     08/21/22 1932 08/23/22  0449 08/24/22  0513   * 130* 134*   K 5.1 3.5 4.1   CL 92* 93* 100   CO2 26 31 26   BUN 10 8 6*   CREATININE 0.5* <0.5* <0.5*   CALCIUM 9.7 9.5 9.3       Recent Labs     08/21/22 1932   AST 36   ALT 16   BILITOT 0.3   ALKPHOS 81       No results for input(s): INR in the last 72 hours.   Recent Labs     08/21/22 1932   TROPONINI <0.01         Urinalysis:      Lab Results   Component Value Date/Time    NITRU Negative 08/21/2022 10:07 PM    [address] 3-5 05/27/2022 02:05 AM    BACTERIA None Seen 05/23/2022 02:27 PM    RBCUA 3-4 05/27/2022 02:05 AM    BLOODU Negative 08/21/2022 10:07 PM    SPECGRAV 1.010 08/21/2022 10:07 PM    GLUCOSEU Negative 08/21/2022 10:07 PM    GLUCOSEU NEGATIVE 09/02/2010 11:27 AM       Radiology:  CT HEAD WO CONTRAST   Final Result   1. No acute intracranial abnormality. 2. Mild global parenchymal volume loss with chronic microvascular ischemic   changes. 3. Minimal atherosclerosis of the intracranial vasculature. 4. Nonspecific lucencies within the calvarium measuring up to 10 mm in size. However, these appear similar in size dating back to the CT from 2019. CTA HEAD NECK W CONTRAST   Final Result   No flow limiting stenosis or large vessel occlusion detected within the head   or neck. RECOMMENDATIONS:   Unavailable         CT HEAD WO CONTRAST   Final Result   No CT evidence of an acute infarct. XR CHEST PORTABLE   Final Result   No acute abnormality visualized. Assessment/Plan:    Active Hospital Problems    Diagnosis     Loss of balance [R26.89]      Priority: Medium     Ataxia:  Etiology unclear at this time  Patient MRI is contraindicated due to having metal  Neurology consulted, much appreciated  CT head CTA negative  Recommendation PT OT, orthostatic,, TSH, B12 folate, Depakote level, A1c pending  Echo showed normal ejection fraction next  Neurochecks  B12 low normal will replace with oral 1000 mcg  Repeat CT head remains negative      Essential Hypertension: Continue home medication  Hyperlipidemia: Controlled on home Statin. Outpatient PCP follow up post-discharge  Hypothyroidism: Continue home medication  GERD: Continue home medication  Type 2 Diabetes: Insulin sliding scale. Last A1c 7.4      DVT Prophylaxis: Lovenox  Diet: ADULT DIET; Regular;  No Drinking Straws  Code Status: Full Code        Overall patient doing well: Medically stable to be discharged  Precertification has been started  Susie Roman MD

## 2022-08-24 NOTE — FLOWSHEET NOTE
Pt resting comfortably in  bed with daughter @ bedside. Video monitoring remains in place. Denies pain @ this time. Denies further needs. Call light, bedside table, and all personal belongings are within reach. Will continue to closely monitor.          08/24/22 1731   Vital Signs   Temp 98.2 °F (36.8 °C)   Temp Source Oral   Heart Rate 83   Heart Rate Source Monitor   Resp 16   BP (!) 155/94   BP Location Right lower arm   BP Method Automatic   MAP (Calculated) 114.33   Patient Position Semi fowlers   Level of Consciousness 0   MEWS Score 1   Pain Assessment   Pain Assessment None - Denies Pain   Oxygen Therapy   SpO2 91 %   Pulse Oximetry Type Intermittent   O2 Device None (Room air)
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
## 2022-08-24 NOTE — PROGRESS NOTES
[facility]   Phone: [phone]    Occupational Therapy    [] Initial Evaluation            [x] Daily Treatment Note         [] Discharge Summary      Patient: Jeffy Malone   : 1945   MRN: [id number]   Date of Service:  2022    Admitting Diagnosis:  Loss of balance  Current Admission Summary: The patient is a 68-year-old  female with known history of dementia, who presented to the hospital with chief complaint of three weeks of what her family has noted to be deviation to the right when she tries to walk. The son notes that she tries to avoid putting weight on the right side and at couple of times in the last three weeks he has noted her leaning to the right even though she is sitting up in bed. The patient denies any visual disturbance with nausea or vomiting. Overall, the patient does know that she is in the hospital and she is cognitively fairly well/was able to answer many questions appropriately, but does have episodes where she does not quite recall what exactly happened and is not able to follow  directions very well. Thereby the patient's ability to provide a good coherent reliable history is greatly limited. Unable to have MRI  Past Medical History:  has a past medical history of COVID-19 virus infection, Dementia with behavioral disturbance ([address] Diabetes mellitus ([address] GERD (gastroesophageal reflux disease), Hyperlipidemia, Hypertension, Hypothyroidism, MGUS (monoclonal gammopathy of unknown significance), Mild dementia ([address] Osteopenia of multiple sites, and Other idiopathic scoliosis, thoracolumbar region. Past Surgical History:  has a past surgical history that includes eye surgery (Left, ); bladder suspension (N/A, ); and Colonoscopy (2017). Discharge Recommendations: Jeffy Malone scored a 17/24 on the AM-PAC ADL Inpatient form.  Current research shows that an AM-PAC score of 17 or less is typically not associated with a discharge to the patient's home setting. Based on the patient's AM-PAC score and their current ADL deficits, it is recommended that the patient have 3-5 sessions per week of Occupational Therapy at d/c to increase the patient's independence. Please see assessment section for further patient specific details. If patient discharges prior to next session this note will serve as a discharge summary. Please see below for the latest assessment towards goals. Or HHOT if pt/family decline SNF  HOME HEALTH CARE: LEVEL 3 SAFETY     - Initial home health evaluation to occur within 24-48 hours, in patient home   - Therapy evaluations in home within 24-48 hours of discharge; including DME and home safety   - Frontload therapy 5 days, then 3x a week   - Therapy to evaluate if patient has [address] needs for personal care   -  evaluation within 24-48 hours, includes evaluation of resources and insurance to determine [place], [place], LTC, and Medicaid options      If patient discharges prior to next session this note will serve as a discharge summary. Please see below for the latest assessment towards goals. DME Required For Discharge: patient has all required DME for discharge    Precautions/Restrictions: high fall risk  Weight Bearing Restrictions: no restrictions  [] Right Upper Extremity  [] Left Upper Extremity [] Right Lower Extremity  [] Left Lower Extremity     Required Braces/Orthotics: no braces required   [] Right  [] Left  Positional Restrictions:no positional restrictions    Pre-Admission Information   Lives With: daughter, Milly Shoe; spends the day at her son's house which is a bilevel and pt must amb up/down steps     Type of Home: house  Home Layout: one level, laundry in basement  Home Access:  2 step to enter with handrail. Handrails are located on L side.   Bathroom Layout: walker accessible, tub/shower unit  Bathroom Equipment: grab bars in shower, shower chair  Toilet Height: standard height  Home Equipment: rolling walker, single point cane, alert button  Transfer Assistance: requires assistance  Ambulation Assistance:requires assistance-son and daughter HHA  ADL Assistance: requires assistance with bathing, requires assistance with dressing, requires assistance with grooming, requires assistance with toileting  IADL Assistance: requires assistance with all homemaking tasks, pt will help with setting table and washing dishes  Active :        [] Yes                 [x] No  Hand Dominance: [] Left                 [] Right  Hobbies: watching tv; mostly sedentary   Recent Falls: 3 in one day last week; at least 10 falls in past 6 months; Per chart review and discussion with pt's children, pt spent a month in a behavioral health unit in May-June 2022. Pt did not receive therapy services and experienced a large decline in her mobility level during that time. Prior to that hospitalization, pt was much more independent with transfers, ambulation, stair climbing. Subjective  General: Pt laying supine in bed and agreeable to OT/PT cotreat. Pt is pleasant and cooperates with all the activities asked of her. Pt required constant cues due to cognition. Pain: 0/10  Pain Interventions: not applicable        Activities of Daily Living  Basic Activities of Daily Living  Grooming: contact guard assistance . Comment: standing CGA at sink for oral care, wash hands and hair, and comb hair. Intermittent UE support on sink   Upper Extremity Bathing: stand by assistance . Comment: pt washed the front side of her UB, cues for initiation and thoroughness  Upper Extremity Dressing: stand by assistance requires verbal cueing  Lower Extremity Dressing: moderate assistance . Comment: pt required assistance threading her feet through her brief and pulling her brief up  Toileting: minimal assistance.     Toileting Comments: min A to doff brief with max verbal cues; completes hygiene seated SBA, don brief CGA for standing balance; requires increased time to complete   General Comments: Pt required constant verbal and tactile cues to initiate and perform ADLs, increased time needed    Instrumental Activities of Daily Living  No IADL completed on this date. Functional Mobility  Bed Mobility  Supine to Sit: maximum assistance  Comments: HOB elevated, Vcs for sequence  Transfers  Sit to stand transfer:minimal assistance, . Comment EOB to FWW with slight posterior LOB  Stand to sit transfer: minimal assistance, . Comment max verbal and [place] A for hand placement to/from walker  Toilet transfer: minimal assistance  Toilet transfer equipment: grab bars, walker  Comments: pt required constant verbal and tactile cueing  Functional Mobility:  Standing Balance: contact guard assistance. Functional Mobility: .  contact guard assistance  Functional Mobility Activity: pt ambulated 10 ft from her bed to the bathroom and 15 ft from the bathroom to the recliner; amb ~150 in hallway  Functional Mobility Device Use: rolling walker  Functional Mobility Comment: pt min assist with RW management when maneuvering around the room and in hallway , Vcs needed for obstacle management/safety awareness.     Other Therapeutic Interventions    Functional Outcomes  AM-PAC Inpatient Daily Activity Raw Score: 17    Cognition  Overall Cognitive Status: Impaired  Following Commands: follows one step commands with repetition, follows one step commands with increased time  Attention Span: attends with cues to redirect, difficulty dividing attention  Safety Judgement: decreased awareness of need for safety  Initiation: requires cues for some  Orientation:    oriented to person, oriented to place, and oriented to situation  Command Following:   accurately follows one step commands     Education  Barriers To Learning: cognition  Patient Education: patient educated on goals, OT role and benefits, plan of care  Learning Assessment:  patient will require reinforcement due to cognitive deficits    Assessment  Activity Tolerance: Pt responded well to treatment session and performed all of the tasks asked of her. Pt required constant verbal and tactile cues to initiate task performance due to cognitive deficits. Impairments Requiring Therapeutic Intervention: decreased functional mobility, decreased ADL status, decreased strength, decreased cognition  Prognosis: good  Clinical Assessment: Pt presenting below functional baseline with above deficits associated with loss of balance. Limitations include decreased strength, decreased functional mobility, decreased balance, and decreased I with ADL/IADL performance. Pt to continue OT services until d/c in order to maximize safety and I with task performance.    Safety Interventions: patient left in bed, bed alarm in place, call light within reach, and nurse notified, pt off to testing with transport    Plan  Frequency: 3-5 x/per week  Current Treatment Recommendations: strengthening, balance training, functional mobility training, transfer training, and ADL/self-care training    Goals  Patient Goals: none stated   Short Term Goals:  Time Frame: until d/c  Patient will complete upper body ADL at supervision   Patient will complete lower body ADL at minimal assistance   Patient will complete functional transfers at supervision   Patient will complete functional mobility at supervision   Patient will complete bed mobility at contact guard assistance         Therapy Session Time     Individual Group Co-treatment   Time In    2241   Time Out    1437   Minutes    44          Total Treatment Minutes:  Edilberto BOLIVAR OTR/L      Pt in Observation Status, bills shared with PT    Electronically Signed By: Serg Hernandez OT

## 2022-08-24 NOTE — CARE COORDINATION
Mariaelena [place] [phone], from [facility] has a bed available and will initiate [place] precert now. Carl Haiders [facility]  [address] [id number] [facility], [address] [facility]  Phone: [phone]  Fax: [phone]  Please complete & sign the HEMALATHA/AVS/Prescriptions,  RN please print HEMALATHA/AVS once completed.  Thank you, Tacho Dickson, MSDAISY, [phone]

## 2022-08-25 VITALS
HEART RATE: 72 BPM | WEIGHT: 134.92 LBS | DIASTOLIC BLOOD PRESSURE: 82 MMHG | RESPIRATION RATE: 17 BRPM | SYSTOLIC BLOOD PRESSURE: 131 MMHG | TEMPERATURE: 98.1 F | OXYGEN SATURATION: 96 % | HEIGHT: 59 IN | BODY MASS INDEX: 27.2 KG/M2

## 2022-08-25 LAB
GLUCOSE BLD-MCNC: 143 MG/DL (ref 70–99)
GLUCOSE BLD-MCNC: 165 MG/DL (ref 70–99)
GLUCOSE BLD-MCNC: 99 MG/DL (ref 70–99)
PERFORMED ON: ABNORMAL
PERFORMED ON: ABNORMAL
PERFORMED ON: NORMAL
SARS-COV-2, NAAT: NOT DETECTED

## 2022-08-25 PROCEDURE — 2580000003 HC RX 258: Performed by: INTERNAL MEDICINE

## 2022-08-25 PROCEDURE — 96376 TX/PRO/DX INJ SAME DRUG ADON: CPT

## 2022-08-25 PROCEDURE — 6370000000 HC RX 637 (ALT 250 FOR IP): Performed by: INTERNAL MEDICINE

## 2022-08-25 PROCEDURE — 6360000002 HC RX W HCPCS: Performed by: NURSE PRACTITIONER

## 2022-08-25 PROCEDURE — C9113 INJ PANTOPRAZOLE SODIUM, VIA: HCPCS | Performed by: NURSE PRACTITIONER

## 2022-08-25 PROCEDURE — G0378 HOSPITAL OBSERVATION PER HR: HCPCS

## 2022-08-25 PROCEDURE — 87635 SARS-COV-2 COVID-19 AMP PRB: CPT

## 2022-08-25 PROCEDURE — 94760 N-INVAS EAR/PLS OXIMETRY 1: CPT

## 2022-08-25 PROCEDURE — 97530 THERAPEUTIC ACTIVITIES: CPT

## 2022-08-25 PROCEDURE — 97116 GAIT TRAINING THERAPY: CPT

## 2022-08-25 RX ADMIN — BENZTROPINE MESYLATE 1 MG: 1 TABLET ORAL at 09:27

## 2022-08-25 RX ADMIN — Medication 10 ML: at 09:26

## 2022-08-25 RX ADMIN — PERPHENAZINE 8 MG: 4 TABLET, FILM COATED ORAL at 09:27

## 2022-08-25 RX ADMIN — CYANOCOBALAMIN TAB 1000 MCG 1000 MCG: 1000 TAB at 09:27

## 2022-08-25 RX ADMIN — DIVALPROEX SODIUM 250 MG: 125 CAPSULE ORAL at 15:07

## 2022-08-25 RX ADMIN — PANTOPRAZOLE SODIUM 40 MG: 40 INJECTION, POWDER, FOR SOLUTION INTRAVENOUS at 09:27

## 2022-08-25 RX ADMIN — DIVALPROEX SODIUM 250 MG: 125 CAPSULE ORAL at 09:27

## 2022-08-25 RX ADMIN — SODIUM CHLORIDE, PRESERVATIVE FREE 10 ML: 5 INJECTION INTRAVENOUS at 06:21

## 2022-08-25 ASSESSMENT — PAIN SCALES - GENERAL: PAINLEVEL_OUTOF10: 0

## 2022-08-25 NOTE — PROGRESS NOTES
Data- discharge order received, appointed legal authority verbalized agreement to discharge, disposition to [address] [place] [address] reviewed and signed by physician. Action- AVS prepared, HEMALATHA completed/ reported faxed by case management/. Discharge instruction summary: Diet- general, no straws, Activity- as tolerated, immunizations reviewed, medications prescriptions to be filled at receiving facility. Transfer code status: Full Code, LDAs to remain with discharge: none. DME used: walker. Response- Bedside RN to call report to receiving facility. Pt belongings gathered, peripheral IV and cardiac monitoring removed. Disposition to Discharged via ambulance to skilled nursing by EMS transportation, no complications reported. 1. WEIGHT: Admit Weight: 140 lb (63.5 kg) (08/21/22 1900)        Today  Weight: 134 lb 14.7 oz (61.2 kg) (08/25/22 0804)       2.  O2 SAT.: SpO2: 96 % (08/25/22 1125)

## 2022-08-25 NOTE — CARE COORDINATION
Patient discharged 8-25-22 to [address] [place] [address] at [address]    Call report [phone]  Fax  [address] [facility] [address] [facility] [address] [facility], [address] [place] [address]  Phone: [phone]  Fax: [phone]    All discharge needs met per case management Notified RN that Bonny Sorenson [phone]    from [address] [place] [address] is anticipating an [facility] precert decision and requested a rapid covid test.    Addendum:   Precert is approved    Covid tested negative. Hens submitted.

## 2022-08-25 NOTE — PROGRESS NOTES
Physical [facility] Department   Phone: [phone]    Physical Therapy    [] Initial Evaluation            [x] Daily Treatment Note         [] Discharge Summary      Patient: Robbie Foreman   : 1945   MRN: [id number]   Date of Service:  2022  Admitting Diagnosis: Loss of balance  Current Admission Summary: Patient is a 80-year-old female who lives at home with daughter able to care for self however has been having decline with multiple falls. Patient reports that she has been noticing that she becoming more weak and has been having difficulty with walking. Daughter does report the patient does have history of dementia and has noted that her weakness is progressively worsening is concerned that patient will need to be in a facility. Past Medical History:  has a past medical history of COVID-19 virus infection, Dementia with behavioral disturbance ([address] Diabetes mellitus ([address] GERD (gastroesophageal reflux disease), Hyperlipidemia, Hypertension, Hypothyroidism, MGUS (monoclonal gammopathy of unknown significance), Mild dementia ([address] Osteopenia of multiple sites, and Other idiopathic scoliosis, thoracolumbar region. Past Surgical History:  has a past surgical history that includes eye surgery (Left, ); bladder suspension (N/A, ); and Colonoscopy (2017). Discharge Recommendations: Robbie Foreman scored a 17/24 on the AM-PAC short mobility form. Current research shows that an AM-PAC score of 17 or less is typically not associated with a discharge to the patient's home setting. Based on the patient's AM-PAC score and their current functional mobility deficits, it is recommended that the patient have 3-5 sessions per week of Physical Therapy at d/c to increase the patient's independence. Please see assessment section for further patient specific details.      If patient discharges prior to next session this note will serve as a discharge summary. Please see below for the latest assessment towards goals. DME Required For Discharge: DME to be determined at next level of care  Precautions/Restrictions:   Precautions/Restrictions: high fall risk  Weight Bearing Restrictions: no restrictions  [] Right Upper Extremity        [] Left Upper Extremity         [] Right Lower Extremity         [] Left Lower Extremity             Required Braces/Orthotics: no braces required                   [] Right            [] Left  Positional Restrictions:no positional restrictions     Pre-Admission Information   Lives With: daughter, Milly Shoe; spends the day at her son's house which is a bilevel and pt must amb up/down steps     Type of Home: house  Home Layout: one level, laundry in basement  Home Access:  2 step to enter with handrail. Handrails are located on L side. Bathroom Layout: walker accessible, tub/shower unit  Bathroom Equipment: grab bars in shower, shower chair  Toilet Height: standard height  Home Equipment: rolling walker, single point cane, alert button  Transfer Assistance: requires assistance  Ambulation Assistance:requires assistance-son and daughter HHA  ADL Assistance: requires assistance with bathing, requires assistance with dressing, requires assistance with grooming, requires assistance with toileting  IADL Assistance: requires assistance with all homemaking tasks, pt will help with setting table and washing dishes  Active :        [] Yes                 [x] No  Hand Dominance: [] Left                 [] Right  Hobbies: watching tv; mostly sedentary   Recent Falls: 3 in one day last week; at least 10 falls in past 6 months; Per chart review and discussion with pt's children, pt spent a month in a behavioral health unit in May-June 2022. Pt did not receive therapy services and experienced a large decline in her mobility level during that time.  Prior to that hospitalization, pt was much more independent with transfers, ambulation, stair climbing. Subjective  General: Pt supine in bed upon arrival. Pt agreeable to PT treatment   Pain: 0/10  Pain Interventions: not applicable       Functional Mobility  Bed Mobility  Supine to Sit: moderate assistance  Scooting: maximum assistance  Comments: HOB elevated, pt pulls up on therapist arms despite VC to use HR, requires assistance at trunk   Transfers  Sit to stand transfer: minimal assistance  Stand to sit transfer: minimal assistance  Comments: max cues for safe hand placement and sequencing. Poor eccentric contol   Ambulation  Surface:level surface  Distance: 15ft+15ft+150ft+15ft+15ft   Gait Mechanics:  narrow Kellie, decreased B step lengths/heights, slightly decreased genoveva  Comments:  Pt requires assistance with RW navigation and safety. Several minor LOB with min A to correct   Stair Mobility  Stair mobility not completed on this date. Comments:  Wheelchair Mobility:  No w/c mobility completed on this date. Comments:  Balance  Static Sitting Balance: fair (+): maintains balance at SBA/supervision without use of UE support  Dynamic Sitting Balance: fair (+): maintains balance at SBA/supervision without use of UE support  Static Standing Balance: fair (-): maintains balance at CGA with use of UE support  Dynamic Standing Balance: poor (+): requires min (A) to maintain balance  Comments: Pt sits at toilet with SBA, assistance required for brief management.  Pt stand at sink ~10 mins to complete oral hygiene and hair care with CGA    Other Therapeutic Interventions    Functional Outcomes  AM-PAC Inpatient Mobility Raw Score : 17              Cognition  Overall Cognitive Status: Impaired  Arousal/Alterness: delayed responses to stimuli  Following Commands: follows one step commands with repetition, follows one step commands with increased time  Attention Span: attends with cues to redirect, difficulty attending to directions  Memory: decreased recall of precautions, decreased recall of recent events, decreased short term memory, decreased long term memory  Safety Judgement: decreased awareness of need for assistance, decreased awareness of need for safety  Problem Solving: assistance required to generate solutions, assistance required to implement solutions, assistance required to identify errors made, assistance required to correct errors made  Insights: not aware of deficits  Initiation: requires cues for all  Sequencing: requires cues for some  Orientation:    oriented to person, oriented to place, oriented to time , and disoriented to situation  Command Following:   accurately follows one step commands     Education  Barriers To Learning: cognition and physical  Patient Education: patient educated on PT role and benefits, plan of care, general safety, discharge recommendations  Learning Assessment:  patient will require reinforcement due to cognitive deficits, pt's son and daughter verbalize understanding of PT discharge recommendation     Assessment  Activity Tolerance: tolerated session well, some limitations due to cognition  Impairments Requiring Therapeutic Intervention: decreased functional mobility, decreased ADL status, decreased ROM, decreased strength, decreased safety awareness, decreased cognition, decreased endurance, decreased balance, decreased coordination, decreased posture  Prognosis: fair  Clinical Assessment:  Pt continues to require min A for all functional mobility and max cueing for sequencing and safety. Pt continues to require increased assist for bed mobility and increased assist for walker management during ambulatory tasks. Pt would benefit from continued skilled PT services to address deficits and facilitate return to PLOF.    Safety Interventions: patient left in chair, chair alarm in place, call light within reach, gait belt, patient at risk for falls, nurse notified and dtr in room   Plan  Frequency: 3-5 x/per week  Current Treatment Recommendations: strengthening, ROM, balance training, functional mobility training, transfer training, gait training, and patient/caregiver education-trial of use of RW      Goals  Patient Goals: none stated   Short Term Goals:  Time Frame: discharge              Patient will complete bed mobility at minimal assistance   Patient will complete transfers at minimal assistance--MET 8/23/22   Patient will ambulate 10 ft with use of rolling walker at minimal assistance--MET 8/23/22  Pt will perform transfers with LRAD and CGA  Pt will ambulate 48' with LRAD and CGA  NO GOALS MET THIS DATE     Therapy Session Time      Individual Group Co-treatment   Time In [id number]       Time Out 0916       Minutes 53         Total Treatment Minutes:  48       Electronically Signed By: Steff Gonzalez, PT

## 2022-08-25 NOTE — DISCHARGE SUMMARY
Patient: Kevin Brooks     Gender: female  : 1945   Age: 68 y.o. MRN: [id number]    Admitting Physician: Siria Ramírez MD  Discharge Physician: Emily Howard MD     Code Status: Full Code     Admit Date: 2022   Discharge Date:   2022    Disposition:  Home    Discharge Diagnoses:  Ataxia probably secondary to age-related debility possibly due to mild low normal B12 levels    Active Hospital Problems    Diagnosis Date Noted    Loss of balance [R26.89] 2022     Priority: Medium       Follow-up appointments:  one week    Outpatient to do list: none     Condition at Discharge:  [address] Course:   Ataxia:    Patient MRI is contraindicated due to having metal  Neurology consulted, much appreciated  CT head CTA negative  Recommendation PT OT, orthostatic,, TSH, B12 folate, Depakote level, A1c pending  Echo showed normal ejection fraction next  Neurochecks  B12 low normal will replace with oral 1000 mcg  Repeat CT head remains negative      Essential Hypertension: Continue home medication  Hyperlipidemia: Controlled on home Statin. Outpatient PCP follow up post-discharge  Hypothyroidism: Continue home medication  GERD: Continue home medication  Type 2 Diabetes: Insulin sliding scale.  Last A1c 7.4    Discharge Medications:   Current Discharge Medication List        START taking these medications    Details   vitamin B-12 1000 MCG tablet Take 1 tablet by mouth daily  Qty: 30 tablet, Refills: 3           Current Discharge Medication List        Current Discharge Medication List        CONTINUE these medications which have NOT CHANGED    Details   perphenazine 8 MG tablet TAKE 1 TABLET BY MOUTH TWO TIMES A DAY WITH MEALS  Qty: 60 tablet, Refills: 0      fluvoxaMINE (LUVOX) 50 MG tablet TAKE 1 TABLET BY MOUTH EVERY DAY AT NIGHT  Qty: 30 tablet, Refills: 0      metFORMIN (GLUCOPHAGE) 500 MG tablet TAKE 2 TABLETS BY MOUTH TWO TIMES A DAY WITH MEALS  Qty: 360 tablet, Refills: 0 Associated Diagnoses: Type 2 diabetes mellitus without complication, without long-term current use of insulin ([facility])      benztropine (COGENTIN) 1 MG tablet Take 1 tablet by mouth in the morning and 1 tablet before bedtime. Qty: 60 tablet, Refills: 2    Associated Diagnoses: Extrapyramidal symptom      donepezil (ARICEPT) 10 MG tablet TAKE 1 TABLET BY MOUTH IN THE EVENING  Qty: 90 tablet, Refills: 1      zinc gluconate 50 MG tablet Take 50 mg by mouth Indications: Diarrhea 1/2 to 1 pill once or twice daily with a meal.      divalproex (DEPAKOTE SPRINKLE) 125 MG capsule Take 2 capsules by mouth 2 times daily at 0800 and 1400  Qty: 120 capsule, Refills: 0      atorvastatin (LIPITOR) 20 MG tablet TAKE 1 TABLET BY MOUTH EVERY DAY  Qty: 90 tablet, Refills: 0      levothyroxine (SYNTHROID) 25 MCG tablet TAKE 1 TABLET BY MOUTH EVERY DAY  Qty: 30 tablet, Refills: 0      pantoprazole (PROTONIX) 40 MG tablet Take 1 tablet by mouth daily  Qty: 30 tablet, Refills: 3      VITAMIN D PO Take 1 tablet by mouth daily Indications: Vitamin D Deficiency      Multiple Vitamin (MULTI-DAY VITAMINS PO) Take by mouth Difficulty swallowing           Current Discharge Medication List        STOP taking these medications       lisinopril-hydroCHLOROthiazide (PRINZIDE;ZESTORETIC) 20-12.5 MG per tablet Comments:   Reason for Stopping:         diclofenac sodium (VOLTAREN) 1 % GEL Comments:   Reason for Stopping:         fluticasone (FLONASE) 50 MCG/ACT nasal spray Comments:   Reason for Stopping:               Discharge ROS:  A complete review of systems was asked and negative     Discharge Exam:    /82   Pulse 72   Temp 98.1 °F (36.7 °C) (Oral)   Resp 17   Ht 4' 11\" (1.499 m)   Wt 134 lb 14.7 oz (61.2 kg)   SpO2 96%   BMI 27.25 kg/m²   General appearance:  NAD  HEENT:   Normal cephalic, atraumatic, moist mucous membranes, no oropharyngeal erythema or exudate  Heart[de-identified] Normal s1/s2, RRR, no murmurs, gallops, or rubs.  no leg edema  Lungs:  Normal respiratory effort. Clear to auscultation, bilaterally without Rales/Wheezes/Rhonchi. Abdomen: Soft, non-tender, non-distended, bowel sounds present, no masses  Musculoskeletal:  No clubbing, no cyanosis, *  Neurologic:  Neurovascularly intact without any focal sensory/motor deficits. Cranial nerves: II-XII intact, grossly non-focal.    Labs: For convenience and continuity at follow-up the following most recent labs are provided:    Lab Results   Component Value Date/Time    WBC 6.4 08/24/2022 05:14 AM    HGB 11.4 08/24/2022 05:14 AM    HCT 35.2 08/24/2022 05:14 AM    MCV 79.6 08/24/2022 05:14 AM     08/24/2022 05:14 AM     08/24/2022 05:13 AM    K 4.1 08/24/2022 05:13 AM    K 3.5 08/23/2022 04:49 AM     08/24/2022 05:13 AM    CO2 26 08/24/2022 05:13 AM    BUN 6 08/24/2022 05:13 AM    CREATININE <0.5 08/24/2022 05:13 AM    CALCIUM 9.3 08/24/2022 05:13 AM    ALKPHOS 81 08/21/2022 07:32 PM    ALT 16 08/21/2022 07:32 PM    AST 36 08/21/2022 07:32 PM    BILITOT 0.3 08/21/2022 07:32 PM    LABALBU 4.1 08/21/2022 07:32 PM    LDLCALC 64 05/25/2022 07:05 AM    TRIG 198 05/25/2022 07:05 AM     No results found for: INR        The patient was seen and examined on day of discharge and this discharge summary is in conjunction with any daily progress note from day of discharge. Time Spent on discharge is 45 minutes  in the examination, evaluation, counseling and review of medications and discharge plan. Note that greater  than 30 minutes was spent in preparing discharge papers, discussing discharge with patient, medication review, etc.       Signed:    Puala Galan MD   8/25/2022      Thank you Goffdeepali Zeng DO for the opportunity to be involved in this patient's care.  If you have any questions or concerns please feel free to contact me

## 2022-08-25 NOTE — PLAN OF CARE
Problem: Discharge Planning  Goal: Discharge to home or other facility with appropriate resources  8/24/2022 2049 by Monica Simmons RN  Outcome: Progressing     Problem: Neurosensory - Adult  Goal: Achieves maximal functionality and self care  8/24/2022 2049 by Monica Simmons RN  Outcome: Progressing     Problem: Respiratory - Adult  Goal: Achieves optimal ventilation and oxygenation  8/24/2022 2049 by Monica Simmons RN  Outcome: Progressing     Problem: Skin/Tissue Integrity - Adult  Goal: Skin integrity remains intact  8/24/2022 2049 by Monica Simmons RN  Outcome: Progressing  Flowsheets (Taken 8/24/2022 2000)  Skin Integrity Remains Intact: Monitor for areas of redness and/or skin breakdown     Problem: Skin/Tissue Integrity - Adult  Goal: Oral mucous membranes remain intact  8/24/2022 2049 by Monica Simmons RN  Outcome: Progressing     Problem: Genitourinary - Adult  Goal: Absence of urinary retention  8/24/2022 2049 by Monica Simmons RN  Outcome: Progressing     Problem: Metabolic/Fluid and Electrolytes - Adult  Goal: Electrolytes maintained within normal limits  8/24/2022 2049 by Monica Simmons RN  Outcome: Progressing     Problem: Metabolic/Fluid and Electrolytes - Adult  Goal: Hemodynamic stability and optimal renal function maintained  8/24/2022 2049 by Monica Simmons RN  Outcome: Progressing     Problem: Metabolic/Fluid and Electrolytes - Adult  Goal: Glucose maintained within prescribed range  8/24/2022 2049 by Monica Simmons RN  Outcome: Progressing     Problem: Safety - Adult  Goal: Free from fall injury  8/24/2022 2049 by Monica Simmons RN  Outcome: Progressing  Flowsheets (Taken 8/24/2022 2000)  Free From Fall Injury: Instruct family/caregiver on patient safety     Problem: ABCDS Injury Assessment  Goal: Absence of physical injury  8/24/2022 2049 by Monica Simmons RN  Outcome: Progressing  Flowsheets (Taken 8/24/2022 2000)  Absence of Physical Injury: Implement safety measures based on patient assessment Problem: Chronic Conditions and Co-morbidities  Goal: Patient's chronic conditions and co-morbidity symptoms are monitored and maintained or improved  8/24/2022 2049 by Gina Polanco RN  Outcome: Progressing     Continue with plan of care.

## 2022-08-26 ENCOUNTER — TELEPHONE (OUTPATIENT)
Dept: FAMILY MEDICINE CLINIC | Age: 77
End: 2022-08-26

## 2022-08-29 ENCOUNTER — TELEPHONE (OUTPATIENT)
Dept: FAMILY MEDICINE CLINIC | Age: 77
End: 2022-08-29

## 2022-08-29 NOTE — TELEPHONE ENCOUNTER
[facility] Trans[facility] Initial Follow Up Call    Outreach made within 2 business days of discharge: Yes    Patient: Erick Catalan Patient : 1945   MRN: [id number]  Reason for Admission: There are no discharge diagnoses documented for the most recent discharge. Discharge Date: 22       Spoke with: ALEXA    Discharge department/facility: [facility]    TCM Interactive Patient Contact:  Was patient able to fill all prescriptions: Yes  Was patient instructed to bring all medications to the follow-up visit: Yes  Is patient taking all medications as directed in the discharge summary? Yes  Does patient understand their discharge instructions: Yes  Does patient have questions or concerns that need addressed prior to 7-14 day follow up office visit: no    Scheduled appointment with PCP within 7-14 days    Follow Up  Future Appointments   Date Time Provider Joel Doss   10/21/2022  3:00 PM DO Cameron Collier   2023  4:20 PM Nathaly Alexandre MD [facility] [address] [place], [address]  Phone: [phone]  Fax: [phone]    PT ADMITTED TO [address] [place] [address].  [facility]    Juan Musa MA

## 2022-09-14 ENCOUNTER — TELEPHONE (OUTPATIENT)
Dept: FAMILY MEDICINE CLINIC | Age: 77
End: 2022-09-14

## 2022-09-14 NOTE — TELEPHONE ENCOUNTER
[facility] with [address] is calling. She is being discharged today from [facility]. Will you follow pt for Nursing ,PT and OT?   [place] Weeks

## 2022-09-14 NOTE — TELEPHONE ENCOUNTER
CALLED AND SPOKE TO DELMAR AT [facility], GAVE VERBAL THAT DR. LAI WILL SIGN ORDERS FOR PT, OT, AND NURSING.  SC

## 2022-09-19 ENCOUNTER — TELEPHONE (OUTPATIENT)
Dept: FAMILY MEDICINE CLINIC | Age: 77
End: 2022-09-19

## 2022-09-19 NOTE — TELEPHONE ENCOUNTER
[address]  opened the case Skilled Nursing is going to see pt once a week for 4 weeks. Pt BP was 140/90 they will monitor this. PT-OT will do their Eval and call us with their assess. Pt congentin and perphenazine have an interaction--could cause. But pat has been taking this a while.

## 2022-09-22 ENCOUNTER — OFFICE VISIT (OUTPATIENT)
Dept: FAMILY MEDICINE CLINIC | Age: 77
End: 2022-09-22
Payer: MEDICARE

## 2022-09-22 ENCOUNTER — TELEPHONE (OUTPATIENT)
Dept: FAMILY MEDICINE CLINIC | Age: 77
End: 2022-09-22

## 2022-09-22 VITALS
SYSTOLIC BLOOD PRESSURE: 112 MMHG | DIASTOLIC BLOOD PRESSURE: 68 MMHG | WEIGHT: 132 LBS | HEIGHT: 59 IN | OXYGEN SATURATION: 95 % | HEART RATE: 80 BPM | BODY MASS INDEX: 26.61 KG/M2

## 2022-09-22 DIAGNOSIS — Z09 HOSPITAL DISCHARGE FOLLOW-UP: Primary | ICD-10-CM

## 2022-09-22 DIAGNOSIS — F03.91 DEMENTIA WITH BEHAVIORAL DISTURBANCE, UNSPECIFIED DEMENTIA TYPE: ICD-10-CM

## 2022-09-22 DIAGNOSIS — E53.8 VITAMIN B12 DEFICIENCY: ICD-10-CM

## 2022-09-22 PROCEDURE — 1123F ACP DISCUSS/DSCN MKR DOCD: CPT

## 2022-09-22 PROCEDURE — 99214 OFFICE O/P EST MOD 30 MIN: CPT

## 2022-09-22 PROCEDURE — 1111F DSCHRG MED/CURRENT MED MERGE: CPT

## 2022-09-22 RX ORDER — PERPHENAZINE 4 MG/1
8 TABLET, FILM COATED ORAL 2 TIMES DAILY
Qty: 120 TABLET | Refills: 5 | Status: SHIPPED | OUTPATIENT
Start: 2022-09-22 | End: 2023-03-21

## 2022-09-22 RX ORDER — PERPHENAZINE 4 MG/1
8 TABLET, FILM COATED ORAL 2 TIMES DAILY
Qty: 120 TABLET | Refills: 5 | Status: SHIPPED | OUTPATIENT
Start: 2022-09-22 | End: 2022-09-22 | Stop reason: SDUPTHER

## 2022-09-22 NOTE — PROGRESS NOTES
Post-Discharge Transitional Care  Follow Up      Evelin Mccoy   YOB: 1945    Date of Office Visit:  9/22/2022  Date of Hospital Admission: 8/21/22  Date of Hospital Discharge: 8/25/22  Risk of hospital readmission (high >=14%. Medium >=10%) :Readmission Risk Score: 6.8 ( )      Care management risk score Rising risk (score 2-5) and Complex Care (Scores >=6): No Risk Score On File     Non face to face  following discharge, date last encounter closed (first attempt may have been earlier): *No documented post hospital discharge outreach found in the last 14 days    Call initiated 2 business days of discharge: *No response recorded in the last 14 days    ASSESSMENT/PLAN:   Hospital discharge [facility] admission reconciled, including provider notes, imaging, blood work, medications, and vitals.  -No changes to discharge plan. -Follow-up in 1 month for diabetes. -     [place] DISCHARGE MEDS RECONCILED W/ CURRENT OUTPATIENT MED LIST  Vitamin B12 deficiency  -Patient was not taking B12 vitamin. B12 sent to the pharmacy. Medication use and side effects discussed with the patient and daughter.  -Follow-up as needed if symptoms worsen or fail to improve. -     cyanocobalamin 1000 MCG tablet; Take 1 tablet by mouth daily, Disp-30 tablet, R-3Normal  Dementia with behavioral disturbance, unspecified dementia type ([facility]  -Well-controlled on Aricept, Luvox, and perphenazine.    -Continue with home health care, PT, OT, and adult . Continue ambulating with walker.  -Follow-up as needed. Medical Decision Making: moderate complexity  Return in 29 days (on 10/21/2022) for Diabetes. Subjective:   HPI:  Follow up of Hospital problems/diagnosis(es): Clayton Escobar presents today for hospital follow-up. In August, she had been falling for a few weeks, and then 1 day she could not sit up straight. She was into the right.   They went to the ER, where a CT of the head, chest x-ray, EKG were all completed. An MRI was unavailable because she is a history of metal pin around her eye. There are no signs of a stroke at this time. Ataxia was documented to be due to vitamin B12 deficiency and dementia. After admission, she was sent to [facility] rehab. She was there for about 3 weeks. In the hospital, her lisinopril-hctz was stopped due to low blood pressure. Her blood pressure is 112/68 today, so restarting it is not indicated. Inpatient course: Discharge summary reviewed- see chart. Interval history/Current status: She is now home with her daughter. She gets around fairly well with her walker, but needs reminders to use it. Her daughter lives with her, she goes to adult  3 times a week, and needed 2 days a week she is with her son who works from home. Additionally, she was evaluated for home physical therapy yesterday. The plan is to do PT twice a week, OT twice a week, and have a nurse visit once a week. Per the daughter, her cognition level has not been affected from the admission. She is same as previously a few months ago. Jenifer Narayan still falls about once a week. She is no longer on benztropine per the daughter, but she is still taking Aricept, Luvox, and perphenazine. She has not been supplementing with vitamin B12 at home. Adult  thought that she had some diarrhea that was similar to C. difficile. When discussing with the daughter, the daughter does not believe her stools are liquid or even diarrhea, she reports them as being soft and without an abnormal odor. She is planning on following up with neurology in January, and with Dr. Jamie Rosario next month for diabetes.       Patient Active Problem List   Diagnosis    Type 2 diabetes mellitus (HCC)    Hyperlipidemia    Hypertension    Hypothyroidism    Gastro-esophageal reflux disease without esophagitis    Mild dementia (HCC)    MGUS (monoclonal gammopathy of unknown significance)    Dementia with behavioral disturbance ([address]    Psychosis ([address]    Left foot pain    Urticarial rash    Loss of balance       Medications listed as ordered at the time of discharge from hospital     Medication List            Accurate as of September 22, 2022  2:49 PM. If you have any questions, ask your nurse or doctor. CHANGE how you take these medications      perphenazine 4 MG tablet  Take 2 tablets by mouth 2 times daily TAKE 1 TABLET BY MOUTH TWO TIMES A DAY WITH MEALS  What changed:   medication strength  See the new instructions. Changed by: PRATIMA Yusuf CNP            CONTINUE taking these medications      atorvastatin 20 MG tablet  Commonly known as: LIPITOR  TAKE 1 TABLET BY MOUTH EVERY DAY     benztropine 1 MG tablet  Commonly known as: COGENTIN  Take 1 tablet by mouth in the morning and 1 tablet before bedtime. cyanocobalamin 1000 MCG tablet  Take 1 tablet by mouth daily     divalproex 125 MG capsule  Commonly known as: DEPAKOTE SPRINKLE  Take 2 capsules by mouth 2 times daily at 0800 and 1400     donepezil 10 MG tablet  Commonly known as: ARICEPT  TAKE 1 TABLET BY MOUTH IN THE EVENING     fluvoxaMINE 50 MG tablet  Commonly known as: LUVOX  TAKE 1 TABLET BY MOUTH EVERY DAY AT NIGHT     levothyroxine 25 MCG tablet  Commonly known as: SYNTHROID  TAKE 1 TABLET BY MOUTH EVERY DAY     metFORMIN 500 MG tablet  Commonly known as: GLUCOPHAGE  TAKE 2 TABLETS BY MOUTH TWO TIMES A DAY WITH MEALS     MULTI-DAY VITAMINS PO     pantoprazole 40 MG tablet  Commonly known as: PROTONIX  Take 1 tablet by mouth daily     VITAMIN D PO     zinc gluconate 50 MG tablet               Where to Get Your Medications        These medications were sent to [address] [place], [place] [address] [facility] [phone] Amanda Paula [phone]  [address] [place] [id number]      Phone: [phone]   cyanocobalamin 1000 MCG tablet  perphenazine 4 MG tablet           Medications marked \"taking\" at this time  Outpatient Medications Marked as Taking for the 9/22/22 encounter (Office Visit) with PRATIMA Blackwell CNP   Medication Sig Dispense Refill    cyanocobalamin 1000 MCG tablet Take 1 tablet by mouth daily 30 tablet 3    perphenazine 4 MG tablet Take 2 tablets by mouth 2 times daily TAKE 1 TABLET BY MOUTH TWO TIMES A DAY WITH MEALS 120 tablet 5    fluvoxaMINE (LUVOX) 50 MG tablet TAKE 1 TABLET BY MOUTH EVERY DAY AT NIGHT 30 tablet 0    metFORMIN (GLUCOPHAGE) 500 MG tablet TAKE 2 TABLETS BY MOUTH TWO TIMES A DAY WITH MEALS 360 tablet 0    benztropine (COGENTIN) 1 MG tablet Take 1 tablet by mouth in the morning and 1 tablet before bedtime. 60 tablet 2    donepezil (ARICEPT) 10 MG tablet TAKE 1 TABLET BY MOUTH IN THE EVENING 90 tablet 1    zinc gluconate 50 MG tablet Take 50 mg by mouth Indications: Diarrhea 1/2 to 1 pill once or twice daily with a meal.      divalproex (DEPAKOTE SPRINKLE) 125 MG capsule Take 2 capsules by mouth 2 times daily at 0800 and 1400 120 capsule 0    atorvastatin (LIPITOR) 20 MG tablet TAKE 1 TABLET BY MOUTH EVERY DAY 90 tablet 0    levothyroxine (SYNTHROID) 25 MCG tablet TAKE 1 TABLET BY MOUTH EVERY DAY 30 tablet 0    pantoprazole (PROTONIX) 40 MG tablet Take 1 tablet by mouth daily 30 tablet 3    VITAMIN D PO Take 1 tablet by mouth daily Indications: Vitamin D Deficiency      Multiple Vitamin (MULTI-DAY VITAMINS PO) Take by mouth Difficulty swallowing          Medications patient taking as of now reconciled against medications ordered at time of hospital discharge: Yes    A comprehensive review of systems was negative except for what was noted in the HPI.     Objective:    /68 (Site: Right Upper Arm, Position: Sitting, Cuff Size: Medium Adult)   Pulse 80   Ht 4' 11\" (1.499 m)   Wt 132 lb (59.9 kg)   SpO2 95%   BMI 26.66 kg/m²   General Appearance: alert and oriented to person, place, well developed and well- nourished, in no acute distress; intermittent confusion/disorientation due to history of dementia  Skin: warm and dry, no rash or erythema  Head: normocephalic and atraumatic  Eyes: pupils equal, round, and reactive to light, extraocular eye movements intact, conjunctivae normal  ENT: tympanic membrane, external ear and ear canal normal bilaterally, nose without deformity, nasal mucosa and turbinates normal without polyps  Neck: supple and non-tender without mass, no thyromegaly or thyroid nodules, no cervical lymphadenopathy  Pulmonary/Chest: clear to auscultation bilaterally- no wheezes, rales or rhonchi, normal air movement, no respiratory distress  Cardiovascular: normal rate, regular rhythm, normal S1 and S2, no murmurs, rubs, clicks, or gallops, distal pulses intact, no carotid bruits  Abdomen: soft, non-tender, non-distended, normal bowel sounds, no masses or organomegaly  Extremities: no cyanosis, clubbing or edema  Musculoskeletal: normal range of motion, no joint swelling, deformity or tenderness; ambulates with walker  Neurologic: reflexes normal and symmetric, no cranial nerve deficit, gait, coordination and speech normal      An electronic signature was used to authenticate this note. --Fiona العراقي, APRN - CNP      Please note that this chart was generated using dragon dictation software. Although every effort was made to ensure the accuracy of this automated transcription, some errors in transcription may have occurred.

## 2022-09-22 NOTE — TELEPHONE ENCOUNTER
Aj is calling about an RX for pt --it has two Sets of directions on it-  which is correct? Viky Chino     Perphenazine 4 mg

## 2022-10-11 RX ORDER — ATORVASTATIN CALCIUM 20 MG/1
TABLET, FILM COATED ORAL
Qty: 90 TABLET | Refills: 0 | Status: SHIPPED | OUTPATIENT
Start: 2022-10-11

## 2022-10-13 ENCOUNTER — TELEPHONE (OUTPATIENT)
Dept: FAMILY MEDICINE CLINIC | Age: 77
End: 2022-10-13

## 2022-10-13 RX ORDER — PANTOPRAZOLE SODIUM 40 MG/1
40 TABLET, DELAYED RELEASE ORAL DAILY
Qty: 30 TABLET | Refills: 0 | Status: SHIPPED | OUTPATIENT
Start: 2022-10-13

## 2022-10-21 ENCOUNTER — OFFICE VISIT (OUTPATIENT)
Dept: FAMILY MEDICINE CLINIC | Age: 77
End: 2022-10-21
Payer: MEDICARE

## 2022-10-21 VITALS
OXYGEN SATURATION: 99 % | HEART RATE: 84 BPM | WEIGHT: 129 LBS | BODY MASS INDEX: 26 KG/M2 | TEMPERATURE: 98 F | RESPIRATION RATE: 20 BRPM | DIASTOLIC BLOOD PRESSURE: 74 MMHG | SYSTOLIC BLOOD PRESSURE: 120 MMHG | HEIGHT: 59 IN

## 2022-10-21 DIAGNOSIS — R53.1 GENERAL WEAKNESS: ICD-10-CM

## 2022-10-21 DIAGNOSIS — E11.9 TYPE 2 DIABETES MELLITUS WITHOUT COMPLICATION, WITHOUT LONG-TERM CURRENT USE OF INSULIN (HCC): Primary | ICD-10-CM

## 2022-10-21 DIAGNOSIS — E03.9 ACQUIRED HYPOTHYROIDISM: ICD-10-CM

## 2022-10-21 DIAGNOSIS — R19.7 DIARRHEA, UNSPECIFIED TYPE: ICD-10-CM

## 2022-10-21 DIAGNOSIS — K21.9 GASTRO-ESOPHAGEAL REFLUX DISEASE WITHOUT ESOPHAGITIS: ICD-10-CM

## 2022-10-21 DIAGNOSIS — E53.8 VITAMIN B12 DEFICIENCY: ICD-10-CM

## 2022-10-21 DIAGNOSIS — E78.2 MIXED HYPERLIPIDEMIA: ICD-10-CM

## 2022-10-21 DIAGNOSIS — R26.89 LOSS OF BALANCE: ICD-10-CM

## 2022-10-21 PROCEDURE — 3074F SYST BP LT 130 MM HG: CPT | Performed by: FAMILY MEDICINE

## 2022-10-21 PROCEDURE — 99215 OFFICE O/P EST HI 40 MIN: CPT | Performed by: FAMILY MEDICINE

## 2022-10-21 PROCEDURE — 3044F HG A1C LEVEL LT 7.0%: CPT | Performed by: FAMILY MEDICINE

## 2022-10-21 PROCEDURE — 3078F DIAST BP <80 MM HG: CPT | Performed by: FAMILY MEDICINE

## 2022-10-21 PROCEDURE — 1123F ACP DISCUSS/DSCN MKR DOCD: CPT | Performed by: FAMILY MEDICINE

## 2022-10-21 RX ORDER — LEVOTHYROXINE SODIUM 0.03 MG/1
TABLET ORAL
Qty: 30 TABLET | Refills: 3 | Status: SHIPPED | OUTPATIENT
Start: 2022-10-21
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
## 2022-10-25 RX ORDER — ATORVASTATIN CALCIUM 20 MG/1
TABLET, FILM COATED ORAL
Qty: 90 TABLET | Refills: 0 | OUTPATIENT
Start: 2022-10-25

## 2022-10-25 NOTE — TELEPHONE ENCOUNTER
DUPLICATE REQUEST    atorvastatin (LIPITOR) 20 MG tablet 90 tablet 0 10/11/2022     Sig: TAKE 1 TABLET BY MOUTH EVERY DAY    Sent to pharmacy as:  Atorvastatin Calcium 20 MG Oral Tablet (LIPITOR)    Cosign for Ordering: Accepted by PRATIMA Corey CNP on 10/11/2022 11:03 AM    E-Prescribing Status: Receipt confirmed by pharmacy (10/11/2022 10:17 AM EDT)

## 2022-11-07 RX ORDER — PANTOPRAZOLE SODIUM 40 MG/1
TABLET, DELAYED RELEASE ORAL
Qty: 30 TABLET | Refills: 2 | Status: SHIPPED | OUTPATIENT
Start: 2022-11-07

## 2022-11-29 DIAGNOSIS — R29.818 EXTRAPYRAMIDAL SYMPTOM: ICD-10-CM

## 2022-11-30 ENCOUNTER — TELEPHONE (OUTPATIENT)
Dept: ADMINISTRATIVE | Age: 77
End: 2022-11-30

## 2022-11-30 RX ORDER — BENZTROPINE MESYLATE 1 MG/1
1 TABLET ORAL 2 TIMES DAILY
Qty: 60 TABLET | Refills: 1 | Status: SHIPPED | OUTPATIENT
Start: 2022-11-30

## 2022-11-30 NOTE — TELEPHONE ENCOUNTER
Submitted PA for Benztropine Mesylate   [facility] Key: [id number] STATUS: APPROVED THROUGH 11/30/2023. If this requires a response please respond to the pool. [address]). Please advise patient thank you.

## 2022-12-11 ENCOUNTER — APPOINTMENT (OUTPATIENT)
Dept: CT IMAGING | Age: 77
DRG: 884 | End: 2022-12-11
Payer: MEDICARE

## 2022-12-11 ENCOUNTER — APPOINTMENT (OUTPATIENT)
Dept: GENERAL RADIOLOGY | Age: 77
DRG: 884 | End: 2022-12-11
Payer: MEDICARE

## 2022-12-11 ENCOUNTER — HOSPITAL ENCOUNTER (INPATIENT)
Age: 77
LOS: 3 days | Discharge: NURSING, REHAB OR LONG-TERM CARE | DRG: 884 | End: 2022-12-14
Attending: EMERGENCY MEDICINE | Admitting: INTERNAL MEDICINE
Payer: MEDICARE

## 2022-12-11 DIAGNOSIS — R41.82 ALTERED MENTAL STATUS, UNSPECIFIED ALTERED MENTAL STATUS TYPE: Primary | ICD-10-CM

## 2022-12-11 DIAGNOSIS — R53.1 GENERALIZED WEAKNESS: ICD-10-CM

## 2022-12-11 DIAGNOSIS — R79.89 ELEVATED LACTIC ACID LEVEL: ICD-10-CM

## 2022-12-11 DIAGNOSIS — R74.8 ELEVATED CK: ICD-10-CM

## 2022-12-11 DIAGNOSIS — E87.6 HYPOKALEMIA: ICD-10-CM

## 2022-12-11 DIAGNOSIS — E83.42 HYPOMAGNESEMIA: ICD-10-CM

## 2022-12-11 PROBLEM — R62.7 FAILURE TO THRIVE IN ADULT: Status: ACTIVE | Noted: 2022-12-11

## 2022-12-11 LAB
A/G RATIO: 1.3 (ref 1.1–2.2)
ALBUMIN SERPL-MCNC: 4.3 G/DL (ref 3.4–5)
ALP BLD-CCNC: 90 U/L (ref 40–129)
ALT SERPL-CCNC: 21 U/L (ref 10–40)
ANION GAP SERPL CALCULATED.3IONS-SCNC: 14 MMOL/L (ref 3–16)
AST SERPL-CCNC: 34 U/L (ref 15–37)
BACTERIA: ABNORMAL /HPF
BASOPHILS ABSOLUTE: 0 K/UL (ref 0–0.2)
BASOPHILS RELATIVE PERCENT: 0.3 %
BILIRUB SERPL-MCNC: 0.3 MG/DL (ref 0–1)
BILIRUBIN URINE: NEGATIVE
BLOOD, URINE: NEGATIVE
BUN BLDV-MCNC: 21 MG/DL (ref 7–20)
CALCIUM SERPL-MCNC: 10.5 MG/DL (ref 8.3–10.6)
CHLORIDE BLD-SCNC: 101 MMOL/L (ref 99–110)
CLARITY: CLEAR
CO2: 26 MMOL/L (ref 21–32)
COLOR: ABNORMAL
CREAT SERPL-MCNC: 0.7 MG/DL (ref 0.6–1.2)
EOSINOPHILS ABSOLUTE: 0 K/UL (ref 0–0.6)
EOSINOPHILS RELATIVE PERCENT: 0.2 %
EPITHELIAL CELLS, UA: 4 /HPF (ref 0–5)
GFR SERPL CREATININE-BSD FRML MDRD: >60 ML/MIN/{1.73_M2}
GLUCOSE BLD-MCNC: 137 MG/DL (ref 70–99)
GLUCOSE BLD-MCNC: 172 MG/DL (ref 70–99)
GLUCOSE URINE: 500 MG/DL
HCT VFR BLD CALC: 42.4 % (ref 36–48)
HEMOGLOBIN: 13.3 G/DL (ref 12–16)
HYALINE CASTS: 16 /LPF (ref 0–8)
KETONES, URINE: 15 MG/DL
LACTIC ACID: 3.5 MMOL/L (ref 0.4–2)
LACTIC ACID: 4.6 MMOL/L (ref 0.4–2)
LEUKOCYTE ESTERASE, URINE: NEGATIVE
LYMPHOCYTES ABSOLUTE: 3.2 K/UL (ref 1–5.1)
LYMPHOCYTES RELATIVE PERCENT: 27.4 %
MAGNESIUM: 1.7 MG/DL (ref 1.8–2.4)
MCH RBC QN AUTO: 24.8 PG (ref 26–34)
MCHC RBC AUTO-ENTMCNC: 31.3 G/DL (ref 31–36)
MCV RBC AUTO: 79 FL (ref 80–100)
MICROSCOPIC EXAMINATION: YES
MONOCYTES ABSOLUTE: 0.9 K/UL (ref 0–1.3)
MONOCYTES RELATIVE PERCENT: 7.6 %
NEUTROPHILS ABSOLUTE: 7.5 K/UL (ref 1.7–7.7)
NEUTROPHILS RELATIVE PERCENT: 64.5 %
NITRITE, URINE: NEGATIVE
PDW BLD-RTO: 17.2 % (ref 12.4–15.4)
PERFORMED ON: ABNORMAL
PH UA: 5.5 (ref 5–8)
PLATELET # BLD: 228 K/UL (ref 135–450)
PMV BLD AUTO: 8.6 FL (ref 5–10.5)
POTASSIUM REFLEX MAGNESIUM: 3.4 MMOL/L (ref 3.5–5.1)
PROCALCITONIN: 0.06 NG/ML (ref 0–0.15)
PROTEIN UA: 100 MG/DL
RBC # BLD: 5.37 M/UL (ref 4–5.2)
RBC UA: 2 /HPF (ref 0–4)
SODIUM BLD-SCNC: 141 MMOL/L (ref 136–145)
SPECIFIC GRAVITY UA: >=1.03 (ref 1–1.03)
TOTAL CK: 999 U/L (ref 26–192)
TOTAL PROTEIN: 7.7 G/DL (ref 6.4–8.2)
TROPONIN: <0.01 NG/ML
URINE REFLEX TO CULTURE: ABNORMAL
URINE TYPE: ABNORMAL
UROBILINOGEN, URINE: 1 E.U./DL
VALPROIC ACID LEVEL: 47.8 UG/ML (ref 50–100)
WBC # BLD: 11.6 K/UL (ref 4–11)
WBC UA: 4 /HPF (ref 0–5)

## 2022-12-11 PROCEDURE — 96374 THER/PROPH/DIAG INJ IV PUSH: CPT

## 2022-12-11 PROCEDURE — 80053 COMPREHEN METABOLIC PANEL: CPT

## 2022-12-11 PROCEDURE — 6360000002 HC RX W HCPCS: Performed by: EMERGENCY MEDICINE

## 2022-12-11 PROCEDURE — 84484 ASSAY OF TROPONIN QUANT: CPT

## 2022-12-11 PROCEDURE — 80164 ASSAY DIPROPYLACETIC ACD TOT: CPT

## 2022-12-11 PROCEDURE — 36415 COLL VENOUS BLD VENIPUNCTURE: CPT

## 2022-12-11 PROCEDURE — 84145 PROCALCITONIN (PCT): CPT

## 2022-12-11 PROCEDURE — 87040 BLOOD CULTURE FOR BACTERIA: CPT

## 2022-12-11 PROCEDURE — 83735 ASSAY OF MAGNESIUM: CPT

## 2022-12-11 PROCEDURE — 93005 ELECTROCARDIOGRAM TRACING: CPT | Performed by: PHYSICIAN ASSISTANT

## 2022-12-11 PROCEDURE — 1200000000 HC SEMI PRIVATE

## 2022-12-11 PROCEDURE — 70450 CT HEAD/BRAIN W/O DYE: CPT

## 2022-12-11 PROCEDURE — 83605 ASSAY OF LACTIC ACID: CPT

## 2022-12-11 PROCEDURE — 99285 EMERGENCY DEPT VISIT HI MDM: CPT

## 2022-12-11 PROCEDURE — 82550 ASSAY OF CK (CPK): CPT

## 2022-12-11 PROCEDURE — 71045 X-RAY EXAM CHEST 1 VIEW: CPT

## 2022-12-11 PROCEDURE — 6370000000 HC RX 637 (ALT 250 FOR IP): Performed by: PHYSICIAN ASSISTANT

## 2022-12-11 PROCEDURE — 85025 COMPLETE CBC W/AUTO DIFF WBC: CPT

## 2022-12-11 PROCEDURE — 81001 URINALYSIS AUTO W/SCOPE: CPT

## 2022-12-11 PROCEDURE — P9612 CATHETERIZE FOR URINE SPEC: HCPCS

## 2022-12-11 RX ORDER — SODIUM CHLORIDE AND POTASSIUM CHLORIDE .9; .15 G/100ML; G/100ML
SOLUTION INTRAVENOUS CONTINUOUS
Status: DISCONTINUED | OUTPATIENT
Start: 2022-12-11 | End: 2022-12-14 | Stop reason: HOSPADM

## 2022-12-11 RX ORDER — POTASSIUM CHLORIDE 20 MEQ/1
40 TABLET, EXTENDED RELEASE ORAL ONCE
Status: DISCONTINUED | OUTPATIENT
Start: 2022-12-11 | End: 2022-12-12

## 2022-12-11 RX ORDER — LANOLIN ALCOHOL/MO/W.PET/CERES
400 CREAM (GRAM) TOPICAL ONCE
Status: COMPLETED | OUTPATIENT
Start: 2022-12-11 | End: 2022-12-11

## 2022-12-11 RX ADMIN — POTASSIUM CHLORIDE AND SODIUM CHLORIDE: 900; 150 INJECTION, SOLUTION INTRAVENOUS at 21:12

## 2022-12-11 RX ADMIN — Medication 400 MG: at 19:56

## 2022-12-11 ASSESSMENT — ENCOUNTER SYMPTOMS
GASTROINTESTINAL NEGATIVE: 1
EYES NEGATIVE: 1
RESPIRATORY NEGATIVE: 1

## 2022-12-11 NOTE — ED PROVIDER NOTES
[address]        Pt Name: Lukas Barrett  MRN: [id number]  [place] 1945  Date of evaluation: 12/11/2022  Provider: Benjie Lucia PA-C  PCP: Armando Marques DO  Note Started: 4:41 PM EST        I have seen and evaluated this patient with my supervising physician Zeinab Johnson MD.    [address]       Chief Complaint   Patient presents with    Altered Mental Status     Patient with daughter. Px hx dementia.  concerned that mental status is declining. Pt had a fall 3 weeks ago and was not taken the hospital and also had medications changed on the 11/30. HISTORY OF PRESENT ILLNESS   (Location, Timing/Onset, Context/Setting, Quality, Duration, Modifying Factors, Severity, Associated Signs and Symptoms)  Note limiting factors. Chief Complaint: Fall, general weakness    Lukas Barrett is a 68 y.o. female with past medical history of hypertension, hyperlipidemia, diabetes, dementia, bilateral upper extremity tremors, who presents with her family complaining of a fall 3 weeks ago, general weakness. 3 weeks ago, patient slipped in the bathtub, fell back and hit the back of her head, denies LOC then. Family states EMS was called, they came to the house and patient was not transported due to no external signs of injury, patient appears at baseline then. Since this fall, patient has increasing general weakness. She has had falls and balance problems prior to her fall 3 weeks ago, normally states worsens. She is intermittently ambulatory with a walker or requires assistance from family at baseline. Nursing Notes were all reviewed and agreed with or any disagreements were addressed in the HPI. REVIEW OF SYSTEMS    (2-9 systems for level 4, 10 or more for level 5)     Review of Systems   All other systems reviewed and are negative. Positives and Pertinent negatives as per HPI.  Except as noted above in the ROS, all other gluconate 50 MG tablet Take 50 mg by mouth Indications: Diarrhea 1/2 to 1 pill once or twice daily with a meal.      divalproex (DEPAKOTE SPRINKLE) 125 MG capsule Take 2 capsules by mouth 2 times daily at 0800 and 1400  Qty: 120 capsule, Refills: 0      VITAMIN D PO Take 1 tablet by mouth daily Indications: Vitamin D Deficiency      Multiple Vitamin (MULTI-DAY VITAMINS PO) Take by mouth Difficulty swallowing               ALLERGIES     Patient has no known allergies. FAMILYHISTORY       Family History   Problem Relation Age of Onset    High Cholesterol Mother     Stroke Mother     Mental Illness Mother         Was on medicine - not sure of diagnosis    Diabetes type 2  Mother         per pt. Heart Disease Father     Heart Attack Father     Colon Cancer Father     No Known Problems Sister     Cancer Sister     Other Brother         gait issue    Heart Attack Brother     Coronary Art Dis Brother     Coronary Art Dis Brother     Heart Attack Brother     Diabetes type 2  Brother         ? ? Type     Other Brother         MVA with amputation    Heart Disease Brother     Lymphoma Daughter 22        Non-hodgkins - radiation tx - mid 19's    Multinodular goiter Daughter     Heart Murmur Daughter         ? 2nd to radiation? Mental Illness Daughter         ?schizophrenia?     Hypertension Son     High Cholesterol Son     Other Son         GB SURGERY, KUMAR          SOCIAL HISTORY       Social History     Tobacco Use    Smoking status: Never    Smokeless tobacco: Never   Vaping Use    Vaping Use: Never used   Substance Use Topics    Alcohol use: No     Comment: never a heavy drinker    Drug use: No       SCREENINGS    Naeem Coma Scale  Eye Opening: Spontaneous  Best Verbal Response: Confused  Best Motor Response: Obeys commands  Naeem Coma Scale Score: 14        PHYSICAL EXAM    (up to 7 for level 4, 8 or more for level 5)     ED Triage Vitals [12/11/22 1623]   BP Temp Temp src Pulse Resp SpO2 Height Weight   -- 98.4 °F (36.9 °C) -- -- -- -- -- --       Physical Exam  Vitals and nursing note reviewed. Constitutional:       General: She is not in acute distress. Appearance: She is not ill-appearing or toxic-appearing. HENT:      Head: Normocephalic and atraumatic. Right Ear: External ear normal.      Left Ear: External ear normal.      Nose: Nose normal.      Mouth/Throat:      Mouth: Mucous membranes are moist.      Pharynx: Oropharynx is clear. Eyes:      Extraocular Movements: Extraocular movements intact. Conjunctiva/sclera: Conjunctivae normal.      Pupils: Pupils are equal, round, and reactive to light. Cardiovascular:      Rate and Rhythm: Normal rate and regular rhythm. Pulses: Normal pulses. Heart sounds: Normal heart sounds. Pulmonary:      Effort: Pulmonary effort is normal. No respiratory distress. Breath sounds: Normal breath sounds. Abdominal:      General: Abdomen is flat. Bowel sounds are normal. There is no distension. Palpations: Abdomen is soft. Tenderness: There is no abdominal tenderness. There is no guarding or rebound. Musculoskeletal:         General: Normal range of motion. Cervical back: Normal range of motion and neck supple. Skin:     General: Skin is warm and dry. Capillary Refill: Capillary refill takes less than 2 seconds. Neurological:      General: No focal deficit present. Mental Status: She is alert. Comments: Alert, oriented to self. Bilateral upper extremity tremors present. Intermittently follows commands with upper extremities. Does not follow commands lower extremities, will move spontaneously. No cranial nerve deficit. Bilateral upper extremity tremors make finger/nose difficult, no pronator drift.    Psychiatric:         Mood and Affect: Mood normal.         Behavior: Behavior normal.       DIAGNOSTIC RESULTS   LABS:    Labs Reviewed   CBC WITH AUTO DIFFERENTIAL - Abnormal; Notable for the following components:       Result Value    WBC 11.6 (*)     RBC 5.37 (*)     MCV 79.0 (*)     MCH 24.8 (*)     RDW 17.2 (*)     All other components within normal limits   COMPREHENSIVE METABOLIC PANEL W/ REFLEX TO MG FOR LOW K - Abnormal; Notable for the following components:    Potassium reflex Magnesium 3.4 (*)     Glucose 137 (*)     BUN 21 (*)     All other components within normal limits   URINALYSIS WITH REFLEX TO CULTURE - Abnormal; Notable for the following components:    Color, UA DARK YELLOW (*)     Glucose, Ur 500 (*)     Ketones, Urine 15 (*)     Protein,  (*)     All other components within normal limits   MAGNESIUM - Abnormal; Notable for the following components:    Magnesium 1.70 (*)     All other components within normal limits   MICROSCOPIC URINALYSIS - Abnormal; Notable for the following components:    Hyaline Casts, UA 16 (*)     All other components within normal limits   LACTIC ACID - Abnormal; Notable for the following components:    Lactic Acid 4.6 (*)     All other components within normal limits    Narrative:     CALL  [facility] tel. [phone],  Chemistry results called to and read back by RACHEL Lynn, 12/11/2022  19:57, by Edwar Mata   VALPROIC ACID LEVEL, TOTAL - Abnormal; Notable for the following components:    Valproic Acid Lvl 47.8 (*)     All other components within normal limits   LACTIC ACID - Abnormal; Notable for the following components:    Lactic Acid 3.5 (*)     All other components within normal limits   CK - Abnormal; Notable for the following components:     Total  (*)     All other components within normal limits   COMPREHENSIVE METABOLIC PANEL W/ REFLEX TO MG FOR LOW K - Abnormal; Notable for the following components:    Glucose 116 (*)     Creatinine <0.5 (*)     Total Protein 6.2 (*)     All other components within normal limits   CBC WITH AUTO DIFFERENTIAL - Abnormal; Notable for the following components:    Hemoglobin 10.9 (*)     Hematocrit 33.8 (*)     MCV 78.4 (*)     MCH 25.2 (*)     RDW 17.0 (*)     All other components within normal limits   POCT GLUCOSE - Abnormal; Notable for the following components:    POC Glucose 172 (*)     All other components within normal limits   POCT GLUCOSE - Abnormal; Notable for the following components:    POC Glucose 131 (*)     All other components within normal limits   POCT GLUCOSE - Abnormal; Notable for the following components:    POC Glucose 103 (*)     All other components within normal limits   POCT GLUCOSE - Abnormal; Notable for the following components:    POC Glucose 150 (*)     All other components within normal limits   POCT GLUCOSE - Abnormal; Notable for the following components:    POC Glucose 186 (*)     All other components within normal limits   CULTURE, BLOOD 1   CULTURE, BLOOD 2   TROPONIN   PROCALCITONIN   LACTIC ACID       When ordered only abnormal lab results are displayed. All other labs were within normal range or not returned as of this dictation. EKG: When ordered, EKG's are interpreted by the Emergency Department Physician in the absence of a cardiologist.  Please see their note for interpretation of EKG. RADIOLOGY:   Non-plain film images such as CT, Ultrasound and MRI are read by the radiologist. Plain radiographic images are visualized and preliminarily interpreted by the ED Provider with the below findings:        Interpretation per the Radiologist below, if available at the time of this note:    CT Head W/O Contrast   Preliminary Result   No acute intracranial abnormality. XR CHEST PORTABLE   Final Result   No acute process. No results found.         PROCEDURES   Unless otherwise noted below, none     Procedures    CRITICAL CARE TIME       CONSULTS:  IP CONSULT TO CASE MANAGEMENT  IP CONSULT TO PALLIATIVE CARE  IP CONSULT TO PSYCHIATRY      EMERGENCY DEPARTMENT COURSE and DIFFERENTIAL DIAGNOSIS/MDM:   Vitals:    Vitals:    12/12/22 0515 12/12/22 1015 12/12/22 1245 12/12/22 1652   BP: 138/80 133/60 128/64 (!) 164/91   Pulse: 77 64 68 71   Resp: 18 18 17 17   Temp:  97.8 °F (36.6 °C) 97.6 °F (36.4 °C)    TempSrc:  Oral Oral    SpO2: 96% 95% 96% 98%   Weight:       Height:           Patient was given the following medications:  Medications   0.9% NaCl with KCl 20 mEq infusion ( IntraVENous Rate/Dose Change 12/11/22 2135)   divalproex (DEPAKOTE) DR tablet 250 mg (250 mg Oral Given 12/12/22 1521)   levothyroxine (SYNTHROID) tablet 25 mcg (25 mcg Oral Not Given 12/12/22 0546)   insulin lispro (HUMALOG) injection vial 0-4 Units (0 Units SubCUTAneous Not Given 12/12/22 1638)   insulin lispro (HUMALOG) injection vial 0-4 Units (0 Units SubCUTAneous Not Given 12/12/22 0244)   dextrose bolus 10% 125 mL (has no administration in time range)     Or   dextrose bolus 10% 250 mL (has no administration in time range)   glucagon (rDNA) injection 1 mg (has no administration in time range)   dextrose 10 % infusion (has no administration in time range)   sodium chloride flush 0.9 % injection 5-40 mL (10 mLs IntraVENous Not Given 12/12/22 1034)   sodium chloride flush 0.9 % injection 5-40 mL (has no administration in time range)   0.9 % sodium chloride infusion (has no administration in time range)   enoxaparin (LOVENOX) injection 40 mg (40 mg SubCUTAneous Given 12/12/22 1033)   ondansetron (ZOFRAN-ODT) disintegrating tablet 4 mg (has no administration in time range)     Or   ondansetron (ZOFRAN) injection 4 mg (has no administration in time range)   polyethylene glycol (GLYCOLAX) packet 17 g (has no administration in time range)   acetaminophen (TYLENOL) tablet 650 mg (has no administration in time range)     Or   acetaminophen (TYLENOL) suppository 650 mg (has no administration in time range)   melatonin tablet 3 mg (has no administration in time range)   magnesium oxide (MAG-OX) tablet 400 mg (400 mg Oral Given 12/11/22 1956)         Is this patient to be included in the SEP-1 Core Measure due to severe sepsis or septic shock? No   Exclusion criteria - the patient is NOT to be included for SEP-1 Core Measure due to: Infection is not suspected    1757 - care transferred to ER attending, Dr. Indigo Paiz, CT head, lactic, final dispo pending. FINAL IMPRESSION      1. Altered mental status, unspecified altered mental status type    2. Generalized weakness    3. Elevated lactic acid level    4. Hypomagnesemia    5. Hypokalemia    6. Elevated CK          DISPOSITION/PLAN   DISPOSITION Admitted 12/11/2022 09:25:34 PM      PATIENT REFERRED TO:  No follow-up provider specified. DISCHARGE MEDICATIONS:  Current Discharge Medication List          DISCONTINUED MEDICATIONS:  Current Discharge Medication List        STOP taking these medications       benztropine (COGENTIN) 1 MG tablet Comments:   Reason for Stopping:         perphenazine 4 MG tablet Comments:   Reason for Stopping:         lisinopril-hydroCHLOROthiazide (PRINZIDE;ZESTORETIC) 20-12.5 MG per tablet Comments:   Reason for Stopping:         diclofenac sodium (VOLTAREN) 1 % GEL Comments:   Reason for Stopping:         fluticasone (FLONASE) 50 MCG/ACT nasal spray Comments:   Reason for Stopping:                      (Please note that portions of this note were completed with a voice recognition program.  Efforts were made to edit the dictations but occasionally words are mis-transcribed. )    Samm Shankar PA-C (electronically signed)           Samm Shankar PA-C  12/12/22 1937
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
## 2022-12-11 NOTE — ED PROVIDER NOTES
[facility] Emergency Department      Pt Name: Kiersten Glover  MRN: [id number]  Abbygfmerlin 1945  Date of evaluation: 12/11/2022  Provider: Eri Holbrook MD  I independently performed a history and physical on Kiersten Glover. All diagnostic, treatment, and disposition decisions were made by myself in conjunction with the advanced practice provider. HPI: Kiersten Glover presented with   Chief Complaint   Patient presents with    Altered Mental Status     Patient with daughter. Px hx dementia. Dr concerned that mental status is declining. Pt had a fall 3 weeks ago and was not taken the hospital and also had medications changed on the 11/30. Kiersten Glover has a past medical history of COVID-19 virus infection (01/07/2022), Dementia with behavioral disturbance (05/23/2022), Diabetes mellitus ([facility] [address] GERD (gastroesophageal reflux disease), Hyperlipidemia, Hypertension, Hypothyroidism, MGUS (monoclonal gammopathy of unknown significance) (2019), Mild dementia, Osteopenia of multiple sites (10/15/2015), and Other idiopathic scoliosis, thoracolumbar region (08/03/2017). She has a past surgical history that includes eye surgery (Left, 2000); bladder suspension (N/A, 1999); and Colonoscopy (11/07/2017). No current facility-administered medications on file prior to encounter. Current Outpatient Medications on File Prior to Encounter   Medication Sig Dispense Refill    benztropine (COGENTIN) 1 MG tablet Take 1 tablet by mouth in the morning and 1 tablet before bedtime.  (Patient not taking: Reported on 12/11/2022) 60 tablet 1    pantoprazole (PROTONIX) 40 MG tablet TAKE 1 TABLET BY MOUTH EVERY DAY 30 tablet 2    levothyroxine (SYNTHROID) 25 MCG tablet TAKE 1 TABLET BY MOUTH EVERY DAY 30 tablet 3    atorvastatin (LIPITOR) 20 MG tablet TAKE 1 TABLET BY MOUTH EVERY DAY 90 tablet 0    cyanocobalamin 1000 MCG tablet Take 1 tablet by mouth daily 30 tablet 3    perphenazine 4 MG tablet Take 2 tablets by mouth 2 times daily (Patient not taking: Reported on 12/11/2022) 120 tablet 5    fluvoxaMINE (LUVOX) 50 MG tablet TAKE 1 TABLET BY MOUTH EVERY DAY AT NIGHT 30 tablet 0    metFORMIN (GLUCOPHAGE) 500 MG tablet TAKE 2 TABLETS BY MOUTH TWO TIMES A DAY WITH MEALS 360 tablet 0    donepezil (ARICEPT) 10 MG tablet TAKE 1 TABLET BY MOUTH IN THE EVENING 90 tablet 1    zinc gluconate 50 MG tablet Take 50 mg by mouth Indications: Diarrhea 1/2 to 1 pill once or twice daily with a meal.      divalproex (DEPAKOTE SPRINKLE) 125 MG capsule Take 2 capsules by mouth 2 times daily at 0800 and 1400 120 capsule 0    [DISCONTINUED] lisinopril-hydroCHLOROthiazide (PRINZIDE;ZESTORETIC) 20-12.5 MG per tablet TAKE 1 TABLET BY MOUTH EVERY DAY 30 tablet 0    [DISCONTINUED] diclofenac sodium (VOLTAREN) 1 % GEL Apply 2-4 g topically 4 times daily To area of pain to intact skin as needed for pain. (Patient not taking: Reported on 8/21/2022) 200 g 1    VITAMIN D PO Take 1 tablet by mouth daily Indications: Vitamin D Deficiency      [DISCONTINUED] fluticasone (FLONASE) 50 MCG/ACT nasal spray 1 spray by Each Nostril route daily (Patient not taking: Reported on 8/21/2022) 1 Bottle 2    Multiple Vitamin (MULTI-DAY VITAMINS PO) Take by mouth Difficulty swallowing       PHYSICAL EXAM  Vitals: BP (!) 138/101   Pulse 98   Temp 98.4 °F (36.9 °C) (Oral)   Resp 18   SpO2 99%   Constitutional:  68 y.o. female eyes open but dazed in appearance, will answer simple questions  HENT:  Atraumatic, oral mucosa dry  Neck:  No visible JVD, supple  Chest/Lungs:  Respiratory effort normal, clear, regular  Abdomen:  Non-distended, soft, NT  Back:  No gross deformity  Extremities:  Normal tone and perfusion  Neurologic:  eyes open mentation slow, pupils equal, fair coordination of arms, no facial asymmetry, resting tremor of arms    Medical Decision Making: Briefly, this is an 68 y. o.female who presented with concern for shakiness, fall several weeks ago. Has had a progressive decline over the past several weeks. Her psychiatrist discontinued a couple of her medicines about 12 days ago because of her increasing instability with walking. Daughter says that she does not communicate the way she used to and cannot walk independently which she was better at doing before the past few weeks. She has been having increasing difficulty swallowing. Lactic acid level is elevated but no obvious source for infection. Procalcitonin level is normal.  CK is elevated and we have initiated IV fluid hydration with potassium supplement. She was not able to swallow potassium tablets. I discussed the case in full with the admitting physician. For further details of [facility] encounter, please see documentation by advanced practice provider NUBIA Leon.     Labs Reviewed   CBC WITH AUTO DIFFERENTIAL - Abnormal; Notable for the following components:       Result Value    WBC 11.6 (*)     RBC 5.37 (*)     MCV 79.0 (*)     MCH 24.8 (*)     RDW 17.2 (*)     All other components within normal limits   COMPREHENSIVE METABOLIC PANEL W/ REFLEX TO MG FOR LOW K - Abnormal; Notable for the following components:    Potassium reflex Magnesium 3.4 (*)     Glucose 137 (*)     BUN 21 (*)     All other components within normal limits   URINALYSIS WITH REFLEX TO CULTURE - Abnormal; Notable for the following components:    Color, UA DARK YELLOW (*)     Glucose, Ur 500 (*)     Ketones, Urine 15 (*)     Protein,  (*)     All other components within normal limits   MAGNESIUM - Abnormal; Notable for the following components:    Magnesium 1.70 (*)     All other components within normal limits   MICROSCOPIC URINALYSIS - Abnormal; Notable for the following components:    Hyaline Casts, UA 16 (*)     All other components within normal limits   LACTIC ACID - Abnormal; Notable for the following components:    Lactic Acid 4.6 (*)     All other components within normal limits    Narrative:     Khushbu Tamez  [facility] tel. [phone],                  Chemistry results called to and read back by RACHEL Goldman, 12/11/2022                  19:57, by Sunny Rosa   VALPROIC ACID LEVEL, TOTAL - Abnormal; Notable for the following components:    Valproic Acid Lvl 47.8 (*)     All other components within normal limits   LACTIC ACID - Abnormal; Notable for the following components:    Lactic Acid 3.5 (*)     All other components within normal limits   CK - Abnormal; Notable for the following components: Total  (*)     All other components within normal limits   POCT GLUCOSE - Abnormal; Notable for the following components:    POC Glucose 172 (*)     All other components within normal limits   CULTURE, BLOOD 1   CULTURE, BLOOD 2   TROPONIN   PROCALCITONIN     RADIOLOGY:   Plain x-rays were viewed by me:   CT Head W/O Contrast   Preliminary Result   No acute intracranial abnormality. XR CHEST PORTABLE   Final Result   No acute process. EKG:  Read by me in the absence of a cardiologist shows: Motion artifact affects interpretation but appears to be sinus rhythm with a rate of 100 with a normal QRS duration, axis -4, nonspecific ST-T wave abnormality, prior EKG with less artifact    Medications administered:  Medications   potassium chloride (KLOR-CON M) extended release tablet 40 mEq (40 mEq Oral Not Given 12/11/22 2031)   0.9% NaCl with KCl 20 mEq infusion ( IntraVENous Rate/Dose Change 12/11/22 2135)   magnesium oxide (MAG-OX) tablet 400 mg (400 mg Oral Given 12/11/22 1956)     Is this patient to be included in the SEP-1 Core Measure due to severe sepsis or septic shock? No   Exclusion criteria - the patient is NOT to be included for SEP-1 Core Measure due to: Alternative explanation for abnormal labs/vitals that do not relate to sepsis, see MDM for further explanation    FINAL IMPRESSION:    1.  Altered mental status, unspecified altered mental status type 2. Generalized weakness    3. Elevated lactic acid level    4. Hypomagnesemia    5. Hypokalemia    6.  Elevated CK       DISPOSITION Admitted 12/11/2022 09:25:34 PM       Ailyn Richmond MD  12/11/22 7686

## 2022-12-12 LAB
A/G RATIO: 1.2 (ref 1.1–2.2)
ALBUMIN SERPL-MCNC: 3.4 G/DL (ref 3.4–5)
ALP BLD-CCNC: 73 U/L (ref 40–129)
ALT SERPL-CCNC: 14 U/L (ref 10–40)
ANION GAP SERPL CALCULATED.3IONS-SCNC: 10 MMOL/L (ref 3–16)
AST SERPL-CCNC: 21 U/L (ref 15–37)
BASOPHILS ABSOLUTE: 0 K/UL (ref 0–0.2)
BASOPHILS RELATIVE PERCENT: 0.3 %
BILIRUB SERPL-MCNC: 0.3 MG/DL (ref 0–1)
BUN BLDV-MCNC: 19 MG/DL (ref 7–20)
CALCIUM SERPL-MCNC: 9.2 MG/DL (ref 8.3–10.6)
CHLORIDE BLD-SCNC: 105 MMOL/L (ref 99–110)
CO2: 27 MMOL/L (ref 21–32)
CREAT SERPL-MCNC: <0.5 MG/DL (ref 0.6–1.2)
EKG ATRIAL RATE: 100 BPM
EKG DIAGNOSIS: NORMAL
EKG P AXIS: 23 DEGREES
EKG P-R INTERVAL: 112 MS
EKG Q-T INTERVAL: 410 MS
EKG QRS DURATION: 72 MS
EKG QTC CALCULATION (BAZETT): 528 MS
EKG R AXIS: -4 DEGREES
EKG T AXIS: 17 DEGREES
EKG VENTRICULAR RATE: 100 BPM
EOSINOPHILS ABSOLUTE: 0 K/UL (ref 0–0.6)
EOSINOPHILS RELATIVE PERCENT: 0.1 %
GFR SERPL CREATININE-BSD FRML MDRD: >60 ML/MIN/{1.73_M2}
GLUCOSE BLD-MCNC: 103 MG/DL (ref 70–99)
GLUCOSE BLD-MCNC: 116 MG/DL (ref 70–99)
GLUCOSE BLD-MCNC: 131 MG/DL (ref 70–99)
GLUCOSE BLD-MCNC: 150 MG/DL (ref 70–99)
GLUCOSE BLD-MCNC: 186 MG/DL (ref 70–99)
GLUCOSE BLD-MCNC: 90 MG/DL (ref 70–99)
HCT VFR BLD CALC: 33.8 % (ref 36–48)
HEMOGLOBIN: 10.9 G/DL (ref 12–16)
LACTIC ACID: 1.1 MMOL/L (ref 0.4–2)
LYMPHOCYTES ABSOLUTE: 2.7 K/UL (ref 1–5.1)
LYMPHOCYTES RELATIVE PERCENT: 29.2 %
MCH RBC QN AUTO: 25.2 PG (ref 26–34)
MCHC RBC AUTO-ENTMCNC: 32.2 G/DL (ref 31–36)
MCV RBC AUTO: 78.4 FL (ref 80–100)
MONOCYTES ABSOLUTE: 0.8 K/UL (ref 0–1.3)
MONOCYTES RELATIVE PERCENT: 8.3 %
NEUTROPHILS ABSOLUTE: 5.7 K/UL (ref 1.7–7.7)
NEUTROPHILS RELATIVE PERCENT: 62.1 %
PDW BLD-RTO: 17 % (ref 12.4–15.4)
PERFORMED ON: ABNORMAL
PERFORMED ON: NORMAL
PLATELET # BLD: 184 K/UL (ref 135–450)
PMV BLD AUTO: 8.6 FL (ref 5–10.5)
POTASSIUM REFLEX MAGNESIUM: 4.2 MMOL/L (ref 3.5–5.1)
RBC # BLD: 4.31 M/UL (ref 4–5.2)
SODIUM BLD-SCNC: 142 MMOL/L (ref 136–145)
TOTAL PROTEIN: 6.2 G/DL (ref 6.4–8.2)
WBC # BLD: 9.1 K/UL (ref 4–11)

## 2022-12-12 PROCEDURE — 97162 PT EVAL MOD COMPLEX 30 MIN: CPT

## 2022-12-12 PROCEDURE — 85025 COMPLETE CBC W/AUTO DIFF WBC: CPT

## 2022-12-12 PROCEDURE — 1200000000 HC SEMI PRIVATE

## 2022-12-12 PROCEDURE — 6360000002 HC RX W HCPCS: Performed by: NURSE PRACTITIONER

## 2022-12-12 PROCEDURE — 97166 OT EVAL MOD COMPLEX 45 MIN: CPT

## 2022-12-12 PROCEDURE — 6370000000 HC RX 637 (ALT 250 FOR IP): Performed by: NURSE PRACTITIONER

## 2022-12-12 PROCEDURE — 92523 SPEECH SOUND LANG COMPREHEN: CPT

## 2022-12-12 PROCEDURE — 92610 EVALUATE SWALLOWING FUNCTION: CPT

## 2022-12-12 PROCEDURE — 2580000003 HC RX 258: Performed by: NURSE PRACTITIONER

## 2022-12-12 PROCEDURE — 83605 ASSAY OF LACTIC ACID: CPT

## 2022-12-12 PROCEDURE — 93010 ELECTROCARDIOGRAM REPORT: CPT | Performed by: INTERNAL MEDICINE

## 2022-12-12 PROCEDURE — 80053 COMPREHEN METABOLIC PANEL: CPT

## 2022-12-12 PROCEDURE — 97530 THERAPEUTIC ACTIVITIES: CPT

## 2022-12-12 PROCEDURE — 92526 ORAL FUNCTION THERAPY: CPT

## 2022-12-12 RX ORDER — ENOXAPARIN SODIUM 100 MG/ML
40 INJECTION SUBCUTANEOUS DAILY
Status: DISCONTINUED | OUTPATIENT
Start: 2022-12-12 | End: 2022-12-14 | Stop reason: HOSPADM

## 2022-12-12 RX ORDER — SODIUM CHLORIDE 9 MG/ML
INJECTION, SOLUTION INTRAVENOUS PRN
Status: DISCONTINUED | OUTPATIENT
Start: 2022-12-12 | End: 2022-12-14 | Stop reason: HOSPADM

## 2022-12-12 RX ORDER — INSULIN LISPRO 100 [IU]/ML
0-4 INJECTION, SOLUTION INTRAVENOUS; SUBCUTANEOUS NIGHTLY
Status: DISCONTINUED | OUTPATIENT
Start: 2022-12-12 | End: 2022-12-14 | Stop reason: HOSPADM

## 2022-12-12 RX ORDER — ONDANSETRON 4 MG/1
4 TABLET, ORALLY DISINTEGRATING ORAL EVERY 8 HOURS PRN
Status: DISCONTINUED | OUTPATIENT
Start: 2022-12-12 | End: 2022-12-14 | Stop reason: HOSPADM

## 2022-12-12 RX ORDER — DIVALPROEX SODIUM 250 MG/1
250 TABLET, DELAYED RELEASE ORAL 2 TIMES DAILY
Status: DISCONTINUED | OUTPATIENT
Start: 2022-12-12 | End: 2022-12-14 | Stop reason: HOSPADM

## 2022-12-12 RX ORDER — LEVOTHYROXINE SODIUM 0.03 MG/1
25 TABLET ORAL DAILY
Status: DISCONTINUED | OUTPATIENT
Start: 2022-12-12 | End: 2022-12-14 | Stop reason: HOSPADM

## 2022-12-12 RX ORDER — SODIUM CHLORIDE 0.9 % (FLUSH) 0.9 %
5-40 SYRINGE (ML) INJECTION EVERY 12 HOURS SCHEDULED
Status: DISCONTINUED | OUTPATIENT
Start: 2022-12-12 | End: 2022-12-14 | Stop reason: HOSPADM

## 2022-12-12 RX ORDER — LANOLIN ALCOHOL/MO/W.PET/CERES
3 CREAM (GRAM) TOPICAL NIGHTLY PRN
Status: DISCONTINUED | OUTPATIENT
Start: 2022-12-12 | End: 2022-12-14 | Stop reason: HOSPADM

## 2022-12-12 RX ORDER — DEXTROSE MONOHYDRATE 100 MG/ML
INJECTION, SOLUTION INTRAVENOUS CONTINUOUS PRN
Status: DISCONTINUED | OUTPATIENT
Start: 2022-12-12 | End: 2022-12-14 | Stop reason: HOSPADM

## 2022-12-12 RX ORDER — POLYETHYLENE GLYCOL 3350 17 G/17G
17 POWDER, FOR SOLUTION ORAL DAILY PRN
Status: DISCONTINUED | OUTPATIENT
Start: 2022-12-12 | End: 2022-12-14 | Stop reason: HOSPADM

## 2022-12-12 RX ORDER — ACETAMINOPHEN 650 MG/1
650 SUPPOSITORY RECTAL EVERY 6 HOURS PRN
Status: DISCONTINUED | OUTPATIENT
Start: 2022-12-12 | End: 2022-12-14 | Stop reason: HOSPADM

## 2022-12-12 RX ORDER — SODIUM CHLORIDE 0.9 % (FLUSH) 0.9 %
5-40 SYRINGE (ML) INJECTION PRN
Status: DISCONTINUED | OUTPATIENT
Start: 2022-12-12 | End: 2022-12-14 | Stop reason: HOSPADM

## 2022-12-12 RX ORDER — ONDANSETRON 2 MG/ML
4 INJECTION INTRAMUSCULAR; INTRAVENOUS EVERY 6 HOURS PRN
Status: DISCONTINUED | OUTPATIENT
Start: 2022-12-12 | End: 2022-12-14 | Stop reason: HOSPADM

## 2022-12-12 RX ORDER — ACETAMINOPHEN 325 MG/1
650 TABLET ORAL EVERY 6 HOURS PRN
Status: DISCONTINUED | OUTPATIENT
Start: 2022-12-12 | End: 2022-12-14 | Stop reason: HOSPADM

## 2022-12-12 RX ORDER — INSULIN LISPRO 100 [IU]/ML
0-4 INJECTION, SOLUTION INTRAVENOUS; SUBCUTANEOUS
Status: DISCONTINUED | OUTPATIENT
Start: 2022-12-12 | End: 2022-12-14 | Stop reason: HOSPADM

## 2022-12-12 RX ADMIN — DIVALPROEX SODIUM 250 MG: 250 TABLET, DELAYED RELEASE ORAL at 10:33

## 2022-12-12 RX ADMIN — Medication 10 ML: at 21:25

## 2022-12-12 RX ADMIN — ENOXAPARIN SODIUM 40 MG: 100 INJECTION SUBCUTANEOUS at 10:33

## 2022-12-12 RX ADMIN — DIVALPROEX SODIUM 250 MG: 250 TABLET, DELAYED RELEASE ORAL at 15:21

## 2022-12-12 NOTE — ED NOTES
Pt spo2 dropped to 86% while resting. Placed on 2L NC. Increased to 100%.       Johana Rojas RN  12/11/22 5925

## 2022-12-12 NOTE — PLAN OF CARE
Problem: Discharge Planning  Goal: Discharge to home or other facility with appropriate resources  Outcome: Progressing  Flowsheets (Taken 12/12/2022 1015)  Discharge to home or other facility with appropriate resources:   Identify barriers to discharge with patient and caregiver   Arrange for needed discharge resources and transportation as appropriate   Identify discharge learning needs (meds, wound care, etc)     Problem: Skin/Tissue Integrity  Goal: Absence of new skin breakdown  Description: 1. Monitor for areas of redness and/or skin breakdown  2. Assess vascular access sites hourly  3. Every 4-6 hours minimum:  Change oxygen saturation probe site  4. Every 4-6 hours:  If on nasal continuous positive airway pressure, respiratory therapy assess nares and determine need for appliance change or resting period.   Outcome: Progressing     Problem: Safety - Adult  Goal: Free from fall injury  12/12/2022 1632 by Malick Gray RN  Outcome: Progressing  12/12/2022 0601 by Jocelyn Nolasco RN  Outcome: Progressing

## 2022-12-12 NOTE — ED NOTES
This RN attempted to call report again to 4T. Was told nurse is in a critical situation and will have to call back later.       Jah Candelario RN  12/11/22 3555

## 2022-12-12 NOTE — H&P
HOSPITALISTS HISTORY AND PHYSICAL    12/11/2022 9:20 PM    Patient Information:  Jane Whalen is a 68 y.o. female [id number]  PCP:  Nkechi Clarke DO (Tel: [phone] )    Chief complaint:    Chief Complaint   Patient presents with    Altered Mental Status     Patient with daughter. Px hx dementia. Dr concerned that mental status is declining. Pt had a fall 3 weeks ago and was not taken the hospital and also had medications changed on the 11/30. History of Present Illness:  Tracie Flowers is a 68 y.o. female. Patient presents to the emergency room tonight accompanied by her daughter. Patient fell approximately 3 weeks ago since then she has had a progressive decline in her health. She has been unable to stand up and use her walker. She has had decreased appetite. Her psychiatrist stopped on of her medications per daughter thinking maybe that maybe affecting her balance. However there has been no improvement and unfortunately her resting tremors in her arms has gotten progressively worse with discontinuation of medication. Which has made it difficult for patient to feed herself. Patient does have dementia and daughter feels her memory fluctuates at baseline and has not noticed a significant change in this in past 3 weeks. She was noted to have difficulty in ER swallowing medications. Which was new according to daughter. Family feel they can no longer care for patient safely at home and are seeking ECF placement at this time. History obtained from patient     REVIEW OF SYSTEMS:   Review of Systems   Constitutional:  Positive for appetite change and fatigue. Negative for fever. HENT: Negative. Eyes: Negative. Respiratory: Negative. Gastrointestinal: Negative. Endocrine: Negative. Genitourinary: Negative. Musculoskeletal:  Positive for gait problem.    Skin: Negative. Neurological:  Positive for weakness. Psychiatric/Behavioral:  Positive for confusion. All other systems reviewed and are negative. Past Medical History:   has a past medical history of COVID-19 virus infection, Dementia with behavioral disturbance, Diabetes mellitus ([address] GERD (gastroesophageal reflux disease), Hyperlipidemia, Hypertension, Hypothyroidism, MGUS (monoclonal gammopathy of unknown significance), Mild dementia, Osteopenia of multiple sites, and Other idiopathic scoliosis, thoracolumbar region. Past Surgical History:   has a past surgical history that includes eye surgery (Left, 2000); bladder suspension (N/A, 1999); and Colonoscopy (11/07/2017). Medications:  No current facility-administered medications on file prior to encounter. Current Outpatient Medications on File Prior to Encounter   Medication Sig Dispense Refill    benztropine (COGENTIN) 1 MG tablet Take 1 tablet by mouth in the morning and 1 tablet before bedtime.  (Patient not taking: Reported on 12/11/2022) 60 tablet 1    pantoprazole (PROTONIX) 40 MG tablet TAKE 1 TABLET BY MOUTH EVERY DAY 30 tablet 2    levothyroxine (SYNTHROID) 25 MCG tablet TAKE 1 TABLET BY MOUTH EVERY DAY 30 tablet 3    atorvastatin (LIPITOR) 20 MG tablet TAKE 1 TABLET BY MOUTH EVERY DAY 90 tablet 0    cyanocobalamin 1000 MCG tablet Take 1 tablet by mouth daily 30 tablet 3    perphenazine 4 MG tablet Take 2 tablets by mouth 2 times daily (Patient not taking: Reported on 12/11/2022) 120 tablet 5    fluvoxaMINE (LUVOX) 50 MG tablet TAKE 1 TABLET BY MOUTH EVERY DAY AT NIGHT 30 tablet 0    metFORMIN (GLUCOPHAGE) 500 MG tablet TAKE 2 TABLETS BY MOUTH TWO TIMES A DAY WITH MEALS 360 tablet 0    donepezil (ARICEPT) 10 MG tablet TAKE 1 TABLET BY MOUTH IN THE EVENING 90 tablet 1    zinc gluconate 50 MG tablet Take 50 mg by mouth Indications: Diarrhea 1/2 to 1 pill once or twice daily with a meal.      divalproex (DEPAKOTE SPRINKLE) 125 MG capsule Take 2 capsules by mouth 2 times daily at 0800 and 1400 120 capsule 0    [DISCONTINUED] lisinopril-hydroCHLOROthiazide (PRINZIDE;ZESTORETIC) 20-12.5 MG per tablet TAKE 1 TABLET BY MOUTH EVERY DAY 30 tablet 0    [DISCONTINUED] diclofenac sodium (VOLTAREN) 1 % GEL Apply 2-4 g topically 4 times daily To area of pain to intact skin as needed for pain. (Patient not taking: Reported on 8/21/2022) 200 g 1    VITAMIN D PO Take 1 tablet by mouth daily Indications: Vitamin D Deficiency      [DISCONTINUED] fluticasone (FLONASE) 50 MCG/ACT nasal spray 1 spray by Each Nostril route daily (Patient not taking: Reported on 8/21/2022) 1 Bottle 2    Multiple Vitamin (MULTI-DAY VITAMINS PO) Take by mouth Difficulty swallowing         Allergies:  No Known Allergies     Social History:  Patient Lives    reports that she has never smoked. She has never used smokeless tobacco. She reports that she does not drink alcohol and does not use drugs. Family History:  family history includes Cancer in her sister; Colon Cancer in her father; Coronary Art Dis in her brother and brother; Diabetes type 2  in her brother and mother; Heart Attack in her brother, brother, and father; Heart Disease in her brother and father; Laneta Medici in her daughter; High Cholesterol in her mother and son; Hypertension in her son; Lymphoma (age of onset: 22) in her daughter; Mental Illness in her daughter and mother; Multinodular goiter in her daughter; No Known Problems in her sister; Other in her brother, brother, and son; Stroke in her mother. ,     Physical Exam:  BP 92/73   Pulse 95   Temp 98.4 °F (36.9 °C) (Oral)   Resp 28   SpO2 96%   Physical Exam  Vitals and nursing note reviewed. Constitutional:       General: She is not in acute distress. Appearance: She is not ill-appearing. HENT:      Head: Normocephalic. Mouth/Throat:      Mouth: Mucous membranes are dry. Eyes:      Pupils: Pupils are equal, round, and reactive to light. Cardiovascular:      Rate and Rhythm: Normal rate and regular rhythm. Pulses: Normal pulses. Heart sounds: No murmur heard. Pulmonary:      Effort: Pulmonary effort is normal.      Breath sounds: Normal breath sounds. Abdominal:      General: Abdomen is flat. Bowel sounds are normal. There is no distension. Palpations: Abdomen is soft. Tenderness: There is no abdominal tenderness. Musculoskeletal:         General: Normal range of motion. Cervical back: Normal range of motion. Right lower leg: No edema. Left lower leg: No edema. Comments: Upper extremity resting tremor   Skin:     General: Skin is warm and dry. Capillary Refill: Capillary refill takes less than 2 seconds. Coloration: Skin is pale. Neurological:      Mental Status: She is alert. Comments: Alert oriented to person but not place or time. Recognizes daughter at bedside.    She is unaware she is in the hospital   Generalized weakness, but moving all extremities    Psychiatric:         Mood and Affect: Mood normal.         Behavior: Behavior normal.       Labs:  CBC:   Lab Results   Component Value Date/Time    WBC 11.6 12/11/2022 04:39 PM    RBC 5.37 12/11/2022 04:39 PM    HGB 13.3 12/11/2022 04:39 PM    HCT 42.4 12/11/2022 04:39 PM    MCV 79.0 12/11/2022 04:39 PM    MCH 24.8 12/11/2022 04:39 PM    MCHC 31.3 12/11/2022 04:39 PM    RDW 17.2 12/11/2022 04:39 PM     12/11/2022 04:39 PM    MPV 8.6 12/11/2022 04:39 PM     BMP:    Lab Results   Component Value Date/Time     12/11/2022 04:39 PM    K 3.4 12/11/2022 04:39 PM     12/11/2022 04:39 PM    CO2 26 12/11/2022 04:39 PM    BUN 21 12/11/2022 04:39 PM    CREATININE 0.7 12/11/2022 04:39 PM    CALCIUM 10.5 12/11/2022 04:39 PM    GFRAA >60 08/24/2022 05:13 AM    GFRAA >60 09/02/2010 11:15 AM    LABGLOM >60 12/11/2022 04:39 PM    GLUCOSE 137 12/11/2022 04:39 PM     CT Head W/O Contrast   Preliminary Result   No acute intracranial abnormality. XR CHEST PORTABLE   Final Result   No acute process. Chest Xray:   EKG:    I visualized CXR images and EKG strips    Problem List  Active Problems:    * No active hospital problems. *  Resolved Problems:    * No resolved hospital problems. *        Assessment/Plan:   Failure to Thrive in Adult  2. Unsteady Gait with Generalized Weakness  3. Dysphagia  4. Upper Extremity Tremors  5. Elevated Lactic Acid  6. Type II DM  7. Dementia    Plan is to admit patient to hospital since she is no longer safe at home. She had extensive work up in [facility]. CT head No acute findings along with chest x-ray no evidence of pneumonia. Lactic Acid elevated however at this time no evidence of infection. IV fluids for hydration she appears dehydrated with casts in urine. Hold metformin and recheck lactic acid in am.    Replace K and Mag  Blood cx and pro calcitonin pending  PT, OT eval   Speech eval  Case management for ECF placement   Did briefly discuss code status with daughter she states patient wants to be Full Code. Will ask palliative care to consult on patient to expand on goals of care discussion. DVT prophylaxis Lovenox   Code status Full   Diet NPO  IV access peripheral   Dooley Catheter none    Admit as inpatient. I anticipate hospitalization spanning more than two midnights for investigation and treatment of the above medically necessary diagnoses. Please note that some part of this chart was generated using Dragon dictation software. Although every effort was made to ensure the accuracy of this automated transcription, some errors in transcription may have occurred inadvertently. If you may need any clarification, please do not hesitate to contact me through [facility].        PRATIMA Sorto CNP    12/11/2022 9:20 PM

## 2022-12-12 NOTE — ED NOTES
Report given to [employer]. No further questions at this time. Pt transported to  by Pat Nelson with all belongings and with daughter bedside. No sign of distress at time of transfer, VSS.      Jah Candelario RN  12/11/22 8723

## 2022-12-12 NOTE — ED NOTES
This RN attempted to call report to 4T x2, was told room has not been assigned to a nurse yet. Awaiting call back from RN.       Sean Bowman RN  12/11/22 1737

## 2022-12-12 NOTE — PROGRESS NOTES
Facility/Department: [address] ORTHO/NEURO NURSING  SLP Clinical Swallow Evaluation and Speech Language Cognitive Assessment     Patient: Aline Velásquez   : 1945   MRN: [id number]      Evaluation Date: 2022      Admitting Dx: Hypokalemia [E87.6]  Hypomagnesemia [E83.42]  Generalized weakness [R53.1]  Elevated CK [R74.8]  Failure to thrive in adult [R62.7]  Elevated lactic acid level [R79.89]  Altered mental status, unspecified altered mental status type [R41.82]  Pain: Denies                                  H&P: Shola Cardenas is a 68 y.o. female. Patient presents to the emergency room tonight accompanied by her daughter. Patient fell approximately 3 weeks ago since then she has had a progressive decline in her health. She has been unable to stand up and use her walker. She has had decreased appetite. Her psychiatrist stopped on of her medications per daughter thinking maybe that maybe affecting her balance. However there has been no improvement and unfortunately her resting tremors in her arms has gotten progressively worse with discontinuation of medication. Which has made it difficult for patient to feed herself. Patient does have dementia and daughter feels her memory fluctuates at baseline and has not noticed a significant change in this in past 3 weeks. She was noted to have difficulty in ER swallowing medications. Which was new according to daughter. Family feel they can no longer care for patient safely at home and are seeking ECF placement at this time. \"    Imaging:  Chest X-ray:   Impression   No acute process. Head CT:   Impression   No acute intracranial abnormality.        History/Prior Level of Function:   Living Status: home  Prior Dysphagia History:   Prior Speech History:     Reason for referral: SLP evaluation orders received due to altered mental status, unable to swallow pills     DYSPHAGIA BEDSIDE SWALLOW EVALUATION   Dysphagia Impressions/Dysphagia Diagnosis: Oropharyngeal Dysphagia   Pt was positioned upright in bed, awake and alert. Only intermittently able to follow commands and respond to questions. Unable to fully participate in oral mechanism examination, no focal weakness was noted. Pt was able to self feed with set-up. Various consistency trials were provided to assess swallow function. Pt demonstrated prolonged mastication with delayed oral clearing, suspected pharyngeal pooling with delayed swallow initiation and s/s of delayed pharyngeal clearing. With various textures pt utilized multiple delayed swallows to clear. No immediate overt clinical s/s of aspiration/penetration were assessed. Overall pt appeared to tolerate soft solids and thin liquids with no overt clinical s/s of aspiration/penetration. Recommend diet advance with close monitoring. Recommended Diet and Intervention:  Diet Solids Recommendation:  Dysphagia III Soft and bite sized  Liquid Consistency Recommendation: Thin liquids  Recommended form of Meds: Meds in puree            Dysphagia Therapeutic Intervention:  Patient/Family Education , Therapeutic Trials with SLP     Compensatory Swallowing Strategies: Alternate solids/liquids , Upright as possible with all PO intake , Assist Feed , Eat/feed slowly    Oral Mechanism Exam:  []WFL []Mild   [] Moderate  []Severe  [x]To be assessed    SHORT TERM DYSPHAGIA GOALS  Pt will functionally tolerate recommended diet with no overt clinical s/s of aspiration     Patient Positioning: Upright in bed       SPEECH LANGUAGE COGNITIVE ASSESSMENT:     Speech Diagnosis:   Cognitive-Linguistic Deficits , Speech Language Deficits     Impressions: Pt was awake and alert for duration of assessment, per chart pt has experienced a gradual decline over the past few weeks. This date pt demonstrated speech language deficits characterized by moderately to severely impaired auditory comprehension and verbal expression.  She was only intermittently responsive to questions/commands and did not appear to benefit from repetitions or physical model. She was able to state name and month and date of birthday. She was not responsive to any questions regarding her hx or simple functional conversation. She did not track to speaker unless her name was stated. Cognition was unable to be assessed at this time. COMPREHENSION  Auditory Comprehension: Moderate  and Severe   Impaired Yes/no questions  Impaired Basic questions  Impaired One step commands  Impaired Two step commands    EXPRESSION  Verbal Expression: Moderate  and Severe   Impaired Initiation  Impaired Repetition  Impaired Automatic Speech  Impaired Confrontational Naming  Impaired Conversation     Pragmatics/Social Functioning: Moderate   Impaired Eye contact  Flat Affect      MOTOR SPEECH  Motor Speech: To be assessed   Comment: limited verbal output was intelligible     VOICE  Voice: Within functional limits        COGNITION    Overall Orientation : Unable to assess              Attention: Moderate  and Severe    Impaired Selective Attention  Impaired Sustained Attention    Memory: Unable to assess         Problem Solving: Unable to assess         Safety/Judgement: Unable to assess         GOALS:  Short Term Speech/Language/Cognitive Goals:   Pt will improve auditory processing/comprehension of commands and questions to 80%, via graded tasks   Pt will improve verbal expression for functional expression via graded tasks to 80%    Plan of care: 3-5 times per week during acute care stay. Discharge Recommendations:  Discharge recommendations to be determined pending ongoing follow-up during acute care stay    EDUCATION:   Provided education regarding role of SLP, results of assessment, recommendations and general speech pathology plan of care.    [] Pt verbalized understanding and agreement   [] Pt requires ongoing learning   [x] No evidence of comprehension     If patient discharges prior to next visit, this note will serve as discharge.      Treatment time:  Timed Code Treatment Minutes: 0 minutes   Total Treatment time: 40 minutes     Electronically signed by:  Rui See MA [facility]  Speech Language Pathologist

## 2022-12-12 NOTE — CARE COORDINATION
Discharge Planning Assessment  Risk of Readmission Score: 13%    RN discharge planner met with patient and Tricia Thomas, daughter to discuss reason for admission, current living situation, and potential needs at the time of discharge. Demographics/Insurance verified Yes    Current type of dwelling: Single story home with two steps to enter. Patient from ECF/ confirmed with: N/A    Living arrangements: Daughter    Level of function/Support: The patient uses a walker to ambulate in the home. The daughter provides the needed assistance with ADL's. PCP: Dorina Last DO    Last Visit to PCP: October 2022    DME: walker, tub/shower, grab bar, shower seat    Active with any community resources/agencies/skilled home care: N/A    - Family does not want patient to return to [facility]    - Patient has Adult Day Care 3 days/week    Medication compliance issues:  The daughter oversees the patient's medications    Financial issues that could impact healthcare: None    Tentative discharge plan: PT/OT recommends SNF, referrals sent, waiting on response, discharge plan TBD      Transportation at the time of discharge: TBD    LUI Xavier RN    [facility]  Phone: [phone]

## 2022-12-12 NOTE — CONSULTS
PALLIATIVE MEDICINE CONSULTATION     Patient name:Franci Marrero   DLR:[id number]    :1945  Room/Bed:[facility]4464/4464-01   LOS: 1 day         Date of consult:2022    Consult Information  Palliative Medicine Consult performed by: PRATIMA Sánchez CNP, CNP    Inpatient consult to Palliative Care  Consult performed by: PRATIMA Sánchez CNP  Consult ordered by: PRATIMA Wagner CNP  Reason for consult: GOC and code status             ASSESSMENT/RECOMMENDATIONS     68 y.o. female with AMS and debility with dementia      Symptom Management:  AMS- pt limited in her verbal responses per her family this is an acute change prior to admission and coincided with stopping a psychiatric medication, referral to psychiatry  Debility- pt with increased falls and imbalance at home   Goals of Care- talked to pts two children they are to the point that they can't manage pt at home she had a geriatric psych stay and has been weaning off medication under medical advice her kids feel that her symptoms started about the time she stopped medication. They are going to reach out to [employer] for help with placement. We discussed code status the family will discuss and I'll follow up. Patient/Family Goals of Care :    talked to pts two children they are to the point that they can't manage pt at home she had a geriatric psych stay and has been weaning off medication under medical advice her kids feel that her symptoms started about the time she stopped medication. They are going to reach out to [employer] for help with placement. We discussed code status the family will discuss and I'll follow up. Disposition/Discharge Plan:   pending    Advance Directives:  Surrogate Decision Maker: Elizabeth-child  Code status:  Full Code    Case discussed with: patient, floor RN  Thank you for allowing us to participate in the care of this patient.       HISTORY     CC: AMS  HPI: The patient is a 68 y.o. female with past medical history of hypertension, hyperlipidemia, diabetes, dementia, bilateral upper extremity tremors, who presents with her family complaining of a fall 3 weeks ago, general weakness. 3 weeks ago, patient slipped in the bathtub, fell back and hit the back of her head, denies LOC then. Family states EMS was called, they came to the house and patient was not transported due to no external signs of injury, patient appears at baseline then. Since this fall, patient has increasing general weakness. Palliative Medicine SymptomScreening/ROS:    Review of Systems   Unable to perform ROS: Mental status change       Patient unable to complete full ROS due to current cognitive status. Information that is obtained from nursing and chart. Palliative Performance Scale:     [] 60%  Amb reduced; Sig dz. Can't do hobbies/housework; Intake normal or reduced, Occasional assist; LOC full/confusion   [] 50%  Mainly sit/lie; Extensive disease. Mainly assist, Intake normal or reduced; Occasional assist; LOC full/confusion   [x] 40%  Mainly in bed; Extensive disease; Mainly assist; Intake normal or reduced; Occasional assist; LOC full/confusion   [] 30%  Bed bound, Extensive disease; Total care; Intake reduced; LOC full/confusion   [] 20%  Bed bound; Extensive disease; Total care; Intake minimal; Drowsy/coma   [] 10%  Bed bound; Extensive disease; Total care; Mouth care only; Drowsy/coma   []  0%   Death       Home med list and hospital medications reviewed in chart as of 12/12/2022     EXAM     Vitals:    12/12/22 0515   BP: 138/80   Pulse: 77   Resp: 18   Temp:    SpO2: 96%       Physical Exam  Constitutional:       Appearance: She is ill-appearing. HENT:      Head: Normocephalic and atraumatic. Nose: No congestion. Mouth/Throat:      Mouth: Mucous membranes are dry. Eyes:      Pupils: Pupils are equal, round, and reactive to light.    Cardiovascular:      Rate and Rhythm: Normal rate. Heart sounds: No murmur heard. No friction rub. No gallop. Pulmonary:      Effort: No respiratory distress. Abdominal:      Palpations: Abdomen is soft. Musculoskeletal:      Cervical back: Normal range of motion. Right lower leg: Edema present. Left lower leg: Edema present. Skin:     General: Skin is warm and dry. Coloration: Skin is pale. Neurological:      Mental Status: She is alert.       Comments: Pt alert with limited verbal responses              Current labs in the epic chart reviewed as of 12/12/2022   Review of previous notes, admits, labs, radiology and testing relevant to this consult done in this chart today 12/12/2022      Total time: 70 minutes  >50% of time spent counseling patient at bedside or POA/family member if applicable , reviewing information and discussing care, coordinating with care team  Signed By: Electronically signed by PRATIMA Sánchez CNP on 12/12/2022 at 9:24 AM  Palliative Medicine   [phone]    December 12, 2022

## 2022-12-12 NOTE — PROGRESS NOTES
[facility] PROGRESS NOTE    12/12/2022 9:25 AM        Name: Aline Velásquez . Admitted: 12/11/2022  Primary Care Provider: Afsaneh Espino DO (Tel: [phone])                        Subjective:  . No acute events overnight. Resting well. Pain control. Diet ok. Labs reviewed  Denies any chest pain sob. Reviewed interval ancillary notes    Current Medications  divalproex (DEPAKOTE) DR tablet 250 mg, BID  levothyroxine (SYNTHROID) tablet 25 mcg, Daily  insulin lispro (HUMALOG) injection vial 0-4 Units, TID WC  insulin lispro (HUMALOG) injection vial 0-4 Units, Nightly  dextrose bolus 10% 125 mL, PRN   Or  dextrose bolus 10% 250 mL, PRN  glucagon (rDNA) injection 1 mg, PRN  dextrose 10 % infusion, Continuous PRN  sodium chloride flush 0.9 % injection 5-40 mL, 2 times per day  sodium chloride flush 0.9 % injection 5-40 mL, PRN  0.9 % sodium chloride infusion, PRN  enoxaparin (LOVENOX) injection 40 mg, Daily  ondansetron (ZOFRAN-ODT) disintegrating tablet 4 mg, Q8H PRN   Or  ondansetron (ZOFRAN) injection 4 mg, Q6H PRN  polyethylene glycol (GLYCOLAX) packet 17 g, Daily PRN  acetaminophen (TYLENOL) tablet 650 mg, Q6H PRN   Or  acetaminophen (TYLENOL) suppository 650 mg, Q6H PRN  melatonin tablet 3 mg, Nightly PRN  0.9% NaCl with KCl 20 mEq infusion, Continuous        Objective:  /80   Pulse 77   Temp 97.6 °F (36.4 °C) (Oral)   Resp 18   Ht 4' 11\" (1.499 m)   Wt 125 lb 12.8 oz (57.1 kg)   SpO2 96%   BMI 25.41 kg/m²   No intake or output data in the 24 hours ending 12/12/22 0925   Wt Readings from Last 3 Encounters:   12/12/22 125 lb 12.8 oz (57.1 kg)   10/21/22 129 lb (58.5 kg)   09/22/22 132 lb (59.9 kg)       General appearance:  Appears comfortable  Eyes: Sclera clear. Pupils equal.  ENT: Moist oral mucosa. Trachea midline, no adenopathy.   Cardiovascular: Regular rhythm, normal S1, S2. No murmur. No edema in lower extremities  Respiratory: Not using accessory muscles. Good inspiratory effort. Clear to auscultation bilaterally, no wheeze or crackles. GI: Abdomen soft, no tenderness, not distended, normal bowel sounds  Musculoskeletal: No cyanosis in digits, neck supple  Neurology: CN 2-12 grossly intact. No speech or motor deficits  Psych: Normal affect. Alert and oriented in time, place and person  Skin: Warm, dry, normal turgor    Labs and Tests:  CBC:   Recent Labs     12/11/22  1639 12/12/22  0552   WBC 11.6* 9.1   HGB 13.3 10.9*    184     BMP:    Recent Labs     12/11/22  1639 12/12/22  0552    142   K 3.4* 4.2    105   CO2 26 27   BUN 21* 19   CREATININE 0.7 <0.5*   GLUCOSE 137* 116*     Hepatic:   Recent Labs     12/11/22  1639 12/12/22  0552   AST 34 21   ALT 21 14   BILITOT 0.3 0.3   ALKPHOS 90 73       Discussed care with patient             Problem List  Principal Problem:    Failure to thrive in adult  Resolved Problems:    * No resolved hospital problems. *       Assessment & Plan:   Failure to thrive  -Likely multifactorial with weakness dysphagia dementia  -Supportive care PT OT evaluation    Unsteady gait with generalized weakness  -Tremors likely probably essential tremor  -No acute finding.  -Continue to replace electrolyte. Procalcitonin less likely pneumonia or any other infectious process    Dementia    Diabetes      Diet: ADULT DIET;  Dysphagia - Soft and Bite Sized  Code:Full Code  DVT PPX lovenox   Disposition PT OT and ECF placement      Frantz Lange MD   12/12/2022 9:25 AM

## 2022-12-12 NOTE — ED NOTES
Report given to [facility] RN. No further questions at this time.       Jessica Dinero RN  12/11/22 9134

## 2022-12-12 NOTE — ED NOTES
Pt brief checked, clean and dry at this time. Oriented to time and self. Pt daughter continues to be bedside, denies needs at this time.      Dilan Shepard RN  12/11/22 7318

## 2022-12-12 NOTE — ED NOTES
Dr Mor[facility] bedside speaking with pt's daughter and pt to discuss plan of care.       Yazmin Birmingham RN  12/11/22 7913

## 2022-12-12 NOTE — PROGRESS NOTES
[facility] [facility]   Phone: [phone]    Physical Therapy    [x] Initial Evaluation            [] Daily Treatment Note         [] Discharge Summary      Patient: Jean Claude Ogden   : 1945   MRN: [id number]   Date of Service:  2022  Admitting Diagnosis: Failure to thrive in adult  Current Admission Summary: Jean Claude Ogden is a 68 y.o. female. Patient presents to the emergency room tonight accompanied by her daughter. Patient fell approximately 3 weeks ago since then she has had a progressive decline in her health. She has been unable to stand up and use her walker. She has had decreased appetite. Her psychiatrist stopped on of her medications per daughter thinking maybe that maybe affecting her balance. However there has been no improvement and unfortunately her resting tremors in her arms has gotten progressively worse with discontinuation of medication. Which has made it difficult for patient to feed herself. Patient does have dementia and daughter feels her memory fluctuates at baseline and has not noticed a significant change in this in past 3 weeks. She was noted to have difficulty in ER swallowing medications. Which was new according to daughter. Family feel they can no longer care for patient safely at home and are seeking ECF placement at this time. Past Medical History:  has a past medical history of COVID-19 virus infection, Dementia with behavioral disturbance, Diabetes mellitus ([address] GERD (gastroesophageal reflux disease), Hyperlipidemia, Hypertension, Hypothyroidism, MGUS (monoclonal gammopathy of unknown significance), Mild dementia, Osteopenia of multiple sites, and Other idiopathic scoliosis, thoracolumbar region. Past Surgical History:  has a past surgical history that includes eye surgery (Left, ); bladder suspension (N/A, ); and Colonoscopy (2017). Discharge Recommendations:  Jean Claude Ogden scored a 6/24 on the AM-PAC short mobility form. Current research shows that an AM-PAC score of 17 or less is typically not associated with a discharge to the patient's home setting. Based on the patient's AM-PAC score and their current functional mobility deficits, it is recommended that the patient have 3-5 sessions per week of Physical Therapy at d/c to increase the patient's independence. Please see assessment section for further patient specific details. If patient discharges prior to next session this note will serve as a discharge summary. Please see below for the latest assessment towards goals. DME Required For Discharge: DME to be determined at next level of care  Precautions/Restrictions: high fall risk  Positional Restrictions:no positional restrictions    Pre-Admission Information - Information is from prior chart- admission in August 2022  Lives With: daughter, Cecil Cardozo; spends the day at her son's house which is a bilevel and pt must amb up/down steps     Type of Home: house  Home Layout: one level, laundry in basement  Home Access:  2 step to enter with handrail. Handrails are located on L side.   Bathroom Layout: walker accessible, tub/shower unit  Bathroom Equipment: grab bars in shower, shower chair  Toilet Height: standard height  Home Equipment: rolling walker, single point cane, alert button  Transfer Assistance: requires assistance  Ambulation Assistance:requires assistance-son and daughter HHA  ADL Assistance: requires assistance with bathing, requires assistance with dressing, requires assistance with grooming, requires assistance with toileting  IADL Assistance: requires assistance with all homemaking tasks, pt will help with setting table and washing dishes  Active :        [] Yes                 [x] No  Hand Dominance: [] Left                 [] Right  Hobbies: watching tv; mostly sedentary   Recent Falls: Per chart review and discussion with pt's children in August, pt spent a month in a behavioral health unit in May-June 2022. Pt did not receive therapy services and experienced a large decline in her mobility level during that time. Prior to that hospitalization, pt was much more independent with transfers, ambulation, stair climbing. Pt has had over a dozen falls this year     Examination   Vision:   Vision Corrective Device: wears glasses for reading  Hearing:   [facility]  Observation:   General Observation:  resting UE tremors; extra time to respond to questions  Sensation:   Unable to formally test secondary to cognition  Tone:   Normotonic  Coordination Testing:   Unable to formally test secondary to cognition     ROM:   B PROM WFLs; pt follows minimal commands for AROM LEs with significant delay; pt does actively move ankles in bed, knees while EOB through partial ROM  Strength:   Formal MMT held secondary to cognition  Therapist Clinical Decision Making (Complexity): medium complexity  Clinical Presentation: evolving      Subjective  General: Pt supine in bed upon arrival; agreeable to PT/OT; flat affect, decreased conversation  Pain: Patient does not rate upon questioning  Pain Interventions: repositioned        Functional Mobility  Bed Mobility  Supine to Sit: 2 person assistance with Dep of 2   Sit to Supine: 2 person assistance with Dep of 2   Scooting: dependent assistance  Comments:  Transfers  Sit to stand transfer: 2 person assistance with max A of 2   Stand to sit transfer: 2 person assistance with max A of 2   Comments: HHA of 2 from ELB; pt leaning posteriorly onto bed  Ambulation  Ambulation not tested on this date secondary to unsafe to attempt ambulation this date; pt has difficulty standing fully upright, limited activity tolerance, poor command following.   Distance:   Gait Mechanics:   Comments:    Balance  Static Sitting Balance: poor (+): requires min (A) to maintain balance  Dynamic Sitting Balance: poor (-): requires max (A) to maintain balance  Static Standing Balance: poor (-): requires max (A) to maintain balance  Dynamic Standing Balance: poor (-): requires max (A) to maintain balance  Comments: Pt sits EOB with SBA-min A    Other Therapeutic Interventions    Functional Outcomes  AM-PAC Inpatient Mobility Raw Score : 6              Cognition  Overall Cognitive Status: Impaired  Arousal/Alterness: delayed responses to stimuli, inconsistent responses to stimuli  Following Commands: inconsistently follows commands  Attention Span: unable to maintain attention  Memory: decreased recall of biographical information  Safety Judgement: decreased awareness of need for assistance, decreased awareness of need for safety  Problem Solving: decreased awareness of errors  Insights: not aware of deficits  Initiation: requires cues for all  Sequencing: requires cues for all  Orientation:    oriented to person, knew name, not birthday; pt stated that her daughter's name is Mary Cline Following:   impaired    Education  Barriers To Learning: cognition  Patient Education: patient educated on PT role and benefits, plan of care  Learning Assessment:  patient will require reinforcement due to cognitive deficits    Assessment  Activity Tolerance: seated 98%, /94, pt unable to reports dizziness  Impairments Requiring Therapeutic Intervention: decreased functional mobility, decreased strength, decreased safety awareness, decreased cognition, decreased endurance, decreased balance  Prognosis: fair and poor  Clinical Assessment:  Pt is limited by the above deficits and would benefit from skilled PT services to maximize pt functional mobility and safety prior to discharge. Pt is dependent for all mobility and significantly limited by cognitive deficits. Recommend SNF at discharge.      Safety Interventions: patient left in bed, bed alarm in place, patient left in chair, call light within reach, gait belt, patient at risk for falls, and nurse notified    Plan  Frequency: 3-5 x/per week  Current Treatment Recommendations: strengthening, ROM, balance training, functional mobility training, transfer training, gait training, and patient/caregiver education    Goals  Patient Goals: None stated   Short Term Goals:  Time Frame: Discharge  Patient will complete bed mobility at moderate assistance   Patient will complete transfers at moderate assistance   Patient will ambulate 10 ft with use of LRAD at moderate assistance  Patient to maintain standing at minimal assistance for 5 minutes.     Therapy Session Time      Individual Group Co-treatment   Time In     0933   Time Out     7873   Minutes     29     Timed Code Treatment Minutes:  14 Minutes  Total Treatment Minutes:  29       Electronically Signed By: Aashish Tapia, PT  Aashish Tapia, [id number]

## 2022-12-12 NOTE — PROGRESS NOTES
Patient admitted to room 4464 from ER. Oriented to room, call light, and floor policies. Plan of care reviewed with patient/family. Pt is resting in bed and denies pain at this time; no s/s of distress noted. Assessment completed; VSS.  bed alarm on. Safety precautions in place; call light and bedside table within reach. Pt encouraged to call for needs or ambulation.

## 2022-12-12 NOTE — ED NOTES
Pt resting. Family bedside. No sign of distress, vitals as charted. Call light within reach, bed alarm and monitors in place.      Daryle Self, RN  12/11/22 2535

## 2022-12-12 NOTE — PROGRESS NOTES
[facility]   Phone: [phone]    Occupational Therapy    [x] Initial Evaluation            [] Daily Treatment Note         [] Discharge Summary      Patient: Lissa Jimenez   : 1945   MRN: [id number]   Date of Service:  2022    Admitting Diagnosis:  Failure to thrive in adult  Current Admission Summary: Lissa Jimenez is a 68 y.o. female. Patient presents to the emergency room tonight accompanied by her daughter. Patient fell approximately 3 weeks ago since then she has had a progressive decline in her health. She has been unable to stand up and use her walker. She has had decreased appetite. Her psychiatrist stopped on of her medications per daughter thinking maybe that maybe affecting her balance. However there has been no improvement and unfortunately her resting tremors in her arms has gotten progressively worse with discontinuation of medication. Which has made it difficult for patient to feed herself. Patient does have dementia and daughter feels her memory fluctuates at baseline and has not noticed a significant change in this in past 3 weeks. She was noted to have difficulty in ER swallowing medications. Which was new according to daughter. Family feel they can no longer care for patient safely at home and are seeking ECF placement at this time. Past Medical History:  has a past medical history of COVID-19 virus infection, Dementia with behavioral disturbance, Diabetes mellitus ([address] GERD (gastroesophageal reflux disease), Hyperlipidemia, Hypertension, Hypothyroidism, MGUS (monoclonal gammopathy of unknown significance), Mild dementia, Osteopenia of multiple sites, and Other idiopathic scoliosis, thoracolumbar region. Past Surgical History:  has a past surgical history that includes eye surgery (Left, ); bladder suspension (N/A, ); and Colonoscopy (2017). Discharge Recommendations:  Lissa Jimenez scored a 9/24 on the AM-PAC ADL Inpatient form. Current research shows that an AM-PAC score of 17 or less is typically not associated with a discharge to the patient's home setting. Based on the patient's AM-PAC score and their current ADL deficits, it is recommended that the patient have 3-5 sessions per week of Occupational Therapy at d/c to increase the patient's independence. Please see assessment section for further patient specific details. If patient discharges prior to next session this note will serve as a discharge summary. Please see below for the latest assessment towards goals. DME Required For Discharge: DME to be determined at next level of care    Precautions/Restrictions: high fall risk  Weight Bearing Restrictions: no restrictions  [] Right Upper Extremity  [] Left Upper Extremity [] Right Lower Extremity  [] Left Lower Extremity     Required Braces/Orthotics: no braces required   [] Right  [] Left  Positional Restrictions:no positional restrictions    Pre-Admission Information - Information is from prior chart- admission in August 2022  Lives With: daughter, Sheryle Pinon; spends the day at her son's house which is a bilevel and pt must amb up/down steps     Type of Home: house  Home Layout: one level, laundry in basement  Home Access:  2 step to enter with handrail. Handrails are located on L side.   Bathroom Layout: walker accessible, tub/shower unit  Bathroom Equipment: grab bars in shower, shower chair  Toilet Height: standard height  Home Equipment: rolling walker, single point cane, alert button  Transfer Assistance: requires assistance  Ambulation Assistance:requires assistance-son and daughter HHA  ADL Assistance: requires assistance with bathing, requires assistance with dressing, requires assistance with grooming, requires assistance with toileting  IADL Assistance: requires assistance with all homemaking tasks, pt will help with setting table and washing dishes  Active :        [] Yes [x] No  Hand Dominance: [] Left                 [] Right  Hobbies: watching tv; mostly sedentary   Recent Falls: Per chart review and discussion with pt's children in August, pt spent a month in a behavioral health unit in May-June 2022. Pt did not receive therapy services and experienced a large decline in her mobility level during that time. Prior to that hospitalization, pt was much more independent with transfers, ambulation, stair climbing. Pt has had over a dozen falls this year     Examination   Vision:   Vision Corrective Device: wears glasses for reading  [facility]    Observation:   General Observation:  Pt alert eyes open but delayed response to questions pertaining to self (pain), resting tremor noted BUE,   Coordination Testing:   Coordination and Movement Description: fine motor impairments, tremors, resting tremors, intention tremors  Unable to formally test secondary to coginition     ROM:   (B) UE AROM WFL  Strength:   (B) UE strength grossly [facility]    Therapist Clinical Decision Making (Complexity): medium complexity  Clinical Presentation: stable      Subjective  General: Pt received seated in bed agreeable to PT OT evaluation. Pain: 0/10  Pain Interventions: not applicable        Activities of Daily Living  Basic Activities of Daily Living  Grooming: dependent  Upper Extremity Bathing: dependent  Upper Extremity Dressing: dependent  Toileting: dependent. Pt dependent with all ADLs due to cognitive deficits pt is not initiating any self care activity, delayed response with [facility]  Instrumental Activities of Daily Living  No IADL completed on this date.     Functional Mobility  Bed Mobility  Supine to Sit: dependent assistance  Sit to Supine: dependent assistance  Rolling Left: dependent assistance  Rolling Right: dependent assistance  Comments: pt required 2 person assist with all bed mobility  Transfers  Sit to stand transfer:2 person assistance with Max assist   Stand to sit transfer: 2 person assistance with Max assist   Comments:  Functional Mobility:  No functional mobility completed on this date secondary to safety. Other Therapeutic Interventions    Functional Outcomes  AM-PAC Inpatient Daily Activity Raw Score: 9    Cognition  Overall Cognitive Status: Impaired  Arousal/Alterness: delayed responses to stimuli  Following Commands: inconsistently follows commands  Attention Span: difficulty attending to directions  Insights: not aware of deficits  Initiation: requires cues for all  Sequencing: requires cues for all  Orientation:    oriented to person  Command Following:   impaired     Education  Barriers To Learning: cognition  Patient Education: patient educated on goals, OT role and benefits, plan of care, discharge recommendations  Learning Assessment:  patient will require reinforcement due to cognitive deficits    Assessment  Activity Tolerance: poor  Impairments Requiring Therapeutic Intervention: decreased functional mobility, decreased ADL status, decreased ROM, decreased cognition, decreased endurance, decreased balance, decreased fine motor control  Prognosis: fair  Clinical Assessment: 68year old female admitted with multiple falls and continued decline in cognition and safety at home. Pt below baseline level would benefit from skilled OT for self care, ADL, transfer and functional mobility training prior to discharge.    Safety Interventions: patient left in bed, bed alarm in place, call light within reach, patient at risk for falls, and nurse notified    Plan  Frequency: 3-5 x/per week  Current Treatment Recommendations: strengthening, ROM, balance training, functional mobility training, transfer training, endurance training, neuromuscular re-education, and ADL/self-care training    Goals  Patient Goals: none stated   Short Term Goals:  Time Frame: discharge   Patient will complete upper body ADL at moderate assistance   Patient will complete lower body ADL at maximum assistance   Patient will complete toileting at maximum assistance   Patient will complete functional transfers at moderate assistance     Therapy Session Time     Individual Group Co-treatment   Time In    0933   Time Out    1002   Minutes    29        Timed Code Treatment Minutes:   14  Total Treatment Minutes:  29       Electronically Signed By: Segun Matthews, [facility] Yash Thacker, [address] [facility]

## 2022-12-12 NOTE — CARE COORDINATION
Discharge Planning Note:    Met with Jose Guadalupe Contreras, daughter. An approved SNF list was reviewed and the following referrals were placed a the request of the daughter:    - [facility] - waiting on response    - [facility] - waiting on response    - [facility] - waiting on response    - [facility] - waiting on response    Will continue to follow.     FABY HodgesN RN    [facility]  Phone: [phone]

## 2022-12-13 PROBLEM — F03.90 MAJOR NEUROCOGNITIVE DISORDER (HCC): Status: ACTIVE | Noted: 2022-12-13

## 2022-12-13 PROBLEM — R41.82 ALTERED MENTAL STATUS: Status: ACTIVE | Noted: 2022-12-13

## 2022-12-13 LAB
AMMONIA: 49 UMOL/L (ref 11–51)
FOLATE: 15.25 NG/ML (ref 4.78–24.2)
GLUCOSE BLD-MCNC: 104 MG/DL (ref 70–99)
GLUCOSE BLD-MCNC: 104 MG/DL (ref 70–99)
GLUCOSE BLD-MCNC: 106 MG/DL (ref 70–99)
GLUCOSE BLD-MCNC: 109 MG/DL (ref 70–99)
GLUCOSE BLD-MCNC: 110 MG/DL (ref 70–99)
PERFORMED ON: ABNORMAL
TSH REFLEX: 1.7 UIU/ML (ref 0.27–4.2)
VITAMIN B-12: 471 PG/ML (ref 211–911)

## 2022-12-13 PROCEDURE — 94760 N-INVAS EAR/PLS OXIMETRY 1: CPT

## 2022-12-13 PROCEDURE — 92507 TX SP LANG VOICE COMM INDIV: CPT

## 2022-12-13 PROCEDURE — 6370000000 HC RX 637 (ALT 250 FOR IP): Performed by: PSYCHIATRY & NEUROLOGY

## 2022-12-13 PROCEDURE — 92526 ORAL FUNCTION THERAPY: CPT

## 2022-12-13 PROCEDURE — 99221 1ST HOSP IP/OBS SF/LOW 40: CPT | Performed by: PSYCHIATRY & NEUROLOGY

## 2022-12-13 PROCEDURE — 82140 ASSAY OF AMMONIA: CPT

## 2022-12-13 PROCEDURE — 82746 ASSAY OF FOLIC ACID SERUM: CPT

## 2022-12-13 PROCEDURE — 82607 VITAMIN B-12: CPT

## 2022-12-13 PROCEDURE — 6360000002 HC RX W HCPCS: Performed by: NURSE PRACTITIONER

## 2022-12-13 PROCEDURE — 6370000000 HC RX 637 (ALT 250 FOR IP): Performed by: NURSE PRACTITIONER

## 2022-12-13 PROCEDURE — 6360000002 HC RX W HCPCS: Performed by: PSYCHIATRY & NEUROLOGY

## 2022-12-13 PROCEDURE — 1200000000 HC SEMI PRIVATE

## 2022-12-13 PROCEDURE — 36415 COLL VENOUS BLD VENIPUNCTURE: CPT

## 2022-12-13 PROCEDURE — 2700000000 HC OXYGEN THERAPY PER DAY

## 2022-12-13 PROCEDURE — 84443 ASSAY THYROID STIM HORMONE: CPT

## 2022-12-13 RX ORDER — PERPHENAZINE 2 MG/1
2 TABLET, FILM COATED ORAL 2 TIMES DAILY WITH MEALS
Status: DISCONTINUED | OUTPATIENT
Start: 2022-12-13 | End: 2022-12-14 | Stop reason: HOSPADM

## 2022-12-13 RX ORDER — LORAZEPAM 2 MG/ML
0.5 INJECTION INTRAMUSCULAR ONCE
Status: DISCONTINUED | OUTPATIENT
Start: 2022-12-13 | End: 2022-12-13

## 2022-12-13 RX ORDER — FLUVOXAMINE MALEATE 50 MG/1
50 TABLET, COATED ORAL NIGHTLY
Status: DISCONTINUED | OUTPATIENT
Start: 2022-12-13 | End: 2022-12-14 | Stop reason: HOSPADM

## 2022-12-13 RX ORDER — LORAZEPAM 2 MG/ML
1 INJECTION INTRAMUSCULAR ONCE
Status: COMPLETED | OUTPATIENT
Start: 2022-12-13 | End: 2022-12-13

## 2022-12-13 RX ORDER — PERPHENAZINE 2 MG/1
2 TABLET, FILM COATED ORAL 2 TIMES DAILY WITH MEALS
Qty: 120 TABLET | Refills: 3 | Status: SHIPPED | OUTPATIENT
Start: 2022-12-13

## 2022-12-13 RX ADMIN — LORAZEPAM 1 MG: 2 INJECTION INTRAMUSCULAR; INTRAVENOUS at 14:58

## 2022-12-13 RX ADMIN — DIVALPROEX SODIUM 250 MG: 250 TABLET, DELAYED RELEASE ORAL at 14:52

## 2022-12-13 RX ADMIN — DIVALPROEX SODIUM 250 MG: 250 TABLET, DELAYED RELEASE ORAL at 09:01

## 2022-12-13 RX ADMIN — LEVOTHYROXINE SODIUM 25 MCG: 0.03 TABLET ORAL at 06:33

## 2022-12-13 RX ADMIN — FLUVOXAMINE MALEATE 50 MG: 50 TABLET, COATED ORAL at 21:40

## 2022-12-13 RX ADMIN — POTASSIUM CHLORIDE AND SODIUM CHLORIDE: 900; 150 INJECTION, SOLUTION INTRAVENOUS at 12:26

## 2022-12-13 RX ADMIN — PERPHENAZINE 2 MG: 2 TABLET, FILM COATED ORAL at 17:35

## 2022-12-13 RX ADMIN — ENOXAPARIN SODIUM 40 MG: 100 INJECTION SUBCUTANEOUS at 09:01

## 2022-12-13 NOTE — CARE COORDINATION
Discharge Planning Note:    Called and talked with Mae Ruiz, daughter. The daughter stated she wants to accept [place]. - [place] - ACCEPTED    - Pre-Cert - started on 50/47/2208    Will continue to follow.     Yamile Leonard, FABYN RN    [facility]  Phone: [phone]

## 2022-12-13 NOTE — CARE COORDINATION
Discharge Planning Note:   CM checked [employer] Portal, pre-cert remains pending at this time.      Electronically signed by Gracie Dickson RN on 12/13/2022 at 3:47 PM

## 2022-12-13 NOTE — PROGRESS NOTES
[facility] PROGRESS NOTE    12/13/2022 12:52 PM        Name: Kay Negrete . Admitted: 12/11/2022  Primary Care Provider: Natalie Robertson DO (Tel: [phone])                        Subjective:  . No acute events overnight. Resting well. Pain control. Diet ok. Labs reviewed  Denies any chest pain sob.      Reviewed interval ancillary notes    Current Medications  divalproex (DEPAKOTE) DR tablet 250 mg, BID  levothyroxine (SYNTHROID) tablet 25 mcg, Daily  insulin lispro (HUMALOG) injection vial 0-4 Units, TID WC  insulin lispro (HUMALOG) injection vial 0-4 Units, Nightly  dextrose bolus 10% 125 mL, PRN   Or  dextrose bolus 10% 250 mL, PRN  glucagon (rDNA) injection 1 mg, PRN  dextrose 10 % infusion, Continuous PRN  sodium chloride flush 0.9 % injection 5-40 mL, 2 times per day  sodium chloride flush 0.9 % injection 5-40 mL, PRN  0.9 % sodium chloride infusion, PRN  enoxaparin (LOVENOX) injection 40 mg, Daily  ondansetron (ZOFRAN-ODT) disintegrating tablet 4 mg, Q8H PRN   Or  ondansetron (ZOFRAN) injection 4 mg, Q6H PRN  polyethylene glycol (GLYCOLAX) packet 17 g, Daily PRN  acetaminophen (TYLENOL) tablet 650 mg, Q6H PRN   Or  acetaminophen (TYLENOL) suppository 650 mg, Q6H PRN  melatonin tablet 3 mg, Nightly PRN  0.9% NaCl with KCl 20 mEq infusion, Continuous      Objective:  BP (!) 144/87   Pulse 70   Temp 98.4 °F (36.9 °C) (Oral)   Resp 16   Ht 4' 11\" (1.499 m)   Wt 125 lb 12.8 oz (57.1 kg)   SpO2 95%   BMI 25.41 kg/m²     Intake/Output Summary (Last 24 hours) at 12/13/2022 1252  Last data filed at 12/13/2022 1228  Gross per 24 hour   Intake 926 ml   Output 1650 ml   Net -724 ml      Wt Readings from Last 3 Encounters:   12/12/22 125 lb 12.8 oz (57.1 kg)   10/21/22 129 lb (58.5 kg)   09/22/22 132 lb (59.9 kg)       General appearance:  Appears comfortable  Eyes: Sclera clear. Pupils equal.  ENT: Moist oral mucosa. Trachea midline, no adenopathy. Cardiovascular: Regular rhythm, normal S1, S2. No murmur. No edema in lower extremities  Respiratory: Not using accessory muscles. Good inspiratory effort. Clear to auscultation bilaterally, no wheeze or crackles. GI: Abdomen soft, no tenderness, not distended, normal bowel sounds  Musculoskeletal: No cyanosis in digits, neck supple  Neurology: CN 2-12 grossly intact. No speech or motor deficits  Psych: Normal affect. Alert and oriented in time, place and person  Skin: Warm, dry, normal turgor    Labs and Tests:  CBC:   Recent Labs     12/11/22  1639 12/12/22  0552   WBC 11.6* 9.1   HGB 13.3 10.9*    184     BMP:    Recent Labs     12/11/22  1639 12/12/22  0552    142   K 3.4* 4.2    105   CO2 26 27   BUN 21* 19   CREATININE 0.7 <0.5*   GLUCOSE 137* 116*     Hepatic:   Recent Labs     12/11/22  1639 12/12/22  0552   AST 34 21   ALT 21 14   BILITOT 0.3 0.3   ALKPHOS 90 73       Discussed care with patient             Problem List  Principal Problem:    Failure to thrive in adult  Resolved Problems:    * No resolved hospital problems. *       Assessment & Plan:   Failure to thrive  -Likely multifactorial with weakness dysphagia dementia  Stable    Unsteady gait with generalized weakness  -Tremors likely probably essential tremor  -No acute finding.  -Continue to replace electrolyte. Procalcitonin less likely pneumonia or any other infectious process    Dementia    Diabetes      Diet: ADULT DIET;  Dysphagia - Soft and Bite Sized  Code:Full Code  DVT PPX lovenox   Disposition medically stable for discharge    Luis Angel Rivas MD   12/13/2022 12:52 PM

## 2022-12-13 NOTE — PROGRESS NOTES
Shift assessment completed, pt A&Ox1, flat affect, only responds to voice, VSS. AM med administered per MAR, PO med crushed and given with pudding. POC and education reviewed with pt. All needs met at this time, call light in reach, will continue to monitor. 1558: Pt got her ativan per order at 1500, pt still with flat affect, responds to voice, sleepy.

## 2022-12-13 NOTE — PROGRESS NOTES
Facility/Department: [address] ORTHO/NEURO NURSING  Speech Language Pathology   Dysphagia and Speech Language/Cognitive Treatment Note    Patient: Latanya Puente   : 1945   MRN: [id number]      Evaluation Date: 2022      Admitting Dx: Hypokalemia [E87.6]  Hypomagnesemia [E83.42]  Generalized weakness [R53.1]  Elevated CK [R74.8]  Failure to thrive in adult [R62.7]  Elevated lactic acid level [R79.89]  Altered mental status, unspecified altered mental status type [R41.82]  Treatment Diagnosis: Cognitive-Linguistic Deficits , Speech Language Deficits , Oropharyngeal Dysphagia   Pain: Denies                                                Subjective:  Pt awake and alert. Daughter present for duration of session. Dysphagia Treatment:   Diet and Treatment Recommendations 2022:  Diet Solids Recommendation:  Dysphagia III Soft and bite sized  Liquid Consistency Recommendation: Thin liquids  Recommended form of Meds: Meds in puree  or Meds crushed as able in puree           Compensatory strategies: Alternate solids/liquids , Upright as possible with all PO intake , Assist Feed , Small bites/sips , Eat/feed slowly    Assessment of Texture Tolerance:  Diet level prior to treatment: Dysphagia III Soft and bite sized , Thin liquids   Tolerance of Current Diet Level:Per chart, no noted difficulty with current diet level . Daughter reported limited intake      -Impressions: Pt was positioned Upright in bed , awake and alert. Currently on  1L O2 via nasal cannula . Trials of thin liquids and regular solids  were provided to assess swallow function. Pt was able to self feed solid food, required assistance with cup this date. She demonstrated tremors at rest and with feeding. Again unable to follow commands for oral mechanism exam. Pt demonstrated prolonged mastication with intermittent oral holding and delayed oral clearing.  Pharyngeal pooling with delayed swallow initiation was suspected with use of multiple swallows to clear solid bolus. No overt clinical s/s of aspiration/penetration assessed this date. Pt demonstrates increased risk for aspiration due to cognitive state  and co morbidities . Based on today's assessment recommend Dysphagia III Soft and bite sized  with Thin liquids , Meds in puree  or Meds crushed as able in puree  with use of compensatory swallow strategies (see above). Daughter was present in room for session. Extensive education was provided to daughter re; dementia and dysphagia, strategies to increased intake (I.e., finger foods, tray set up), s/s of aspiration, and compensatory strategies for swallow safety. Daughter verbalized understanding. Dysphagia Goals:  Pt will functionally tolerate recommended diet with no overt clinical s/s of aspiration (Ongoing 22)      Speech Language/Cognitive Treatment:  Impressions: This date goals were targeted via one step commands, simple questions and automatic speech tasks. Pt able to state name and . Unable to complete automatic speech task of counting. She was engaged in 1-step physical commands with less than 25% accuracy. Pt did not appear to benefit from repetition or physical model. She did demonstrate increased verbal responsiveness from prior date however remains inconsistent. Daughter reports pt is not at her baseline. Based on today's session recommend ongoing speech therapy to address new communicative deficits. Speech Language Short Term Goals:  Pt will improve auditory processing/comprehension of commands and questions to 80%, via graded tasks (Ongoing 22)  Pt will improve verbal expression for functional expression via graded tasks to 80% (Ongoing 22)      Assessment: Patient progressing toward goals    Plan: 3-5 times per week during acute care stay. Patient/Family Education:  Provided education regarding role of SLP, recommendations and general speech pathology plan of care.    [] Pt verbalized understanding and agreement   [x] Pt requires ongoing learning   [] No evidence of comprehension     Discharge Recommendations:  Discharge recommendations to be determined pending ongoing follow-up during acute care stay    Treatment time  Timed Code Treatment Minutes: 0 minutes   Total Treatment time: 25 minutes     If patient discharges prior to next session this note will serve as a discharge summary.       Signature: Sandra Loera, [facility]  Speech Language Pathologist    -

## 2022-12-13 NOTE — ACP (ADVANCE CARE PLANNING)
Advance Care Planning     Advance Care Planning Inpatient Note  Spiritual Care Department    Today's Date: 12/13/2022  Unit: [facility]OWER ORTHO/NEURO NURSING    Received request from [employer], RN. Upon review of chart and communication with care team, Spiritual Care will defer advance care planning with patient at this time. And existence of [employer] Advance Directive currently on chart. Goals of ACP Conversation:  Deferred at this time due to recent medical assessment    Health Care Decision Makers:       Primary Decision Maker: Hector Garcia Child - [phone]    Secondary Decision Maker: Seamus Minaya - Child - [phone]    Secondary Decision Maker: Pete Babinski - Child - [phone]      Advance Care Planning Documents (Patient Wishes):  Patient has [address] POA currently on chart. Assessment:  {  Interventions:  Deferred conversation based upon MD assessment. Care Preferences Communicated: Outcomes/Plan:  Deferred at this time.     Electronically signed by Katie Segal, [address] on 12/13/2022 at 4:00 PM

## 2022-12-13 NOTE — PROGRESS NOTES
"Problem List Items Addressed This Visit        Cardiac/Vascular    Tachycardia    Overview     New problem; started on propranolol 20 BID, HR 90s  - will send for thyroid studies/US/ECHO/Holter monitor   - ED precautions discussed with pt. Pt voiced understanding              Relevant Orders    US Soft Tissue Head Neck Thyroid    TSH    * Thyroglobulin    * Anti-thyroglobulin Antibody    * Thyroid Peroxidase Antibody    Echo    Holter monitor - 48 hour       Endocrine    Low TSH level - Primary    Overview     W/ assoc Symptomatic tachycardia  New problem; started on propranolol 20 BID, HR 90s  - will send for thyroid studies/US   - ED precautions discussed with pt. Pt voiced understanding            Relevant Orders    US Soft Tissue Head Neck Thyroid    TSH    * Thyroglobulin    * Anti-thyroglobulin Antibody    * Thyroid Peroxidase Antibody    Echo    Holter monitor - 48 hour      Other Visit Diagnoses     Elevated glucose        Relevant Orders    Hemoglobin A1C    Urinalysis            Patient ID: Yamila Tipton is a 27 y.o. female.    Chief Complaint:  establish care    Previous PCP: n/a      Patient is here to establish care. Has a hx of  has a past medical history of ADHD (attention deficit hyperactivity disorder) and Enlarged tonsils.   RN telemed.Reports drinking 2 shots espresso daily.     Reports last night on the way to work with , felt dizzy. Seen in ED yesterday. Started on propranolol 20 mg BID.     Per chart review, " Patient evaluated emergency department for reports of elevated heart rate. Patient's workup has been unremarkable. Patient is low risk for PE and had a negative D-dimer. Patient does not have any electrolyte abnormalities, is not anemic, does not have any signs of ACS. Patient is noted to have a mildly decreased TSH suggesting that patient's T4 swelling to be high. I informed the patient of this and suggested that she does need to follow-up with a primary care and/or " 2 hrs after lorazepam 1mg challenge to assess for catatonia, did not result in any improvement in mental status based on RN assessment. Patient noted to still be flat with affect, delayed response to voice, sleepy. Assessment / Recommendations:   Less likely catatonia. Will not continue benzodiazepines routinely. Mental status changes could be due to progression of dementia. Will keep low dose perphenazine. Agree with plan for SNF transfer. Patient can follow up with Dr. Scarlet Bobby outpatient. "endocrinologist. Patient will be started on a low-dose propranolol 20 mg b.i.d. for elevated heart rate."    Denies fevers, chills, chest pain, SOB, fatigue, abdominal pain, nausea, vomiting, dysuria, hematuria, hematochezia, or melena.       Mom hx thyroid nodule; MGM hyperthyroid s/p partial resection.      History:  OBGYN: [facility] [place]     LMP: No LMP recorded.   MGM: n/a   PAP: hx colpo, repeat pap nml  Colonoscopy: No personal history of colon cancer, hematochezia, melena, crohn's, ulcerative colitis; No family history of colon cancer.      Health Maintenance Topics with due status: Not Due       Topic Last Completion Date    TETANUS VACCINE 10/16/2021        ==============================================  History reviewed.   Health Maintenance Due   Topic Date Due    COVID-19 Vaccine (3 - Booster for Moderna series) 01/25/2022    Pap Smear  06/28/2022       Past Medical History:  Past Medical History:   Diagnosis Date    ADHD (attention deficit hyperactivity disorder)     Enlarged tonsils      Past Surgical History:   Procedure Laterality Date    TONSILLECTOMY      WISDOM TOOTH EXTRACTION  7-8-13     Review of patient's allergies indicates:   Allergen Reactions    Concerta [methylphenidate] Nausea Only     Current Outpatient Medications on File Prior to Visit   Medication Sig Dispense Refill    levonorgestreL (MIRENA) 20 mcg/24 hours (7 yrs) 52 mg IUD 1 each by Intrauterine route once.      propranoloL (INDERAL) 20 MG tablet Take 1 tablet (20 mg total) by mouth 2 (two) times daily. 60 tablet 0    [DISCONTINUED] MONONESSA, 28, 0.25-35 mg-mcg per tablet TAKE 1 TABLET BY MOUTH DAILY 28 tablet 0    [DISCONTINUED] trazodone (DESYREL) 50 MG tablet Take 1 tablet (50 mg total) by mouth every evening. 30 tablet 1     Current Facility-Administered Medications on File Prior to Visit   Medication Dose Route Frequency Provider Last Rate Last Admin    [COMPLETED] metoprolol injection 5 mg  5 mg " Intravenous ED 1 Time Manuel Cantu MD   5 mg at 06/27/22 0168    [COMPLETED] propranoloL tablet 20 mg  20 mg Oral ED 1 Time Manuel Cantu MD   20 mg at 06/28/22 0145    [COMPLETED] sodium chloride 0.9% bolus 1,000 mL  1,000 mL Intravenous ED 1 Time Manuel Cantu MD   Stopped at 06/28/22 0031     Social History     Socioeconomic History    Marital status: Single   Tobacco Use    Smoking status: Never Smoker    Smokeless tobacco: Never Used   Substance and Sexual Activity    Alcohol use: No    Drug use: No    Sexual activity: Yes     Partners: Male     Birth control/protection: None     Family History   Problem Relation Age of Onset    Thyroid disease Mother     Hypertension Mother     Diabetes Mother           Review of Systems   12 point review of systems per hpi, otherwise negative         Objective:    Nursing note and vitals reviewed.  Vitals:    06/28/22 1504   BP: 117/79   Pulse: 100   Temp: 98.1 °F (36.7 °C)     Body mass index is 29.39 kg/m².     Physical Exam   Constitutional: SHE is oriented to person, place, and time. She appears well-developed and well-nourished. No distress.   HENT: WNL  Head: Normocephalic and atraumatic.   Eyes: Pupils are equal, round, and reactive to light. EOM are normal.   Neck: Normal range of motion. Neck supple. No overt thyromegaly, + ant cervical chain LAD  Cardiovascular: Normal rate, regular rhythm, normal heart sounds and intact distal pulses.   No murmur heard.  Pulmonary/Chest: Effort normal and breath sounds normal. No respiratory distress. She has no wheezes.   GI: soft, non distended, no ttp, no rebound/guarding  Musculoskeletal: Normal range of motion. She exhibits no edema.   Neurological: She is alert and oriented to person, place, and time. No cranial nerve deficit.   Skin: Skin is warm and dry. Capillary refill takes less than 2 seconds.   Psychiatric: She has a normal mood and affect. Her behavior is normal.           Nereida Shaw MD    We Offer  Telehealth & Same Day Appointments!   Book your Telehealth appointment with me through my nurse or   Clinic appointments on [facility]!  [facility]-[phone]     To Schedule appointments online, go to [facility]: [email or web address]

## 2022-12-13 NOTE — CARE COORDINATION
[facility] ([place]) pre-cert request submitted via [facility] Access Portal.    Requested Facility:  [facility]  Anticipated Admit Date to [facility]:  12/13/2022  Auth#:  [id number]    PENDING    Electronically signed by Gladys Schmidt RN on 12/13/2022 at 11:20 AM

## 2022-12-13 NOTE — CARE COORDINATION
12/13/22 1150   IMM Letter   IMM Letter given to Patient/Family/Significant other/Guardian/POA/by: Sarah Rosenthal, Daughter   IMM Letter date given: 12/13/22   IMM Letter time given: 1150     Copy given to daughter, Sarah Rosenthal.     FABY OatesN RN    [facility]  Phone: [phone]

## 2022-12-13 NOTE — CONSULTS
PSYCHIATRY CONSULT, INITIAL EVALUATION    Attending Provider:  Bhavik Kan MD    CC/Reason for Consult: AMS, recent changes in psych meds    HPI:   context: 69 yo F with dementia, here with decline in mental status over the last 2 weeks. Pt had a fall 3 wks ago and her psychiatrist stopped her perphenazine 4mg BID and cogentin 1mg BID about 1 week later in order to reduce her risk of falls. Since that time her tremors from the medication seemed to have improved, but she is less interactive, more confused, less able to talk and communicate. She has also been picking at her skin and daughter notes she was holding an odd sideways position for a long period of time without purpose. Pt started to have memory problems 3 yrs ago. She had been treated with olanzapine and quetiapine for delusional thoughts prior to eventually getting admitted at [facility] in the summer of 2022 with delusions and OCD symptoms that led her to be found walking outside naked. She was placed on perphenazine 8mg BID, depakote,  luvox 50mg qhs but this has been tapered down by her psychiatrist to perphenazaine 4mg BID due to concern about side effects and cogentin had been added by pt's PCP to managed EPS symptoms. Since stopping the medications her motor function and balance seems to have worsened, but she has not been psychotic or delusional.     Pt at this time can not provide any history and stares blankly and can't answer most questioned asked of her.      Timing: subacute on chronic  duration: 12 days  severity: severe    Collateral information: obtained from daughter, Deborah Brody, who was at bedside    ROS:   Unable to assess d/t AMS    Past Psychiatric History:   Hosp: [facility] May 2022  Diagnoses: dementia w/behavioral disturbance  Med trials: olanzapine, quetiapine, perphenazine, cogentin, risperidone  Outpt: Dr. Sj Bowling    Substance Use History:  No active substance abuse issues    Past Medical History:   Diagnosis Date COVID-19 virus infection 01/07/2022    tESTED = 1/24/21. Dementia with behavioral disturbance 05/23/2022    Diabetes mellitus (HCC)     GERD (gastroesophageal reflux disease)     Hyperlipidemia     Hypertension     Hypothyroidism     MGUS (monoclonal gammopathy of unknown significance) 2019    Mild dementia     Osteopenia of multiple sites 10/15/2015    Other idiopathic scoliosis, thoracolumbar region 08/03/2017    Moderate to marked scoliosis chest x-ray. On CT 2/14/2019.  3/7/2019 on bone scan       Social/Developmental History:   Daughter is primary support Mo Menon. Family History   Problem Relation Age of Onset    High Cholesterol Mother     Stroke Mother     Mental Illness Mother         Was on medicine - not sure of diagnosis    Diabetes type 2  Mother         per pt. Heart Disease Father     Heart Attack Father     Colon Cancer Father     No Known Problems Sister     Cancer Sister     Other Brother         gait issue    Heart Attack Brother     Coronary Art Dis Brother     Coronary Art Dis Brother     Heart Attack Brother     Diabetes type 2  Brother         ? ? Type     Other Brother         MVA with amputation    Heart Disease Brother     Lymphoma Daughter 22        Non-hodgkins - radiation tx - mid 19's    Multinodular goiter Daughter     Heart Murmur Daughter         ? 2nd to radiation? Mental Illness Daughter         ?schizophrenia?     Hypertension Son     High Cholesterol Son     Other Son         GB SURGERY, KUMAR       No Known Allergies    Medications:  Scheduled Meds:   perphenazine  2 mg Oral BID WC    LORazepam  0.5 mg IntraVENous Once    divalproex  250 mg Oral BID    levothyroxine  25 mcg Oral Daily    insulin lispro  0-4 Units SubCUTAneous TID WC    insulin lispro  0-4 Units SubCUTAneous Nightly    sodium chloride flush  5-40 mL IntraVENous 2 times per day    enoxaparin  40 mg SubCUTAneous Daily       OBJECTIVE:  Height: 4' 11\" (1.499 m), Weight: 125 lb 12.8 oz (57.1 kg), BP: 136/81    MSE:   Appearance    alert, laying in bed, no eye contact  Motor: muscle stiffness, staring, rigidity with maintenance of rigid position, +waxy flexibility, muscle resistance, +grasp reflex  Speech    answers some questions with one word, but most exhibits mutism. Language    2 - severe aphasia; all communication is through fragmentary expression; great need for inference, questioning, and guessing by the listener. Range of information that can be exchanged is limited; listener carries burden of communication. Examiner cannot identify materials provided from patient response. Mood/Affect    unable to assess / flat  Thought Process    slow  Thought Content    no delusions, paucity of thoughts , no suicidal ideation  Associations     unable to assess  Attention/Concentration    impaired  Orientation   not oriented to person, place or time  Memory    impaired for recent and remote content  Fund of Knowledge    impaired  Insight/Judgement    Poor / impaired    Labs:     Lab Results   Component Value Date    CREATININE <0.5 (L) 12/12/2022    BUN 19 12/12/2022     12/12/2022    K 4.2 12/12/2022     12/12/2022    CO2 27 12/12/2022     Lab Results   Component Value Date    WBC 9.1 12/12/2022    HGB 10.9 (L) 12/12/2022    HCT 33.8 (L) 12/12/2022    MCV 78.4 (L) 12/12/2022     12/12/2022     Lab Results   Component Value Date    TSH 2.38 05/24/2022    TSHREFLEX 1.38 08/22/2022     Lab Results   Component Value Date    XTQISUMC36 245 08/22/2022     Lab Results   Component Value Date    FOLATE >20.00 08/22/2022     Lab Results   Component Value Date    VALPROATE 47.8 (L) 12/11/2022         Imaging:   CT Head 12/11/2022:      FINDINGS:   BRAIN/VENTRICLES: There is no acute intracranial hemorrhage, mass effect or   midline shift. No abnormal extra-axial fluid collection. The gray-white   differentiation is maintained without evidence of an acute infarct.   There is   no evidence of hydrocephalus. There is stable age-appropriate atrophy. There   is stable mild chronic small vessel ischemic white matter disease. No focus   of acute abnormal brain attenuation is identified. ORBITS: The visualized portion of the orbits demonstrate no acute abnormality. SINUSES: The visualized paranasal sinuses and mastoid air cells demonstrate   no acute abnormality. SOFT TISSUES/SKULL:  There are multiple stable various sized lucencies   throughout the calvarium, unchanged dating back to the study 2019,   likely benign given their stability. There is no acute osseous abnormality   of the skull base or calvarium. Impression   No acute intracranial abnormality. EK2022: rate 100 bpm, NSR, prolonged QT, non-specific T wave abnormality, QTc 528ms     ASSESSMENT:   69 yo F with dementia and hx of psychosis and behavioral disturbance in the setting of dementia who has been treated with antipsychotics, mood stabilizers, and antidepressants with benefit. With recent falls the perphenazine was stopped, but she may have had some withdrawal effects causing changes in mentation. Alternatively, her mental status changes could also be due to progression of her dementia. Complicating the picture is that she clearly exhibits catatonic features on exam, which I wouldn't expect from antipsychotic withdrawal, but could be due to underlying metabolic disturbance or psychiatric illness. Altered mental status  2. Major neurocognitive disorder   3. Suspected catatonia    RECOMMENDATIONS:   1. Will give lorazepam 1mg IV x 1 to see if her mentation, speech, and motor rigidity improve. We should see improvement within 30-60 min if this is truly catatonia. 2. I will resume perphenazine at 2mg BID, and she may be able to slowly taper this as an outpatient with the help of her psychiatrist  3. I will resume luvox 50mg qhs  4.  Will check NH3, B12, folate, TSH to r/o as potential contributing factors to her AMS  5. Continue depakote 25mg BID    Dispo: planned for SNF transfer which seems appropriate. She needs outpatient follow up with Dr. Dolly Thomas. Thank you for this consult, please call the psychiatry consult line for further questions.  I will continue to follow            Vicki Beasley MD   Psychiatrist

## 2022-12-14 VITALS
DIASTOLIC BLOOD PRESSURE: 73 MMHG | OXYGEN SATURATION: 95 % | SYSTOLIC BLOOD PRESSURE: 115 MMHG | HEIGHT: 59 IN | RESPIRATION RATE: 12 BRPM | BODY MASS INDEX: 25.36 KG/M2 | WEIGHT: 125.8 LBS | HEART RATE: 68 BPM | TEMPERATURE: 97.6 F

## 2022-12-14 LAB
GLUCOSE BLD-MCNC: 106 MG/DL (ref 70–99)
GLUCOSE BLD-MCNC: 169 MG/DL (ref 70–99)
PERFORMED ON: ABNORMAL
PERFORMED ON: ABNORMAL

## 2022-12-14 PROCEDURE — 6360000002 HC RX W HCPCS: Performed by: NURSE PRACTITIONER

## 2022-12-14 PROCEDURE — 6370000000 HC RX 637 (ALT 250 FOR IP): Performed by: PSYCHIATRY & NEUROLOGY

## 2022-12-14 PROCEDURE — 92507 TX SP LANG VOICE COMM INDIV: CPT

## 2022-12-14 PROCEDURE — 6370000000 HC RX 637 (ALT 250 FOR IP): Performed by: NURSE PRACTITIONER

## 2022-12-14 PROCEDURE — 92526 ORAL FUNCTION THERAPY: CPT

## 2022-12-14 PROCEDURE — 97530 THERAPEUTIC ACTIVITIES: CPT

## 2022-12-14 PROCEDURE — 97535 SELF CARE MNGMENT TRAINING: CPT

## 2022-12-14 RX ADMIN — ENOXAPARIN SODIUM 40 MG: 100 INJECTION SUBCUTANEOUS at 10:14

## 2022-12-14 RX ADMIN — DIVALPROEX SODIUM 250 MG: 250 TABLET, DELAYED RELEASE ORAL at 15:49

## 2022-12-14 RX ADMIN — PERPHENAZINE 2 MG: 2 TABLET, FILM COATED ORAL at 10:14

## 2022-12-14 RX ADMIN — POTASSIUM CHLORIDE AND SODIUM CHLORIDE: 900; 150 INJECTION, SOLUTION INTRAVENOUS at 10:12

## 2022-12-14 RX ADMIN — DIVALPROEX SODIUM 250 MG: 250 TABLET, DELAYED RELEASE ORAL at 10:14

## 2022-12-14 RX ADMIN — LEVOTHYROXINE SODIUM 25 MCG: 0.03 TABLET ORAL at 06:54

## 2022-12-14 NOTE — CARE COORDINATION
Discharge Plan:     Patient discharged to: [address] [facility], [address]    SW/DC Planner faxed, [address] and [facility] to: [phone]    Narcotic Prescriptions faxed were: N/A    RN: will call report to:   [phone]      Medical Transport with: [address] [phone]     time: 1600    Family advised of discharge?: yes, Cyndy Regalado?:  yes    All discharge needs met per case management.     FABY BellamyN RN    [facility]  Phone: [phone]

## 2022-12-14 NOTE — DISCHARGE SUMMARY
[facility] DISCHARGE SUMMARY    Patient Demographics    Patient. Grady Sadler  Date of Birth. 1945  MRN. [id number]     Primary care provider. Vince Siddiqui DO  (Tel: [phone])    Admit date: 12/11/2022    Discharge date (blank if same as Note Date): Note Date: 12/14/2022     Reason for Hospitalization. Chief Complaint   Patient presents with    Altered Mental Status     Patient with daughter. Px hx dementia. Dr concerned that mental status is declining. Pt had a fall 3 weeks ago and was not taken the hospital and also had medications changed on the 11/30. [facility] Course. Altered mental status resolved  Remained stable questioning ADLs with unable to care at home with deconditioning evaluated by PT OT. Patient was discharged to skilled nursing facility    Consults. IP CONSULT TO CASE MANAGEMENT  IP CONSULT TO PALLIATIVE CARE  IP CONSULT TO PSYCHIATRY  IP CONSULT TO SPIRITUAL SERVICES    Physical examination on discharge day. /73   Pulse 68   Temp 97.6 °F (36.4 °C) (Axillary)   Resp 12   Ht 4' 11\" (1.499 m)   Wt 125 lb 12.8 oz (57.1 kg)   SpO2 95%   BMI 25.41 kg/m²   General appearance. Alert. Looks comfortable. HEENT. Sclera clear. Moist mucus membranes. Cardiovascular. Regular rate and rhythm, normal S1, S2. No murmur. Respiratory. Not using accessory muscles. Clear to auscultation bilaterally, no wheeze. Gastrointestinal. Abdomen soft, non-tender, not distended, normal bowel sounds  Neurology. Facial symmetry. No speech deficits. Moving all extremities equally. Extremities. No edema in lower extremities. Skin. Warm, dry, normal turgor    Condition at time of discharge stable     Medication instructions provided to patient at discharge.      Medication List        CHANGE how you take these medications      perphenazine 2 MG tablet  Take 1 tablet by mouth 2 times daily (with meals)  What changed:   medication strength  how much to take  when to take this            CONTINUE taking these medications      atorvastatin 20 MG tablet  Commonly known as: LIPITOR  TAKE 1 TABLET BY MOUTH EVERY DAY     cyanocobalamin 1000 MCG tablet  Take 1 tablet by mouth daily     divalproex 125 MG capsule  Commonly known as: DEPAKOTE SPRINKLE  Take 2 capsules by mouth 2 times daily at 0800 and 1400     donepezil 10 MG tablet  Commonly known as: ARICEPT  TAKE 1 TABLET BY MOUTH IN THE EVENING     fluvoxaMINE 50 MG tablet  Commonly known as: LUVOX  TAKE 1 TABLET BY MOUTH EVERY DAY AT NIGHT     levothyroxine 25 MCG tablet  Commonly known as: SYNTHROID  TAKE 1 TABLET BY MOUTH EVERY DAY     metFORMIN 500 MG tablet  Commonly known as: GLUCOPHAGE  TAKE 2 TABLETS BY MOUTH TWO TIMES A DAY WITH MEALS     MULTI-DAY VITAMINS PO     pantoprazole 40 MG tablet  Commonly known as: PROTONIX  TAKE 1 TABLET BY MOUTH EVERY DAY     VITAMIN D PO     zinc gluconate 50 MG tablet            STOP taking these medications      benztropine 1 MG tablet  Commonly known as: COGENTIN     diclofenac sodium 1 % Gel  Commonly known as: VOLTAREN     fluticasone 50 MCG/ACT nasal spray  Commonly known as: FLONASE     lisinopril-hydroCHLOROthiazide 20-12.5 MG per tablet  Commonly known as: PRINZIDE;ZESTORETIC               Where to Get Your Medications        These medications were sent to [facility], [address] [place] [address], [address] [place] [address]      Phone: [phone]   perphenazine 2 MG tablet         Discharge recommendations given to patient. Follow Up. pcp in 1 week   Disposition. ECF  Activity. activity as tolerated  Diet: ADULT DIET; Dysphagia - Soft and Bite Sized      Spent 45  minutes in discharge process.     Signed:  Marium Turpin MD     12/14/2022 3:05 PM

## 2022-12-14 NOTE — PROGRESS NOTES
Shift assessment complete; pt comfortably resting in bed. VSS. Disoriented x4. Call light and belongings within reach. The care plan and education has been reviewed and mutually agreed upon with the patient.

## 2022-12-14 NOTE — PROGRESS NOTES
[facility]   Phone: [phone]    Physical Therapy    [] Initial Evaluation            [x] Daily Treatment Note         [] Discharge Summary      Patient: Alexus Hale   : 1945   MRN: [id number]   Date of Service:  2022  Admitting Diagnosis: Failure to thrive in adult  Current Admission Summary: Alexus Hale is a 68 y.o. female. Patient presents to the emergency room tonight accompanied by her daughter. Patient fell approximately 3 weeks ago since then she has had a progressive decline in her health. She has been unable to stand up and use her walker. She has had decreased appetite. Her psychiatrist stopped on of her medications per daughter thinking maybe that maybe affecting her balance. However there has been no improvement and unfortunately her resting tremors in her arms has gotten progressively worse with discontinuation of medication. Which has made it difficult for patient to feed herself. Patient does have dementia and daughter feels her memory fluctuates at baseline and has not noticed a significant change in this in past 3 weeks. She was noted to have difficulty in ER swallowing medications. Which was new according to daughter. Family feel they can no longer care for patient safely at home and are seeking ECF placement at this time. Past Medical History:  has a past medical history of COVID-19 virus infection, Dementia with behavioral disturbance, Diabetes mellitus ([address] GERD (gastroesophageal reflux disease), Hyperlipidemia, Hypertension, Hypothyroidism, MGUS (monoclonal gammopathy of unknown significance), Mild dementia, Osteopenia of multiple sites, and Other idiopathic scoliosis, thoracolumbar region. Past Surgical History:  has a past surgical history that includes eye surgery (Left, ); bladder suspension (N/A, ); and Colonoscopy (2017). Discharge Recommendations:  Alexus Hale scored a 6/24 on the AM-PAC short mobility form. Current research shows that an AM-PAC score of 17 or less is typically not associated with a discharge to the patient's home setting. Based on the patient's AM-PAC score and their current functional mobility deficits, it is recommended that the patient have 3-5 sessions per week of Physical Therapy at d/c to increase the patient's independence. Please see assessment section for further patient specific details. If patient discharges prior to next session this note will serve as a discharge summary. Please see below for the latest assessment towards goals. DME Required For Discharge: DME to be determined at next level of care  Precautions/Restrictions: high fall risk  Positional Restrictions:no positional restrictions    Pre-Admission Information - Information is from prior chart- admission in August 2022  Lives With: daughter, Arpit Salazar; spends the day at her son's house which is a bilevel and pt must amb up/down steps     Type of Home: house  Home Layout: one level, laundry in basement  Home Access:  2 step to enter with handrail. Handrails are located on L side.   Bathroom Layout: walker accessible, tub/shower unit  Bathroom Equipment: grab bars in shower, shower chair  Toilet Height: standard height  Home Equipment: rolling walker, single point cane, alert button  Transfer Assistance: requires assistance  Ambulation Assistance:requires assistance-son and daughter HHA  ADL Assistance: requires assistance with bathing, requires assistance with dressing, requires assistance with grooming, requires assistance with toileting  IADL Assistance: requires assistance with all homemaking tasks, pt will help with setting table and washing dishes  Active :        [] Yes                 [x] No  Hand Dominance: [] Left                 [] Right  Hobbies: watching tv; mostly sedentary   Recent Falls: Per chart review and discussion with pt's children in August, pt spent a month in a behavioral health unit in May-June 2022. Pt did not receive therapy services and experienced a large decline in her mobility level during that time. Prior to that hospitalization, pt was much more independent with transfers, ambulation, stair climbing. Pt has had over a dozen falls this year         Subjective  General: Pt supine in bed upon arrival; agreeable to PT/OT; flat affect, decreased conversation  Pain: Patient does not rate upon questioning  Pain Interventions: repositioned        Functional Mobility  Bed Mobility  Supine to Sit: 2 person assistance with Dep of 2   Sit to Supine: 2 person assistance with Dep of 2   Scooting: dependent assistance  Comments:  Transfers  No transfers completed on this date secondary to n/a. Comments:  Ambulation  Ambulation not tested on this date secondary to unsafe to attempt ambulation this date; pt has difficulty sitting fully upright, limited activity tolerance, poor command following.   Distance:   Gait Mechanics:   Comments:    Balance  Static Sitting Balance: poor (+): requires min (A) to maintain balance  Dynamic Sitting Balance: poor (-): requires max (A) to maintain balance  Static Standing Balance: poor (-): requires max (A) to maintain balance  Dynamic Standing Balance: poor (-): requires max (A) to maintain balance  Comments: Pt sits EOB with CGA to Max A after 15 min for ADL's  Other Therapeutic Interventions    Functional Outcomes                 Cognition  Overall Cognitive Status: Impaired  Arousal/Alterness: delayed responses to stimuli, inconsistent responses to stimuli  Following Commands: inconsistently follows commands  Attention Span: unable to maintain attention  Memory: decreased recall of biographical information  Safety Judgement: decreased awareness of need for assistance, decreased awareness of need for safety  Problem Solving: decreased awareness of errors  Insights: not aware of deficits  Initiation: requires cues for all  Sequencing: requires cues for all  Orientation:    oriented to person, knew name, not birthday; pt stated that her daughter's name is Thai Siddiqui Following:   impaired    Education  Barriers To Learning: cognition  Patient Education: patient educated on PT role and benefits, plan of care  Learning Assessment:  patient will require reinforcement due to cognitive deficits    Assessment  Activity Tolerance:   Impairments Requiring Therapeutic Intervention: decreased functional mobility, decreased strength, decreased safety awareness, decreased cognition, decreased endurance, decreased balance  Prognosis: fair and poor  Clinical Assessment:  Pt is limited by the above deficits and would benefit from skilled PT services to maximize pt functional mobility and safety prior to discharge. Pt is dependent for all mobility and significantly limited by cognitive deficits. Recommend SNF at discharge. Safety Interventions: patient left in bed, bed alarm in place, patient left in chair, call light within reach, gait belt, patient at risk for falls, and nurse notified    Plan  Frequency: 3-5 x/per week  Current Treatment Recommendations: strengthening, ROM, balance training, functional mobility training, transfer training, gait training, and patient/caregiver education    Goals  Patient Goals: None stated   Short Term Goals:  Time Frame: Discharge  Patient will complete bed mobility at moderate assistance   Patient will complete transfers at moderate assistance   Patient will ambulate 10 ft with use of LRAD at moderate assistance  Patient to maintain standing at minimal assistance for 5 minutes.     No goals met 12/14    Therapy Session Time      Individual Group Co-treatment   Time In     1317   Time Out     1343   Minutes     26     Timed Code Treatment Minutes:  26 Minutes  Total Treatment Minutes:  26       Electronically Signed By: CARLOS Lund PT, DPT [id number]  PT agrees with above note and addressed goals.

## 2022-12-14 NOTE — PROGRESS NOTES
Facility/Department: [address] ORTHO/NEURO NURSING  Speech Language Pathology   Dysphagia and Speech Language/Cognitive Treatment Note    Patient: Cristy Bojorquez   : 1945   MRN: [id number]      Evaluation Date: 2022      Admitting Dx: Hypokalemia [E87.6]  Hypomagnesemia [E83.42]  Generalized weakness [R53.1]  Elevated CK [R74.8]  Failure to thrive in adult [R62.7]  Elevated lactic acid level [R79.89]  Altered mental status, unspecified altered mental status type [R41.82]  Treatment Diagnosis: Cognitive-Linguistic Deficits , Speech Language Deficits , Oropharyngeal Dysphagia   Pain: Denies                                                Subjective:  Pt awake and alert, resting upright in bed. Dysphagia Treatment:   Diet and Treatment Recommendations 2022:  Diet Solids Recommendation:  Dysphagia III Soft and bite sized  Liquid Consistency Recommendation: Thin liquids  Recommended form of Meds: Meds in puree  or Meds crushed as able in puree        Compensatory strategies: Alternate solids/liquids , Upright as possible with all PO intake , Assist Feed , Small bites/sips , Eat/feed slowly    Assessment of Texture Tolerance:  Diet level prior to treatment: Dysphagia III Soft and bite sized , Thin liquids   Tolerance of Current Diet Level:Per chart, no noted difficulty with current diet level      -Impressions: Pt was positioned Upright in bed , awake and alert. Currently on  1L O2 via nasal cannula . Again unable to follow commands for oral mechanism exam, limited verbalizations this date. Trials of thin liquids and soft solids were provided to assess swallow function. Pt continues to present with tremors at rest and when feeding, SLP assisted with cups and utensils. Attempted solids, when trials presented to pt and placed in pt's hand, pt with no functional acceptance or attention to presentation.  Oral phase characterized by functional oral acceptance and labial seal, prolonged but functional oral manipulation and A-P transit. Concern for delayed swallow initiation, multiple swallows to clear boluses intermittently observed. No overt clinical s/s of aspiration across PO intake; no coughing, throat clearing, vocal quality change, or RR change. Pt demonstrates increased risk for aspiration due to cognitive state  and co morbidities . Based on today's assessment recommend continuation of current diet with use of compensatory swallow strategies (see above). Dysphagia Goals:  Pt will functionally tolerate recommended diet with no overt clinical s/s of aspiration (Ongoing 22)      Speech Language/Cognitive Treatment:  Impressions: This date goals were targeted via one step commands, responsive speech tasks, and automatic speech tasks. Pt able to state name and  with delay in responses. Trialed automatic speech tasks of counting 1-10, SHIKHA, and MARYLOU. Pt did not verbalize numbers or MARYLOU despite multimodality cues (tapping, model, visual cues). With unison speech, pt verbalized SHIKHA. Pt completed responsive speech prompts in 25% of opportunities, inconsistent verbal responses to basic yes/no questions. Pt again did not follow one-step simple commands consistently this date, did not appear to benefit from repetition or physical model. She did demonstrate intermittent appropriate responses to conversational speech. Based on today's session recommend ongoing speech therapy to address new communicative deficits. Speech Language Short Term Goals:  Pt will improve auditory processing/comprehension of commands and questions to 80%, via graded tasks (Ongoing 22)  Pt will improve verbal expression for functional expression via graded tasks to 80% (Ongoing 22)      Assessment: Patient progressing toward goals    Plan: 3-5 times per week during acute care stay.       Patient/Family Education:  Provided education regarding role of SLP, recommendations and general speech pathology plan of care.   [] Pt verbalized understanding and agreement   [x] Pt requires ongoing learning   [] No evidence of comprehension     Discharge Recommendations:  Discharge recommendations to be determined pending ongoing follow-up during acute care stay    Treatment time  Timed Code Treatment Minutes: 0 minutes   Total Treatment time: 24 minutes     If patient discharges prior to next session this note will serve as a discharge summary.       Signature:   Robyn Croft, [id number] [facility]  Speech-Language Pathologist  [place] [phone]

## 2022-12-14 NOTE — PROGRESS NOTES
PALLIATIVE MEDICINE PROGRESS NOTE     Patient name:Franci Landaverde Mail    UVX:[id number] :1945  Room/Bed:[facility]44644464-01    LOS: 3 days        ASSESSMENT/RECOMMENDATIONS     68 y.o. female with AMS and debility with dementia        Symptom Management:  AMS- pt limited in her verbal responses improved today, psychiatry on board  Debility- pt with increased falls and imbalance at home   Goals of Care- talked to pts joshua Tucker she they are waiting on precert for Rehab plan is for LTC after we discussed code status she feels that [facility] is appropriate we discussed Hospice support when pt gets past skilled stay days      Patient/Family Goals of Care :      talked to pts two children they are to the point that they can't manage pt at home she had a geriatric psych stay and has been weaning off medication under medical advice her kids feel that her symptoms started about the time she stopped medication. They are going to reach out to [employer] for help with placement. We discussed code status the family will discuss and I'll follow up.       talked to pts daughter Kanu Tucker she they are waiting on precert for Rehab plan is for LTC after we discussed code status she feels that [facility] is appropriate we discussed Hospice support when pt gets past skilled stay days      Disposition/Discharge Plan:   pending     Advance Directives:  Surrogate Decision Maker: Elizabeth-child  Code status:  [facility]     Case discussed with: patient, floor RN  Thank you for allowing us to participate in the care of this patient. SUBJECTIVE     Chief Complaint: AMS    Last 24 hours:   Pt more alert today she is able to say more words today. ROS:    Review of Systems   Unable to perform ROS: Mental status change        Patient unable to complete full ROS due to current cognitive status.   Information that is obtained from nursing and chart          OBJECTIVE   /73   Pulse 68   Temp 97.6 °F (36.4 °C) (Axillary) Resp 12   Ht 4' 11\" (1.499 m)   Wt 125 lb 12.8 oz (57.1 kg)   SpO2 95%   BMI 25.41 kg/m²   I/O last 3 completed shifts: In: 40 [P.O.:40]  Out: 1900 [Urine:1900]  I/O this shift:  In: -   Out: 1300 [Urine:1300]      Physical Exam  Constitutional:       Appearance: She is ill-appearing. HENT:      Head: Normocephalic and atraumatic. Nose: No congestion. Mouth/Throat:      Mouth: Mucous membranes are dry. Eyes:      Pupils: Pupils are equal, round, and reactive to light. Cardiovascular:      Rate and Rhythm: Normal rate. Heart sounds: No murmur heard. No friction rub. No gallop. Pulmonary:      Effort: No respiratory distress. Abdominal:      Palpations: Abdomen is soft. Musculoskeletal:      Cervical back: Normal range of motion. Right lower leg: Edema present. Left lower leg: Edema present. Skin:     General: Skin is warm and dry. Coloration: Skin is pale. Neurological:      Mental Status: She is alert.       Comments: Pt alert with limited verbal responses            Total time: 35 minutes  >50% of time spent counseling patient at bedside or POA/family member if applicable , reviewing information and discussing care, coordinating with care team       Signed By: Electronically signed by PRATIMA Moreno CNP on 12/14/2022 at 11:37 AM   Palliative Medicine   [phone]    December 14, 2022

## 2022-12-14 NOTE — CARE COORDINATION
[employer] [place] authorization received via [place] Access Portal    Plan Auth ID:  [id number]  Service:  [facility]  Approval Dates:  12/14/22 to 12/16/2022  Next Review Date:  12/21/2022    Electronically signed by Gracie Dickson RN on 12/14/2022 at 10:47 AM

## 2022-12-14 NOTE — PROGRESS NOTES
[facility]   Phone: [phone]    Occupational Therapy    [] Initial Evaluation            [x] Daily Treatment Note         [] Discharge Summary      Patient: Beverly Vazquez   : 1945   MRN: [id number]   Date of Service:  2022    Admitting Diagnosis:  Failure to thrive in adult  Current Admission Summary: Beverly Vazquez is a 68 y.o. female. Patient presents to the emergency room tonight accompanied by her daughter. Patient fell approximately 3 weeks ago since then she has had a progressive decline in her health. She has been unable to stand up and use her walker. She has had decreased appetite. Her psychiatrist stopped on of her medications per daughter thinking maybe that maybe affecting her balance. However there has been no improvement and unfortunately her resting tremors in her arms has gotten progressively worse with discontinuation of medication. Which has made it difficult for patient to feed herself. Patient does have dementia and daughter feels her memory fluctuates at baseline and has not noticed a significant change in this in past 3 weeks. She was noted to have difficulty in ER swallowing medications. Which was new according to daughter. Family feel they can no longer care for patient safely at home and are seeking ECF placement at this time. Past Medical History:  has a past medical history of COVID-19 virus infection, Dementia with behavioral disturbance, Diabetes mellitus ([address] GERD (gastroesophageal reflux disease), Hyperlipidemia, Hypertension, Hypothyroidism, MGUS (monoclonal gammopathy of unknown significance), Mild dementia, Osteopenia of multiple sites, and Other idiopathic scoliosis, thoracolumbar region. Past Surgical History:  has a past surgical history that includes eye surgery (Left, ); bladder suspension (N/A, ); and Colonoscopy (2017). Discharge Recommendations:  Beverly Vazquez scored a 9/24 on the AM-PAC ADL Inpatient form. Current research shows that an AM-PAC score of 17 or less is typically not associated with a discharge to the patient's home setting. Based on the patient's AM-PAC score and their current ADL deficits, it is recommended that the patient have 3-5 sessions per week of Occupational Therapy at d/c to increase the patient's independence. Please see assessment section for further patient specific details. If patient discharges prior to next session this note will serve as a discharge summary. Please see below for the latest assessment towards goals. DME Required For Discharge: DME to be determined at next level of care    Precautions/Restrictions: high fall risk  Weight Bearing Restrictions: no restrictions  [] Right Upper Extremity  [] Left Upper Extremity [] Right Lower Extremity  [] Left Lower Extremity     Required Braces/Orthotics: no braces required   [] Right  [] Left  Positional Restrictions:no positional restrictions    Pre-Admission Information - Information is from prior chart- admission in August 2022  Lives With: daughter, Dominic Blunt; spends the day at her son's house which is a bilevel and pt must amb up/down steps     Type of Home: house  Home Layout: one level, laundry in basement  Home Access:  2 step to enter with handrail. Handrails are located on L side.   Bathroom Layout: walker accessible, tub/shower unit  Bathroom Equipment: grab bars in shower, shower chair  Toilet Height: standard height  Home Equipment: rolling walker, single point cane, alert button  Transfer Assistance: requires assistance  Ambulation Assistance:requires assistance-son and daughter HHA  ADL Assistance: requires assistance with bathing, requires assistance with dressing, requires assistance with grooming, requires assistance with toileting  IADL Assistance: requires assistance with all homemaking tasks, pt will help with setting table and washing dishes  Active :        [] Yes [x] No  Hand Dominance: [] Left                 [] Right  Hobbies: watching tv; mostly sedentary   Recent Falls: Per chart review and discussion with pt's children in August, pt spent a month in a behavioral health unit in May-2022. Pt did not receive therapy services and experienced a large decline in her mobility level during that time. Prior to that hospitalization, pt was much more independent with transfers, ambulation, stair climbing. Pt has had over a dozen falls this year      Subjective  General: Pt supine in bed upon arrival, minimal verbalizations but agreeable to OT/PT cotx. Pain: Patient does not rate upon questioning pt also did not appear in pain with movement  Pain Interventions: not applicable and repositioned         Activities of Daily Living    Basic Activities of Daily Living  Feeding: dependent  Feeding Comments: seated EOB with pt supporting pt, total(A) for hand to mouth movements  General Comments: Pt declined additional ADLs stating \"no\" when asked. However pt verbalizes little throughout  Instrumental Activities of Daily Living  No IADL completed on this date. Functional Mobility    Bed Mobility  Supine to Sit: 2 person assistance with total(A)x2   Sit to Supine: 2 person assistance with total(A)x2   Scootin person assistance with total(A)x2   Comments: no initiation with bed mobility  Transfers  Comments: no transfers complete d/t safety concerns  Functional Mobility:  Sitting Balance: stand by assistance, maximum assistance. Sitting Balance Comment: varied SBA-MAX(A), increased posterior lean as pt fatigues.  Seated EOB for feeding for 10-15 minutes      Other Therapeutic Interventions    Functional Outcomes  AM-PAC Inpatient Daily Activity Raw Score: 9    Cognition  Overall Cognitive Status: Impaired  Arousal/Alterness: delayed responses to stimuli, inconsistent responses to stimuli  Following Commands: inconsistently follows commands  Attention Span: difficulty attending to directions  Memory: decreased recall of biographical information, decreased recall of recent events, decreased short term memory, decreased long term memory  Problem Solving: assistance required to generate solutions, assistance required to implement solutions, decreased awareness of errors, assistance required to identify errors made, assistance required to correct errors made  Insights: not aware of deficits  Initiation: requires cues for all  Sequencing: requires cues for all  Comments: hx of dementia, eyes open and alert but little verbalizations throughout. Orientation:    oriented to person, disoriented to place, disoriented to time , and disoriented to situation  Command Following:   impaired     Education  Barriers To Learning: cognition  Patient Education: patient educated on goals, OT role and benefits, plan of care, discharge recommendations  Learning Assessment:  patient will require reinforcement due to cognitive deficits    Assessment  Activity Tolerance: poor  Impairments Requiring Therapeutic Intervention: decreased functional mobility, decreased ADL status, decreased ROM, decreased cognition, decreased endurance, decreased balance, decreased fine motor control  Prognosis: fair  Clinical Assessment: 68year old female admitted with multiple falls and continued decline in cognition and safety at home. Pt below baseline level would benefit from skilled OT for self care, ADL, transfer and functional mobility training prior to discharge.    Safety Interventions: patient left in bed, bed alarm in place, call light within reach, patient at risk for falls, and nurse notified    Plan  Frequency: 3-5 x/per week  Current Treatment Recommendations: strengthening, ROM, balance training, functional mobility training, transfer training, endurance training, neuromuscular re-education, and ADL/self-care training    Goals  Patient Goals: none stated   Short Term Goals:  Time Frame: discharge Patient will complete upper body ADL at moderate assistance   Patient will complete lower body ADL at maximum assistance   Patient will complete toileting at maximum assistance   Patient will complete functional transfers at moderate assistance     Therapy Session Time     Individual Group Co-treatment   Time In    1317   Time Out    1343   Minutes    26        Timed Code Treatment Minutes:  26 minutes  Total Treatment Minutes:  26 minutes       Electronically Signed By: Michell Diaz OTR/L [id number]

## 2022-12-15 LAB — CULTURE, BLOOD 2: NORMAL

## 2022-12-16 LAB — BLOOD CULTURE, ROUTINE: NORMAL

## 2023-02-09 ENCOUNTER — APPOINTMENT (OUTPATIENT)
Dept: GENERAL RADIOLOGY | Age: 78
DRG: 871 | End: 2023-02-09
Payer: MEDICARE

## 2023-02-09 ENCOUNTER — HOSPITAL ENCOUNTER (INPATIENT)
Age: 78
LOS: 6 days | Discharge: HOSPICE | DRG: 871 | End: 2023-02-15
Attending: EMERGENCY MEDICINE | Admitting: STUDENT IN AN ORGANIZED HEALTH CARE EDUCATION/TRAINING PROGRAM
Payer: MEDICARE

## 2023-02-09 DIAGNOSIS — R40.0 SOMNOLENCE: ICD-10-CM

## 2023-02-09 DIAGNOSIS — R09.02 HYPOXIA: Primary | ICD-10-CM

## 2023-02-09 DIAGNOSIS — A41.9 SEPTICEMIA (HCC): ICD-10-CM

## 2023-02-09 DIAGNOSIS — Z51.5 HOSPICE CARE: ICD-10-CM

## 2023-02-09 DIAGNOSIS — E86.0 DEHYDRATION: ICD-10-CM

## 2023-02-09 PROBLEM — N39.0 UTI (URINARY TRACT INFECTION): Status: ACTIVE | Noted: 2023-02-09

## 2023-02-09 PROBLEM — J96.01 ACUTE RESPIRATORY FAILURE WITH HYPOXIA (HCC): Status: ACTIVE | Noted: 2023-02-09

## 2023-02-09 LAB
A/G RATIO: 0.7 (ref 1.1–2.2)
ALBUMIN SERPL-MCNC: 3.1 G/DL (ref 3.4–5)
ALP BLD-CCNC: 91 U/L (ref 40–129)
ALT SERPL-CCNC: 16 U/L (ref 10–40)
ANION GAP SERPL CALCULATED.3IONS-SCNC: 14 MMOL/L (ref 3–16)
AST SERPL-CCNC: 20 U/L (ref 15–37)
BASE EXCESS VENOUS: 9.5 MMOL/L
BASOPHILS ABSOLUTE: 0 K/UL (ref 0–0.2)
BASOPHILS RELATIVE PERCENT: 0.1 %
BILIRUB SERPL-MCNC: 0.3 MG/DL (ref 0–1)
BILIRUBIN URINE: NEGATIVE
BLOOD, URINE: ABNORMAL
BUN BLDV-MCNC: 26 MG/DL (ref 7–20)
CALCIUM SERPL-MCNC: 10.4 MG/DL (ref 8.3–10.6)
CARBOXYHEMOGLOBIN: 1.1 %
CHLORIDE BLD-SCNC: 102 MMOL/L (ref 99–110)
CLARITY: ABNORMAL
CO2: 29 MMOL/L (ref 21–32)
COLOR: YELLOW
COMMENT UA: ABNORMAL
CREAT SERPL-MCNC: <0.5 MG/DL (ref 0.6–1.2)
EOSINOPHILS ABSOLUTE: 0 K/UL (ref 0–0.6)
EOSINOPHILS RELATIVE PERCENT: 0 %
EPITHELIAL CELLS, UA: ABNORMAL /HPF (ref 0–5)
GFR SERPL CREATININE-BSD FRML MDRD: >60 ML/MIN/{1.73_M2}
GLUCOSE BLD-MCNC: 141 MG/DL (ref 70–99)
GLUCOSE BLD-MCNC: 187 MG/DL (ref 70–99)
GLUCOSE URINE: NEGATIVE MG/DL
HCO3 VENOUS: 35 MMOL/L (ref 23–29)
HCT VFR BLD CALC: 45.2 % (ref 36–48)
HEMOGLOBIN: 14.5 G/DL (ref 12–16)
HYALINE CASTS: ABNORMAL /LPF (ref 0–2)
KETONES, URINE: 15 MG/DL
LACTIC ACID, SEPSIS: 1.8 MMOL/L (ref 0.4–1.9)
LACTIC ACID, SEPSIS: 2.2 MMOL/L (ref 0.4–1.9)
LACTIC ACID: 1.5 MMOL/L (ref 0.4–2)
LEUKOCYTE ESTERASE, URINE: ABNORMAL
LYMPHOCYTES ABSOLUTE: 2.5 K/UL (ref 1–5.1)
LYMPHOCYTES RELATIVE PERCENT: 24.9 %
MCH RBC QN AUTO: 28.9 PG (ref 26–34)
MCHC RBC AUTO-ENTMCNC: 32 G/DL (ref 31–36)
MCV RBC AUTO: 90.4 FL (ref 80–100)
METHEMOGLOBIN VENOUS: 0.2 %
MICROSCOPIC EXAMINATION: YES
MONOCYTES ABSOLUTE: 0.5 K/UL (ref 0–1.3)
MONOCYTES RELATIVE PERCENT: 5.3 %
MUCUS: ABNORMAL /LPF
NEUTROPHILS ABSOLUTE: 6.9 K/UL (ref 1.7–7.7)
NEUTROPHILS RELATIVE PERCENT: 69.7 %
NITRITE, URINE: NEGATIVE
O2 SAT, VEN: 78 %
O2 THERAPY: ABNORMAL
PCO2, VEN: 49.7 MMHG (ref 40–50)
PDW BLD-RTO: 22.1 % (ref 12.4–15.4)
PERFORMED ON: ABNORMAL
PH UA: 6.5 (ref 5–8)
PH VENOUS: 7.46 (ref 7.35–7.45)
PLATELET # BLD: 274 K/UL (ref 135–450)
PLATELET SLIDE REVIEW: ADEQUATE
PMV BLD AUTO: 7.6 FL (ref 5–10.5)
PO2, VEN: 44 MMHG
POTASSIUM REFLEX MAGNESIUM: 3.7 MMOL/L (ref 3.5–5.1)
PROCALCITONIN: 0.07 NG/ML (ref 0–0.15)
PROTEIN UA: 100 MG/DL
RAPID INFLUENZA  B AGN: NEGATIVE
RAPID INFLUENZA A AGN: NEGATIVE
RBC # BLD: 5 M/UL (ref 4–5.2)
RBC # BLD: NORMAL 10*6/UL
RBC UA: ABNORMAL /HPF (ref 0–4)
RENAL EPITHELIAL, UA: ABNORMAL /HPF (ref 0–1)
SARS-COV-2, NAAT: NOT DETECTED
SLIDE REVIEW: ABNORMAL
SODIUM BLD-SCNC: 145 MMOL/L (ref 136–145)
SPECIFIC GRAVITY UA: 1.02 (ref 1–1.03)
TCO2 CALC VENOUS: 37 MMOL/L
TOTAL PROTEIN: 7.3 G/DL (ref 6.4–8.2)
URINE REFLEX TO CULTURE: ABNORMAL
URINE TYPE: ABNORMAL
UROBILINOGEN, URINE: 1 E.U./DL
WBC # BLD: 9.8 K/UL (ref 4–11)
WBC UA: ABNORMAL /HPF (ref 0–5)
YEAST: PRESENT /HPF

## 2023-02-09 PROCEDURE — 87077 CULTURE AEROBIC IDENTIFY: CPT

## 2023-02-09 PROCEDURE — 6370000000 HC RX 637 (ALT 250 FOR IP): Performed by: NURSE PRACTITIONER

## 2023-02-09 PROCEDURE — 1200000000 HC SEMI PRIVATE

## 2023-02-09 PROCEDURE — 99285 EMERGENCY DEPT VISIT HI MDM: CPT

## 2023-02-09 PROCEDURE — 87086 URINE CULTURE/COLONY COUNT: CPT

## 2023-02-09 PROCEDURE — 2580000003 HC RX 258: Performed by: EMERGENCY MEDICINE

## 2023-02-09 PROCEDURE — 83605 ASSAY OF LACTIC ACID: CPT

## 2023-02-09 PROCEDURE — 6370000000 HC RX 637 (ALT 250 FOR IP): Performed by: EMERGENCY MEDICINE

## 2023-02-09 PROCEDURE — 84145 PROCALCITONIN (PCT): CPT

## 2023-02-09 PROCEDURE — 87186 SC STD MICRODIL/AGAR DIL: CPT

## 2023-02-09 PROCEDURE — 87635 SARS-COV-2 COVID-19 AMP PRB: CPT

## 2023-02-09 PROCEDURE — 2580000003 HC RX 258: Performed by: NURSE PRACTITIONER

## 2023-02-09 PROCEDURE — 82803 BLOOD GASES ANY COMBINATION: CPT

## 2023-02-09 PROCEDURE — 36415 COLL VENOUS BLD VENIPUNCTURE: CPT

## 2023-02-09 PROCEDURE — 81001 URINALYSIS AUTO W/SCOPE: CPT

## 2023-02-09 PROCEDURE — 87040 BLOOD CULTURE FOR BACTERIA: CPT

## 2023-02-09 PROCEDURE — 71045 X-RAY EXAM CHEST 1 VIEW: CPT

## 2023-02-09 PROCEDURE — 96365 THER/PROPH/DIAG IV INF INIT: CPT

## 2023-02-09 PROCEDURE — 6360000002 HC RX W HCPCS: Performed by: EMERGENCY MEDICINE

## 2023-02-09 PROCEDURE — 93005 ELECTROCARDIOGRAM TRACING: CPT | Performed by: EMERGENCY MEDICINE

## 2023-02-09 PROCEDURE — 96367 TX/PROPH/DG ADDL SEQ IV INF: CPT

## 2023-02-09 PROCEDURE — 87804 INFLUENZA ASSAY W/OPTIC: CPT

## 2023-02-09 PROCEDURE — 80053 COMPREHEN METABOLIC PANEL: CPT

## 2023-02-09 PROCEDURE — 85025 COMPLETE CBC W/AUTO DIFF WBC: CPT

## 2023-02-09 PROCEDURE — 87449 NOS EACH ORGANISM AG IA: CPT

## 2023-02-09 RX ORDER — DONEPEZIL HYDROCHLORIDE 10 MG/1
10 TABLET, FILM COATED ORAL NIGHTLY
Status: DISCONTINUED | OUTPATIENT
Start: 2023-02-09 | End: 2023-02-15 | Stop reason: HOSPADM

## 2023-02-09 RX ORDER — SODIUM CHLORIDE 0.9 % (FLUSH) 0.9 %
5-40 SYRINGE (ML) INJECTION PRN
Status: DISCONTINUED | OUTPATIENT
Start: 2023-02-09 | End: 2023-02-15 | Stop reason: HOSPADM

## 2023-02-09 RX ORDER — INSULIN LISPRO 100 [IU]/ML
INJECTION, SOLUTION INTRAVENOUS; SUBCUTANEOUS
COMMUNITY
Start: 2023-01-27

## 2023-02-09 RX ORDER — DEXTROSE MONOHYDRATE 100 MG/ML
INJECTION, SOLUTION INTRAVENOUS CONTINUOUS PRN
Status: DISCONTINUED | OUTPATIENT
Start: 2023-02-09 | End: 2023-02-15 | Stop reason: HOSPADM

## 2023-02-09 RX ORDER — ACETAMINOPHEN 325 MG/1
650 TABLET ORAL EVERY 6 HOURS PRN
Status: DISCONTINUED | OUTPATIENT
Start: 2023-02-09 | End: 2023-02-15 | Stop reason: HOSPADM

## 2023-02-09 RX ORDER — ACETAMINOPHEN 325 MG/1
650 TABLET ORAL EVERY 6 HOURS PRN
COMMUNITY

## 2023-02-09 RX ORDER — POLYETHYLENE GLYCOL 3350 17 G/17G
17 POWDER, FOR SOLUTION ORAL EVERY 12 HOURS PRN
COMMUNITY

## 2023-02-09 RX ORDER — CARVEDILOL 3.12 MG/1
3.12 TABLET ORAL
Status: DISCONTINUED | OUTPATIENT
Start: 2023-02-10 | End: 2023-02-15 | Stop reason: HOSPADM

## 2023-02-09 RX ORDER — INSULIN LISPRO 100 [IU]/ML
0-8 INJECTION, SOLUTION INTRAVENOUS; SUBCUTANEOUS
Status: DISCONTINUED | OUTPATIENT
Start: 2023-02-10 | End: 2023-02-15 | Stop reason: HOSPADM

## 2023-02-09 RX ORDER — FLUVOXAMINE MALEATE 50 MG/1
50 TABLET, COATED ORAL NIGHTLY
Status: DISCONTINUED | OUTPATIENT
Start: 2023-02-09 | End: 2023-02-12

## 2023-02-09 RX ORDER — DIVALPROEX SODIUM 125 MG/1
250 CAPSULE, COATED PELLETS ORAL 2 TIMES DAILY
Status: DISCONTINUED | OUTPATIENT
Start: 2023-02-10 | End: 2023-02-15 | Stop reason: HOSPADM

## 2023-02-09 RX ORDER — PANTOPRAZOLE SODIUM 40 MG/1
40 TABLET, DELAYED RELEASE ORAL DAILY
Status: DISCONTINUED | OUTPATIENT
Start: 2023-02-10 | End: 2023-02-15 | Stop reason: HOSPADM

## 2023-02-09 RX ORDER — INSULIN LISPRO 100 [IU]/ML
0-4 INJECTION, SOLUTION INTRAVENOUS; SUBCUTANEOUS NIGHTLY
Status: DISCONTINUED | OUTPATIENT
Start: 2023-02-09 | End: 2023-02-15 | Stop reason: HOSPADM

## 2023-02-09 RX ORDER — TAMSULOSIN HYDROCHLORIDE 0.4 MG/1
0.4 CAPSULE ORAL NIGHTLY
Status: DISCONTINUED | OUTPATIENT
Start: 2023-02-09 | End: 2023-02-15 | Stop reason: HOSPADM

## 2023-02-09 RX ORDER — QUETIAPINE FUMARATE 25 MG/1
25 TABLET, FILM COATED ORAL NIGHTLY
Status: DISCONTINUED | OUTPATIENT
Start: 2023-02-09 | End: 2023-02-15 | Stop reason: HOSPADM

## 2023-02-09 RX ORDER — ATORVASTATIN CALCIUM 20 MG/1
20 TABLET, FILM COATED ORAL DAILY
Status: DISCONTINUED | OUTPATIENT
Start: 2023-02-10 | End: 2023-02-15 | Stop reason: HOSPADM

## 2023-02-09 RX ORDER — SODIUM CHLORIDE, SODIUM LACTATE, POTASSIUM CHLORIDE, AND CALCIUM CHLORIDE .6; .31; .03; .02 G/100ML; G/100ML; G/100ML; G/100ML
30 INJECTION, SOLUTION INTRAVENOUS ONCE
Status: COMPLETED | OUTPATIENT
Start: 2023-02-09 | End: 2023-02-09

## 2023-02-09 RX ORDER — SODIUM CHLORIDE 0.9 % (FLUSH) 0.9 %
5-40 SYRINGE (ML) INJECTION EVERY 12 HOURS SCHEDULED
Status: DISCONTINUED | OUTPATIENT
Start: 2023-02-09 | End: 2023-02-15 | Stop reason: HOSPADM

## 2023-02-09 RX ORDER — POLYETHYLENE GLYCOL 3350 17 G/17G
17 POWDER, FOR SOLUTION ORAL EVERY 12 HOURS PRN
Status: DISCONTINUED | OUTPATIENT
Start: 2023-02-09 | End: 2023-02-15 | Stop reason: HOSPADM

## 2023-02-09 RX ORDER — SODIUM CHLORIDE 9 MG/ML
INJECTION, SOLUTION INTRAVENOUS PRN
Status: DISCONTINUED | OUTPATIENT
Start: 2023-02-09 | End: 2023-02-15 | Stop reason: HOSPADM

## 2023-02-09 RX ORDER — LEVOTHYROXINE SODIUM 0.03 MG/1
25 TABLET ORAL DAILY
Status: DISCONTINUED | OUTPATIENT
Start: 2023-02-10 | End: 2023-02-15 | Stop reason: HOSPADM

## 2023-02-09 RX ORDER — CARVEDILOL 3.12 MG/1
3.12 TABLET ORAL
COMMUNITY

## 2023-02-09 RX ORDER — SODIUM CHLORIDE 9 MG/ML
INJECTION, SOLUTION INTRAVENOUS CONTINUOUS
Status: DISCONTINUED | OUTPATIENT
Start: 2023-02-09 | End: 2023-02-15 | Stop reason: HOSPADM

## 2023-02-09 RX ORDER — ACETAMINOPHEN 650 MG/1
650 SUPPOSITORY RECTAL EVERY 6 HOURS PRN
Status: DISCONTINUED | OUTPATIENT
Start: 2023-02-09 | End: 2023-02-15 | Stop reason: HOSPADM

## 2023-02-09 RX ORDER — PERPHENAZINE 2 MG/1
2 TABLET ORAL 2 TIMES DAILY WITH MEALS
Status: DISCONTINUED | OUTPATIENT
Start: 2023-02-10 | End: 2023-02-15 | Stop reason: HOSPADM

## 2023-02-09 RX ORDER — TAMSULOSIN HYDROCHLORIDE 0.4 MG/1
0.4 CAPSULE ORAL NIGHTLY
COMMUNITY

## 2023-02-09 RX ORDER — QUETIAPINE FUMARATE 25 MG/1
25 TABLET, FILM COATED ORAL NIGHTLY
COMMUNITY

## 2023-02-09 RX ORDER — ACETAMINOPHEN 650 MG/1
650 SUPPOSITORY RECTAL ONCE
Status: COMPLETED | OUTPATIENT
Start: 2023-02-09 | End: 2023-02-09

## 2023-02-09 RX ADMIN — DONEPEZIL HYDROCHLORIDE 10 MG: 10 TABLET, FILM COATED ORAL at 22:57

## 2023-02-09 RX ADMIN — CEFEPIME 2000 MG: 2 INJECTION, POWDER, FOR SOLUTION INTRAVENOUS at 17:05

## 2023-02-09 RX ADMIN — Medication 10 ML: at 23:12

## 2023-02-09 RX ADMIN — VANCOMYCIN HYDROCHLORIDE 1000 MG: 1 INJECTION, POWDER, LYOPHILIZED, FOR SOLUTION INTRAVENOUS at 17:44

## 2023-02-09 RX ADMIN — APIXABAN 5 MG: 5 TABLET, FILM COATED ORAL at 22:57

## 2023-02-09 RX ADMIN — ACETAMINOPHEN 325MG 650 MG: 325 TABLET ORAL at 23:00

## 2023-02-09 RX ADMIN — TAMSULOSIN HYDROCHLORIDE 0.4 MG: 0.4 CAPSULE ORAL at 22:57

## 2023-02-09 RX ADMIN — SODIUM CHLORIDE, POTASSIUM CHLORIDE, SODIUM LACTATE AND CALCIUM CHLORIDE 1464 ML: 600; 310; 30; 20 INJECTION, SOLUTION INTRAVENOUS at 17:05

## 2023-02-09 RX ADMIN — SODIUM CHLORIDE: 9 INJECTION, SOLUTION INTRAVENOUS at 23:31

## 2023-02-09 RX ADMIN — ACETAMINOPHEN 650 MG: 650 SUPPOSITORY RECTAL at 16:49

## 2023-02-09 RX ADMIN — QUETIAPINE FUMARATE 25 MG: 25 TABLET ORAL at 22:57

## 2023-02-09 ASSESSMENT — PAIN SCALES - PAIN ASSESSMENT IN ADVANCED DEMENTIA (PAINAD)
CONSOLABILITY: 2
FACIALEXPRESSION: 2
BREATHING: 1
TOTALSCORE: 7
BODYLANGUAGE: 1
NEGVOCALIZATION: 1

## 2023-02-09 ASSESSMENT — LIFESTYLE VARIABLES
HOW MANY STANDARD DRINKS CONTAINING ALCOHOL DO YOU HAVE ON A TYPICAL DAY: PATIENT DOES NOT DRINK
HOW OFTEN DO YOU HAVE A DRINK CONTAINING ALCOHOL: NEVER

## 2023-02-09 ASSESSMENT — PAIN DESCRIPTION - ORIENTATION: ORIENTATION: OTHER (COMMENT)

## 2023-02-09 ASSESSMENT — PAIN DESCRIPTION - DESCRIPTORS: DESCRIPTORS: OTHER (COMMENT)

## 2023-02-09 ASSESSMENT — PAIN DESCRIPTION - LOCATION: LOCATION: OTHER (COMMENT)

## 2023-02-09 ASSESSMENT — PAIN SCALES - GENERAL: PAINLEVEL_OUTOF10: 7

## 2023-02-09 ASSESSMENT — PAIN - FUNCTIONAL ASSESSMENT: PAIN_FUNCTIONAL_ASSESSMENT: ADULT NONVERBAL PAIN SCALE (NPVS)
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
## 2023-02-09 NOTE — CONSULTS
Clinical Pharmacy Note  Vancomycin Consult    Pharmacy consult received for one-time dose of vancomycin in the Emergency Department per Dr. Kay Marr. Ht Readings from Last 1 Encounters:   02/09/23 4' 11\" (1.499 m)        Wt Readings from Last 1 Encounters:   02/09/23 112 lb 3.4 oz (50.9 kg)         Assessment/Plan:  Vancomycin 1000mg x 1 in ED. If Vancomycin is to continue on admission and pharmacy is to manage dosing, please re-consult with admission orders.       Palak Dears, [facility]  2/9/2023 4:45 PM

## 2023-02-10 LAB
ANION GAP SERPL CALCULATED.3IONS-SCNC: 14 MMOL/L (ref 3–16)
BASOPHILS ABSOLUTE: 0 K/UL (ref 0–0.2)
BASOPHILS RELATIVE PERCENT: 0.2 %
BUN BLDV-MCNC: 18 MG/DL (ref 7–20)
CALCIUM SERPL-MCNC: 9 MG/DL (ref 8.3–10.6)
CHLORIDE BLD-SCNC: 109 MMOL/L (ref 99–110)
CO2: 25 MMOL/L (ref 21–32)
CREAT SERPL-MCNC: <0.5 MG/DL (ref 0.6–1.2)
EKG ATRIAL RATE: 115 BPM
EKG DIAGNOSIS: NORMAL
EKG P AXIS: 75 DEGREES
EKG P-R INTERVAL: 112 MS
EKG Q-T INTERVAL: 318 MS
EKG QRS DURATION: 70 MS
EKG QTC CALCULATION (BAZETT): 439 MS
EKG R AXIS: -22 DEGREES
EKG T AXIS: 31 DEGREES
EKG VENTRICULAR RATE: 115 BPM
EOSINOPHILS ABSOLUTE: 0 K/UL (ref 0–0.6)
EOSINOPHILS RELATIVE PERCENT: 0 %
GFR SERPL CREATININE-BSD FRML MDRD: >60 ML/MIN/{1.73_M2}
GLUCOSE BLD-MCNC: 103 MG/DL (ref 70–99)
GLUCOSE BLD-MCNC: 106 MG/DL (ref 70–99)
GLUCOSE BLD-MCNC: 122 MG/DL (ref 70–99)
GLUCOSE BLD-MCNC: 124 MG/DL (ref 70–99)
GLUCOSE BLD-MCNC: 130 MG/DL (ref 70–99)
HCT VFR BLD CALC: 33.4 % (ref 36–48)
HEMOGLOBIN: 10.8 G/DL (ref 12–16)
LYMPHOCYTES ABSOLUTE: 2.1 K/UL (ref 1–5.1)
LYMPHOCYTES RELATIVE PERCENT: 25.6 %
MAGNESIUM: 2.2 MG/DL (ref 1.8–2.4)
MCH RBC QN AUTO: 28.9 PG (ref 26–34)
MCHC RBC AUTO-ENTMCNC: 32.3 G/DL (ref 31–36)
MCV RBC AUTO: 89.4 FL (ref 80–100)
MONOCYTES ABSOLUTE: 0.5 K/UL (ref 0–1.3)
MONOCYTES RELATIVE PERCENT: 6.3 %
MRSA SCREEN RT-PCR: ABNORMAL
NEUTROPHILS ABSOLUTE: 5.6 K/UL (ref 1.7–7.7)
NEUTROPHILS RELATIVE PERCENT: 67.9 %
ORGANISM: ABNORMAL
PDW BLD-RTO: 21.7 % (ref 12.4–15.4)
PERFORMED ON: ABNORMAL
PLATELET # BLD: 192 K/UL (ref 135–450)
PMV BLD AUTO: 7.7 FL (ref 5–10.5)
POTASSIUM REFLEX MAGNESIUM: 2.9 MMOL/L (ref 3.5–5.1)
PROCALCITONIN: 0.07 NG/ML (ref 0–0.15)
RBC # BLD: 3.74 M/UL (ref 4–5.2)
SODIUM BLD-SCNC: 148 MMOL/L (ref 136–145)
T4 FREE: 0.9 NG/DL (ref 0.9–1.8)
TSH REFLEX: 0.21 UIU/ML (ref 0.27–4.2)
WBC # BLD: 8.2 K/UL (ref 4–11)

## 2023-02-10 PROCEDURE — 36415 COLL VENOUS BLD VENIPUNCTURE: CPT

## 2023-02-10 PROCEDURE — 85025 COMPLETE CBC W/AUTO DIFF WBC: CPT

## 2023-02-10 PROCEDURE — 87070 CULTURE OTHR SPECIMN AEROBIC: CPT

## 2023-02-10 PROCEDURE — 84439 ASSAY OF FREE THYROXINE: CPT

## 2023-02-10 PROCEDURE — 84145 PROCALCITONIN (PCT): CPT

## 2023-02-10 PROCEDURE — 84443 ASSAY THYROID STIM HORMONE: CPT

## 2023-02-10 PROCEDURE — 51702 INSERT TEMP BLADDER CATH: CPT

## 2023-02-10 PROCEDURE — 6360000002 HC RX W HCPCS: Performed by: NURSE PRACTITIONER

## 2023-02-10 PROCEDURE — 87186 SC STD MICRODIL/AGAR DIL: CPT

## 2023-02-10 PROCEDURE — 6370000000 HC RX 637 (ALT 250 FOR IP): Performed by: NURSE PRACTITIONER

## 2023-02-10 PROCEDURE — 83735 ASSAY OF MAGNESIUM: CPT

## 2023-02-10 PROCEDURE — 6360000002 HC RX W HCPCS: Performed by: FAMILY MEDICINE

## 2023-02-10 PROCEDURE — 1200000000 HC SEMI PRIVATE

## 2023-02-10 PROCEDURE — 6370000000 HC RX 637 (ALT 250 FOR IP): Performed by: FAMILY MEDICINE

## 2023-02-10 PROCEDURE — 87205 SMEAR GRAM STAIN: CPT

## 2023-02-10 PROCEDURE — 87641 MR-STAPH DNA AMP PROBE: CPT

## 2023-02-10 PROCEDURE — 87077 CULTURE AEROBIC IDENTIFY: CPT

## 2023-02-10 PROCEDURE — 80048 BASIC METABOLIC PNL TOTAL CA: CPT

## 2023-02-10 PROCEDURE — 93010 ELECTROCARDIOGRAM REPORT: CPT | Performed by: INTERNAL MEDICINE

## 2023-02-10 PROCEDURE — 2580000003 HC RX 258: Performed by: NURSE PRACTITIONER

## 2023-02-10 RX ORDER — POTASSIUM CHLORIDE 7.45 MG/ML
10 INJECTION INTRAVENOUS
Status: COMPLETED | OUTPATIENT
Start: 2023-02-10 | End: 2023-02-11

## 2023-02-10 RX ORDER — POTASSIUM CHLORIDE 20 MEQ/1
60 TABLET, EXTENDED RELEASE ORAL ONCE
Status: DISCONTINUED | OUTPATIENT
Start: 2023-02-10 | End: 2023-02-10

## 2023-02-10 RX ADMIN — PANTOPRAZOLE SODIUM 40 MG: 40 TABLET, DELAYED RELEASE ORAL at 09:53

## 2023-02-10 RX ADMIN — CEFEPIME 2000 MG: 2 INJECTION, POWDER, FOR SOLUTION INTRAVENOUS at 21:28

## 2023-02-10 RX ADMIN — TAMSULOSIN HYDROCHLORIDE 0.4 MG: 0.4 CAPSULE ORAL at 21:29

## 2023-02-10 RX ADMIN — VANCOMYCIN HYDROCHLORIDE 1000 MG: 1 INJECTION, POWDER, LYOPHILIZED, FOR SOLUTION INTRAVENOUS at 20:21

## 2023-02-10 RX ADMIN — Medication 10 MEQ: at 12:19

## 2023-02-10 RX ADMIN — CARVEDILOL 3.12 MG: 3.12 TABLET, FILM COATED ORAL at 09:53

## 2023-02-10 RX ADMIN — ATORVASTATIN CALCIUM 20 MG: 20 TABLET, FILM COATED ORAL at 09:53

## 2023-02-10 RX ADMIN — DONEPEZIL HYDROCHLORIDE 10 MG: 10 TABLET, FILM COATED ORAL at 21:29

## 2023-02-10 RX ADMIN — DAKIN'S SOLUTION 0.125% (QUARTER STRENGTH): 0.12 SOLUTION at 13:00

## 2023-02-10 RX ADMIN — APIXABAN 5 MG: 5 TABLET, FILM COATED ORAL at 21:29

## 2023-02-10 RX ADMIN — PERPHENAZINE 2 MG: 2 TABLET, FILM COATED ORAL at 10:01

## 2023-02-10 RX ADMIN — FLUVOXAMINE MALEATE 50 MG: 50 TABLET, COATED ORAL at 00:20

## 2023-02-10 RX ADMIN — QUETIAPINE FUMARATE 25 MG: 25 TABLET ORAL at 21:39

## 2023-02-10 RX ADMIN — Medication 10 MEQ: at 15:26

## 2023-02-10 RX ADMIN — VANCOMYCIN HYDROCHLORIDE 1000 MG: 1 INJECTION, POWDER, LYOPHILIZED, FOR SOLUTION INTRAVENOUS at 09:56

## 2023-02-10 RX ADMIN — DIVALPROEX SODIUM 250 MG: 125 CAPSULE ORAL at 17:19

## 2023-02-10 RX ADMIN — DIVALPROEX SODIUM 250 MG: 125 CAPSULE ORAL at 09:53

## 2023-02-10 RX ADMIN — CEFEPIME 2000 MG: 2 INJECTION, POWDER, FOR SOLUTION INTRAVENOUS at 12:51

## 2023-02-10 RX ADMIN — Medication 10 MEQ: at 16:50

## 2023-02-10 RX ADMIN — APIXABAN 5 MG: 5 TABLET, FILM COATED ORAL at 09:53

## 2023-02-10 RX ADMIN — LEVOTHYROXINE SODIUM 25 MCG: 25 TABLET ORAL at 05:39

## 2023-02-10 RX ADMIN — PERPHENAZINE 2 MG: 2 TABLET, FILM COATED ORAL at 18:44

## 2023-02-10 RX ADMIN — FLUVOXAMINE MALEATE 50 MG: 50 TABLET, COATED ORAL at 21:29

## 2023-02-10 RX ADMIN — DAKIN'S SOLUTION 0.125% (QUARTER STRENGTH): 0.12 SOLUTION at 21:37

## 2023-02-10 RX ADMIN — CEFEPIME 2000 MG: 2 INJECTION, POWDER, FOR SOLUTION INTRAVENOUS at 04:58

## 2023-02-10 RX ADMIN — Medication 10 MEQ: at 18:19

## 2023-02-10 RX ADMIN — Medication 10 MEQ: at 14:04

## 2023-02-10 SDOH — HEALTH STABILITY: PHYSICAL HEALTH: ON AVERAGE, HOW MANY DAYS PER WEEK DO YOU ENGAGE IN MODERATE TO STRENUOUS EXERCISE (LIKE A BRISK WALK)?: 0 DAYS

## 2023-02-10 SDOH — ECONOMIC STABILITY: INCOME INSECURITY: IN THE LAST 12 MONTHS, WAS THERE A TIME WHEN YOU WERE NOT ABLE TO PAY THE MORTGAGE OR RENT ON TIME?: NO

## 2023-02-10 SDOH — ECONOMIC STABILITY: FOOD INSECURITY: WITHIN THE PAST 12 MONTHS, YOU WORRIED THAT YOUR FOOD WOULD RUN OUT BEFORE YOU GOT MONEY TO BUY MORE.: NEVER TRUE

## 2023-02-10 SDOH — HEALTH STABILITY: PHYSICAL HEALTH: ON AVERAGE, HOW MANY MINUTES DO YOU ENGAGE IN EXERCISE AT THIS LEVEL?: 0 MIN

## 2023-02-10 SDOH — ECONOMIC STABILITY: FOOD INSECURITY: WITHIN THE PAST 12 MONTHS, THE FOOD YOU BOUGHT JUST DIDN'T LAST AND YOU DIDN'T HAVE MONEY TO GET MORE.: NEVER TRUE

## 2023-02-10 SDOH — ECONOMIC STABILITY: HOUSING INSECURITY
IN THE LAST 12 MONTHS, WAS THERE A TIME WHEN YOU DID NOT HAVE A STEADY PLACE TO SLEEP OR SLEPT IN A SHELTER (INCLUDING NOW)?: NO

## 2023-02-10 ASSESSMENT — SOCIAL DETERMINANTS OF HEALTH (SDOH)
DO YOU BELONG TO ANY CLUBS OR ORGANIZATIONS SUCH AS CHURCH GROUPS UNIONS, FRATERNAL OR ATHLETIC GROUPS, OR SCHOOL GROUPS?: NO
IN A TYPICAL WEEK, HOW MANY TIMES DO YOU TALK ON THE PHONE WITH FAMILY, FRIENDS, OR NEIGHBORS?: NEVER
WITHIN THE LAST YEAR, HAVE TO BEEN RAPED OR FORCED TO HAVE ANY KIND OF SEXUAL ACTIVITY BY YOUR PARTNER OR EX-PARTNER?: NO
HOW HARD IS IT FOR YOU TO PAY FOR THE VERY BASICS LIKE FOOD, HOUSING, MEDICAL CARE, AND HEATING?: NOT HARD AT ALL
WITHIN THE LAST YEAR, HAVE YOU BEEN KICKED, HIT, SLAPPED, OR OTHERWISE PHYSICALLY HURT BY YOUR PARTNER OR EX-PARTNER?: NO
HOW OFTEN DO YOU GET TOGETHER WITH FRIENDS OR RELATIVES?: MORE THAN THREE TIMES A WEEK
HOW OFTEN DO YOU ATTENT MEETINGS OF THE CLUB OR ORGANIZATION YOU BELONG TO?: NEVER
WITHIN THE LAST YEAR, HAVE YOU BEEN AFRAID OF YOUR PARTNER OR EX-PARTNER?: NO
WITHIN THE LAST YEAR, HAVE YOU BEEN HUMILIATED OR EMOTIONALLY ABUSED IN OTHER WAYS BY YOUR PARTNER OR EX-PARTNER?: NO
HOW OFTEN DO YOU ATTEND CHURCH OR RELIGIOUS SERVICES?: NEVER

## 2023-02-10 ASSESSMENT — PAIN SCALES - PAIN ASSESSMENT IN ADVANCED DEMENTIA (PAINAD)
CONSOLABILITY: 0
BREATHING: 0
NEGVOCALIZATION: 0
BODYLANGUAGE: 0
CONSOLABILITY: 0
FACIALEXPRESSION: 0
FACIALEXPRESSION: 0
CONSOLABILITY: 0
FACIALEXPRESSION: 0
CONSOLABILITY: 0
CONSOLABILITY: 0
FACIALEXPRESSION: 0
TOTALSCORE: 0
NEGVOCALIZATION: 0
BREATHING: 0
CONSOLABILITY: 0
NEGVOCALIZATION: 0
NEGVOCALIZATION: 0
BODYLANGUAGE: 0
CONSOLABILITY: 0
BREATHING: 0
BODYLANGUAGE: 0
NEGVOCALIZATION: 0
BODYLANGUAGE: 0
BODYLANGUAGE: 0
TOTALSCORE: 0
FACIALEXPRESSION: 0
NEGVOCALIZATION: 0
CONSOLABILITY: 0
BREATHING: 0
TOTALSCORE: 0
BODYLANGUAGE: 0
TOTALSCORE: 0
TOTALSCORE: 0
BREATHING: 0
FACIALEXPRESSION: 0
BODYLANGUAGE: 0
BREATHING: 0
BREATHING: 0
CONSOLABILITY: 0
NEGVOCALIZATION: 0
NEGVOCALIZATION: 0
TOTALSCORE: 0
BREATHING: 0
BREATHING: 0
BODYLANGUAGE: 0
BREATHING: 0
BODYLANGUAGE: 0
TOTALSCORE: 0
CONSOLABILITY: 0
TOTALSCORE: 0
CONSOLABILITY: 0
TOTALSCORE: 0
FACIALEXPRESSION: 0
FACIALEXPRESSION: 0
BREATHING: 0
FACIALEXPRESSION: 0
BODYLANGUAGE: 0
NEGVOCALIZATION: 0
FACIALEXPRESSION: 0
NEGVOCALIZATION: 0
NEGVOCALIZATION: 0
FACIALEXPRESSION: 0
BODYLANGUAGE: 0

## 2023-02-10 ASSESSMENT — LIFESTYLE VARIABLES
HOW OFTEN DO YOU HAVE A DRINK CONTAINING ALCOHOL: NEVER
HOW MANY STANDARD DRINKS CONTAINING ALCOHOL DO YOU HAVE ON A TYPICAL DAY: PATIENT DOES NOT DRINK
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
## 2023-02-10 NOTE — PROGRESS NOTES
4 Eyes Skin Assessment     NAME:  Robinson Hayden OF BIRTH:  1945  MEDICAL RECORD NUMBER:  [id number]    The patient is being assessed for  Admission    I agree that One RN has performed a thorough Head to Toe Skin Assessment on the patient. ALL assessment sites listed below have been assessed. Areas assessed by both nurses:    Head, Face, Ears, Shoulders, Back, Chest, Arms, Elbows, Hands, Sacrum. Buttock, Coccyx, Ischium, and Legs. Feet and Heels        Does the Patient have a Wound? Yes wound(s) were present on assessment.  LDA wound assessment was Initiated and completed by RN       Manuel Prevention initiated by RN: Yes   Wound Care Orders initiated by RN: Yes    Pressure Injury (Stage 3,4, Unstageable, DTI, NWPT, and Complex wounds) if present, place referral order by RN under : Yes    New and Established Ostomies, if present place, referral order under : Yes      Nurse 1 eSignature: Electronically signed by Betty Medeiros RN on 2/10/23 at 2:13 AM EST    **SHARE this note so that the co-signing nurse can place an eSignature**    Nurse 2 eSignature: Electronically signed by Gabriella Hoffman RN on 2/10/23 at 2:49 AM EST

## 2023-02-10 NOTE — CARE COORDINATION
2/10/23 Patient came from Home at [facility] prior to arrival.  Call to Jarad, [phone], at Home at [facility] who confirmed the patient is:  [x] [address], no Precert required for return.  [] [address] will be required for return. [] Skilled Nursing Care, no Precert required for return. [x] [address] required for return.    [] Is fully vaccinated for Covid. [x] Has started Covid vaccination, but is not complete. [] Is not vaccinated for Covid. [] No Covid Test needed for return. [x] Rapid Covid test needed before return within: [] 48 hours, [x] 72 hours, [] 5 days, [] other   [] PCR Covid test needed before return. [x] Confirmed with the patient or family that the plan is to return to this facility at D/C. [] Patient and/or designated decision maker does not plan for the patient to return to this facility at D/C.      Electronically signed by Nelson Yarbrough RN on 2/10/2023 at 2:12 PM

## 2023-02-10 NOTE — FLOWSHEET NOTE
Patient admitted to room 4122 with DX: Sepsis. Patient is non-verbal and non-ambulatory. Information received from son and daughter, who stated patient is nectar thick liquids due to aspiration risk. Pressure injury to coccyx and abrasion to left elbow noted, new dressings placed. VS stable. Patient was facial grimacing and moaning out, PRN Tylenol given with effects noted. Patient resting in bed quietly at this time.

## 2023-02-10 NOTE — PROGRESS NOTES
Comprehensive Nutrition Assessment    Type and Reason for Visit:  Wound    Nutrition Recommendations/Plan:   Continue current diet, monitor for liberalization  Start Julio and Magic Cup BID     Malnutrition Assessment:  Malnutrition Status: At risk for malnutrition (Comment) (02/10/23 1330)    Context:  Acute Illness       Nutrition Assessment:    Pt with hx of dementia, DM, Gerd, HTN, HLD, and hypothyroidism admitted with sepsis d/t UTI. 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 No PO documented yet, will monitor. Pt with increased nutrient needs for wound healing, will start Magic Cup BID and Julio BID. Nutrition Related Findings:    Na+ 148, k+ 2.9, Glucose 106. Wound Type: Stage IV, Pressure Injury       Current Nutrition Intake & Therapies:    Average Meal Intake: Unable to assess  Average Supplements Intake: None Ordered  ADULT DIET; Regular; 3 carb choices (45 gm/meal); Mildly Thick (Nectar)    Anthropometric Measures:  Height: 4' 11\" (149.9 cm)  Ideal Body Weight (IBW): 95 lbs (43 kg)       Current Body Weight: 110 lb (49.9 kg),   IBW. Weight Source: Bed Scale  Current BMI (kg/m2): 22.2                          BMI Categories: Normal Weight (BMI 22.0 to 24.9) age over 72    Estimated Daily Nutrient Needs:  Energy Requirements Based On: Kcal/kg  Weight Used for Energy Requirements: Current  Energy (kcal/day): 5569-4348 kcal (30-35 kcal/kg)  Weight Used for Protein Requirements: Current  Protein (g/day): 75-90 gm (1.5-1.8 g/kg) adjusted for increased needs.   Method Used for Fluid Requirements: 1 ml/kcal  Fluid (ml/day): 9922-3219 mL    Nutrition Diagnosis:   Increased nutrient needs related to increase demand for energy/nutrients as evidenced by wounds    Nutrition Interventions:   Food and/or Nutrient Delivery: Continue Current Diet, Start Oral Nutrition Supplement  Nutrition Education/Counseling: No recommendation at this time  Coordination of Nutrition Care: Continue to monitor while inpatient Goals:     Goals: PO intake 50% or greater, by next RD assessment       Nutrition Monitoring and Evaluation:   Behavioral-Environmental Outcomes: None Identified  Food/Nutrient Intake Outcomes: Food and Nutrient Intake, Supplement Intake  Physical Signs/Symptoms Outcomes: Biochemical Data, Nutrition Focused Physical Findings, Skin, Weight, Meal Time Behavior    Discharge Planning:    Continue Oral Nutrition Supplement     Edenilson Del Toro, [address] [place], [place]  Contact: [phone]

## 2023-02-10 NOTE — CONSULTS
Clinical Pharmacy Note  Vancomycin Consult    Davon Fournier is a 68 y.o. female ordered Vancomycin for sepsis; consult received from Jeremy Carey CNP to manage therapy. Also receiving cefepime. Allergies:  Patient has no known allergies. Temp max:  Temp (24hrs), Av.2 °F (38.4 °C), Min:100.3 °F (37.9 °C), Max:102 °F (38.9 °C)      Recent Labs     23  1700   WBC 9.8       Recent Labs     23  1700   BUN 26*   CREATININE <0.5*         Intake/Output Summary (Last 24 hours) at 2023 2228  Last data filed at 2023 1900  Gross per 24 hour   Intake 3036.96 ml   Output --   Net 3036.96 ml       Culture Results:  Pending    Ht Readings from Last 1 Encounters:   23 4' 11\" (1.499 m)        Wt Readings from Last 1 Encounters:   23 112 lb 3.4 oz (50.9 kg)         Estimated Creatinine Clearance: 73 mL/min (based on SCr of 0.5 mg/dL). Assessment/Plan:  Day # 1 of vancomycin. Vancomycin 1000 mg IV every 12 hours ordered. Goal: -600  Predicted AUC: 538 mg/L*hr      Thank you for the consult.      Sonny Hills, [address] [place] [address]  2023 10:29 PM

## 2023-02-10 NOTE — ACP (ADVANCE CARE PLANNING)
Advance Care Planning     Advance Care Planning Activator (Inpatient)  Conversation Note      Date of ACP Conversation: 2/10/2023     Conversation Conducted with: Patient with Decision Making Capacity    ACP Activator: Felicity Haskins RN    Health Care Decision Maker:     Current Designated Health Care Decision Maker:     Primary Decision Maker: Roseline Del Rio - [phone]    Secondary Decision Maker: Ce Regalado - Child - [phone]    Secondary Decision Maker: Willard Guillen Child - [phone]    Care Preferences    Ventilation: \"If you were in your present state of health and suddenly became very ill and were unable to breathe on your own, what would your preference be about the use of a ventilator (breathing machine) if it were available to you? \"      Would the patient desire the use of ventilator (breathing machine)?: no    \"If your health worsens and it becomes clear that your chance of recovery is unlikely, what would your preference be about the use of a ventilator (breathing machine) if it were available to you? \"     Would the patient desire the use of ventilator (breathing machine)?: No      Resuscitation  \"CPR works best to restart the heart when there is a sudden event, like a heart attack, in someone who is otherwise healthy. Unfortunately, CPR does not typically restart the heart for people who have serious health conditions or who are very sick. \"    \"In the event your heart stopped as a result of an underlying serious health condition, would you want attempts to be made to restart your heart (answer \"yes\" for attempt to resuscitate) or would you prefer a natural death (answer \"no\" for do not attempt to resuscitate)? \" no       [] Yes   [x] No   Educated Patient / Janet Early regarding differences between Advance Directives and portable DNR orders.     Length of ACP Conversation in minutes:      Conversation Outcomes:  [x] ACP discussion completed  [] Existing advance directive reviewed with patient; no changes to patient's previously recorded wishes  [] New Advance Directive completed  [] Portable Do Not Rescitate prepared for Provider review and signature  [] POLST/POST/MOLST/MOST prepared for Provider review and signature      Follow-up plan:    [] Schedule follow-up conversation to continue planning  [] Referred individual to Provider for additional questions/concerns   [] Advised patient/agent/surrogate to review completed ACP document and update if needed with changes in condition, patient preferences or care setting    [] This note routed to one or more involved healthcare providers
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
## 2023-02-10 NOTE — PLAN OF CARE
Problem: Discharge Planning  Goal: Discharge to home or other facility with appropriate resources  Outcome: Progressing  Flowsheets (Taken 2/10/2023 0055)  Discharge to home or other facility with appropriate resources:   Identify barriers to discharge with patient and caregiver   Arrange for needed discharge resources and transportation as appropriate   Identify discharge learning needs (meds, wound care, etc)   Arrange for interpreters to assist at discharge as needed   Refer to discharge planning if patient needs post-hospital services based on physician order or complex needs related to functional status, cognitive ability or social support system     Problem: Pain  Goal: Verbalizes/displays adequate comfort level or baseline comfort level  Outcome: Progressing  Flowsheets (Taken 2/10/2023 0037)  Verbalizes/displays adequate comfort level or baseline comfort level:   Encourage patient to monitor pain and request assistance   Assess pain using appropriate pain scale   Administer analgesics based on type and severity of pain and evaluate response   Implement non-pharmacological measures as appropriate and evaluate response   Consider cultural and social influences on pain and pain management   Notify Licensed Independent Practitioner if interventions unsuccessful or patient reports new pain     Problem: ABCDS Injury Assessment  Goal: Absence of physical injury  Outcome: Progressing     Problem: Skin/Tissue Integrity  Goal: Absence of new skin breakdown  Description: 1. Monitor for areas of redness and/or skin breakdown  2. Assess vascular access sites hourly  3. Every 4-6 hours minimum:  Change oxygen saturation probe site  4. Every 4-6 hours:  If on nasal continuous positive airway pressure, respiratory therapy assess nares and determine need for appliance change or resting period.   Outcome: Progressing

## 2023-02-10 NOTE — PROGRESS NOTES
Medication Reconciliation     List of medications patient is currently taking is complete. Source of information:   1. Conversation with [email or web address] [facility]  2. EPIC records        Notes regarding home medications:  1. Patient received all of her AM home medications today PTA. 2. Patient is anticoagulated on Eliquis 5 mg BID for hx of PE.     Parke Cogan, Pharmacy Intern

## 2023-02-11 LAB
ANION GAP SERPL CALCULATED.3IONS-SCNC: 6 MMOL/L (ref 3–16)
BASOPHILS ABSOLUTE: 0 K/UL (ref 0–0.2)
BASOPHILS RELATIVE PERCENT: 0.2 %
BUN BLDV-MCNC: 14 MG/DL (ref 7–20)
CALCIUM SERPL-MCNC: 8.8 MG/DL (ref 8.3–10.6)
CHLORIDE BLD-SCNC: 113 MMOL/L (ref 99–110)
CO2: 27 MMOL/L (ref 21–32)
CREAT SERPL-MCNC: <0.5 MG/DL (ref 0.6–1.2)
EOSINOPHILS ABSOLUTE: 0 K/UL (ref 0–0.6)
EOSINOPHILS RELATIVE PERCENT: 0.4 %
GFR SERPL CREATININE-BSD FRML MDRD: >60 ML/MIN/{1.73_M2}
GLUCOSE BLD-MCNC: 101 MG/DL (ref 70–99)
GLUCOSE BLD-MCNC: 176 MG/DL (ref 70–99)
GLUCOSE BLD-MCNC: 83 MG/DL (ref 70–99)
GLUCOSE BLD-MCNC: 85 MG/DL (ref 70–99)
GLUCOSE BLD-MCNC: 93 MG/DL (ref 70–99)
HCT VFR BLD CALC: 30.6 % (ref 36–48)
HEMOGLOBIN: 9.9 G/DL (ref 12–16)
LYMPHOCYTES ABSOLUTE: 2.1 K/UL (ref 1–5.1)
LYMPHOCYTES RELATIVE PERCENT: 30.8 %
MCH RBC QN AUTO: 28.7 PG (ref 26–34)
MCHC RBC AUTO-ENTMCNC: 32.3 G/DL (ref 31–36)
MCV RBC AUTO: 88.9 FL (ref 80–100)
MONOCYTES ABSOLUTE: 0.4 K/UL (ref 0–1.3)
MONOCYTES RELATIVE PERCENT: 5.4 %
NEUTROPHILS ABSOLUTE: 4.4 K/UL (ref 1.7–7.7)
NEUTROPHILS RELATIVE PERCENT: 63.2 %
PDW BLD-RTO: 21.4 % (ref 12.4–15.4)
PERFORMED ON: ABNORMAL
PERFORMED ON: ABNORMAL
PERFORMED ON: NORMAL
PERFORMED ON: NORMAL
PLATELET # BLD: 158 K/UL (ref 135–450)
PMV BLD AUTO: 7.6 FL (ref 5–10.5)
POTASSIUM REFLEX MAGNESIUM: 3.9 MMOL/L (ref 3.5–5.1)
RBC # BLD: 3.44 M/UL (ref 4–5.2)
SODIUM BLD-SCNC: 146 MMOL/L (ref 136–145)
VANCOMYCIN RANDOM: 16.8 UG/ML
WBC # BLD: 7 K/UL (ref 4–11)

## 2023-02-11 PROCEDURE — 85025 COMPLETE CBC W/AUTO DIFF WBC: CPT

## 2023-02-11 PROCEDURE — 94760 N-INVAS EAR/PLS OXIMETRY 1: CPT

## 2023-02-11 PROCEDURE — 2580000003 HC RX 258: Performed by: NURSE PRACTITIONER

## 2023-02-11 PROCEDURE — 6360000002 HC RX W HCPCS: Performed by: NURSE PRACTITIONER

## 2023-02-11 PROCEDURE — 1200000000 HC SEMI PRIVATE

## 2023-02-11 PROCEDURE — 80202 ASSAY OF VANCOMYCIN: CPT

## 2023-02-11 PROCEDURE — 6370000000 HC RX 637 (ALT 250 FOR IP): Performed by: NURSE PRACTITIONER

## 2023-02-11 PROCEDURE — 6360000002 HC RX W HCPCS: Performed by: FAMILY MEDICINE

## 2023-02-11 PROCEDURE — 36415 COLL VENOUS BLD VENIPUNCTURE: CPT

## 2023-02-11 PROCEDURE — 2700000000 HC OXYGEN THERAPY PER DAY

## 2023-02-11 PROCEDURE — 80048 BASIC METABOLIC PNL TOTAL CA: CPT

## 2023-02-11 PROCEDURE — 51702 INSERT TEMP BLADDER CATH: CPT

## 2023-02-11 RX ADMIN — ATORVASTATIN CALCIUM 20 MG: 20 TABLET, FILM COATED ORAL at 10:00

## 2023-02-11 RX ADMIN — CEFEPIME 2000 MG: 2 INJECTION, POWDER, FOR SOLUTION INTRAVENOUS at 21:28

## 2023-02-11 RX ADMIN — Medication 10 MEQ: at 02:04

## 2023-02-11 RX ADMIN — PERPHENAZINE 2 MG: 2 TABLET, FILM COATED ORAL at 17:33

## 2023-02-11 RX ADMIN — LEVOTHYROXINE SODIUM 25 MCG: 25 TABLET ORAL at 06:59

## 2023-02-11 RX ADMIN — TAMSULOSIN HYDROCHLORIDE 0.4 MG: 0.4 CAPSULE ORAL at 21:29

## 2023-02-11 RX ADMIN — PANTOPRAZOLE SODIUM 40 MG: 40 TABLET, DELAYED RELEASE ORAL at 10:00

## 2023-02-11 RX ADMIN — VANCOMYCIN HYDROCHLORIDE 1000 MG: 1 INJECTION, POWDER, LYOPHILIZED, FOR SOLUTION INTRAVENOUS at 10:04

## 2023-02-11 RX ADMIN — APIXABAN 5 MG: 5 TABLET, FILM COATED ORAL at 21:29

## 2023-02-11 RX ADMIN — ACETAMINOPHEN 325MG 650 MG: 325 TABLET ORAL at 21:34

## 2023-02-11 RX ADMIN — DAKIN'S SOLUTION 0.125% (QUARTER STRENGTH): 0.12 SOLUTION at 10:11

## 2023-02-11 RX ADMIN — APIXABAN 5 MG: 5 TABLET, FILM COATED ORAL at 10:00

## 2023-02-11 RX ADMIN — DIVALPROEX SODIUM 250 MG: 125 CAPSULE ORAL at 10:00

## 2023-02-11 RX ADMIN — QUETIAPINE FUMARATE 25 MG: 25 TABLET ORAL at 21:29

## 2023-02-11 RX ADMIN — CEFEPIME 2000 MG: 2 INJECTION, POWDER, FOR SOLUTION INTRAVENOUS at 05:14

## 2023-02-11 RX ADMIN — SODIUM CHLORIDE: 9 INJECTION, SOLUTION INTRAVENOUS at 18:47

## 2023-02-11 RX ADMIN — DAKIN'S SOLUTION 0.125% (QUARTER STRENGTH): 0.12 SOLUTION at 23:52

## 2023-02-11 RX ADMIN — PERPHENAZINE 2 MG: 2 TABLET, FILM COATED ORAL at 10:00

## 2023-02-11 RX ADMIN — CEFEPIME 2000 MG: 2 INJECTION, POWDER, FOR SOLUTION INTRAVENOUS at 13:48

## 2023-02-11 RX ADMIN — FLUVOXAMINE MALEATE 50 MG: 50 TABLET, COATED ORAL at 21:29

## 2023-02-11 RX ADMIN — CARVEDILOL 3.12 MG: 3.12 TABLET, FILM COATED ORAL at 10:00

## 2023-02-11 RX ADMIN — DONEPEZIL HYDROCHLORIDE 10 MG: 10 TABLET, FILM COATED ORAL at 21:29

## 2023-02-11 RX ADMIN — Medication 10 ML: at 10:01

## 2023-02-11 ASSESSMENT — PAIN SCALES - GENERAL: PAINLEVEL_OUTOF10: 0

## 2023-02-11 ASSESSMENT — PAIN SCALES - PAIN ASSESSMENT IN ADVANCED DEMENTIA (PAINAD)
FACIALEXPRESSION: 0
CONSOLABILITY: 0
NEGVOCALIZATION: 0
TOTALSCORE: 0
TOTALSCORE: 0
CONSOLABILITY: 0
TOTALSCORE: 0
NEGVOCALIZATION: 0
CONSOLABILITY: 0
TOTALSCORE: 0
FACIALEXPRESSION: 0
FACIALEXPRESSION: 0
BREATHING: 0
TOTALSCORE: 0
FACIALEXPRESSION: 0
BODYLANGUAGE: 0
CONSOLABILITY: 0
BODYLANGUAGE: 0
NEGVOCALIZATION: 0
BREATHING: 0
NEGVOCALIZATION: 0
BODYLANGUAGE: 0
BREATHING: 0
BODYLANGUAGE: 0
CONSOLABILITY: 0
BREATHING: 0
NEGVOCALIZATION: 0
FACIALEXPRESSION: 0
BODYLANGUAGE: 0
BREATHING: 0

## 2023-02-11 NOTE — PLAN OF CARE
Problem: Pain  Goal: Verbalizes/displays adequate comfort level or baseline comfort level  Outcome: Progressing     Problem: Discharge Planning  Goal: Discharge to home or other facility with appropriate resources  Outcome: Progressing     Problem: Pain  Goal: Verbalizes/displays adequate comfort level or baseline comfort level  Outcome: Progressing     Problem: ABCDS Injury Assessment  Goal: Absence of physical injury  Outcome: Progressing     Problem: Skin/Tissue Integrity  Goal: Absence of new skin breakdown  Description: 1. Monitor for areas of redness and/or skin breakdown  2. Assess vascular access sites hourly  3. Every 4-6 hours minimum:  Change oxygen saturation probe site  4. Every 4-6 hours:  If on nasal continuous positive airway pressure, respiratory therapy assess nares and determine need for appliance change or resting period.   Outcome: Progressing     Problem: Neurosensory - Adult  Goal: Achieves stable or improved neurological status  Outcome: Progressing  Goal: Absence of seizures  Outcome: Progressing  Goal: Remains free of injury related to seizures activity  Outcome: Progressing  Goal: Achieves maximal functionality and self care  Outcome: Progressing     Problem: Respiratory - Adult  Goal: Achieves optimal ventilation and oxygenation  Outcome: Progressing     Problem: Cardiovascular - Adult  Goal: Maintains optimal cardiac output and hemodynamic stability  Outcome: Progressing  Goal: Absence of cardiac dysrhythmias or at baseline  Outcome: Progressing     Problem: Skin/Tissue Integrity - Adult  Goal: Skin integrity remains intact  Outcome: Progressing  Goal: Incisions, wounds, or drain sites healing without S/S of infection  Outcome: Progressing  Goal: Oral mucous membranes remain intact  Outcome: Progressing     Problem: Musculoskeletal - Adult  Goal: Return mobility to safest level of function  Outcome: Progressing  Goal: Maintain proper alignment of affected body part  Outcome: Progressing  Goal: Return ADL status to a safe level of function  Outcome: Progressing     Problem: Gastrointestinal - Adult  Goal: Maintains or returns to baseline bowel function  Outcome: Progressing  Goal: Maintains adequate nutritional intake  Outcome: Progressing     Problem: Genitourinary - Adult  Goal: Absence of urinary retention  Outcome: Progressing  Goal: Urinary catheter remains patent  Outcome: Progressing     Problem: Infection - Adult  Goal: Absence of infection at discharge  Outcome: Progressing  Goal: Absence of infection during hospitalization  Outcome: Progressing  Goal: Absence of fever/infection during anticipated neutropenic period  Outcome: Progressing     Problem: Metabolic/Fluid and Electrolytes - Adult  Goal: Electrolytes maintained within normal limits  Outcome: Progressing  Goal: Hemodynamic stability and optimal renal function maintained  Outcome: Progressing  Goal: Glucose maintained within prescribed range  Outcome: Progressing     Problem: Chronic Conditions and Co-morbidities  Goal: Patient's chronic conditions and co-morbidity symptoms are monitored and maintained or improved  Outcome: Progressing     Problem: Nutrition Deficit:  Goal: Optimize nutritional status  Outcome: Progressing

## 2023-02-11 NOTE — PROGRESS NOTES
Hospitalist Progress Note      PCP: Carlo Cottrell DO    Date of Admission: 2/9/2023    Chief Complaint: sepsis    Hospital Course:      Subjective: somnulent but comfortable       Medications:  Reviewed    Infusion Medications    dextrose      sodium chloride      sodium chloride Stopped (02/10/23 0458)     Scheduled Medications    Sodium Hypochlorite   Irrigation BID    apixaban  5 mg Oral BID    atorvastatin  20 mg Oral Daily    carvedilol  3.125 mg Oral Daily with breakfast    divalproex  250 mg Oral BID    donepezil  10 mg Oral Nightly    fluvoxaMINE  50 mg Oral Nightly    levothyroxine  25 mcg Oral Daily    insulin lispro  0-8 Units SubCUTAneous TID WC    insulin lispro  0-4 Units SubCUTAneous Nightly    pantoprazole  40 mg Oral Daily    perphenazine  2 mg Oral BID WC    QUEtiapine  25 mg Oral Nightly    tamsulosin  0.4 mg Oral Nightly    sodium chloride flush  5-40 mL IntraVENous 2 times per day    cefepime  2,000 mg IntraVENous Q8H    vancomycin  1,000 mg IntraVENous Q12H    vancomycin (VANCOCIN) intermittent dosing (placeholder)   Other RX Placeholder     PRN Meds: glucose, dextrose bolus **OR** dextrose bolus, glucagon (rDNA), dextrose, polyethylene glycol, sodium chloride flush, sodium chloride, acetaminophen **OR** acetaminophen      Intake/Output Summary (Last 24 hours) at 2/10/2023 2203  Last data filed at 2/10/2023 1325  Gross per 24 hour   Intake 808.63 ml   Output 100 ml   Net 708.63 ml       Exam:    /80   Pulse 68   Temp 99.1 °F (37.3 °C) (Axillary)   Resp 16   Ht 4' 11\" (1.499 m)   Wt 110 lb 10.7 oz (50.2 kg)   SpO2 97%   BMI 22.35 kg/m²     General appearance: Bernard ulent  HEENT: Pupils equal, round, and reactive to light. Conjunctivae/corneas clear. Neck: Supple, with full range of motion. No jugular venous distention. Trachea midline. Respiratory:  Normal respiratory effort. Clear to auscultation, bilaterally without Rales/Wheezes/Rhonchi.   Cardiovascular: Regular rate and rhythm with normal S1/S2 without murmurs, rubs or gallops. Abdomen: Soft, non-tender, non-distended with normal bowel sounds. Musculoskeletal: No clubbing, cyanosis or edema bilaterally. Full range of motion without deformity. Skin: Skin color, texture, turgor normal.  No rashes or lesions. Neurologic:  Neurovascularly intact without any focal sensory/motor deficits. Cranial nerves: II-XII intact, grossly non-focal.  Psychiatric: Somnulent  Capillary Refill: Brisk,< 3 seconds   Peripheral Pulses: +2 palpable, equal bilaterally       Labs:   Recent Labs     02/09/23  1700 02/10/23  0454   WBC 9.8 8.2   HGB 14.5 10.8*   HCT 45.2 33.4*    192     Recent Labs     02/09/23  1700 02/10/23  0455    148*   K 3.7 2.9*    109   CO2 29 25   BUN 26* 18   CREATININE <0.5* <0.5*   CALCIUM 10.4 9.0     Recent Labs     02/09/23  1700   AST 20   ALT 16   BILITOT 0.3   ALKPHOS 91     No results for input(s): INR in the last 72 hours. No results for input(s): [facility] in the last 72 hours. Urinalysis:      Lab Results   Component Value Date/Time    NITRU Negative 02/09/2023 05:45 PM    WBCUA 6-9 02/09/2023 05:45 PM    BACTERIA None Seen 12/11/2022 01:00 AM    RBCUA 21-50 02/09/2023 05:45 PM    BLOODU LARGE 02/09/2023 05:45 PM    SPECGRAV 1.022 02/09/2023 05:45 PM    GLUCOSEU Negative 02/09/2023 05:45 PM    GLUCOSEU NEGATIVE 09/02/2010 11:27 AM       Radiology:  XR CHEST PORTABLE   Final Result   No acute process.                  Assessment/Plan:    Active Hospital Problems    Diagnosis Date Noted    Sepsis ([facility] [address] [A41.9] 02/09/2023     Priority: Medium    UTI (urinary tract infection) [N39.0] 02/09/2023     Priority: Medium    Acute respiratory failure with hypoxia ([facility] [address] [J96.01] 02/09/2023     Priority: Medium     Acute hypoxic respiratory failure unclear cause  - with increased work of breath, tachypnea and hypoxia  - room air saturation 80%  - provide supplemental oxygen as necessary to keep SaO2 92% or greater. Sepsis ([facility] [address] on admission due to UTI  - with Fever and Tachycardia   -  Initial Lactic Acid 2.2 and will trend   - Pt was resuscitated with 30 cc/Kg IV Fluids and blood pressure will be monitored closely  - continue IV fluids  - No previous cultures available for review  - Urine and blood cultures pending  - Start abx therapy with Vanc and Maxipime     Acute metabolic encephalopathy in the setting of dementia   - Pt's family reports that mental status is significantly worse than Pt's normal baseline.   - Will monitor and provide supportive care. - likely due to acute illness  - continue home medications when able     Recent PE/DVT  - continue eliquis     Diabetes mellitus II   - hold oral agents  - SSI and CCD     Essential (primary) hypertension   - monitor blood pressure  - continue home meds      Hyperlipidemia   - continue statin       DVT Prophylaxis: Eliquis  Diet: ADULT DIET; Regular; 3 carb choices (45 gm/meal); Mildly Thick (Nectar)  ADULT ORAL NUTRITION SUPPLEMENT; Breakfast, Dinner;  Wound Healing Oral Supplement  ADULT ORAL NUTRITION SUPPLEMENT; Lunch, Dinner; Frozen Oral Supplement  Code Status: DNR-CC    PT/OT Eval Status: n/a    Dispo - Harsh Silva MD

## 2023-02-11 NOTE — PROGRESS NOTES
Hospitalist Progress Note      PCP: Milana Phoenix, DO    Date of Admission: 2/9/2023    Chief Complaint: sepsis    Hospital Course:      Subjective: more awake today       Medications:  Reviewed    Infusion Medications    dextrose      sodium chloride      sodium chloride Stopped (02/10/23 0458)     Scheduled Medications    Sodium Hypochlorite   Irrigation BID    apixaban  5 mg Oral BID    atorvastatin  20 mg Oral Daily    carvedilol  3.125 mg Oral Daily with breakfast    divalproex  250 mg Oral BID    donepezil  10 mg Oral Nightly    fluvoxaMINE  50 mg Oral Nightly    levothyroxine  25 mcg Oral Daily    insulin lispro  0-8 Units SubCUTAneous TID WC    insulin lispro  0-4 Units SubCUTAneous Nightly    pantoprazole  40 mg Oral Daily    perphenazine  2 mg Oral BID WC    QUEtiapine  25 mg Oral Nightly    tamsulosin  0.4 mg Oral Nightly    sodium chloride flush  5-40 mL IntraVENous 2 times per day    cefepime  2,000 mg IntraVENous Q8H    vancomycin  1,000 mg IntraVENous Q12H    vancomycin (VANCOCIN) intermittent dosing (placeholder)   Other RX Placeholder     PRN Meds: glucose, dextrose bolus **OR** dextrose bolus, glucagon (rDNA), dextrose, polyethylene glycol, sodium chloride flush, sodium chloride, acetaminophen **OR** acetaminophen      Intake/Output Summary (Last 24 hours) at 2/11/2023 1506  Last data filed at 2/11/2023 1000  Gross per 24 hour   Intake 20 ml   Output 700 ml   Net -680 ml         Exam:    /77   Pulse 65   Temp 98.1 °F (36.7 °C) (Axillary)   Resp 18   Ht 4' 11\" (1.499 m)   Wt 100 lb 12 oz (45.7 kg)   SpO2 96%   BMI 20.35 kg/m²     General appearance: awake but confused and disinterested  HEENT: Pupils equal, round, and reactive to light. Conjunctivae/corneas clear. Neck: Supple, with full range of motion. No jugular venous distention. Trachea midline. Respiratory:  Normal respiratory effort. Clear to auscultation, bilaterally without Rales/Wheezes/Rhonchi.   Cardiovascular: Regular rate and rhythm with normal S1/S2 without murmurs, rubs or gallops. Abdomen: Soft, non-tender, non-distended with normal bowel sounds. Musculoskeletal: No clubbing, cyanosis or edema bilaterally. Full range of motion without deformity. Skin: Skin color, texture, turgor normal.  No rashes or lesions. Neurologic:  Neurovascularly intact without any focal sensory/motor deficits. Cranial nerves: II-XII intact, grossly non-focal.  Psychiatric: awake but confused and disinterested  Capillary Refill: Brisk,< 3 seconds   Peripheral Pulses: +2 palpable, equal bilaterally       Labs:   Recent Labs     02/09/23  1700 02/10/23  0454 02/11/23  0522   WBC 9.8 8.2 7.0   HGB 14.5 10.8* 9.9*   HCT 45.2 33.4* 30.6*    192 158       Recent Labs     02/09/23  1700 02/10/23  0455 02/11/23  0522    148* 146*   K 3.7 2.9* 3.9    109 113*   CO2 29 25 27   BUN 26* 18 14   CREATININE <0.5* <0.5* <0.5*   CALCIUM 10.4 9.0 8.8       Recent Labs     02/09/23  1700   AST 20   ALT 16   BILITOT 0.3   ALKPHOS 91       No results for input(s): INR in the last 72 hours. No results for input(s): Patrisha Meigs in the last 72 hours. Urinalysis:      Lab Results   Component Value Date/Time    NITRU Negative 02/09/2023 05:45 PM    WBCUA 6-9 02/09/2023 05:45 PM    BACTERIA None Seen 12/11/2022 01:00 AM    RBCUA 21-50 02/09/2023 05:45 PM    BLOODU LARGE 02/09/2023 05:45 PM    SPECGRAV 1.022 02/09/2023 05:45 PM    GLUCOSEU Negative 02/09/2023 05:45 PM    GLUCOSEU NEGATIVE 09/02/2010 11:27 AM       Radiology:  XR CHEST PORTABLE   Final Result   No acute process.                  Assessment/Plan:    Active Hospital Problems    Diagnosis Date Noted    Sepsis ([facility] [address] [A41.9] 02/09/2023     Priority: Medium    UTI (urinary tract infection) [N39.0] 02/09/2023     Priority: Medium    Acute respiratory failure with hypoxia ([facility] [address] [J96.01] 02/09/2023     Priority: Medium     Acute hypoxic respiratory failure unclear cause  - with increased work of breath, tachypnea and hypoxia  - room air saturation 80%  - provide supplemental oxygen as necessary to keep SaO2 92% or greater. Sepsis ([address] on admission due to UTI  - with Fever and Tachycardia   -  Initial Lactic Acid 2.2 and will trend   - Pt was resuscitated with 30 cc/Kg IV Fluids and blood pressure will be monitored closely  - continue IV fluids  - No previous cultures available for review  - Urine and blood cultures pending  - Start abx therapy with Vanc and Maxipime     Acute metabolic encephalopathy in the setting of dementia   - Pt's family reports that mental status is significantly worse than Pt's normal baseline.   - Will monitor and provide supportive care. - likely due to acute illness  - continue home medications when able     Recent PE/DVT  - continue eliquis     Diabetes mellitus II   - hold oral agents  - SSI and CCD     Essential (primary) hypertension   - monitor blood pressure  - continue home meds      Hyperlipidemia   - continue statin       DVT Prophylaxis: Eliquis  Diet: ADULT DIET; Regular; 3 carb choices (45 gm/meal); Mildly Thick (Nectar)  ADULT ORAL NUTRITION SUPPLEMENT; Breakfast, Dinner;  Wound Healing Oral Supplement  ADULT ORAL NUTRITION SUPPLEMENT; Lunch, Dinner; Frozen Oral Supplement  Code Status: DNR-CC    PT/OT Eval Status: n/a    Dispo - Mario Alberto Medina MD

## 2023-02-11 NOTE — PLAN OF CARE
Problem: Pain  Goal: Verbalizes/displays adequate comfort level or baseline comfort level  2/11/2023 1445 by Emily Maya RN  Outcome: Progressing     Problem: ABCDS Injury Assessment  Goal: Absence of physical injury  2/11/2023 1445 by Emily Maya RN  Outcome: Progressing     Problem: Skin/Tissue Integrity  Goal: Absence of new skin breakdown  Description: 1. Monitor for areas of redness and/or skin breakdown  2. Assess vascular access sites hourly  3. Every 4-6 hours minimum:  Change oxygen saturation probe site  4. Every 4-6 hours:  If on nasal continuous positive airway pressure, respiratory therapy assess nares and determine need for appliance change or resting period.   2/11/2023 1445 by Emily Maya RN  Outcome: Progressing     Problem: Neurosensory - Adult  Goal: Achieves stable or improved neurological status  2/11/2023 1445 by Emily Maya RN  Outcome: Progressing     Problem: Neurosensory - Adult  Goal: Absence of seizures  2/11/2023 1445 by Emily Maya RN  Outcome: Progressing     Problem: Neurosensory - Adult  Goal: Remains free of injury related to seizures activity  2/11/2023 1445 by Emily Maya RN  Outcome: Progressing     Problem: Neurosensory - Adult  Goal: Achieves maximal functionality and self care  2/11/2023 1445 by Emily Maya RN  Outcome: Progressing     Problem: Respiratory - Adult  Goal: Achieves optimal ventilation and oxygenation  2/11/2023 1445 by Emily Maya RN  Outcome: Progressing     Problem: Cardiovascular - Adult  Goal: Absence of cardiac dysrhythmias or at baseline  2/11/2023 1445 by Emily Maya RN  Outcome: Progressing     Problem: Skin/Tissue Integrity - Adult  Goal: Skin integrity remains intact  2/11/2023 1445 by Emily Maya RN  Outcome: Progressing     Problem: Skin/Tissue Integrity - Adult  Goal: Incisions, wounds, or drain sites healing without S/S of infection  2/11/2023 1445 by Dariel Gonzalez RN  Outcome: Progressing     Problem: Skin/Tissue Integrity - Adult  Goal: Oral mucous membranes remain intact  2/11/2023 1445 by Dariel Gonzalez RN  Outcome: Progressing     Problem: Musculoskeletal - Adult  Goal: Return mobility to safest level of function  2/11/2023 1445 by Dariel Gonzalez RN  Outcome: Progressing     Problem: Musculoskeletal - Adult  Goal: Maintain proper alignment of affected body part  2/11/2023 1445 by Dariel Gonzalez RN  Outcome: Progressing     Problem: Musculoskeletal - Adult  Goal: Return ADL status to a safe level of function  2/11/2023 1445 by Dariel Gonzalez RN  Outcome: Progressing     Problem: Gastrointestinal - Adult  Goal: Maintains or returns to baseline bowel function  2/11/2023 1445 by Dariel Gonzalez RN  Outcome: Progressing     Problem: Gastrointestinal - Adult  Goal: Maintains adequate nutritional intake  2/11/2023 1445 by Dariel Gonzalez RN  Outcome: Progressing     Problem: Genitourinary - Adult  Goal: Absence of urinary retention  2/11/2023 1445 by Dariel Gonzalez RN  Outcome: Progressing     Problem: Genitourinary - Adult  Goal: Urinary catheter remains patent  2/11/2023 1445 by Dariel Gonzalez RN  Outcome: Progressing     Problem: Infection - Adult  Goal: Absence of infection at discharge  2/11/2023 1445 by Dariel Gonzalez RN  Outcome: Progressing     Problem: Infection - Adult  Goal: Absence of infection during hospitalization  2/11/2023 1445 by Dariel Gonzalez RN  Outcome: Progressing     Problem: Infection - Adult  Goal: Absence of fever/infection during anticipated neutropenic period  2/11/2023 1445 by Dariel Gonzalez RN  Outcome: Progressing     Problem: Metabolic/Fluid and Electrolytes - Adult  Goal: Electrolytes maintained within normal limits  2/11/2023 1445 by Dariel Gonzalez RN  Outcome: Progressing     Problem: Metabolic/Fluid and Electrolytes - Adult  Goal: Hemodynamic stability and optimal renal function maintained  2/11/2023 1445 by Chano Vargas RN  Outcome: Progressing     Problem: Metabolic/Fluid and Electrolytes - Adult  Goal: Glucose maintained within prescribed range  2/11/2023 1445 by Chano Vargas RN  Outcome: Progressing     Problem: Chronic Conditions and Co-morbidities  Goal: Patient's chronic conditions and co-morbidity symptoms are monitored and maintained or improved  2/11/2023 1445 by Chano Vargas RN  Outcome: Progressing     Problem: Nutrition Deficit:  Goal: Optimize nutritional status  2/11/2023 1445 by Chano Vargas RN  Outcome: Progressing     Problem: Safety - Adult  Goal: Free from fall injury  Outcome: Progressing     Problem: Discharge Planning  Goal: Discharge to home or other facility with appropriate resources  2/11/2023 1445 by Chano Vargas RN  Outcome: Progressing

## 2023-02-11 NOTE — PROGRESS NOTES
Clinical Pharmacy Note  Vancomycin Consult    Tom Gallardo is a 68 y.o. female ordered vancomycin for sepsis; consult received from PRATIMA Acharya to manage therapy. Also receiving cefepime. Allergies:  Patient has no known allergies. Temp max:  Temp (24hrs), Av.4 °F (36.9 °C), Min:97.9 °F (36.6 °C), Max:99.1 °F (37.3 °C)      Recent Labs     23  1700 02/10/23  0454 23  0522   WBC 9.8 8.2 7.0       Recent Labs     23  1700 02/10/23  0455 23  0522   BUN 26* 18 14   CREATININE <0.5* <0.5* <0.5*         Intake/Output Summary (Last 24 hours) at 2023 0711  Last data filed at 2023 0334  Gross per 24 hour   Intake --   Output 800 ml   Net -800 ml       Culture Results:  23 blood cultures - no growth to date  2/10/23 MRSA Nasal probe - positive for MRSA    Ht Readings from Last 1 Encounters:   23 4' 11\" (1.499 m)        Wt Readings from Last 1 Encounters:   23 100 lb 12 oz (45.7 kg)         Estimated Creatinine Clearance: 68 mL/min (based on SCr of 0.5 mg/dL). Assessment:  Day # 3 of vancomycin. Current regimen: 1000 mg every 12 hours  Vancomycin level: 16.8 mg/L  Predicted AUC: 589    Plan:  Continue current regimen. Thank you for the consult.      Victorina Youssef PharmD  2023 7:13 AM

## 2023-02-12 LAB
ANION GAP SERPL CALCULATED.3IONS-SCNC: 10 MMOL/L (ref 3–16)
BASOPHILS ABSOLUTE: 0 K/UL (ref 0–0.2)
BASOPHILS RELATIVE PERCENT: 0.1 %
BUN BLDV-MCNC: 9 MG/DL (ref 7–20)
CALCIUM SERPL-MCNC: 8.5 MG/DL (ref 8.3–10.6)
CHLORIDE BLD-SCNC: 109 MMOL/L (ref 99–110)
CO2: 26 MMOL/L (ref 21–32)
CREAT SERPL-MCNC: <0.5 MG/DL (ref 0.6–1.2)
EOSINOPHILS ABSOLUTE: 0 K/UL (ref 0–0.6)
EOSINOPHILS RELATIVE PERCENT: 0.5 %
GFR SERPL CREATININE-BSD FRML MDRD: >60 ML/MIN/{1.73_M2}
GLUCOSE BLD-MCNC: 103 MG/DL (ref 70–99)
GLUCOSE BLD-MCNC: 124 MG/DL (ref 70–99)
GLUCOSE BLD-MCNC: 135 MG/DL (ref 70–99)
GLUCOSE BLD-MCNC: 142 MG/DL (ref 70–99)
GLUCOSE BLD-MCNC: 229 MG/DL (ref 70–99)
GRAM STAIN RESULT: ABNORMAL
HCT VFR BLD CALC: 33.6 % (ref 36–48)
HEMOGLOBIN: 10.9 G/DL (ref 12–16)
L. PNEUMOPHILA SEROGP 1 UR AG: NORMAL
LYMPHOCYTES ABSOLUTE: 1.6 K/UL (ref 1–5.1)
LYMPHOCYTES RELATIVE PERCENT: 25.9 %
MAGNESIUM: 1.9 MG/DL (ref 1.8–2.4)
MCH RBC QN AUTO: 29 PG (ref 26–34)
MCHC RBC AUTO-ENTMCNC: 32.5 G/DL (ref 31–36)
MCV RBC AUTO: 89.2 FL (ref 80–100)
MONOCYTES ABSOLUTE: 0.3 K/UL (ref 0–1.3)
MONOCYTES RELATIVE PERCENT: 5.2 %
NEUTROPHILS ABSOLUTE: 4.3 K/UL (ref 1.7–7.7)
NEUTROPHILS RELATIVE PERCENT: 68.3 %
ORGANISM: ABNORMAL
PDW BLD-RTO: 20.8 % (ref 12.4–15.4)
PERFORMED ON: ABNORMAL
PLATELET # BLD: 160 K/UL (ref 135–450)
PMV BLD AUTO: 8.2 FL (ref 5–10.5)
POTASSIUM REFLEX MAGNESIUM: 2.9 MMOL/L (ref 3.5–5.1)
PROCALCITONIN: 0.06 NG/ML (ref 0–0.15)
RBC # BLD: 3.76 M/UL (ref 4–5.2)
SODIUM BLD-SCNC: 145 MMOL/L (ref 136–145)
STREP PNEUMONIAE ANTIGEN, URINE: NORMAL
URINE CULTURE, ROUTINE: ABNORMAL
WBC # BLD: 6.3 K/UL (ref 4–11)
WOUND/ABSCESS: ABNORMAL

## 2023-02-12 PROCEDURE — 6360000002 HC RX W HCPCS: Performed by: NURSE PRACTITIONER

## 2023-02-12 PROCEDURE — 6360000002 HC RX W HCPCS: Performed by: FAMILY MEDICINE

## 2023-02-12 PROCEDURE — 85025 COMPLETE CBC W/AUTO DIFF WBC: CPT

## 2023-02-12 PROCEDURE — 83735 ASSAY OF MAGNESIUM: CPT

## 2023-02-12 PROCEDURE — 6370000000 HC RX 637 (ALT 250 FOR IP): Performed by: NURSE PRACTITIONER

## 2023-02-12 PROCEDURE — 84145 PROCALCITONIN (PCT): CPT

## 2023-02-12 PROCEDURE — 80048 BASIC METABOLIC PNL TOTAL CA: CPT

## 2023-02-12 PROCEDURE — 2580000003 HC RX 258: Performed by: NURSE PRACTITIONER

## 2023-02-12 PROCEDURE — 1200000000 HC SEMI PRIVATE

## 2023-02-12 PROCEDURE — 36415 COLL VENOUS BLD VENIPUNCTURE: CPT

## 2023-02-12 RX ORDER — POTASSIUM CHLORIDE 7.45 MG/ML
10 INJECTION INTRAVENOUS PRN
Status: DISCONTINUED | OUTPATIENT
Start: 2023-02-12 | End: 2023-02-15 | Stop reason: HOSPADM

## 2023-02-12 RX ORDER — LINEZOLID 2 MG/ML
600 INJECTION, SOLUTION INTRAVENOUS EVERY 12 HOURS
Status: DISCONTINUED | OUTPATIENT
Start: 2023-02-12 | End: 2023-02-15 | Stop reason: HOSPADM

## 2023-02-12 RX ORDER — POTASSIUM CHLORIDE 20 MEQ/1
40 TABLET, EXTENDED RELEASE ORAL PRN
Status: DISCONTINUED | OUTPATIENT
Start: 2023-02-12 | End: 2023-02-15 | Stop reason: HOSPADM

## 2023-02-12 RX ADMIN — ATORVASTATIN CALCIUM 20 MG: 20 TABLET, FILM COATED ORAL at 11:09

## 2023-02-12 RX ADMIN — DIVALPROEX SODIUM 250 MG: 125 CAPSULE ORAL at 10:49

## 2023-02-12 RX ADMIN — DONEPEZIL HYDROCHLORIDE 10 MG: 10 TABLET, FILM COATED ORAL at 21:37

## 2023-02-12 RX ADMIN — PERPHENAZINE 2 MG: 2 TABLET, FILM COATED ORAL at 17:42

## 2023-02-12 RX ADMIN — POTASSIUM CHLORIDE 10 MEQ: 7.46 INJECTION, SOLUTION INTRAVENOUS at 13:11

## 2023-02-12 RX ADMIN — APIXABAN 5 MG: 5 TABLET, FILM COATED ORAL at 10:49

## 2023-02-12 RX ADMIN — POTASSIUM CHLORIDE 10 MEQ: 7.46 INJECTION, SOLUTION INTRAVENOUS at 15:41

## 2023-02-12 RX ADMIN — POTASSIUM CHLORIDE 10 MEQ: 7.46 INJECTION, SOLUTION INTRAVENOUS at 16:52

## 2023-02-12 RX ADMIN — QUETIAPINE FUMARATE 25 MG: 25 TABLET ORAL at 21:37

## 2023-02-12 RX ADMIN — DAKIN'S SOLUTION 0.125% (QUARTER STRENGTH): 0.12 SOLUTION at 21:45

## 2023-02-12 RX ADMIN — CARVEDILOL 3.12 MG: 3.12 TABLET, FILM COATED ORAL at 10:49

## 2023-02-12 RX ADMIN — VANCOMYCIN HYDROCHLORIDE 1000 MG: 1 INJECTION, POWDER, LYOPHILIZED, FOR SOLUTION INTRAVENOUS at 02:56

## 2023-02-12 RX ADMIN — POTASSIUM CHLORIDE 10 MEQ: 7.46 INJECTION, SOLUTION INTRAVENOUS at 11:07

## 2023-02-12 RX ADMIN — CEFEPIME 2000 MG: 2 INJECTION, POWDER, FOR SOLUTION INTRAVENOUS at 04:38

## 2023-02-12 RX ADMIN — CEFEPIME 2000 MG: 2 INJECTION, POWDER, FOR SOLUTION INTRAVENOUS at 11:50

## 2023-02-12 RX ADMIN — POTASSIUM CHLORIDE 10 MEQ: 7.46 INJECTION, SOLUTION INTRAVENOUS at 11:51

## 2023-02-12 RX ADMIN — VANCOMYCIN HYDROCHLORIDE 1000 MG: 1 INJECTION, POWDER, LYOPHILIZED, FOR SOLUTION INTRAVENOUS at 09:44

## 2023-02-12 RX ADMIN — DIVALPROEX SODIUM 250 MG: 125 CAPSULE ORAL at 17:42

## 2023-02-12 RX ADMIN — DAKIN'S SOLUTION 0.125% (QUARTER STRENGTH): 0.12 SOLUTION at 10:56

## 2023-02-12 RX ADMIN — APIXABAN 5 MG: 5 TABLET, FILM COATED ORAL at 21:37

## 2023-02-12 RX ADMIN — LINEZOLID 600 MG: 600 INJECTION, SOLUTION INTRAVENOUS at 18:05

## 2023-02-12 RX ADMIN — TAMSULOSIN HYDROCHLORIDE 0.4 MG: 0.4 CAPSULE ORAL at 21:37

## 2023-02-12 RX ADMIN — SODIUM CHLORIDE: 9 INJECTION, SOLUTION INTRAVENOUS at 17:50

## 2023-02-12 RX ADMIN — POTASSIUM CHLORIDE 10 MEQ: 7.46 INJECTION, SOLUTION INTRAVENOUS at 14:21

## 2023-02-12 RX ADMIN — CEFEPIME 2000 MG: 2 INJECTION, POWDER, FOR SOLUTION INTRAVENOUS at 21:36

## 2023-02-12 NOTE — PLAN OF CARE
Problem: Discharge Planning  Goal: Discharge to home or other facility with appropriate resources  2/12/2023 1014 by Shayla Dahl RN  Outcome: Progressing  2/12/2023 0603 by Anette Barnes RN  Outcome: Progressing     Problem: Pain  Goal: Verbalizes/displays adequate comfort level or baseline comfort level  2/12/2023 1014 by Shayla Dahl RN  Outcome: Progressing  2/12/2023 0603 by Anette Barnes RN  Outcome: Progressing     Problem: ABCDS Injury Assessment  Goal: Absence of physical injury  2/12/2023 1014 by Shayla Dahl RN  Outcome: Progressing  2/12/2023 0603 by Anette Barnes RN  Outcome: Progressing     Problem: Skin/Tissue Integrity  Goal: Absence of new skin breakdown  Description: 1. Monitor for areas of redness and/or skin breakdown  2. Assess vascular access sites hourly  3. Every 4-6 hours minimum:  Change oxygen saturation probe site  4. Every 4-6 hours:  If on nasal continuous positive airway pressure, respiratory therapy assess nares and determine need for appliance change or resting period.   2/12/2023 1014 by Shayla Dahl RN  Outcome: Progressing  2/12/2023 0603 by Anette Barnes RN  Outcome: Progressing     Problem: Neurosensory - Adult  Goal: Achieves stable or improved neurological status  2/12/2023 1014 by Shayla Dahl RN  Outcome: Progressing  2/12/2023 0603 by Anette Barnes RN  Outcome: Progressing  Goal: Absence of seizures  2/12/2023 1014 by Shayla Dahl RN  Outcome: Progressing  2/12/2023 0603 by Anette Barnes RN  Outcome: Progressing  Goal: Remains free of injury related to seizures activity  2/12/2023 1014 by Shayla Dahl RN  Outcome: Progressing  2/12/2023 0603 by Anette Barnes RN  Outcome: Progressing  Goal: Achieves maximal functionality and self care  2/12/2023 1014 by Shayla Dahl RN  Outcome: Progressing  2/12/2023 0603 by Anette Barnes RN  Outcome: Progressing     Problem: Respiratory - Adult  Goal: Achieves optimal ventilation and oxygenation  2/12/2023 1014 by López Rangel RN  Outcome: Progressing  2/12/2023 0603 by Ted Jarquin RN  Outcome: Progressing     Problem: Cardiovascular - Adult  Goal: Maintains optimal cardiac output and hemodynamic stability  2/12/2023 1014 by López Rangel RN  Outcome: Progressing  2/12/2023 0603 by Ted Jarquin RN  Outcome: Progressing  Goal: Absence of cardiac dysrhythmias or at baseline  2/12/2023 1014 by López Rangel RN  Outcome: Progressing  2/12/2023 0603 by Ted Jarquin RN  Outcome: Progressing     Problem: Skin/Tissue Integrity - Adult  Goal: Skin integrity remains intact  2/12/2023 0603 by Ted Jarquin RN  Outcome: Progressing  Goal: Incisions, wounds, or drain sites healing without S/S of infection  2/12/2023 1014 by López Rangel RN  Outcome: Progressing  2/12/2023 0603 by Ted Jarquin RN  Outcome: Progressing  Goal: Oral mucous membranes remain intact  2/12/2023 1014 by López Rangel RN  Outcome: Progressing  2/12/2023 0603 by Ted Jarquin RN  Outcome: Progressing     Problem: Musculoskeletal - Adult  Goal: Return mobility to safest level of function  2/12/2023 1014 by López Rangel RN  Outcome: Progressing  2/12/2023 0603 by Ted Jarquin RN  Outcome: Progressing  Goal: Maintain proper alignment of affected body part  2/12/2023 1014 by López Rangel RN  Outcome: Progressing  2/12/2023 0603 by Ted Jarquin RN  Outcome: Progressing  Goal: Return ADL status to a safe level of function  2/12/2023 1014 by López Rangel RN  Outcome: Progressing  2/12/2023 0603 by Ted Jarquin RN  Outcome: Progressing     Problem: Gastrointestinal - Adult  Goal: Maintains or returns to baseline bowel function  2/12/2023 1014 by López Rangel RN  Outcome: Progressing  2/12/2023 0603 by Ted Jarquin RN  Outcome: Progressing  Goal: Maintains adequate nutritional intake  2/12/2023 1014 by Clemon Mohs, RN  Outcome: Progressing  2/12/2023 0603 by Emely Keenan RN  Outcome: Progressing     Problem: Genitourinary - Adult  Goal: Absence of urinary retention  2/12/2023 1014 by Clemon Mohs, RN  Outcome: Progressing  2/12/2023 0603 by Emely Keenan RN  Outcome: Progressing  Goal: Urinary catheter remains patent  2/12/2023 1014 by Clemon Mohs, RN  Outcome: Progressing  2/12/2023 0603 by Emely Keenan RN  Outcome: Progressing     Problem: Infection - Adult  Goal: Absence of infection at discharge  2/12/2023 1014 by Clemon Mohs, RN  Outcome: Progressing  2/12/2023 0603 by Emely Keenan RN  Outcome: Progressing  Goal: Absence of infection during hospitalization  2/12/2023 1014 by Clemon Mohs, RN  Outcome: Progressing  2/12/2023 0603 by Emely Keenan RN  Outcome: Progressing  Goal: Absence of fever/infection during anticipated neutropenic period  2/12/2023 1014 by Clemon Mohs, RN  Outcome: Progressing  2/12/2023 0603 by Emely Keenan RN  Outcome: Progressing     Problem: Metabolic/Fluid and Electrolytes - Adult  Goal: Electrolytes maintained within normal limits  2/12/2023 1014 by Clemon Mohs, RN  Outcome: Progressing  2/12/2023 0603 by Emely Keenan RN  Outcome: Progressing  Goal: Hemodynamic stability and optimal renal function maintained  2/12/2023 1014 by Clemon Mohs, RN  Outcome: Progressing  2/12/2023 0603 by Emely Keenan RN  Outcome: Progressing  Goal: Glucose maintained within prescribed range  2/12/2023 1014 by Clemon Mohs, RN  Outcome: Progressing  2/12/2023 0603 by Emely Keenan RN  Outcome: Progressing     Problem: Chronic Conditions and Co-morbidities  Goal: Patient's chronic conditions and co-morbidity symptoms are monitored and maintained or improved  2/12/2023 1014 by Clemon Mohs, RN  Outcome: Progressing  2/12/2023 0603 by Emely Keenan RN  Outcome: Progressing     Problem: Nutrition Deficit:  Goal: Optimize nutritional status  2/12/2023 1014 by Sabino Cisse RN  Outcome: Progressing  2/12/2023 0603 by Jeana Barraza RN  Outcome: Progressing     Problem: Safety - Adult  Goal: Free from fall injury  2/12/2023 1014 by Sabino Cisse RN  Outcome: Progressing  2/12/2023 0603 by Jeana Barraza RN  Outcome: Progressing

## 2023-02-12 NOTE — PROGRESS NOTES
Patient check and changed. Wound assessed and dressing changed.     Electronically signed by Rui Harris RN on 2/12/2023 at 3:34 PM

## 2023-02-12 NOTE — PLAN OF CARE
Problem: Discharge Planning  Goal: Discharge to home or other facility with appropriate resources  Outcome: Progressing     Problem: Pain  Goal: Verbalizes/displays adequate comfort level or baseline comfort level  Outcome: Progressing     Problem: ABCDS Injury Assessment  Goal: Absence of physical injury  Outcome: Progressing     Problem: Skin/Tissue Integrity  Goal: Absence of new skin breakdown  Description: 1. Monitor for areas of redness and/or skin breakdown  2. Assess vascular access sites hourly  3. Every 4-6 hours minimum:  Change oxygen saturation probe site  4. Every 4-6 hours:  If on nasal continuous positive airway pressure, respiratory therapy assess nares and determine need for appliance change or resting period.   Outcome: Progressing     Problem: Neurosensory - Adult  Goal: Achieves stable or improved neurological status  Outcome: Progressing  Goal: Absence of seizures  Outcome: Progressing  Goal: Remains free of injury related to seizures activity  Outcome: Progressing  Goal: Achieves maximal functionality and self care  Outcome: Progressing

## 2023-02-12 NOTE — PROGRESS NOTES
Hospitalist Progress Note      PCP: Sage Sy DO    Date of Admission: 2/9/2023    Chief Complaint: sepsis    Hospital Course:      Subjective: no complaints       Medications:  Reviewed    Infusion Medications    dextrose      sodium chloride      sodium chloride 125 mL/hr at 02/11/23 1847     Scheduled Medications    potassium bicarb-citric acid  40 mEq Oral BID    Sodium Hypochlorite   Irrigation BID    apixaban  5 mg Oral BID    atorvastatin  20 mg Oral Daily    carvedilol  3.125 mg Oral Daily with breakfast    divalproex  250 mg Oral BID    donepezil  10 mg Oral Nightly    fluvoxaMINE  50 mg Oral Nightly    levothyroxine  25 mcg Oral Daily    insulin lispro  0-8 Units SubCUTAneous TID WC    insulin lispro  0-4 Units SubCUTAneous Nightly    pantoprazole  40 mg Oral Daily    perphenazine  2 mg Oral BID WC    QUEtiapine  25 mg Oral Nightly    tamsulosin  0.4 mg Oral Nightly    sodium chloride flush  5-40 mL IntraVENous 2 times per day    cefepime  2,000 mg IntraVENous Q8H    vancomycin  1,000 mg IntraVENous Q12H    vancomycin (VANCOCIN) intermittent dosing (placeholder)   Other RX Placeholder     PRN Meds: potassium chloride **OR** potassium alternative oral replacement **OR** potassium chloride, glucose, dextrose bolus **OR** dextrose bolus, glucagon (rDNA), dextrose, polyethylene glycol, sodium chloride flush, sodium chloride, acetaminophen **OR** acetaminophen      Intake/Output Summary (Last 24 hours) at 2/12/2023 1447  Last data filed at 2/12/2023 1324  Gross per 24 hour   Intake 1600 ml   Output 1675 ml   Net -75 ml         Exam:    BP (!) 142/85   Pulse 70   Temp 100.3 °F (37.9 °C) (Axillary)   Resp 18   Ht 4' 11\" (1.499 m)   Wt 100 lb 12 oz (45.7 kg)   SpO2 97%   BMI 20.35 kg/m²     General appearance: awake but confused and disinterested  HEENT: Pupils equal, round, and reactive to light. Conjunctivae/corneas clear. Neck: Supple, with full range of motion. No jugular venous distention. Trachea midline. Respiratory:  Normal respiratory effort. Clear to auscultation, bilaterally without Rales/Wheezes/Rhonchi. Cardiovascular: Regular rate and rhythm with normal S1/S2 without murmurs, rubs or gallops. Abdomen: Soft, non-tender, non-distended with normal bowel sounds. Musculoskeletal: No clubbing, cyanosis or edema bilaterally. Full range of motion without deformity. Skin: Skin color, texture, turgor normal.  Large sacral decubitus wound  Neurologic:  Neurovascularly intact without any focal sensory/motor deficits. Cranial nerves: II-XII intact, grossly non-focal.  Psychiatric: awake but confused and disinterested  Capillary Refill: Brisk,< 3 seconds   Peripheral Pulses: +2 palpable, equal bilaterally       Labs:   Recent Labs     02/10/23  0454 02/11/23  0522 02/12/23  0616   WBC 8.2 7.0 6.3   HGB 10.8* 9.9* 10.9*   HCT 33.4* 30.6* 33.6*    158 160       Recent Labs     02/10/23  0455 02/11/23  0522 02/12/23  0616   * 146* 145   K 2.9* 3.9 2.9*    113* 109   CO2 25 27 26   BUN 18 14 9   CREATININE <0.5* <0.5* <0.5*   CALCIUM 9.0 8.8 8.5       Recent Labs     02/09/23  1700   AST 20   ALT 16   BILITOT 0.3   ALKPHOS 91       No results for input(s): INR in the last 72 hours. No results for input(s): Tenisha Risk in the last 72 hours. Urinalysis:      Lab Results   Component Value Date/Time    NITRU Negative 02/09/2023 05:45 PM    WBCUA 6-9 02/09/2023 05:45 PM    BACTERIA None Seen 12/11/2022 01:00 AM    RBCUA 21-50 02/09/2023 05:45 PM    BLOODU LARGE 02/09/2023 05:45 PM    SPECGRAV 1.022 02/09/2023 05:45 PM    GLUCOSEU Negative 02/09/2023 05:45 PM    GLUCOSEU NEGATIVE 09/02/2010 11:27 AM       Radiology:  XR CHEST PORTABLE   Final Result   No acute process.                  Assessment/Plan:    Active Hospital Problems    Diagnosis Date Noted    Sepsis ([address] [A41.9] 02/09/2023     Priority: Medium    UTI (urinary tract infection) [N39.0] 02/09/2023     Priority: Medium Acute respiratory failure with hypoxia ([facility] [address] [J96.01] 02/09/2023     Priority: Medium     Sepsis ([facility] [address] on admission due to UTI, potentially sacral wound infection  VRE UTI  Sacral decubitus ulcer stage 4  - cefepime for pseudomonas in wound  - changed vanc to linezolid  - consult general surgery     Acute metabolic encephalopathy in the setting of dementia   - Pt's family reports that mental status is significantly worse than Pt's normal baseline.   - Will monitor and provide supportive care. - likely due to acute illness  - continue home medications when able    Acute hypoxic respiratory failure: likely metabolic cause, also deconditioning and recent PE  - with increased work of breath, tachypnea and hypoxia  - room air saturation 80%  - provide supplemental oxygen as necessary to keep SaO2 92% or greater. Recent PE/DVT  - continue eliquis     Diabetes mellitus II   - hold oral agents  - SSI and CCD     Essential (primary) hypertension   - monitor blood pressure  - continue home meds      Hyperlipidemia   - continue statin       DVT Prophylaxis: Eliquis  Diet: ADULT DIET; Regular; 3 carb choices (45 gm/meal); Mildly Thick (Nectar)  ADULT ORAL NUTRITION SUPPLEMENT; Breakfast, Dinner;  Wound Healing Oral Supplement  ADULT ORAL NUTRITION SUPPLEMENT; Lunch, Dinner; Frozen Oral Supplement  Code Status: DNR-CC    PT/OT Eval Status: n/a    Dispo - Dajuan Baltazar MD

## 2023-02-13 LAB
ANION GAP SERPL CALCULATED.3IONS-SCNC: 12 MMOL/L (ref 3–16)
BASOPHILS ABSOLUTE: 0 K/UL (ref 0–0.2)
BASOPHILS RELATIVE PERCENT: 0.2 %
BLOOD CULTURE, ROUTINE: NORMAL
BUN BLDV-MCNC: 12 MG/DL (ref 7–20)
CALCIUM SERPL-MCNC: 8.4 MG/DL (ref 8.3–10.6)
CHLORIDE BLD-SCNC: 109 MMOL/L (ref 99–110)
CO2: 22 MMOL/L (ref 21–32)
CREAT SERPL-MCNC: <0.5 MG/DL (ref 0.6–1.2)
CULTURE, BLOOD 2: NORMAL
EOSINOPHILS ABSOLUTE: 0 K/UL (ref 0–0.6)
EOSINOPHILS RELATIVE PERCENT: 0.3 %
GFR SERPL CREATININE-BSD FRML MDRD: >60 ML/MIN/{1.73_M2}
GLUCOSE BLD-MCNC: 102 MG/DL (ref 70–99)
GLUCOSE BLD-MCNC: 115 MG/DL (ref 70–99)
GLUCOSE BLD-MCNC: 119 MG/DL (ref 70–99)
GLUCOSE BLD-MCNC: 130 MG/DL (ref 70–99)
GLUCOSE BLD-MCNC: 220 MG/DL (ref 70–99)
HCT VFR BLD CALC: 31.5 % (ref 36–48)
HEMOGLOBIN: 9.8 G/DL (ref 12–16)
LYMPHOCYTES ABSOLUTE: 2.6 K/UL (ref 1–5.1)
LYMPHOCYTES RELATIVE PERCENT: 24.5 %
MCH RBC QN AUTO: 28.7 PG (ref 26–34)
MCHC RBC AUTO-ENTMCNC: 31.1 G/DL (ref 31–36)
MCV RBC AUTO: 92.2 FL (ref 80–100)
MONOCYTES ABSOLUTE: 0.3 K/UL (ref 0–1.3)
MONOCYTES RELATIVE PERCENT: 2.8 %
NEUTROPHILS ABSOLUTE: 7.7 K/UL (ref 1.7–7.7)
NEUTROPHILS RELATIVE PERCENT: 72.2 %
PDW BLD-RTO: 20.5 % (ref 12.4–15.4)
PERFORMED ON: ABNORMAL
PLATELET # BLD: 135 K/UL (ref 135–450)
PMV BLD AUTO: 8.7 FL (ref 5–10.5)
POTASSIUM SERPL-SCNC: 4 MMOL/L (ref 3.5–5.1)
PRO-BNP: 249 PG/ML (ref 0–449)
RBC # BLD: 3.41 M/UL (ref 4–5.2)
SODIUM BLD-SCNC: 143 MMOL/L (ref 136–145)
WBC # BLD: 10.7 K/UL (ref 4–11)

## 2023-02-13 PROCEDURE — APPNB15 APP NON BILLABLE TIME 0-15 MINS: Performed by: PHYSICIAN ASSISTANT

## 2023-02-13 PROCEDURE — 6360000002 HC RX W HCPCS: Performed by: FAMILY MEDICINE

## 2023-02-13 PROCEDURE — 85025 COMPLETE CBC W/AUTO DIFF WBC: CPT

## 2023-02-13 PROCEDURE — 6370000000 HC RX 637 (ALT 250 FOR IP): Performed by: NURSE PRACTITIONER

## 2023-02-13 PROCEDURE — 6360000002 HC RX W HCPCS: Performed by: NURSE PRACTITIONER

## 2023-02-13 PROCEDURE — 83880 ASSAY OF NATRIURETIC PEPTIDE: CPT

## 2023-02-13 PROCEDURE — 94760 N-INVAS EAR/PLS OXIMETRY 1: CPT

## 2023-02-13 PROCEDURE — 80048 BASIC METABOLIC PNL TOTAL CA: CPT

## 2023-02-13 PROCEDURE — 2580000003 HC RX 258: Performed by: NURSE PRACTITIONER

## 2023-02-13 PROCEDURE — 1200000000 HC SEMI PRIVATE

## 2023-02-13 PROCEDURE — 2700000000 HC OXYGEN THERAPY PER DAY

## 2023-02-13 PROCEDURE — 36415 COLL VENOUS BLD VENIPUNCTURE: CPT

## 2023-02-13 PROCEDURE — APPSS15 APP SPLIT SHARED TIME 0-15 MINUTES: Performed by: PHYSICIAN ASSISTANT

## 2023-02-13 RX ADMIN — LEVOTHYROXINE SODIUM 25 MCG: 25 TABLET ORAL at 09:06

## 2023-02-13 RX ADMIN — PANTOPRAZOLE SODIUM 40 MG: 40 TABLET, DELAYED RELEASE ORAL at 09:06

## 2023-02-13 RX ADMIN — QUETIAPINE FUMARATE 25 MG: 25 TABLET ORAL at 21:13

## 2023-02-13 RX ADMIN — DIVALPROEX SODIUM 250 MG: 125 CAPSULE ORAL at 13:39

## 2023-02-13 RX ADMIN — PERPHENAZINE 2 MG: 2 TABLET, FILM COATED ORAL at 17:15

## 2023-02-13 RX ADMIN — LINEZOLID 600 MG: 600 INJECTION, SOLUTION INTRAVENOUS at 07:18

## 2023-02-13 RX ADMIN — CARVEDILOL 3.12 MG: 3.12 TABLET, FILM COATED ORAL at 09:18

## 2023-02-13 RX ADMIN — TAMSULOSIN HYDROCHLORIDE 0.4 MG: 0.4 CAPSULE ORAL at 21:13

## 2023-02-13 RX ADMIN — DAKIN'S SOLUTION 0.125% (QUARTER STRENGTH): 0.12 SOLUTION at 10:56

## 2023-02-13 RX ADMIN — ATORVASTATIN CALCIUM 20 MG: 20 TABLET, FILM COATED ORAL at 09:06

## 2023-02-13 RX ADMIN — Medication 10 ML: at 09:11

## 2023-02-13 RX ADMIN — DAKIN'S SOLUTION 0.125% (QUARTER STRENGTH): 0.12 SOLUTION at 21:18

## 2023-02-13 RX ADMIN — DIVALPROEX SODIUM 250 MG: 125 CAPSULE ORAL at 09:16

## 2023-02-13 RX ADMIN — PERPHENAZINE 2 MG: 2 TABLET, FILM COATED ORAL at 09:06

## 2023-02-13 RX ADMIN — APIXABAN 5 MG: 5 TABLET, FILM COATED ORAL at 09:06

## 2023-02-13 RX ADMIN — CEFEPIME 2000 MG: 2 INJECTION, POWDER, FOR SOLUTION INTRAVENOUS at 12:16

## 2023-02-13 RX ADMIN — DONEPEZIL HYDROCHLORIDE 10 MG: 10 TABLET, FILM COATED ORAL at 21:13

## 2023-02-13 RX ADMIN — LINEZOLID 600 MG: 600 INJECTION, SOLUTION INTRAVENOUS at 17:24

## 2023-02-13 RX ADMIN — CEFEPIME 2000 MG: 2 INJECTION, POWDER, FOR SOLUTION INTRAVENOUS at 21:05

## 2023-02-13 RX ADMIN — APIXABAN 5 MG: 5 TABLET, FILM COATED ORAL at 21:13

## 2023-02-13 RX ADMIN — CEFEPIME 2000 MG: 2 INJECTION, POWDER, FOR SOLUTION INTRAVENOUS at 07:17

## 2023-02-13 ASSESSMENT — PAIN SCALES - WONG BAKER: WONGBAKER_NUMERICALRESPONSE: 0

## 2023-02-13 ASSESSMENT — PAIN SCALES - GENERAL: PAINLEVEL_OUTOF10: 0

## 2023-02-13 NOTE — PLAN OF CARE
Problem: Discharge Planning  Goal: Discharge to home or other facility with appropriate resources  Outcome: Progressing  Flowsheets (Taken 2/13/2023 0900)  Discharge to home or other facility with appropriate resources: Identify barriers to discharge with patient and caregiver     Problem: Pain  Goal: Verbalizes/displays adequate comfort level or baseline comfort level  Outcome: Progressing     Problem: ABCDS Injury Assessment  Goal: Absence of physical injury  Outcome: Progressing     Problem: Skin/Tissue Integrity  Goal: Absence of new skin breakdown  Description: 1. Monitor for areas of redness and/or skin breakdown  2. Assess vascular access sites hourly  3. Every 4-6 hours minimum:  Change oxygen saturation probe site  4. Every 4-6 hours:  If on nasal continuous positive airway pressure, respiratory therapy assess nares and determine need for appliance change or resting period.   Outcome: Progressing     Problem: Neurosensory - Adult  Goal: Achieves stable or improved neurological status  Outcome: Progressing  Flowsheets (Taken 2/13/2023 0900)  Achieves stable or improved neurological status: Assess for and report changes in neurological status  Goal: Absence of seizures  Outcome: Progressing  Goal: Remains free of injury related to seizures activity  Outcome: Progressing  Goal: Achieves maximal functionality and self care  Outcome: Progressing  Flowsheets (Taken 2/13/2023 0900)  Achieves maximal functionality and self care: Monitor swallowing and airway patency with patient fatigue and changes in neurological status     Problem: Respiratory - Adult  Goal: Achieves optimal ventilation and oxygenation  Outcome: Progressing  Flowsheets (Taken 2/13/2023 0900)  Achieves optimal ventilation and oxygenation: Assess for changes in respiratory status     Problem: Cardiovascular - Adult  Goal: Maintains optimal cardiac output and hemodynamic stability  Outcome: Progressing  Flowsheets (Taken 2/13/2023 0900)  Maintains optimal cardiac output and hemodynamic stability: Monitor blood pressure and heart rate  Goal: Absence of cardiac dysrhythmias or at baseline  Outcome: Progressing  Flowsheets (Taken 2/13/2023 0900)  Absence of cardiac dysrhythmias or at baseline: Monitor cardiac rate and rhythm     Problem: Skin/Tissue Integrity - Adult  Goal: Skin integrity remains intact  Outcome: Progressing  Flowsheets (Taken 2/13/2023 0900)  Skin Integrity Remains Intact: Monitor for areas of redness and/or skin breakdown  Goal: Incisions, wounds, or drain sites healing without S/S of infection  Outcome: Progressing  Flowsheets (Taken 2/13/2023 0900)  Incisions, Wounds, or Drain Sites Healing Without Sign and Symptoms of Infection: Implement wound care per orders  Goal: Oral mucous membranes remain intact  Outcome: Progressing  Flowsheets (Taken 2/13/2023 0900)  Oral Mucous Membranes Remain Intact: Implement preventative oral hygiene regimen     Problem: Musculoskeletal - Adult  Goal: Return mobility to safest level of function  Outcome: Progressing  Flowsheets (Taken 2/13/2023 0900)  Return Mobility to Safest Level of Function: Assist with transfers and ambulation using safe patient handling equipment as needed  Goal: Maintain proper alignment of affected body part  Outcome: Progressing  Flowsheets (Taken 2/13/2023 0900)  Maintain proper alignment of affected body part: Support and protect limb and body alignment per provider's orders  Goal: Return ADL status to a safe level of function  Outcome: Progressing  Flowsheets (Taken 2/13/2023 0900)  Return ADL Status to a Safe Level of Function: Assess activities of daily living deficits and provide assistive devices as needed     Problem: Gastrointestinal - Adult  Goal: Maintains or returns to baseline bowel function  Outcome: Progressing  Goal: Maintains adequate nutritional intake  Outcome: Progressing     Problem: Genitourinary - Adult  Goal: Absence of urinary retention  Outcome: Progressing  Flowsheets (Taken 2/13/2023 0900)  Absence of urinary retention: Monitor intake/output and perform bladder scan as needed  Goal: Urinary catheter remains patent  Outcome: Progressing  Flowsheets (Taken 2/13/2023 0900)  Urinary catheter remains patent: Assess patency of urinary catheter     Problem: Infection - Adult  Goal: Absence of infection at discharge  Outcome: Progressing  Flowsheets (Taken 2/13/2023 0900)  Absence of infection at discharge: Assess and monitor for signs and symptoms of infection  Goal: Absence of infection during hospitalization  Outcome: Progressing  Flowsheets (Taken 2/13/2023 0900)  Absence of infection during hospitalization: Assess and monitor for signs and symptoms of infection  Goal: Absence of fever/infection during anticipated neutropenic period  Outcome: Progressing  Flowsheets (Taken 2/13/2023 0900)  Absence of fever/infection during anticipated neutropenic period: Monitor white blood cell count     Problem: Metabolic/Fluid and Electrolytes - Adult  Goal: Electrolytes maintained within normal limits  Outcome: Progressing  Flowsheets (Taken 2/13/2023 0900)  Electrolytes maintained within normal limits: Administer electrolyte replacement as ordered  Goal: Hemodynamic stability and optimal renal function maintained  Outcome: Progressing  Flowsheets (Taken 2/13/2023 0900)  Hemodynamic stability and optimal renal function maintained: Monitor labs and assess for signs and symptoms of volume excess or deficit  Goal: Glucose maintained within prescribed range  Outcome: Progressing  Flowsheets (Taken 2/13/2023 0900)  Glucose maintained within prescribed range: Monitor blood glucose as ordered     Problem: Chronic Conditions and Co-morbidities  Goal: Patient's chronic conditions and co-morbidity symptoms are monitored and maintained or improved  Outcome: Progressing  Flowsheets (Taken 2/13/2023 0900)  Care Plan - Patient's Chronic Conditions and Co-Morbidity Symptoms are Monitored and Maintained or Improved: Monitor and assess patient's chronic conditions and comorbid symptoms for stability, deterioration, or improvement     Problem: Nutrition Deficit:  Goal: Optimize nutritional status  Outcome: Progressing     Problem: Safety - Adult  Goal: Free from fall injury  Outcome: Progressing

## 2023-02-13 NOTE — PROGRESS NOTES
Hospitalist Progress Note      PCP: Sam Smith DO    Date of Admission: 2/9/2023    Chief Complaint: sepsis    Hospital Course:      Subjective: no complaints       Medications:  Reviewed    Infusion Medications    dextrose      sodium chloride      sodium chloride 125 mL/hr at 02/12/23 1750     Scheduled Medications    potassium bicarb-citric acid  40 mEq Oral BID    linezolid  600 mg IntraVENous Q12H    Sodium Hypochlorite   Irrigation BID    apixaban  5 mg Oral BID    atorvastatin  20 mg Oral Daily    carvedilol  3.125 mg Oral Daily with breakfast    divalproex  250 mg Oral BID    donepezil  10 mg Oral Nightly    levothyroxine  25 mcg Oral Daily    insulin lispro  0-8 Units SubCUTAneous TID WC    insulin lispro  0-4 Units SubCUTAneous Nightly    pantoprazole  40 mg Oral Daily    perphenazine  2 mg Oral BID WC    QUEtiapine  25 mg Oral Nightly    tamsulosin  0.4 mg Oral Nightly    sodium chloride flush  5-40 mL IntraVENous 2 times per day    cefepime  2,000 mg IntraVENous Q8H     PRN Meds: potassium chloride **OR** potassium alternative oral replacement **OR** potassium chloride, glucose, dextrose bolus **OR** dextrose bolus, glucagon (rDNA), dextrose, polyethylene glycol, sodium chloride flush, sodium chloride, acetaminophen **OR** acetaminophen      Intake/Output Summary (Last 24 hours) at 2/13/2023 6556  Last data filed at 2/12/2023 1324  Gross per 24 hour   Intake 80 ml   Output 750 ml   Net -670 ml         Exam:    /69   Pulse 62   Temp 98.4 °F (36.9 °C) (Axillary)   Resp 15   Ht 4' 11\" (1.499 m)   Wt 99 lb 3.3 oz (45 kg)   SpO2 93%   BMI 20.04 kg/m²     General appearance: awake but confused and disinterested  HEENT: Pupils equal, round, and reactive to light. Conjunctivae/corneas clear. Neck: Supple, with full range of motion. No jugular venous distention. Trachea midline. Respiratory:  Normal respiratory effort.  Clear to auscultation, bilaterally without Rales/Wheezes/Rhonchi. Cardiovascular: Regular rate and rhythm with normal S1/S2 without murmurs, rubs or gallops. Abdomen: Soft, non-tender, non-distended with normal bowel sounds. Musculoskeletal: No clubbing, cyanosis or edema bilaterally. Full range of motion without deformity. Skin: Skin color, texture, turgor normal.  Large sacral decubitus wound  Neurologic:  Neurovascularly intact without any focal sensory/motor deficits. Cranial nerves: II-XII intact, grossly non-focal.  Psychiatric: awake but confused and disinterested  Capillary Refill: Brisk,< 3 seconds   Peripheral Pulses: +2 palpable, equal bilaterally       Labs:   Recent Labs     02/11/23 0522 02/12/23 0616   WBC 7.0 6.3   HGB 9.9* 10.9*   HCT 30.6* 33.6*    160       Recent Labs     02/11/23 0522 02/12/23 0616   * 145   K 3.9 2.9*   * 109   CO2 27 26   BUN 14 9   CREATININE <0.5* <0.5*   CALCIUM 8.8 8.5       No results for input(s): AST, ALT, BILIDIR, BILITOT, ALKPHOS in the last 72 hours. No results for input(s): INR in the last 72 hours. No results for input(s): Wayna Khat in the last 72 hours. Urinalysis:      Lab Results   Component Value Date/Time    NITRU Negative 02/09/2023 05:45 PM    WBCUA 6-9 02/09/2023 05:45 PM    BACTERIA None Seen 12/11/2022 01:00 AM    RBCUA 21-50 02/09/2023 05:45 PM    BLOODU LARGE 02/09/2023 05:45 PM    SPECGRAV 1.022 02/09/2023 05:45 PM    GLUCOSEU Negative 02/09/2023 05:45 PM    GLUCOSEU NEGATIVE 09/02/2010 11:27 AM       Radiology:  XR CHEST PORTABLE   Final Result   No acute process.                  Assessment/Plan:    Active Hospital Problems    Diagnosis Date Noted    Sepsis ([facility] [address] [A41.9] 02/09/2023     Priority: Medium    UTI (urinary tract infection) [N39.0] 02/09/2023     Priority: Medium    Acute respiratory failure with hypoxia ([facility] [address] [J96.01] 02/09/2023     Priority: Medium     Sepsis ([facility] [address] on admission due to UTI, potentially sacral wound infection  VRE UTI  Sacral decubitus ulcer stage 4  - cefepime for pseudomonas in wound  - changed vanc to linezolid  - consult general surgery     Acute metabolic encephalopathy in the setting of dementia   - Pt's family reports that mental status is significantly worse than Pt's normal baseline.   - Will monitor and provide supportive care. - likely due to acute illness  - continue home medications when able    Acute hypoxic respiratory failure: likely metabolic cause, also deconditioning and recent PE  - with increased work of breath, tachypnea and hypoxia  - room air saturation 80%  - provide supplemental oxygen as necessary to keep SaO2 92% or greater. Recent PE/DVT  - continue eliquis     Diabetes mellitus II   - hold oral agents  - SSI and CCD     Essential (primary) hypertension   - monitor blood pressure  - continue home meds      Hyperlipidemia   - continue statin       DVT Prophylaxis: Eliquis  Diet: ADULT DIET; Regular; 3 carb choices (45 gm/meal); Mildly Thick (Nectar)  ADULT ORAL NUTRITION SUPPLEMENT; Breakfast, Dinner;  Wound Healing Oral Supplement  ADULT ORAL NUTRITION SUPPLEMENT; Lunch, Dinner; Frozen Oral Supplement  Code Status: DNR-CC    PT/OT Eval Status: n/a    Dispo - Donna Justice MD

## 2023-02-13 NOTE — PROGRESS NOTES
Tried to feed pt regular diet at breakfast and lunch. Pt did not tolerate well and was coughing. Referenced her nursing home note and saw she was on dysphagia diet. Downgraded current diet to minced and moist. Notified M.RENNY. Will continue to monitor.
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
## 2023-02-13 NOTE — PROGRESS NOTES
Assisted general surgery provider Lori Ga) with cleaning and changing stage 4 coccyx dressing. Wound was cleansed with Dakins solution and covered with ABD pad and cloth tape. Padded dressing on left elbow also replaced. Patient tolerated well. Will continue to monitor.

## 2023-02-13 NOTE — PROGRESS NOTES
Wound care completed to the coccyx as ordered. Large BM noted. Pt changed and cleaned up. Vitals stable.  New sheets, IVF infusing

## 2023-02-13 NOTE — CONSULTS
Surgery Consult Note     Nela Quach PA-C  Pt Name: Erick Catalan  MRN: [id number]  YOB: 1945  Date of evaluation: 2/13/2023  Primary Care Physician: Clement Harden DO  Referring Physician. Guillermina Garrett  Reason for Consultation: Large sacral decubitus ulcer  IMPRESSIONS:   Stage IV sacral decubitus ulcer  Dementia  UTI  Hx of DVT on Eliquis  PLANS:   Will add santyl applications to her daily dressing changes to help debride wound. No sharp excisional debridement at this time. Keep pressure off ulcer as much as possible   IV antibiotics  Will continue to follow and give further recommendations and debride as needed, with caution secondary to the patient being anticoagulated. SUBJECTIVE:   History of Chief Complaint:    Erick Catalan is a 66 y.o. female with a history of dementia presented to the hospital on 2/9/23 with fever and decreased level of consciousness. All information was obtained from her chart and RN, secondary to patient mental status and being nonverbal at this time. We have been asked to further evaluate a large sacral decubitus ulcer that she has. The ulcer is stage IV and has some slough at the superior edges of it. No pockets of pus or gross purulent drainage from the ulcer. Bone is exposed. On physical examination no tenderness was noted by the patient. Past Medical History  Reviewed  has a past medical history of COVID-19 virus infection, Dementia with behavioral disturbance, Diabetes mellitus ([address] GERD (gastroesophageal reflux disease), Hyperlipidemia, Hypertension, Hypothyroidism, MGUS (monoclonal gammopathy of unknown significance), Mild dementia, Osteopenia of multiple sites, and Other idiopathic scoliosis, thoracolumbar region. Past Surgical History  Reviewed has a past surgical history that includes eye surgery (Left, 2000); bladder suspension (N/A, 1999); and Colonoscopy (11/07/2017).   Medications  Prior to Admission medications    Medication Sig Start Date End Date Taking?  Authorizing Provider   carvedilol (COREG) 3.125 MG tablet Take 3.125 mg by mouth daily (with breakfast)   Yes Historical Provider, MD   apixaban (ELIQUIS) 5 MG TABS tablet Take 5 mg by mouth 2 times daily   Yes Historical Provider, MD   tamsulosin (FLOMAX) 0.4 MG capsule Take 0.4 mg by mouth at bedtime   Yes Historical Provider, MD   polyethylene glycol (GLYCOLAX) 17 GM/SCOOP powder Take 17 g by mouth every 12 hours as needed   Yes Historical Provider, MD   QUEtiapine (SEROQUEL) 25 MG tablet Take 25 mg by mouth at bedtime   Yes Historical Provider, MD   acetaminophen (TYLENOL) 325 MG tablet Take 650 mg by mouth every 6 hours as needed for Pain   Yes Historical Provider, MD   HUMALOG KWIKPEN 100 UNIT/ML SOPN Inject as per sliding scale three times a day before meals: if 0-70= 0U and give orange juice;  = 1U; 181-200 = 2U; 201-250 = 3U; 251-300 = 4U; 301-350 = 5U and call MD if > 351 1/27/23   Historical Provider, MD   perphenazine 2 MG tablet Take 1 tablet by mouth 2 times daily (with meals) 12/13/22   Jackquelyn Holstein, MD   pantoprazole (PROTONIX) 40 MG tablet TAKE 1 TABLET BY MOUTH EVERY DAY 11/7/22   Kalen Cuello APRN - CNP   levothyroxine (SYNTHROID) 25 MCG tablet TAKE 1 TABLET BY MOUTH EVERY DAY 10/21/22   Josué Naval,    atorvastatin (LIPITOR) 20 MG tablet TAKE 1 TABLET BY MOUTH EVERY DAY 10/11/22   Carolanne Celestine Glischinski, APRN - CNP   fluvoxaMINE (LUVOX) 50 MG tablet TAKE 1 TABLET BY MOUTH EVERY DAY AT NIGHT 8/9/22   [facility],    donepezil (ARICEPT) 10 MG tablet TAKE 1 TABLET BY MOUTH IN THE EVENING 7/15/22   Kendall Lema MD   divalproex (DEPAKOTE SPRINKLE) 125 MG capsule Take 2 capsules by mouth 2 times daily at 0800 and 1400 7/13/22   [facility],    lisinopril-hydroCHLOROthiazide (PRINZIDE;ZESTORETIC) 20-12.5 MG per tablet TAKE 1 TABLET BY MOUTH EVERY DAY 6/30/22 8/23/22  Josué Veras DO   diclofenac sodium (VOLTAREN) 1 % GEL Apply 2-4 g topically 4 times daily To area of pain to intact skin as needed for pain. Patient not taking: Reported on 8/21/2022 2/21/22 8/23/22  Rachele Giraldo DO   fluticasone [facility]) 50 MCG/ACT nasal spray 1 spray by Each Nostril route daily  Patient not taking: Reported on 8/21/2022 10/9/20 8/23/22  PRATIMA Denton - CNP    Scheduled Meds:   linezolid  600 mg IntraVENous Q12H    Sodium Hypochlorite   Irrigation BID    apixaban  5 mg Oral BID    atorvastatin  20 mg Oral Daily    carvedilol  3.125 mg Oral Daily with breakfast    divalproex  250 mg Oral BID    donepezil  10 mg Oral Nightly    levothyroxine  25 mcg Oral Daily    insulin lispro  0-8 Units SubCUTAneous TID WC    insulin lispro  0-4 Units SubCUTAneous Nightly    pantoprazole  40 mg Oral Daily    perphenazine  2 mg Oral BID WC    QUEtiapine  25 mg Oral Nightly    tamsulosin  0.4 mg Oral Nightly    sodium chloride flush  5-40 mL IntraVENous 2 times per day    cefepime  2,000 mg IntraVENous Q8H     Continuous Infusions:   dextrose      sodium chloride      sodium chloride 125 mL/hr at 02/12/23 1750     PRN Meds:.potassium chloride **OR** potassium alternative oral replacement **OR** potassium chloride, glucose, dextrose bolus **OR** dextrose bolus, glucagon (rDNA), dextrose, polyethylene glycol, sodium chloride flush, sodium chloride, acetaminophen **OR** acetaminophen  Allergies  has No Known Allergies. Family History  Reviewed. Noncontributory to current situation  Social History   reports that she has never smoked. She has never used smokeless tobacco. She reports that she does not drink alcohol and does not use drugs. EDUCATION  Patient educated about their illness/diagnosis, stated above, and all questions answered. We discussed the importance of nutrition, medications they are taking, and healthy lifestyle. Review of Systems:  Unable to obtain secondary to dementia  OBJECTIVE:   VITALS:  height is 4' 11\" (1.499 m) and weight is 99 lb 3.3 oz (45 kg).  Her axillary temperature is 98.4 °F (36.9 °C). Her blood pressure is 119/69 and her pulse is 62. Her respiration is 15 and oxygen saturation is 97%. CONSTITUTIONAL: Awake and alert. Nonverbal  SKIN: Stage IV sacral ulcer with slough at the superior edge of the ulcer  LYMPH: no cervical nodes, no inguinal nodes  HEENT: Head is normocephalic, atraumatic. EOMI, PERRLA. NECK: Supple, symmetrical, trachea midline, no adenopathy, thyroid symmetric, not enlarged and no tenderness, skin normal.  CHEST/LUNGS: chest symmetric with normal A/P diameter, normal respiratory rate and rhythm  CARDIOVASCULAR: Heart sounds are normal.  Regular rate and rhythm   ABDOMEN: Normal shape. Tenderness: absent. RECTAL: deferred, not clinically indicated. +BM during dressing change  NEUROLOGIC: There are no focalizing motor or sensory deficits. CN II-XII are grossly intact. Vearl Pippins EXTREMITIES/BUTTOCK: Stage IV sacral ulcer with slough at the superior edge. + bone exposed and felt on exam  LABS:     Recent Labs     02/11/23  0522 02/12/23  0616 02/13/23  0624   WBC 7.0 6.3 10.7   HGB 9.9* 10.9* 9.8*   HCT 30.6* 33.6* 31.5*    160 135   * 145 143   K 3.9 2.9* 4.0   * 109 109   CO2 27 26 22   BUN 14 9 12   CREATININE <0.5* <0.5* <0.5*   MG  --  1.90  --    CALCIUM 8.8 8.5 8.4     No results for input(s): ALKPHOS, ALT, AST, BILITOT, BILIDIR, LABALBU, AMYLASE, LIPASE in the last 72 hours.   CBC:   Lab Results   Component Value Date/Time    WBC 10.7 02/13/2023 06:24 AM    RBC 3.41 02/13/2023 06:24 AM    HGB 9.8 02/13/2023 06:24 AM    HCT 31.5 02/13/2023 06:24 AM    MCV 92.2 02/13/2023 06:24 AM    MCH 28.7 02/13/2023 06:24 AM    MCHC 31.1 02/13/2023 06:24 AM    RDW 20.5 02/13/2023 06:24 AM     02/13/2023 06:24 AM    MPV 8.7 02/13/2023 06:24 AM     CMP:    Lab Results   Component Value Date/Time     02/13/2023 06:24 AM    K 4.0 02/13/2023 06:24 AM    K 2.9 02/12/2023 06:16 AM     02/13/2023 06:24 AM    CO2 22 02/13/2023 06:24 AM    BUN 12 02/13/2023 06:24 AM    CREATININE <0.5 02/13/2023 06:24 AM    GFRAA >60 08/24/2022 05:13 AM    GFRAA >60 09/02/2010 11:15 AM    AGRATIO 0.7 02/09/2023 05:00 PM    LABGLOM >60 02/13/2023 06:24 AM    GLUCOSE 102 02/13/2023 06:24 AM    PROT 7.3 02/09/2023 05:00 PM    LABALBU 3.1 02/09/2023 05:00 PM    CALCIUM 8.4 02/13/2023 06:24 AM    BILITOT 0.3 02/09/2023 05:00 PM    ALKPHOS 91 02/09/2023 05:00 PM    AST 20 02/09/2023 05:00 PM    ALT 16 02/09/2023 05:00 PM     Urine Culture:  No components found for: CURINE  Blood Culture:  No components found for: CBLOOD, CFUNGUSBL      Thank you for the interesting evaluation. Further recommendations to follow. Joy Boland [facility]  General and Vascular Surgery [phone]  Electronically signed by Neetu Noriega PA-C on 2/13/2023 at 12:26 PM

## 2023-02-14 ENCOUNTER — TELEPHONE (OUTPATIENT)
Dept: FAMILY MEDICINE CLINIC | Age: 78
End: 2023-02-14

## 2023-02-14 LAB
GLUCOSE BLD-MCNC: 139 MG/DL (ref 70–99)
GLUCOSE BLD-MCNC: 157 MG/DL (ref 70–99)
PERFORMED ON: ABNORMAL
PERFORMED ON: ABNORMAL

## 2023-02-14 PROCEDURE — 6360000002 HC RX W HCPCS: Performed by: FAMILY MEDICINE

## 2023-02-14 PROCEDURE — 2580000003 HC RX 258: Performed by: FAMILY MEDICINE

## 2023-02-14 PROCEDURE — APPSS15 APP SPLIT SHARED TIME 0-15 MINUTES: Performed by: PHYSICIAN ASSISTANT

## 2023-02-14 PROCEDURE — 6360000002 HC RX W HCPCS: Performed by: NURSE PRACTITIONER

## 2023-02-14 PROCEDURE — 1200000000 HC SEMI PRIVATE

## 2023-02-14 PROCEDURE — 6370000000 HC RX 637 (ALT 250 FOR IP): Performed by: NURSE PRACTITIONER

## 2023-02-14 PROCEDURE — APPNB15 APP NON BILLABLE TIME 0-15 MINS: Performed by: PHYSICIAN ASSISTANT

## 2023-02-14 PROCEDURE — 2580000003 HC RX 258: Performed by: NURSE PRACTITIONER

## 2023-02-14 PROCEDURE — 6370000000 HC RX 637 (ALT 250 FOR IP): Performed by: PHYSICIAN ASSISTANT

## 2023-02-14 RX ADMIN — CEFEPIME 2000 MG: 2 INJECTION, POWDER, FOR SOLUTION INTRAVENOUS at 04:56

## 2023-02-14 RX ADMIN — LINEZOLID 600 MG: 600 INJECTION, SOLUTION INTRAVENOUS at 18:06

## 2023-02-14 RX ADMIN — LINEZOLID 600 MG: 600 INJECTION, SOLUTION INTRAVENOUS at 06:50

## 2023-02-14 RX ADMIN — QUETIAPINE FUMARATE 25 MG: 25 TABLET ORAL at 21:46

## 2023-02-14 RX ADMIN — DAKIN'S SOLUTION 0.125% (QUARTER STRENGTH): 0.12 SOLUTION at 12:55

## 2023-02-14 RX ADMIN — DIVALPROEX SODIUM 250 MG: 125 CAPSULE ORAL at 12:51

## 2023-02-14 RX ADMIN — CEFEPIME 2000 MG: 2 INJECTION, POWDER, FOR SOLUTION INTRAVENOUS at 22:05

## 2023-02-14 RX ADMIN — PERPHENAZINE 2 MG: 2 TABLET, FILM COATED ORAL at 09:18

## 2023-02-14 RX ADMIN — SODIUM CHLORIDE: 9 INJECTION, SOLUTION INTRAVENOUS at 04:43

## 2023-02-14 RX ADMIN — TAMSULOSIN HYDROCHLORIDE 0.4 MG: 0.4 CAPSULE ORAL at 21:46

## 2023-02-14 RX ADMIN — PANTOPRAZOLE SODIUM 40 MG: 40 TABLET, DELAYED RELEASE ORAL at 09:07

## 2023-02-14 RX ADMIN — APIXABAN 5 MG: 5 TABLET, FILM COATED ORAL at 21:46

## 2023-02-14 RX ADMIN — DONEPEZIL HYDROCHLORIDE 10 MG: 10 TABLET, FILM COATED ORAL at 21:46

## 2023-02-14 RX ADMIN — Medication 10 ML: at 09:18

## 2023-02-14 RX ADMIN — APIXABAN 5 MG: 5 TABLET, FILM COATED ORAL at 09:07

## 2023-02-14 RX ADMIN — Medication: at 13:03

## 2023-02-14 RX ADMIN — DIVALPROEX SODIUM 250 MG: 125 CAPSULE ORAL at 09:07

## 2023-02-14 RX ADMIN — DAKIN'S SOLUTION 0.125% (QUARTER STRENGTH): 0.12 SOLUTION at 21:54

## 2023-02-14 RX ADMIN — ATORVASTATIN CALCIUM 20 MG: 20 TABLET, FILM COATED ORAL at 09:07

## 2023-02-14 RX ADMIN — CARVEDILOL 3.12 MG: 3.12 TABLET, FILM COATED ORAL at 09:07

## 2023-02-14 RX ADMIN — ACETAMINOPHEN 325MG 650 MG: 325 TABLET ORAL at 12:51

## 2023-02-14 RX ADMIN — LEVOTHYROXINE SODIUM 25 MCG: 25 TABLET ORAL at 06:42

## 2023-02-14 ASSESSMENT — PAIN SCALES - GENERAL
PAINLEVEL_OUTOF10: 0

## 2023-02-14 ASSESSMENT — PAIN SCALES - PAIN ASSESSMENT IN ADVANCED DEMENTIA (PAINAD)
BREATHING: 0
BODYLANGUAGE: 0
NEGVOCALIZATION: 0
FACIALEXPRESSION: 0
CONSOLABILITY: 0
TOTALSCORE: 0

## 2023-02-14 NOTE — TELEPHONE ENCOUNTER
At [facility] with fever, drop in O2,   She is on nectar thickened liquid. He is eating the yogurt and ice cream.  Occasionally some of the entrées or vegetables. She was at [facility] and was rigid and staring into space. She had been taken off her psychiatric medications. She went into [facility] and placed back on her medications. She has not been able to walk well so she is in bed or chair all the time. Reviewed the chart: She developed of fourth degree decubitus ulcer. She has lost weight. She was septic, anemic, had a decubitus ulcer, mental status changes when she was in the hospital.  Explained to the son that in order to get better she would need to do 2 major things eat and drink and stay off the decubitus ulcer. If she is not eating enough protein or so will never heal.  If she keeps laying on the same spot it will not heal.  If someone turns her and keeps her off the spot and they leave the room and she turned back onto it people cannot be with her 24 hours a day. In order to do that she would need to private duty caregiver which is very expensive. Even then she might not take in enough food to let her sore heal.  He will need to determine if those problems can be solved in order to decide on hospice.

## 2023-02-14 NOTE — PROGRESS NOTES
AM Assessment completed. BP (!) 156/110   Pulse (!) 129   Temp 98.5 °F (36.9 °C) (Axillary)   Resp 16   Ht 4' 11\" (1.499 m)   Wt 100 lb 12 oz (45.7 kg)   SpO2 95%   BMI 20.35 kg/m²     Eyes closed. Will open with verbal stimulation. Nonverbal. Total Care. Turned and repositioned. Needed items including call light in reach and exit alarm in place.        Electronically signed by Ericka Campos RN on 2/14/2023 at 10:42 AM

## 2023-02-14 NOTE — TELEPHONE ENCOUNTER
Her son Arvind Higgins calling to see if you will give them a second opinion - she is at [facility] right now - she has a pressure wound on there buttocks - they told them that this can not heal and now it's a hospice situation. there is an infection -  should they bring Hospice in? She is staying at [facility].       Arvind Higgins @  [phone]

## 2023-02-14 NOTE — PROGRESS NOTES
[facility]  Palliative Care   Progress Note    NAME:  [address] RECORD NUMBER:  [id number]  AGE: 66 y.o. GENDER: female  : 1945  TODAY'S DATE:  2023    Subjective: Patient not responding, not following commands, took a few bites for breakfast.     Objective:    Vitals:    23 0752   BP: (!) 156/110   Pulse: (!) 129   Resp: 16   Temp: 98.5 °F (36.9 °C)   SpO2: 95%     Lab Results   Component Value Date    WBC 10.7 2023    HGB 9.8 (L) 2023    HCT 31.5 (L) 2023    MCV 92.2 2023     2023     Lab Results   Component Value Date    CREATININE <0.5 (L) 2023    BUN 12 2023     2023    K 4.0 2023     2023    CO2 22 2023     Lab Results   Component Value Date    ALT 16 2023    AST 20 2023    ALKPHOS 91 2023    BILITOT 0.3 2023       Plan: I have spoken to her son Maria Fernanda Stapleton about her current condition, general decline over last few months we have discussed what end stage dementia looks like. He was surprised as was his sister Verena Huang. He is going to discuss with his family and come see his mother today. I have explained hospice is most appropriate at this time. Code Status: DNR-CC  Discharge Environment:  [] Hospice Consult Agency:  [] Inpatient Hospice    [] Home with  [facility]   [] ECF with Hospice  [] ECF skilled care with Hospice to follow   [] Other:    Teaching Time:  1hours      I will continue to follow Ms. Gunn's care as needed. Thank you for allowing me to participate in the care of Ms. Destinee Brown .      Electronically signed by Luciana Dorado RN, BSN,CHPN on 2023 at 10:05 AM  Palliative Care Nurse [facility]  Office: [phone]

## 2023-02-14 NOTE — PROGRESS NOTES
Hospitalist Progress Note      PCP: Nick Paredes DO    Date of Admission: 2/9/2023    Chief Complaint: sepsis    Hospital Course:      Subjective: Pt S/E. No acute events. Pt is sleeping. Medications:  Reviewed    Infusion Medications    dextrose      sodium chloride      sodium chloride Stopped (02/14/23 0456)     Scheduled Medications    cefepime  2,000 mg IntraVENous Q12H    linezolid  600 mg IntraVENous Q12H    Sodium Hypochlorite   Irrigation BID    apixaban  5 mg Oral BID    atorvastatin  20 mg Oral Daily    carvedilol  3.125 mg Oral Daily with breakfast    divalproex  250 mg Oral BID    donepezil  10 mg Oral Nightly    levothyroxine  25 mcg Oral Daily    insulin lispro  0-8 Units SubCUTAneous TID WC    insulin lispro  0-4 Units SubCUTAneous Nightly    pantoprazole  40 mg Oral Daily    perphenazine  2 mg Oral BID WC    QUEtiapine  25 mg Oral Nightly    tamsulosin  0.4 mg Oral Nightly    sodium chloride flush  5-40 mL IntraVENous 2 times per day     PRN Meds: potassium chloride **OR** potassium alternative oral replacement **OR** potassium chloride, glucose, dextrose bolus **OR** dextrose bolus, glucagon (rDNA), dextrose, polyethylene glycol, sodium chloride flush, sodium chloride, acetaminophen **OR** acetaminophen      Intake/Output Summary (Last 24 hours) at 2/14/2023 0812  Last data filed at 2/14/2023 0615  Gross per 24 hour   Intake 5671.73 ml   Output 800 ml   Net 4871.73 ml       Exam:    BP (!) 156/110   Pulse (!) 129   Temp 98.5 °F (36.9 °C) (Axillary)   Resp 16   Ht 4' 11\" (1.499 m)   Wt 100 lb 12 oz (45.7 kg)   SpO2 95%   BMI 20.35 kg/m²     General appearance: nad, sleeping comfortable in bed  HEENT: Pupils equal, round, and reactive to light. Conjunctivae/corneas clear. Neck: Supple, with full range of motion. No jugular venous distention. Respiratory:  Normal respiratory effort. Clear to auscultation, bilaterally without Rales/Wheezes/Rhonchi.   Cardiovascular: Regular rate and rhythm with normal S1/S2 without murmurs, rubs or gallops. Abdomen: Soft, non-tender, non-distended with normal bowel sounds. Musculoskeletal: No clubbing, cyanosis or edema bilaterally. Skin: Skin color, texture, turgor normal.  Large sacral decubitus wound  Neurologic:  grossly non-focal.  Psychiatric: asleep, able to be awakened   Capillary Refill: Brisk,< 3 seconds   Peripheral Pulses: +2 palpable, equal bilaterally       Labs:   Recent Labs     02/12/23  0616 02/13/23 0624   WBC 6.3 10.7   HGB 10.9* 9.8*   HCT 33.6* 31.5*    135     Recent Labs     02/12/23  0616 02/13/23 0624    143   K 2.9* 4.0    109   CO2 26 22   BUN 9 12   CREATININE <0.5* <0.5*   CALCIUM 8.5 8.4     No results for input(s): AST, ALT, BILIDIR, BILITOT, ALKPHOS in the last 72 hours. No results for input(s): INR in the last 72 hours. No results for input(s): Kaleen Hope in the last 72 hours. Urinalysis:      Lab Results   Component Value Date/Time    NITRU Negative 02/09/2023 05:45 PM    WBCUA 6-9 02/09/2023 05:45 PM    BACTERIA None Seen 12/11/2022 01:00 AM    RBCUA 21-50 02/09/2023 05:45 PM    BLOODU LARGE 02/09/2023 05:45 PM    SPECGRAV 1.022 02/09/2023 05:45 PM    GLUCOSEU Negative 02/09/2023 05:45 PM    GLUCOSEU NEGATIVE 09/02/2010 11:27 AM       Radiology:  XR CHEST PORTABLE   Final Result   No acute process. Assessment/Plan:    Active Hospital Problems    Diagnosis Date Noted    Sepsis ([facility] [address] [A41.9] 02/09/2023     Priority: Medium    UTI (urinary tract infection) [N39.0] 02/09/2023     Priority: Medium    Acute respiratory failure with hypoxia [facility]) [J96.01] 02/09/2023     Priority: Medium     Sepsis ([facility] [address] on admission due to UTI, potentially sacral wound infection  VRE UTI - zyvox day 3  Sacral decubitus ulcer stage 4, wound cx with pseudomonas, corynebacterium, and enterococcus faecalis - cefepime here, day 7  - mrsa probe +  - consult general surgery:  cont IV antibiotics. Not a candidate for wound vac      Acute metabolic encephalopathy in the setting of dementia   - Will monitor and provide supportive care. - likely due to acute illness  - continue home medications when able  - dc back to ecf with hospice soon    Acute hypoxic respiratory failure: likely metabolic cause, also deconditioning and recent PE  - with increased work of breath, tachypnea and hypoxia  - room air saturation 80%  - provide supplemental oxygen as necessary to keep SaO2 92% or greater. Recent PE/DVT  - continue eliquis     Diabetes mellitus II   - hold oral agents  - SSI and CCD     Essential (primary) hypertension   - monitor blood pressure  - continue home meds      Hyperlipidemia   - continue statin       DVT Prophylaxis: Eliquis  Diet: ADULT ORAL NUTRITION SUPPLEMENT; Breakfast, Dinner; Wound Healing Oral Supplement  ADULT ORAL NUTRITION SUPPLEMENT; Lunch, Dinner; Frozen Oral Supplement  ADULT DIET; Dysphagia - Minced and Moist; 3 carb choices (45 gm/meal); Mildly Thick (Nectar)  Code Status: DNR-CC    PT/OT Eval Status: n/a    Dispo - MedSurg; ongoing discussions with family.   Recommend hospice    Lyudmila Guerra,

## 2023-02-14 NOTE — PROGRESS NOTES
Changed dressing to coccyx. Applied santyl and packed with Dakins soaked gauze. Covered with ABD and paper tape. Applied skin prep under tape r/t irritation from adhesive. Tunneling noted. Wound bed with eschar. Left patient with pillow under left hip. Applied heel protecting boots. Bed in low position and call light within reach.

## 2023-02-14 NOTE — CARE COORDINATION
Discharge Planning:  Pt is a long term resident at [facility]. Pt can return there upon d/c. Pt would need a COVID test within 72 hrs of return. DOM contacted Moon with [facility] [phone] and explained that the palliative care RN is working with pts family and hospice is being discussed. Moon stated [facility] uses [facility] in their facility. SW will continue to follow to assist with d/c planning needs. Plan: Pt is a long term resident at [facility]. Pt can return upon d/c. Pt will need a COVID test within 72hrs of d/c. Pt is pending Medicaid. Palliative Care is working with family for possible hospice.    ANGE Conn  [phone]  Electronically signed by Kiki Bull on 2/14/2023 at 12:53 PM

## 2023-02-14 NOTE — PROGRESS NOTES
Hospitalist Progress Note      PCP: Navi Morales DO    Date of Admission: 2/9/2023    Chief Complaint: sepsis    Hospital Course:      Subjective: no change in clinical status       Medications:  Reviewed    Infusion Medications    dextrose      sodium chloride      sodium chloride 125 mL/hr at 02/12/23 1750     Scheduled Medications    linezolid  600 mg IntraVENous Q12H    Sodium Hypochlorite   Irrigation BID    apixaban  5 mg Oral BID    atorvastatin  20 mg Oral Daily    carvedilol  3.125 mg Oral Daily with breakfast    divalproex  250 mg Oral BID    donepezil  10 mg Oral Nightly    levothyroxine  25 mcg Oral Daily    insulin lispro  0-8 Units SubCUTAneous TID WC    insulin lispro  0-4 Units SubCUTAneous Nightly    pantoprazole  40 mg Oral Daily    perphenazine  2 mg Oral BID WC    QUEtiapine  25 mg Oral Nightly    tamsulosin  0.4 mg Oral Nightly    sodium chloride flush  5-40 mL IntraVENous 2 times per day    cefepime  2,000 mg IntraVENous Q8H     PRN Meds: potassium chloride **OR** potassium alternative oral replacement **OR** potassium chloride, glucose, dextrose bolus **OR** dextrose bolus, glucagon (rDNA), dextrose, polyethylene glycol, sodium chloride flush, sodium chloride, acetaminophen **OR** acetaminophen      Intake/Output Summary (Last 24 hours) at 2/13/2023 2213  Last data filed at 2/13/2023 1826  Gross per 24 hour   Intake 200 ml   Output 575 ml   Net -375 ml         Exam:    /76   Pulse (!) 186   Temp 97.6 °F (36.4 °C) (Axillary)   Resp 16   Ht 4' 11\" (1.499 m)   Wt 99 lb 3.3 oz (45 kg)   SpO2 91%   BMI 20.04 kg/m²     General appearance: awake but confused and disinterested  HEENT: Pupils equal, round, and reactive to light. Conjunctivae/corneas clear. Neck: Supple, with full range of motion. No jugular venous distention. Trachea midline. Respiratory:  Normal respiratory effort. Clear to auscultation, bilaterally without Rales/Wheezes/Rhonchi.   Cardiovascular: Regular rate and rhythm with normal S1/S2 without murmurs, rubs or gallops. Abdomen: Soft, non-tender, non-distended with normal bowel sounds. Musculoskeletal: No clubbing, cyanosis or edema bilaterally. Full range of motion without deformity. Skin: Skin color, texture, turgor normal.  Large sacral decubitus wound  Neurologic:  Neurovascularly intact without any focal sensory/motor deficits. Cranial nerves: II-XII intact, grossly non-focal.  Psychiatric: awake but confused and disinterested  Capillary Refill: Brisk,< 3 seconds   Peripheral Pulses: +2 palpable, equal bilaterally       Labs:   Recent Labs     02/11/23 0522 02/12/23 0616 02/13/23 0624   WBC 7.0 6.3 10.7   HGB 9.9* 10.9* 9.8*   HCT 30.6* 33.6* 31.5*    160 135       Recent Labs     02/11/23 0522 02/12/23 0616 02/13/23 0624   * 145 143   K 3.9 2.9* 4.0   * 109 109   CO2 27 26 22   BUN 14 9 12   CREATININE <0.5* <0.5* <0.5*   CALCIUM 8.8 8.5 8.4       No results for input(s): AST, ALT, BILIDIR, BILITOT, ALKPHOS in the last 72 hours. No results for input(s): INR in the last 72 hours. No results for input(s): Janelle Comber in the last 72 hours. Urinalysis:      Lab Results   Component Value Date/Time    NITRU Negative 02/09/2023 05:45 PM    WBCUA 6-9 02/09/2023 05:45 PM    BACTERIA None Seen 12/11/2022 01:00 AM    RBCUA 21-50 02/09/2023 05:45 PM    BLOODU LARGE 02/09/2023 05:45 PM    SPECGRAV 1.022 02/09/2023 05:45 PM    GLUCOSEU Negative 02/09/2023 05:45 PM    GLUCOSEU NEGATIVE 09/02/2010 11:27 AM       Radiology:  XR CHEST PORTABLE   Final Result   No acute process.                  Assessment/Plan:    Active Hospital Problems    Diagnosis Date Noted    Sepsis ([address] [A41.9] 02/09/2023     Priority: Medium    UTI (urinary tract infection) [N39.0] 02/09/2023     Priority: Medium    Acute respiratory failure with hypoxia ([address] [J96.01] 02/09/2023     Priority: Medium     Sepsis ([facility] on admission due to UTI, potentially sacral wound infection  VRE UTI  Sacral decubitus ulcer stage 4  - cefepime for pseudomonas in wound  - changed vanc to linezolid  - consult general surgery:  cont IV antibiotics. Not a candidate for wound vac      Acute metabolic encephalopathy in the setting of dementia   - Pt's family reports that mental status is significantly worse than Pt's normal baseline.   - Will monitor and provide supportive care. - likely due to acute illness  - continue home medications when able    Acute hypoxic respiratory failure: likely metabolic cause, also deconditioning and recent PE  - with increased work of breath, tachypnea and hypoxia  - room air saturation 80%  - provide supplemental oxygen as necessary to keep SaO2 92% or greater. Recent PE/DVT  - continue eliquis     Diabetes mellitus II   - hold oral agents  - SSI and CCD     Essential (primary) hypertension   - monitor blood pressure  - continue home meds      Hyperlipidemia   - continue statin       DVT Prophylaxis: Eliquis  Diet: ADULT ORAL NUTRITION SUPPLEMENT; Breakfast, Dinner; Wound Healing Oral Supplement  ADULT ORAL NUTRITION SUPPLEMENT; Lunch, Dinner; Frozen Oral Supplement  ADULT DIET; Dysphagia - Minced and Moist; 3 carb choices (45 gm/meal); Mildly Thick (Nectar)  Code Status: DNR-CC    PT/OT Eval Status: n/a    Dispo - MedSurg; ongoing discussions with family.   Emy Khan MD

## 2023-02-15 VITALS
RESPIRATION RATE: 16 BRPM | OXYGEN SATURATION: 99 % | HEART RATE: 55 BPM | BODY MASS INDEX: 20.58 KG/M2 | TEMPERATURE: 98.2 F | WEIGHT: 102.07 LBS | HEIGHT: 59 IN | SYSTOLIC BLOOD PRESSURE: 144 MMHG | DIASTOLIC BLOOD PRESSURE: 80 MMHG

## 2023-02-15 LAB
GLUCOSE BLD-MCNC: 102 MG/DL (ref 70–99)
GLUCOSE BLD-MCNC: 113 MG/DL (ref 70–99)
GLUCOSE BLD-MCNC: 118 MG/DL (ref 70–99)
GLUCOSE BLD-MCNC: 81 MG/DL (ref 70–99)
PERFORMED ON: ABNORMAL
PERFORMED ON: NORMAL
SARS-COV-2, NAAT: NOT DETECTED

## 2023-02-15 PROCEDURE — 6370000000 HC RX 637 (ALT 250 FOR IP): Performed by: NURSE PRACTITIONER

## 2023-02-15 PROCEDURE — APPSS15 APP SPLIT SHARED TIME 0-15 MINUTES: Performed by: PHYSICIAN ASSISTANT

## 2023-02-15 PROCEDURE — 6370000000 HC RX 637 (ALT 250 FOR IP): Performed by: PHYSICIAN ASSISTANT

## 2023-02-15 PROCEDURE — 2580000003 HC RX 258: Performed by: FAMILY MEDICINE

## 2023-02-15 PROCEDURE — 2580000003 HC RX 258: Performed by: NURSE PRACTITIONER

## 2023-02-15 PROCEDURE — 87635 SARS-COV-2 COVID-19 AMP PRB: CPT

## 2023-02-15 PROCEDURE — 6360000002 HC RX W HCPCS: Performed by: FAMILY MEDICINE

## 2023-02-15 PROCEDURE — APPNB15 APP NON BILLABLE TIME 0-15 MINS: Performed by: PHYSICIAN ASSISTANT

## 2023-02-15 PROCEDURE — 94760 N-INVAS EAR/PLS OXIMETRY 1: CPT

## 2023-02-15 PROCEDURE — 51702 INSERT TEMP BLADDER CATH: CPT

## 2023-02-15 RX ORDER — LORAZEPAM 0.5 MG/1
0.5 TABLET ORAL EVERY 6 HOURS PRN
Qty: 6 TABLET | Refills: 0 | Status: SHIPPED | OUTPATIENT
Start: 2023-02-15 | End: 2023-02-15 | Stop reason: HOSPADM

## 2023-02-15 RX ORDER — LINEZOLID 600 MG/1
600 TABLET, FILM COATED ORAL 2 TIMES DAILY
Qty: 6 TABLET | Refills: 0 | Status: SHIPPED | OUTPATIENT
Start: 2023-02-15 | End: 2023-02-18

## 2023-02-15 RX ORDER — LEVOFLOXACIN 500 MG/1
500 TABLET, FILM COATED ORAL DAILY
Qty: 7 TABLET | Refills: 0 | Status: SHIPPED | OUTPATIENT
Start: 2023-02-16 | End: 2023-02-23

## 2023-02-15 RX ORDER — MORPHINE SULFATE 100 MG/5ML
5 SOLUTION ORAL EVERY 6 HOURS PRN
Qty: 30 ML | Refills: 0 | Status: SHIPPED | OUTPATIENT
Start: 2023-02-15 | End: 2023-02-15 | Stop reason: HOSPADM

## 2023-02-15 RX ADMIN — DAKIN'S SOLUTION 0.125% (QUARTER STRENGTH): 0.12 SOLUTION at 08:59

## 2023-02-15 RX ADMIN — DIVALPROEX SODIUM 250 MG: 125 CAPSULE ORAL at 15:59

## 2023-02-15 RX ADMIN — Medication: at 10:52

## 2023-02-15 RX ADMIN — LEVOTHYROXINE SODIUM 25 MCG: 25 TABLET ORAL at 07:16

## 2023-02-15 RX ADMIN — APIXABAN 5 MG: 5 TABLET, FILM COATED ORAL at 08:59

## 2023-02-15 RX ADMIN — PERPHENAZINE 2 MG: 2 TABLET, FILM COATED ORAL at 08:58

## 2023-02-15 RX ADMIN — CARVEDILOL 3.12 MG: 3.12 TABLET, FILM COATED ORAL at 08:58

## 2023-02-15 RX ADMIN — Medication 10 ML: at 08:59

## 2023-02-15 RX ADMIN — PANTOPRAZOLE SODIUM 40 MG: 40 TABLET, DELAYED RELEASE ORAL at 08:59

## 2023-02-15 RX ADMIN — ATORVASTATIN CALCIUM 20 MG: 20 TABLET, FILM COATED ORAL at 08:59

## 2023-02-15 RX ADMIN — CEFEPIME 2000 MG: 2 INJECTION, POWDER, FOR SOLUTION INTRAVENOUS at 06:16

## 2023-02-15 RX ADMIN — DIVALPROEX SODIUM 250 MG: 125 CAPSULE ORAL at 08:58

## 2023-02-15 RX ADMIN — LINEZOLID 600 MG: 600 INJECTION, SOLUTION INTRAVENOUS at 06:16

## 2023-02-15 ASSESSMENT — PAIN SCALES - PAIN ASSESSMENT IN ADVANCED DEMENTIA (PAINAD)
TOTALSCORE: 0
TOTALSCORE: 0
NEGVOCALIZATION: 0
FACIALEXPRESSION: 0
BODYLANGUAGE: 0
BODYLANGUAGE: 0
NEGVOCALIZATION: 0
TOTALSCORE: 0
FACIALEXPRESSION: 0
BREATHING: 0
CONSOLABILITY: 0
BREATHING: 0
TOTALSCORE: 0
BODYLANGUAGE: 0
CONSOLABILITY: 0
TOTALSCORE: 0
NEGVOCALIZATION: 0
NEGVOCALIZATION: 0
CONSOLABILITY: 0
BODYLANGUAGE: 0
CONSOLABILITY: 0
CONSOLABILITY: 0
NEGVOCALIZATION: 0
BREATHING: 0
TOTALSCORE: 0
FACIALEXPRESSION: 0
FACIALEXPRESSION: 0
CONSOLABILITY: 0
NEGVOCALIZATION: 0
FACIALEXPRESSION: 0
BREATHING: 0
BREATHING: 0
BODYLANGUAGE: 0
FACIALEXPRESSION: 0
BREATHING: 0
BODYLANGUAGE: 0

## 2023-02-15 ASSESSMENT — PAIN SCALES - WONG BAKER
WONGBAKER_NUMERICALRESPONSE: 0
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
## 2023-02-15 NOTE — CARE COORDINATION
Noted that pt is being discharged to Home at [facility]. Surg added Santyl to sacral wound. Orders on HEMALATHA. Text rep for Santyl for assistance to get it approved for ECF.  Ramiro Ashford, MSN, RN, [employer]

## 2023-02-15 NOTE — CARE COORDINATION
DISCHARGE SUMMARY     DATE OF DISCHARGE: 02/15/2023    DISCHARGE DESTINATION: Home at [facility]    Level of Care: Intermediate    Discharging to Facility/ Agency   Name: [facility] at [facility]  Address:  [address] [place] [address], [address]   Phone:  [phone]  Fax:  [phone]      TRANSPORTATION: [place] [address] Name:  [address] up Time: 1900    Phone Number: [phone]    COMMENTS: SW attempted to contact pts dgtr, Sean Greenfield. No Answer. SW contacted pts son, Dhaval Kee. Dhaval Kee was informed of the d/c time and plan  Abdirahman is agreeable. DOM spoke with Martha with [address] re: a [facility] referral.  HOC will work with [facility]. A referral was faxed to [facility] at [phone]. DOM notified [facility]  of the d/c time and plan. Bedside RN and pts son aware. Case management has presented and reviewed IMM letter #2 to Dhaval Kee . Verbalized understanding of their medicare rights and appeal process if needed. Education provided to Dhaval Kee . Kalenrachaelne Vidhya  reported no questions and verbalized understanding. Dhaval Kee made aware that he/she has 4 hours prior to discharging from hospital to decide if he/she wishes to pursue the Medicare appeal process.     ANGE Barragan  [phone]  Electronically signed by Saeid Tolentino on 2/15/2023 at 1:02 PM    --

## 2023-02-15 NOTE — PLAN OF CARE
Problem: Discharge Planning  Goal: Discharge to home or other facility with appropriate resources  Outcome: Progressing  Flowsheets (Taken 2/15/2023 0909)  Discharge to home or other facility with appropriate resources: Identify barriers to discharge with patient and caregiver     Problem: Pain  Goal: Verbalizes/displays adequate comfort level or baseline comfort level  Outcome: Progressing     Problem: ABCDS Injury Assessment  Goal: Absence of physical injury  Outcome: Progressing     Problem: Skin/Tissue Integrity  Goal: Absence of new skin breakdown  Description: 1. Monitor for areas of redness and/or skin breakdown  2. Assess vascular access sites hourly  3. Every 4-6 hours minimum:  Change oxygen saturation probe site  4. Every 4-6 hours:  If on nasal continuous positive airway pressure, respiratory therapy assess nares and determine need for appliance change or resting period.   Outcome: Progressing     Problem: Neurosensory - Adult  Goal: Achieves stable or improved neurological status  Outcome: Progressing  Flowsheets (Taken 2/15/2023 0909)  Achieves stable or improved neurological status: Assess for and report changes in neurological status  Goal: Absence of seizures  Outcome: Progressing  Goal: Remains free of injury related to seizures activity  Outcome: Progressing  Goal: Achieves maximal functionality and self care  Outcome: Progressing  Flowsheets (Taken 2/15/2023 0909)  Achieves maximal functionality and self care: Monitor swallowing and airway patency with patient fatigue and changes in neurological status     Problem: Respiratory - Adult  Goal: Achieves optimal ventilation and oxygenation  Outcome: Progressing     Problem: Cardiovascular - Adult  Goal: Maintains optimal cardiac output and hemodynamic stability  Outcome: Progressing  Goal: Absence of cardiac dysrhythmias or at baseline  Outcome: Progressing     Problem: Skin/Tissue Integrity - Adult  Goal: Skin integrity remains intact  Outcome: Progressing  Flowsheets (Taken 2/15/2023 0909)  Skin Integrity Remains Intact: Monitor for areas of redness and/or skin breakdown  Goal: Incisions, wounds, or drain sites healing without S/S of infection  Outcome: Progressing  Flowsheets (Taken 2/15/2023 0909)  Incisions, Wounds, or Drain Sites Healing Without Sign and Symptoms of Infection:   TWICE DAILY: Assess and document dressing/incision, wound bed, drain sites and surrounding tissue   Implement wound care per orders  Goal: Oral mucous membranes remain intact  Outcome: Progressing  Flowsheets (Taken 2/15/2023 0909)  Oral Mucous Membranes Remain Intact: Assess oral mucosa and hygiene practices     Problem: Musculoskeletal - Adult  Goal: Return mobility to safest level of function  Outcome: Progressing  Flowsheets (Taken 2/15/2023 0909)  Return Mobility to Safest Level of Function:   Assess patient stability and activity tolerance for standing, transferring and ambulating with or without assistive devices   Assist with transfers and ambulation using safe patient handling equipment as needed  Goal: Maintain proper alignment of affected body part  Outcome: Progressing  Flowsheets (Taken 2/15/2023 0909)  Maintain proper alignment of affected body part: Support and protect limb and body alignment per provider's orders  Goal: Return ADL status to a safe level of function  Outcome: Progressing  Flowsheets (Taken 2/15/2023 0909)  Return ADL Status to a Safe Level of Function: Assess activities of daily living deficits and provide assistive devices as needed     Problem: Gastrointestinal - Adult  Goal: Maintains or returns to baseline bowel function  Outcome: Progressing  Flowsheets (Taken 2/15/2023 0909)  Maintains or returns to baseline bowel function:   Assess bowel function   Encourage oral fluids to ensure adequate hydration  Goal: Maintains adequate nutritional intake  Outcome: Progressing  Flowsheets (Taken 2/15/2023 0909)  Maintains adequate nutritional intake:   Monitor percentage of each meal consumed   Identify factors contributing to decreased intake, treat as appropriate     Problem: Genitourinary - Adult  Goal: Absence of urinary retention  Outcome: Progressing  Goal: Urinary catheter remains patent  Outcome: Progressing     Problem: Infection - Adult  Goal: Absence of infection at discharge  Outcome: Progressing  Flowsheets (Taken 2/15/2023 0909)  Absence of infection at discharge: Assess and monitor for signs and symptoms of infection  Goal: Absence of infection during hospitalization  Outcome: Progressing  Flowsheets (Taken 2/15/2023 0909)  Absence of infection during hospitalization: Assess and monitor for signs and symptoms of infection  Goal: Absence of fever/infection during anticipated neutropenic period  Outcome: Progressing  Flowsheets (Taken 2/15/2023 0909)  Absence of fever/infection during anticipated neutropenic period: Monitor white blood cell count     Problem: Metabolic/Fluid and Electrolytes - Adult  Goal: Electrolytes maintained within normal limits  Outcome: Progressing  Flowsheets (Taken 2/15/2023 0909)  Electrolytes maintained within normal limits: Monitor labs and assess patient for signs and symptoms of electrolyte imbalances  Goal: Hemodynamic stability and optimal renal function maintained  Outcome: Progressing  Flowsheets (Taken 2/15/2023 0909)  Hemodynamic stability and optimal renal function maintained: Monitor labs and assess for signs and symptoms of volume excess or deficit  Goal: Glucose maintained within prescribed range  Outcome: Progressing  Flowsheets (Taken 2/15/2023 0909)  Glucose maintained within prescribed range: Monitor blood glucose as ordered     Problem: Chronic Conditions and Co-morbidities  Goal: Patient's chronic conditions and co-morbidity symptoms are monitored and maintained or improved  Outcome: Progressing  Flowsheets (Taken 2/15/2023 0909)  Care Plan - Patient's Chronic Conditions and Co-Morbidity Symptoms are Monitored and Maintained or Improved: Monitor and assess patient's chronic conditions and comorbid symptoms for stability, deterioration, or improvement     Problem: Nutrition Deficit:  Goal: Optimize nutritional status  Outcome: Progressing     Problem: Safety - Adult  Goal: Free from fall injury  Outcome: Progressing

## 2023-02-15 NOTE — PROGRESS NOTES
[facility]  Palliative Care   Progress Note    NAME:  [address] RECORD NUMBER:  [id number]  AGE: 66 y.o. GENDER: female  : 1945  TODAY'S DATE:  2/15/2023    Subjective: Patient wakes up states she is fine but does not answer any other questions. She would only eat a tsp of oatmeal.     Objective:    Vitals:    02/15/23 1052   BP:    Pulse: 61   Resp: 18   Temp:    SpO2: 98%     Lab Results   Component Value Date    WBC 10.7 2023    HGB 9.8 (L) 2023    HCT 31.5 (L) 2023    MCV 92.2 2023     2023     Lab Results   Component Value Date    CREATININE <0.5 (L) 2023    BUN 12 2023     2023    K 4.0 2023     2023    CO2 22 2023     Lab Results   Component Value Date    ALT 16 2023    AST 20 2023    ALKPHOS 91 2023    BILITOT 0.3 2023       Plan: I have spoken to her daughter/JENNIFER Xavier Comment she states the family has decided on palliative care at this time. Cecilia Crisostomo will work with ECF to see if they have palliative care company they work with. Dr Parvez Wesley informed of above. Code Status: DNR-CC  Discharge Environment:  [x] ECF palliative care   Teaching Time:  0hours  30 min     I will continue to follow Ms. Gunn's care as needed. Thank you for allowing me to participate in the care of Ms. Juanpablo Dupont .      Electronically signed by Prisca Rose RN, BSN,CHPN on 2/15/2023 at 1:34 PM  [address] [place] [address]  Office: [phone]

## 2023-02-15 NOTE — DISCHARGE SUMMARY
Hospital Medicine Discharge Summary    Patient: Harry Hodge     Gender: female  : 1945   Age: 66 y.o. MRN: [id number]    Code Status: [facility]     Primary Care Provider: Rachele Giraldo DO    Admit Date: 2023   Discharge Date:   2/15/2023    Admitting Physician: Anastacio Ledezma DO  Discharge Physician: Tyshawn Villatoro DO     Discharge Diagnoses: Active Hospital Problems    Diagnosis Date Noted    Sepsis [facility]) [A41.9] 2023     Priority: Medium    UTI (urinary tract infection) [N39.0] 2023     Priority: Medium    Acute respiratory failure with hypoxia ([address] [J96.01] 2023     Priority: Medium       Hospital Course:   A 65 yo female with h/o severe dementia, bed bound at baseline, chronic sacral wounds was admitted with fevers. She was treated for a uti and possible acute sacral infection. Work up completed, and improved with treatment as below. was discharged today in stable condition. Sepsis ([address] on admission due to UTI, potentially sacral wound infection  VRE UTI - zyvox iv day 3, dc with po zyvox  Sacral decubitus ulcer stage 4; there was no drainage, abscesses, or foul odor from the area. The bone is exposed. wound cx with pseudomonas, corynebacterium, and enterococcus faecalis - cefepime here, day 7, dc with levaquin   - mrsa probe +  - consult general surgery:  cont IV antibiotics and wound care. Not a candidate for wound vac      Acute metabolic encephalopathy in the setting of dementia   - likely due to acute illness  - continue home medications   - dc back to [facility] with palliative medicine.  She is very appropriate for hospice, with a very poor prognosis as her wound will not completely heal due to being bed bound and very limited mental capacity      Acute hypoxic respiratory failure: likely metabolic cause, also deconditioning and recent PE  - with increased work of breath, tachypnea and hypoxia  - room air saturation 80%  - provide supplemental oxygen as necessary to keep SaO2 92% or greater. Recent PE/DVT  - continue eliquis     Diabetes mellitus II      Essential (primary) hypertension   - continue home meds        [facility]    Exam:     /74   Pulse 61   Temp 96.9 °F (36.1 °C) (Axillary)   Resp 18   Ht 4' 11\" (1.499 m)   Wt 102 lb 1.2 oz (46.3 kg)   SpO2 98%   BMI 20.62 kg/m²     General appearance:  No apparent distress, appears comforable  HEENT:  Normal cephalic, atraumatic without obvious deformity. Pupils equal, round, and reactive to light. Extra ocular muscles intact. Conjunctivae/corneas clear. Neck: Supple, with full range of motion. No jugular venous distention. Trachea midline. Respiratory:  Normal respiratory effort. Clear to auscultation, bilaterally without Rales/Wheezes/Rhonchi. Cardiovascular:  Regular rate and rhythm with normal S1/S2 without murmurs, rubs or gallops. Abdomen: Soft, non-tender, non-distended with normal bowel sounds. Musculoskeletal:  No clubbing, cyanosis or edema bilaterally. Skin: Large sacral decubitus wound with slough at the edges. No drainage  Neurologic:  Neurovascularly intact without any focal sensory/motor deficits. Cranial nerves: II-XII intact, grossly non-focal.  Psychiatric:  Alert, paucity of speech. Only says \"hi\" and \"I'm fine\", does not follow commands. Consults:     PHARMACY TO DOSE VANCOMYCIN  IP CONSULT TO PALLIATIVE CARE  IP CONSULT TO SOCIAL WORK  IP CONSULT TO GENERAL SURGERY  IP CONSULT TO HOSPICE    Labs:  For convenience and continuity at follow-up the following most recent labs are provided:    Lab Results   Component Value Date/Time    WBC 10.7 02/13/2023 06:24 AM    HGB 9.8 02/13/2023 06:24 AM    HCT 31.5 02/13/2023 06:24 AM    MCV 92.2 02/13/2023 06:24 AM     02/13/2023 06:24 AM     02/13/2023 06:24 AM    K 4.0 02/13/2023 06:24 AM    K 2.9 02/12/2023 06:16 AM     02/13/2023 06:24 AM    CO2 22 02/13/2023 06:24 AM    BUN 12 02/13/2023 06:24 AM    CREATININE <0.5 02/13/2023 06:24 AM    CALCIUM 8.4 02/13/2023 06:24 AM    ALKPHOS 91 02/09/2023 05:00 PM    ALT 16 02/09/2023 05:00 PM    AST 20 02/09/2023 05:00 PM    BILITOT 0.3 02/09/2023 05:00 PM    LABALBU 3.1 02/09/2023 05:00 PM    LDLCALC 64 05/25/2022 07:05 AM    TRIG 198 05/25/2022 07:05 AM     No results found for: INR    Radiology:  XR CHEST PORTABLE   Final Result   No acute process. Discharge Medications:   Current Discharge Medication List        START taking these medications    Details   Sodium Hypochlorite (DAKINS) 0.125 % SOLN Irrigate with 473 mLs as directed in the morning and at bedtime Dakins moist dressing to sacral area. Cover with dry dressing  Qty: 500 mL, Refills: 0      collagenase 250 UNIT/GM ointment Apply topically daily.   Qty: 30 g, Refills: 0           Current Discharge Medication List        Current Discharge Medication List        CONTINUE these medications which have NOT CHANGED    Details   carvedilol (COREG) 3.125 MG tablet Take 3.125 mg by mouth daily (with breakfast)      apixaban (ELIQUIS) 5 MG TABS tablet Take 5 mg by mouth 2 times daily      tamsulosin (FLOMAX) 0.4 MG capsule Take 0.4 mg by mouth at bedtime      polyethylene glycol (GLYCOLAX) 17 GM/SCOOP powder Take 17 g by mouth every 12 hours as needed      QUEtiapine (SEROQUEL) 25 MG tablet Take 25 mg by mouth at bedtime      acetaminophen (TYLENOL) 325 MG tablet Take 650 mg by mouth every 6 hours as needed for Pain      HUMALOG KWIKPEN 100 UNIT/ML SOPN Inject as per sliding scale three times a day before meals: if 0-70= 0U and give orange juice;  = 1U; 181-200 = 2U; 201-250 = 3U; 251-300 = 4U; 301-350 = 5U and call MD if > 351      perphenazine 2 MG tablet Take 1 tablet by mouth 2 times daily (with meals)  Qty: 120 tablet, Refills: 3      levothyroxine (SYNTHROID) 25 MCG tablet TAKE 1 TABLET BY MOUTH EVERY DAY  Qty: 30 tablet, Refills: 3    Associated Diagnoses: Acquired hypothyroidism      fluvoxaMINE (LUVOX) 50 MG tablet TAKE 1 TABLET BY MOUTH EVERY DAY AT NIGHT  Qty: 30 tablet, Refills: 0      divalproex (DEPAKOTE SPRINKLE) 125 MG capsule Take 2 capsules by mouth 2 times daily at 0800 and 1400  Qty: 120 capsule, Refills: 0           Current Discharge Medication List        STOP taking these medications       pantoprazole (PROTONIX) 40 MG tablet Comments:   Reason for Stopping:         atorvastatin (LIPITOR) 20 MG tablet Comments:   Reason for Stopping:         cyanocobalamin 1000 MCG tablet Comments:   Reason for Stopping:         metFORMIN (GLUCOPHAGE) 500 MG tablet Comments:   Reason for Stopping:         donepezil (ARICEPT) 10 MG tablet Comments:   Reason for Stopping:         zinc gluconate 50 MG tablet Comments:   Reason for Stopping:         lisinopril-hydroCHLOROthiazide (PRINZIDE;ZESTORETIC) 20-12.5 MG per tablet Comments:   Reason for Stopping:         diclofenac sodium (VOLTAREN) 1 % GEL Comments:   Reason for Stopping:         VITAMIN D PO Comments:   Reason for Stopping:         fluticasone (FLONASE) 50 MCG/ACT nasal spray Comments:   Reason for Stopping:         Multiple Vitamin (MULTI-DAY VITAMINS PO) Comments:   Reason for Stopping: Follow-up appointments:  one week    Provider Follow-up:    [facility]    Condition at Discharge:  Terminal    The patient was seen and examined on day of discharge and this discharge summary is in conjunction with any daily progress note from day of discharge. Time Spent on discharge is 45 minutes  in the examination, evaluation, counseling and review of medications and discharge plan. Signed:    Cindy Chew DO   2/15/2023      Thank you Navi Morales DO for the opportunity to be involved in this patient's care. If you have any questions or concerns please feel free to contact me at [phone].

## 2023-02-16 NOTE — PLAN OF CARE
Problem: Discharge Planning  Goal: Discharge to home or other facility with appropriate resources  2/15/2023 1905 by Naz Winkler RN  Outcome: Completed  2/15/2023 1150 by Naz Winkler RN  Outcome: Progressing  Flowsheets (Taken 2/15/2023 0909)  Discharge to home or other facility with appropriate resources: Identify barriers to discharge with patient and caregiver     Problem: Pain  Goal: Verbalizes/displays adequate comfort level or baseline comfort level  2/15/2023 1905 by Naz Winkler RN  Outcome: Completed  2/15/2023 1150 by Naz Winkler RN  Outcome: Progressing     Problem: ABCDS Injury Assessment  Goal: Absence of physical injury  2/15/2023 1905 by Naz Winkler RN  Outcome: Completed  2/15/2023 1150 by Naz Winkler RN  Outcome: Progressing     Problem: Skin/Tissue Integrity  Goal: Absence of new skin breakdown  Description: 1. Monitor for areas of redness and/or skin breakdown  2. Assess vascular access sites hourly  3. Every 4-6 hours minimum:  Change oxygen saturation probe site  4. Every 4-6 hours:  If on nasal continuous positive airway pressure, respiratory therapy assess nares and determine need for appliance change or resting period.   2/15/2023 1905 by Naz Winkler RN  Outcome: Completed  2/15/2023 1150 by Naz Winkler RN  Outcome: Progressing     Problem: Neurosensory - Adult  Goal: Achieves stable or improved neurological status  2/15/2023 1905 by Naz Winkler RN  Outcome: Completed  2/15/2023 1150 by Naz Winkler RN  Outcome: Progressing  Flowsheets (Taken 2/15/2023 0909)  Achieves stable or improved neurological status: Assess for and report changes in neurological status  Goal: Absence of seizures  2/15/2023 1905 by Naz Winkler RN  Outcome: Completed  2/15/2023 1150 by Naz Winkler RN  Outcome: Progressing  Goal: Remains free of injury related to seizures activity  2/15/2023 1905 by Naz Winkler RN  Outcome: Completed  2/15/2023 1150 by Johnathan Damico RN  Outcome: Progressing  Goal: Achieves maximal functionality and self care  2/15/2023 1905 by Johnathan Damico RN  Outcome: Completed  2/15/2023 1150 by Johnathan Damico RN  Outcome: Progressing  Flowsheets (Taken 2/15/2023 0909)  Achieves maximal functionality and self care: Monitor swallowing and airway patency with patient fatigue and changes in neurological status     Problem: Respiratory - Adult  Goal: Achieves optimal ventilation and oxygenation  2/15/2023 1905 by Johnathan Damico RN  Outcome: Completed  2/15/2023 1150 by Johnathan Damico RN  Outcome: Progressing     Problem: Cardiovascular - Adult  Goal: Maintains optimal cardiac output and hemodynamic stability  2/15/2023 1905 by Johnathan Damico RN  Outcome: Completed  2/15/2023 1150 by Johnathan Damico RN  Outcome: Progressing  Goal: Absence of cardiac dysrhythmias or at baseline  2/15/2023 1905 by Johnathan Damico RN  Outcome: Completed  2/15/2023 1150 by Johnathan Damico RN  Outcome: Progressing     Problem: Skin/Tissue Integrity - Adult  Goal: Skin integrity remains intact  2/15/2023 1905 by Johnathan Damico RN  Outcome: Completed  2/15/2023 1150 by Johnathan Damico RN  Outcome: Progressing  Flowsheets (Taken 2/15/2023 0909)  Skin Integrity Remains Intact: Monitor for areas of redness and/or skin breakdown  Goal: Incisions, wounds, or drain sites healing without S/S of infection  2/15/2023 1905 by Johnathan Damico RN  Outcome: Completed  2/15/2023 1150 by Johnathan Damico RN  Outcome: Progressing  Flowsheets (Taken 2/15/2023 0909)  Incisions, Wounds, or Drain Sites Healing Without Sign and Symptoms of Infection:   TWICE DAILY: Assess and document dressing/incision, wound bed, drain sites and surrounding tissue   Implement wound care per orders  Goal: Oral mucous membranes remain intact  2/15/2023 1905 by Johnathan Damico RN  Outcome: Completed  2/15/2023 1150 by Johnathan Damico RN  Outcome: Progressing  Flowsheets (Taken 2/15/2023 0909)  Oral Mucous Membranes Remain Intact: Assess oral mucosa and hygiene practices     Problem: Musculoskeletal - Adult  Goal: Return mobility to safest level of function  2/15/2023 1905 by Marilia Ball RN  Outcome: Completed  2/15/2023 1150 by Marilia Ball RN  Outcome: Progressing  Flowsheets (Taken 2/15/2023 0909)  Return Mobility to Safest Level of Function:   Assess patient stability and activity tolerance for standing, transferring and ambulating with or without assistive devices   Assist with transfers and ambulation using safe patient handling equipment as needed  Goal: Maintain proper alignment of affected body part  2/15/2023 1905 by Marilia Ball RN  Outcome: Completed  2/15/2023 1150 by Marilia Ball RN  Outcome: Progressing  Flowsheets (Taken 2/15/2023 0909)  Maintain proper alignment of affected body part: Support and protect limb and body alignment per provider's orders  Goal: Return ADL status to a safe level of function  2/15/2023 1905 by Marilia Ball RN  Outcome: Completed  2/15/2023 1150 by Marilia Ball RN  Outcome: Progressing  Flowsheets (Taken 2/15/2023 0909)  Return ADL Status to a Safe Level of Function: Assess activities of daily living deficits and provide assistive devices as needed     Problem: Gastrointestinal - Adult  Goal: Maintains or returns to baseline bowel function  2/15/2023 1905 by Marilia Ball RN  Outcome: Completed  2/15/2023 1150 by Marilia Ball RN  Outcome: Progressing  Flowsheets (Taken 2/15/2023 0909)  Maintains or returns to baseline bowel function:   Assess bowel function   Encourage oral fluids to ensure adequate hydration  Goal: Maintains adequate nutritional intake  2/15/2023 1905 by Marilia Ball RN  Outcome: Completed  2/15/2023 1150 by Marilia Ball RN  Outcome: Progressing  Flowsheets (Taken 2/15/2023 0909)  Maintains adequate nutritional intake:   Monitor percentage of each meal consumed   Identify factors contributing to decreased intake, treat as appropriate     Problem: Genitourinary - Adult  Goal: Absence of urinary retention  2/15/2023 1905 by Marlynn Claude, RN  Outcome: Completed  2/15/2023 1150 by Marlynn Claude, RN  Outcome: Progressing  Goal: Urinary catheter remains patent  2/15/2023 1905 by Marlynn Claude, RN  Outcome: Completed  2/15/2023 1150 by Marlynn Claude, RN  Outcome: Progressing     Problem: Infection - Adult  Goal: Absence of infection at discharge  2/15/2023 1905 by Marlynn Claude, RN  Outcome: Completed  2/15/2023 1150 by Marlynn Claude, RN  Outcome: Progressing  Flowsheets (Taken 2/15/2023 0909)  Absence of infection at discharge: Assess and monitor for signs and symptoms of infection  Goal: Absence of infection during hospitalization  2/15/2023 1905 by Marlynn Claude, RN  Outcome: Completed  2/15/2023 1150 by Marlynn Claude, RN  Outcome: Progressing  Flowsheets (Taken 2/15/2023 0909)  Absence of infection during hospitalization: Assess and monitor for signs and symptoms of infection  Goal: Absence of fever/infection during anticipated neutropenic period  2/15/2023 1905 by Marlynn Claude, RN  Outcome: Completed  2/15/2023 1150 by Marlynn Claude, RN  Outcome: Progressing  Flowsheets (Taken 2/15/2023 0909)  Absence of fever/infection during anticipated neutropenic period: Monitor white blood cell count     Problem: Metabolic/Fluid and Electrolytes - Adult  Goal: Electrolytes maintained within normal limits  2/15/2023 1905 by Marlynn Claude, RN  Outcome: Completed  2/15/2023 1150 by Marlynn Claude, RN  Outcome: Progressing  Flowsheets (Taken 2/15/2023 0909)  Electrolytes maintained within normal limits: Monitor labs and assess patient for signs and symptoms of electrolyte imbalances  Goal: Hemodynamic stability and optimal renal function maintained  2/15/2023 1905 by Marlynn Claude, RN  Outcome: Completed  2/15/2023 1150 by Anna Lisa RACHEL Walsh  Outcome: Progressing  Flowsheets (Taken 2/15/2023 0909)  Hemodynamic stability and optimal renal function maintained: Monitor labs and assess for signs and symptoms of volume excess or deficit  Goal: Glucose maintained within prescribed range  2/15/2023 1905 by Oksana Tom RN  Outcome: Completed  2/15/2023 1150 by Oksana Tom RN  Outcome: Progressing  Flowsheets (Taken 2/15/2023 0909)  Glucose maintained within prescribed range: Monitor blood glucose as ordered     Problem: Chronic Conditions and Co-morbidities  Goal: Patient's chronic conditions and co-morbidity symptoms are monitored and maintained or improved  2/15/2023 1905 by Oksana Tom RN  Outcome: Completed  2/15/2023 1150 by Oksana Tom RN  Outcome: Progressing  Flowsheets (Taken 2/15/2023 0909)  Care Plan - Patient's Chronic Conditions and Co-Morbidity Symptoms are Monitored and Maintained or Improved: Monitor and assess patient's chronic conditions and comorbid symptoms for stability, deterioration, or improvement     Problem: Nutrition Deficit:  Goal: Optimize nutritional status  2/15/2023 1905 by Oksana Tom RN  Outcome: Completed  2/15/2023 1150 by Oksana Tom RN  Outcome: Progressing     Problem: Safety - Adult  Goal: Free from fall injury  2/15/2023 1905 by Oksana Tom RN  Outcome: Completed  2/15/2023 1150 by Oksana Tom RN  Outcome: Progressing

## 2023-02-16 NOTE — PROGRESS NOTES
Attempted to call report again. No response. RN tried calling family but no response. Patient is discharged back to [facility].

## 2023-02-16 NOTE — PLAN OF CARE
Problem: Discharge Planning  Goal: Discharge to home or other facility with appropriate resources  2/15/2023 1905 by Sukhwinder Henry RN  Outcome: Completed  2/15/2023 1150 by Sukhwinder Henry RN  Outcome: Progressing  Flowsheets (Taken 2/15/2023 0909)  Discharge to home or other facility with appropriate resources: Identify barriers to discharge with patient and caregiver     Problem: Pain  Goal: Verbalizes/displays adequate comfort level or baseline comfort level  2/15/2023 1905 by Sukhwinder Henry RN  Outcome: Completed  2/15/2023 1150 by Sukhwinder Henry RN  Outcome: Progressing     Problem: ABCDS Injury Assessment  Goal: Absence of physical injury  2/15/2023 1905 by Sukhwinder Henry RN  Outcome: Completed  2/15/2023 1150 by Sukhwinder Henry RN  Outcome: Progressing     Problem: Skin/Tissue Integrity  Goal: Absence of new skin breakdown  Description: 1. Monitor for areas of redness and/or skin breakdown  2. Assess vascular access sites hourly  3. Every 4-6 hours minimum:  Change oxygen saturation probe site  4. Every 4-6 hours:  If on nasal continuous positive airway pressure, respiratory therapy assess nares and determine need for appliance change or resting period.   2/15/2023 1905 by Sukhwinder Henry RN  Outcome: Completed  2/15/2023 1150 by Sukhwinder Henry RN  Outcome: Progressing     Problem: Neurosensory - Adult  Goal: Achieves stable or improved neurological status  2/15/2023 1905 by Sukhwinder Henry RN  Outcome: Completed  2/15/2023 1150 by Sukhwinder Henry RN  Outcome: Progressing  Flowsheets (Taken 2/15/2023 0909)  Achieves stable or improved neurological status: Assess for and report changes in neurological status  Goal: Absence of seizures  2/15/2023 1905 by Sukhwinder Henry RN  Outcome: Completed  2/15/2023 1150 by Sukhwinder Henry RN  Outcome: Progressing  Goal: Remains free of injury related to seizures activity  2/15/2023 1905 by Sukhwinder Henry RN  Outcome: Completed  2/15/2023 1150 by Talib Walsh RN  Outcome: Progressing  Goal: Achieves maximal functionality and self care  2/15/2023 1905 by Talib Walsh RN  Outcome: Completed  2/15/2023 1150 by Talib Walsh RN  Outcome: Progressing  Flowsheets (Taken 2/15/2023 0909)  Achieves maximal functionality and self care: Monitor swallowing and airway patency with patient fatigue and changes in neurological status     Problem: Respiratory - Adult  Goal: Achieves optimal ventilation and oxygenation  2/15/2023 1905 by Talib Walsh RN  Outcome: Completed  2/15/2023 1150 by Talib Walsh RN  Outcome: Progressing     Problem: Cardiovascular - Adult  Goal: Maintains optimal cardiac output and hemodynamic stability  2/15/2023 1905 by Talib Walsh RN  Outcome: Completed  2/15/2023 1150 by Talib Walsh RN  Outcome: Progressing  Goal: Absence of cardiac dysrhythmias or at baseline  2/15/2023 1905 by Talib Walsh RN  Outcome: Completed  2/15/2023 1150 by Talib Walsh RN  Outcome: Progressing     Problem: Skin/Tissue Integrity - Adult  Goal: Skin integrity remains intact  2/15/2023 1905 by Talib Walsh RN  Outcome: Completed  2/15/2023 1150 by Talib Walsh RN  Outcome: Progressing  Flowsheets (Taken 2/15/2023 0909)  Skin Integrity Remains Intact: Monitor for areas of redness and/or skin breakdown  Goal: Incisions, wounds, or drain sites healing without S/S of infection  2/15/2023 1905 by Talib Walsh RN  Outcome: Completed  2/15/2023 1150 by Talib Walsh RN  Outcome: Progressing  Flowsheets (Taken 2/15/2023 0909)  Incisions, Wounds, or Drain Sites Healing Without Sign and Symptoms of Infection:   TWICE DAILY: Assess and document dressing/incision, wound bed, drain sites and surrounding tissue   Implement wound care per orders  Goal: Oral mucous membranes remain intact  2/15/2023 1905 by Talib Walsh RN  Outcome: Completed  2/15/2023 1150 by Talib Walsh RN  Outcome:  Progressing  Flowsheets (Taken 2/15/2023 0909)  Oral Mucous Membranes Remain Intact: Assess oral mucosa and hygiene practices     Problem: Musculoskeletal - Adult  Goal: Return mobility to safest level of function  2/15/2023 1905 by Johnathan Damico RN  Outcome: Completed  2/15/2023 1150 by Johnathan Damico RN  Outcome: Progressing  Flowsheets (Taken 2/15/2023 0909)  Return Mobility to Safest Level of Function:   Assess patient stability and activity tolerance for standing, transferring and ambulating with or without assistive devices   Assist with transfers and ambulation using safe patient handling equipment as needed  Goal: Maintain proper alignment of affected body part  2/15/2023 1905 by Johnathan Damico RN  Outcome: Completed  2/15/2023 1150 by Johnathan Damico RN  Outcome: Progressing  Flowsheets (Taken 2/15/2023 0909)  Maintain proper alignment of affected body part: Support and protect limb and body alignment per provider's orders  Goal: Return ADL status to a safe level of function  2/15/2023 1905 by Johnathan Damico RN  Outcome: Completed  2/15/2023 1150 by Johnathan Damico RN  Outcome: Progressing  Flowsheets (Taken 2/15/2023 0909)  Return ADL Status to a Safe Level of Function: Assess activities of daily living deficits and provide assistive devices as needed     Problem: Gastrointestinal - Adult  Goal: Maintains or returns to baseline bowel function  2/15/2023 1905 by Johnathan Damico RN  Outcome: Completed  2/15/2023 1150 by Johnathan Damico RN  Outcome: Progressing  Flowsheets (Taken 2/15/2023 0909)  Maintains or returns to baseline bowel function:   Assess bowel function   Encourage oral fluids to ensure adequate hydration  Goal: Maintains adequate nutritional intake  2/15/2023 1905 by Johnathan Damico RN  Outcome: Completed  2/15/2023 1150 by Johnathan Damico RN  Outcome: Progressing  Flowsheets (Taken 2/15/2023 0909)  Maintains adequate nutritional intake:   Monitor percentage of each meal consumed   Identify factors contributing to decreased intake, treat as appropriate     Problem: Genitourinary - Adult  Goal: Absence of urinary retention  2/15/2023 1905 by Darryn Dorado RN  Outcome: Completed  2/15/2023 1150 by Darryn Dorado RN  Outcome: Progressing  Goal: Urinary catheter remains patent  2/15/2023 1905 by Darryn Dorado RN  Outcome: Completed  2/15/2023 1150 by Darryn Dorado RN  Outcome: Progressing     Problem: Infection - Adult  Goal: Absence of infection at discharge  2/15/2023 1905 by Darryn Dorado RN  Outcome: Completed  2/15/2023 1150 by Darryn Dorado RN  Outcome: Progressing  Flowsheets (Taken 2/15/2023 0909)  Absence of infection at discharge: Assess and monitor for signs and symptoms of infection  Goal: Absence of infection during hospitalization  2/15/2023 1905 by Darryn Dorado RN  Outcome: Completed  2/15/2023 1150 by Darryn Dorado RN  Outcome: Progressing  Flowsheets (Taken 2/15/2023 0909)  Absence of infection during hospitalization: Assess and monitor for signs and symptoms of infection  Goal: Absence of fever/infection during anticipated neutropenic period  2/15/2023 1905 by Darryn Dorado RN  Outcome: Completed  2/15/2023 1150 by Darryn Dorado RN  Outcome: Progressing  Flowsheets (Taken 2/15/2023 0909)  Absence of fever/infection during anticipated neutropenic period: Monitor white blood cell count     Problem: Metabolic/Fluid and Electrolytes - Adult  Goal: Electrolytes maintained within normal limits  2/15/2023 1905 by Darryn Dorado RN  Outcome: Completed  2/15/2023 1150 by Darryn Dorado RN  Outcome: Progressing  Flowsheets (Taken 2/15/2023 0909)  Electrolytes maintained within normal limits: Monitor labs and assess patient for signs and symptoms of electrolyte imbalances  Goal: Hemodynamic stability and optimal renal function maintained  2/15/2023 1905 by Darryn Dorado RN  Outcome: Completed  2/15/2023 1150 by Mesha Randhawa RACHEL Walsh  Outcome: Progressing  Flowsheets (Taken 2/15/2023 0909)  Hemodynamic stability and optimal renal function maintained: Monitor labs and assess for signs and symptoms of volume excess or deficit  Goal: Glucose maintained within prescribed range  2/15/2023 1905 by Lawanda Rice RN  Outcome: Completed  2/15/2023 1150 by Lawanda Rice RN  Outcome: Progressing  Flowsheets (Taken 2/15/2023 0909)  Glucose maintained within prescribed range: Monitor blood glucose as ordered     Problem: Chronic Conditions and Co-morbidities  Goal: Patient's chronic conditions and co-morbidity symptoms are monitored and maintained or improved  2/15/2023 1905 by Lawanda Rice RN  Outcome: Completed  2/15/2023 1150 by Lawanda Rice RN  Outcome: Progressing  Flowsheets (Taken 2/15/2023 0909)  Care Plan - Patient's Chronic Conditions and Co-Morbidity Symptoms are Monitored and Maintained or Improved: Monitor and assess patient's chronic conditions and comorbid symptoms for stability, deterioration, or improvement     Problem: Nutrition Deficit:  Goal: Optimize nutritional status  2/15/2023 1905 by Lawanda Rice RN  Outcome: Completed  2/15/2023 1150 by Lawanda Rice RN  Outcome: Progressing     Problem: Safety - Adult  Goal: Free from fall injury  2/15/2023 1905 by Lawanda Rice RN  Outcome: Completed  2/15/2023 1150 by Lawanda Rice RN  Outcome: Progressing

## 2023-02-16 NOTE — PLAN OF CARE
Problem: Discharge Planning  Goal: Discharge to home or other facility with appropriate resources  2/15/2023 1905 by Samuel Loaiza RN  Outcome: Completed  2/15/2023 1150 by Samuel Loaiza RN  Outcome: Progressing  Flowsheets (Taken 2/15/2023 0909)  Discharge to home or other facility with appropriate resources: Identify barriers to discharge with patient and caregiver     Problem: Pain  Goal: Verbalizes/displays adequate comfort level or baseline comfort level  2/15/2023 1905 by Samuel Loaiza RN  Outcome: Completed  2/15/2023 1150 by Samuel Loaiza RN  Outcome: Progressing     Problem: ABCDS Injury Assessment  Goal: Absence of physical injury  2/15/2023 1905 by Samuel Loaiza RN  Outcome: Completed  2/15/2023 1150 by Samuel Loaiza RN  Outcome: Progressing     Problem: Skin/Tissue Integrity  Goal: Absence of new skin breakdown  Description: 1. Monitor for areas of redness and/or skin breakdown  2. Assess vascular access sites hourly  3. Every 4-6 hours minimum:  Change oxygen saturation probe site  4. Every 4-6 hours:  If on nasal continuous positive airway pressure, respiratory therapy assess nares and determine need for appliance change or resting period.   2/15/2023 1905 by Samuel Loaiza RN  Outcome: Completed  2/15/2023 1150 by Samuel Loaiza RN  Outcome: Progressing     Problem: Neurosensory - Adult  Goal: Achieves stable or improved neurological status  2/15/2023 1905 by Samuel Loaiza RN  Outcome: Completed  2/15/2023 1150 by Samuel Loaiza RN  Outcome: Progressing  Flowsheets (Taken 2/15/2023 0909)  Achieves stable or improved neurological status: Assess for and report changes in neurological status  Goal: Absence of seizures  2/15/2023 1905 by Samuel Loaiza RN  Outcome: Completed  2/15/2023 1150 by Samuel Loaiza RN  Outcome: Progressing  Goal: Remains free of injury related to seizures activity  2/15/2023 1905 by Samuel Loaiza RN  Outcome: Completed  2/15/2023 1150 by Chaparro Luis RN  Outcome: Progressing  Goal: Achieves maximal functionality and self care  2/15/2023 1905 by Chaparro Luis RN  Outcome: Completed  2/15/2023 1150 by Chaparro Luis RN  Outcome: Progressing  Flowsheets (Taken 2/15/2023 0909)  Achieves maximal functionality and self care: Monitor swallowing and airway patency with patient fatigue and changes in neurological status     Problem: Respiratory - Adult  Goal: Achieves optimal ventilation and oxygenation  2/15/2023 1905 by Chaparro Luis RN  Outcome: Completed  2/15/2023 1150 by Chaparro Luis RN  Outcome: Progressing     Problem: Cardiovascular - Adult  Goal: Maintains optimal cardiac output and hemodynamic stability  2/15/2023 1905 by Chaparro Luis RN  Outcome: Completed  2/15/2023 1150 by Chaparro Luis RN  Outcome: Progressing  Goal: Absence of cardiac dysrhythmias or at baseline  2/15/2023 1905 by Chaparro Luis RN  Outcome: Completed  2/15/2023 1150 by Chaparro Luis RN  Outcome: Progressing     Problem: Skin/Tissue Integrity - Adult  Goal: Skin integrity remains intact  2/15/2023 1905 by Chaparro Luis RN  Outcome: Completed  2/15/2023 1150 by Chaparro Luis RN  Outcome: Progressing  Flowsheets (Taken 2/15/2023 0909)  Skin Integrity Remains Intact: Monitor for areas of redness and/or skin breakdown  Goal: Incisions, wounds, or drain sites healing without S/S of infection  2/15/2023 1905 by Chaparro Luis RN  Outcome: Completed  2/15/2023 1150 by Chaparro Luis RN  Outcome: Progressing  Flowsheets (Taken 2/15/2023 0909)  Incisions, Wounds, or Drain Sites Healing Without Sign and Symptoms of Infection:   TWICE DAILY: Assess and document dressing/incision, wound bed, drain sites and surrounding tissue   Implement wound care per orders  Goal: Oral mucous membranes remain intact  2/15/2023 1905 by Chaparro Luis RN  Outcome: Completed  2/15/2023 1150 by Chaparro Luis RN  Outcome: Progressing  Flowsheets (Taken 2/15/2023 0909)  Oral Mucous Membranes Remain Intact: Assess oral mucosa and hygiene practices     Problem: Musculoskeletal - Adult  Goal: Return mobility to safest level of function  2/15/2023 1905 by Claudia Anaya RN  Outcome: Completed  2/15/2023 1150 by Claudia Anaya RN  Outcome: Progressing  Flowsheets (Taken 2/15/2023 0909)  Return Mobility to Safest Level of Function:   Assess patient stability and activity tolerance for standing, transferring and ambulating with or without assistive devices   Assist with transfers and ambulation using safe patient handling equipment as needed  Goal: Maintain proper alignment of affected body part  2/15/2023 1905 by Claudia Anaya RN  Outcome: Completed  2/15/2023 1150 by Claudia Anaya RN  Outcome: Progressing  Flowsheets (Taken 2/15/2023 0909)  Maintain proper alignment of affected body part: Support and protect limb and body alignment per provider's orders  Goal: Return ADL status to a safe level of function  2/15/2023 1905 by Claudia Anaya RN  Outcome: Completed  2/15/2023 1150 by Claudia Anaya RN  Outcome: Progressing  Flowsheets (Taken 2/15/2023 0909)  Return ADL Status to a Safe Level of Function: Assess activities of daily living deficits and provide assistive devices as needed     Problem: Gastrointestinal - Adult  Goal: Maintains or returns to baseline bowel function  2/15/2023 1905 by Claudia Anaya RN  Outcome: Completed  2/15/2023 1150 by Claudia Anaya RN  Outcome: Progressing  Flowsheets (Taken 2/15/2023 0909)  Maintains or returns to baseline bowel function:   Assess bowel function   Encourage oral fluids to ensure adequate hydration  Goal: Maintains adequate nutritional intake  2/15/2023 1905 by Claudia Anaya RN  Outcome: Completed  2/15/2023 1150 by Claudia Anaya RN  Outcome: Progressing  Flowsheets (Taken 2/15/2023 0909)  Maintains adequate nutritional intake:   Monitor percentage of each meal consumed   Identify factors contributing to decreased intake, treat as appropriate     Problem: Genitourinary - Adult  Goal: Absence of urinary retention  2/15/2023 1905 by Sheri Villaseñor RN  Outcome: Completed  2/15/2023 1150 by Sheri Villaseñor RN  Outcome: Progressing  Goal: Urinary catheter remains patent  2/15/2023 1905 by Sheri Villaseñor RN  Outcome: Completed  2/15/2023 1150 by Sheri Villaseñor RN  Outcome: Progressing     Problem: Infection - Adult  Goal: Absence of infection at discharge  2/15/2023 1905 by Sheri Villaseñor RN  Outcome: Completed  2/15/2023 1150 by Sheri Villaseñor RN  Outcome: Progressing  Flowsheets (Taken 2/15/2023 0909)  Absence of infection at discharge: Assess and monitor for signs and symptoms of infection  Goal: Absence of infection during hospitalization  2/15/2023 1905 by Sheri Villaseñor RN  Outcome: Completed  2/15/2023 1150 by Sheri Villaseñor RN  Outcome: Progressing  Flowsheets (Taken 2/15/2023 0909)  Absence of infection during hospitalization: Assess and monitor for signs and symptoms of infection  Goal: Absence of fever/infection during anticipated neutropenic period  2/15/2023 1905 by Sheri Villaseñor RN  Outcome: Completed  2/15/2023 1150 by Sheri Villaseñor RN  Outcome: Progressing  Flowsheets (Taken 2/15/2023 0909)  Absence of fever/infection during anticipated neutropenic period: Monitor white blood cell count     Problem: Metabolic/Fluid and Electrolytes - Adult  Goal: Electrolytes maintained within normal limits  2/15/2023 1905 by Sheri Villaseñor RN  Outcome: Completed  2/15/2023 1150 by Sheri Villaseñor RN  Outcome: Progressing  Flowsheets (Taken 2/15/2023 0909)  Electrolytes maintained within normal limits: Monitor labs and assess patient for signs and symptoms of electrolyte imbalances  Goal: Hemodynamic stability and optimal renal function maintained  2/15/2023 1905 by Sheri Villaseñor RN  Outcome: Completed  2/15/2023 1150 by Lindsay Nicole RACHEL Walsh  Outcome: Progressing  Flowsheets (Taken 2/15/2023 0909)  Hemodynamic stability and optimal renal function maintained: Monitor labs and assess for signs and symptoms of volume excess or deficit  Goal: Glucose maintained within prescribed range  2/15/2023 1905 by Mary Quiñonez RN  Outcome: Completed  2/15/2023 1150 by Mary Quiñonez RN  Outcome: Progressing  Flowsheets (Taken 2/15/2023 0909)  Glucose maintained within prescribed range: Monitor blood glucose as ordered     Problem: Chronic Conditions and Co-morbidities  Goal: Patient's chronic conditions and co-morbidity symptoms are monitored and maintained or improved  2/15/2023 1905 by Mary Quiñonez RN  Outcome: Completed  2/15/2023 1150 by Mary Quiñonez RN  Outcome: Progressing  Flowsheets (Taken 2/15/2023 0909)  Care Plan - Patient's Chronic Conditions and Co-Morbidity Symptoms are Monitored and Maintained or Improved: Monitor and assess patient's chronic conditions and comorbid symptoms for stability, deterioration, or improvement     Problem: Nutrition Deficit:  Goal: Optimize nutritional status  2/15/2023 1905 by Mary Quiñonez RN  Outcome: Completed  2/15/2023 1150 by Mary Quiñonez RN  Outcome: Progressing     Problem: Safety - Adult  Goal: Free from fall injury  2/15/2023 1905 by Mary Quiñonez RN  Outcome: Completed  2/15/2023 1150 by Mary Quiñonez RN  Outcome: Progressing

## 2023-03-03 ENCOUNTER — TELEPHONE (OUTPATIENT)
Dept: FAMILY MEDICINE CLINIC | Age: 78
End: 2023-03-03

## 2023-03-03 NOTE — TELEPHONE ENCOUNTER
Patients daughter called and asked if she can get a letter from dr. Bari Gomes stating that you had to live with her from august 2020 due to the dementia.   She is now in a nursing facility     This is for the [employer] application [place] exemption

## 2023-03-03 NOTE — LETTER
March 4, 2023       Opal Giron YOB: 1945   [address]  [facility] [id number] Date of Visit:  3/3/2023       To Whom It May Concern: Deuce Uribe is a 77-year-old female patient who began treatment for dementia on 11/6/2018. Patient was referred to a neurologist because of decline of her mental status in 2019. Because her dementia and difficulties that it caused in patient's ability to take her medicines and care for herself around early August 2020 her daughter had to begin caring for the patient in the patient's home. This was essential in order to prevent rapid decline in the Mrs. Gunn's health due to her inability to take her medications and do all her activities of daily living necessary to maintain her health. The patient required supervision overnight and her medical issues could not be addressed any longer through daily visits by her daughter and her son. If you have any questions or concerns, please don't hesitate to call.     Sincerely,        Bob Ray, DO

## 2023-03-06 NOTE — TELEPHONE ENCOUNTER
LETTER PRINTED, READY FOR DR. LAI TO SIGN. CALLED AND LEFT MESSAGE FOR PATIENT DAUGHTER DAVID ADVISING LETTER WILL BE READY TO  THIS AFTERNOON. SC

## 2023-03-11 PROBLEM — N39.0 UTI (URINARY TRACT INFECTION): Status: RESOLVED | Noted: 2023-01-01 | Resolved: 2023-01-01

## 2023-05-07 ENCOUNTER — TELEPHONE (OUTPATIENT)
Dept: FAMILY MEDICINE CLINIC | Age: 78
End: 2023-05-07

## 2023-05-09 ENCOUNTER — TELEPHONE (OUTPATIENT)
Dept: FAMILY MEDICINE CLINIC | Age: 78
End: 2023-05-09

## 2023-05-09 NOTE — TELEPHONE ENCOUNTER
L/m on daughter ashley's cell phone and the home number. I did schedule them in today with doctor Holland Grant at 1130 as he said they need to come in to fill out paperwork.     I asked that they call back if they can not make this appt

## 2023-05-09 NOTE — TELEPHONE ENCOUNTER
Patient daughter dropped off paperwork last Friday to be filled out, her mother passed away on 4/22/2023. Please give her a call back.  She does need the form filled out

## 2023-05-09 NOTE — TELEPHONE ENCOUNTER
Spoke with Dr. Victor Manuel Thapa. There is a lot of information we are unsure how to fill out. Sending to Dudley Pereira to call daughter to discuss.

## 2023-05-10 NOTE — TELEPHONE ENCOUNTER
SPOKE TO DR. Linwood Barker, HE STATES DAUGHTER NEEDS TO COME  A COPY AND FILL OUT AS MUCH INFORMATION AS SHE CAN AND BRING IT BACK TO THE OFFICE, DR. Linwood Barker WILL HELP HER WITH THIS. BRANNON CALLED DAUGHTER AND ADVISED HER OF THIS, DAUGHTER WILL  [address], [address] FROM THE OFFICE TOMORROW AND WILL GET IT BACK TO US.  SC

## 2023-12-20 NOTE — TELEPHONE ENCOUNTER
[phone] SPOKE TO [facility]   CALLED IN RX     OLANZapine (ZYPREXA) 2.5 MG tablet    Sig: Take 1 tablet by mouth nightly Syringe Size Used (Required For Enhanced Ndc): 300 mg/2ml prefilled pen Ndc (300 Mg Prefilled Pen): 4944-3950-76 Was The Medication Purchased By The Clinic?: No [address]002 Billing Preferences: 1 Lot # (Optional): [id number] Ndc (200 Mg Prefilled Syringe): 9575-4611-85 J-Code:  Expiration Date (Optional): 2025-10-31 Dupixent Amount: 300 mg Consent: The risks of pain and injection site reactions were reviewed with the patient prior to the injection. Detail Level: None Administered By (Optional): patient Ndc (300 Mg Prefilled Syringe): 2738-2427-75 Use Enhanced Ndc?: Yes Ndc (200 Mg Prefilled Pen): 1697-8475-26

## 2024-07-25 NOTE — TELEPHONE ENCOUNTER
Ana Paula [facility] Transitions Initial Follow Up Call    Outreach made within 2 business days of discharge: Yes    Patient: Jazmine De La Rosa Patient : 1945   MRN: [id number]  Reason for Admission: There are no discharge diagnoses documented for the most recent discharge. Discharge Date: 22       Spoke with: PT TRANSFERRED TO [place] [address]. Discharge department/facility: [facility]    TCM Interactive Patient Contact:  Was patient able to fill all prescriptions: NA  Was patient instructed to bring all medications to the follow-up visit: NA  Is patient taking all medications as directed in the discharge summary? NA  Does patient understand their discharge instructions: NA  Does patient have questions or concerns that need addressed prior to 7-14 day follow up office visit: NA    Scheduled appointment with PCP within 7-14 days    Follow Up  Future Appointments   Date Time Provider Joel Doss   10/21/2022  3:00 PM DO Cameron Dee   2023  4:20 PM Maurisio Burns MD [facility] [address] [place], [address]  Phone: [phone]  Fax: [phone]    PT TRANSFERRED TO [address] [place] [address] FOR [facility] CARE.   DR Serafin Payne WILL FOLLOW HER AT [place] [address].  [address], Kae Angela, [address] [facility] Tessa Ribeiro Patient educated on the following :    - If you are receiving Sedation for your procedure Nothing to Eat 6 hours and only clear liquids for 2 hours prior to your procedure.    -You will need to have someone drive you home after your PROCEDURE and remain with you for 24 hours after the PROCEDURE  - The date of your procedure, your are welcome to have one visitor at bedside or remain within 10-15 minutes of [facility]  -You will need to arrive at 0700 on 7/26  PROCEDURE  -Please contact [employer] [facility] at: [phone] with any questions and/or concerns

## 2025-01-26 NOTE — PROGRESS NOTES
General and Vascular Surgery                                                           Daily Progress Note                                                             Jennifer Gallegos PA-C    Pt Name: Caden Guallpa  Medical Record Number: [id number]  Date of Birth 1945   Today's Date: 2/14/2023    ASSESSMENT/PLAN  Stage IV sacral decubitus ulcer- apply santyl to wound daily with moist guaze soaked with Dakins solution. Keep pressure off ulcer as much as possible. Dementia  UTI  Hx of DVT on Eliquis  IV antibiotics  Will continue to follow and give further recommendations and debride as needed, with caution secondary to the patient being anticoagulated. EDUCATION  Patient educated about their illness/diagnosis, stated above, and all questions answered. We discussed the importance of nutrition, medications they are taking, and healthy lifestyle. Natalie Montoya is unchanged from yesterday. Pain is well controlled. OBJECTIVE  VITALS:  height is 4' 11\" (1.499 m) and weight is 100 lb 12 oz (45.7 kg). Her axillary temperature is 98.5 °F (36.9 °C). Her blood pressure is 156/110 (abnormal) and her pulse is 129 (abnormal). Her respiration is 16 and oxygen saturation is 95%. VITALS:  BP (!) 156/110   Pulse (!) 129   Temp 98.5 °F (36.9 °C) (Axillary)   Resp 16   Ht 4' 11\" (1.499 m)   Wt 100 lb 12 oz (45.7 kg)   SpO2 95%   BMI 20.35 kg/m²   GENERAL: confused, alert, no distress  ABDOMEN: soft, non-tender, non-distended  EXTREMITY/BUTTOCKS: stage IV sacral decubitus ulcer. Some slough at the superior boarder of the would  I/O last 3 completed shifts: In: 5671.7 [P.O.:200; I.V.:3288.3; IV Piggyback:2183.4]  Out: 800 [Urine:800]  No intake/output data recorded.     LABS  Recent Labs     02/12/23  0616 02/13/23  0624   WBC 6.3 10.7   HGB 10.9* 9.8*   HCT 33.6* 31.5*    135    143   K 2.9* 4.0    109   CO2 26 22   BUN 9 12 CREATININE <0.5* <0.5*   MG 1.90  --    CALCIUM 8.5 8.4   CBC:   Lab Results   Component Value Date/Time    WBC 10.7 02/13/2023 06:24 AM    RBC 3.41 02/13/2023 06:24 AM    HGB 9.8 02/13/2023 06:24 AM    HCT 31.5 02/13/2023 06:24 AM    MCV 92.2 02/13/2023 06:24 AM    MCH 28.7 02/13/2023 06:24 AM    MCHC 31.1 02/13/2023 06:24 AM    RDW 20.5 02/13/2023 06:24 AM     02/13/2023 06:24 AM    MPV 8.7 02/13/2023 06:24 AM     CMP:    Lab Results   Component Value Date/Time     02/13/2023 06:24 AM    K 4.0 02/13/2023 06:24 AM    K 2.9 02/12/2023 06:16 AM     02/13/2023 06:24 AM    CO2 22 02/13/2023 06:24 AM    BUN 12 02/13/2023 06:24 AM    CREATININE <0.5 02/13/2023 06:24 AM    GFRAA >60 08/24/2022 05:13 AM    GFRAA >60 09/02/2010 11:15 AM    AGRATIO 0.7 02/09/2023 05:00 PM    LABGLOM >60 02/13/2023 06:24 AM    GLUCOSE 102 02/13/2023 06:24 AM    PROT 7.3 02/09/2023 05:00 PM    LABALBU 3.1 02/09/2023 05:00 PM    CALCIUM 8.4 02/13/2023 06:24 AM    BILITOT 0.3 02/09/2023 05:00 PM    ALKPHOS 91 02/09/2023 05:00 PM    AST 20 02/09/2023 05:00 PM    ALT 16 02/09/2023 05:00 PM         Sandra Molina PA-C  Electronically signed 2/14/2023 at 9:57 AM        Surgery Staff  I have examined this patient and read and agree with the note by Genna Smith PA-C from today. Wound noted. Unlikely to ever heal even with aggressive measures (diverting ostomy, vac) given immobility and dementia  Hospice is appropriate.   Would not recommend surgery      Electronically signed by Messi Colvin MD on 2/14/2023 at 12:44 PM Male